# Patient Record
Sex: FEMALE | Race: BLACK OR AFRICAN AMERICAN | NOT HISPANIC OR LATINO | Employment: UNEMPLOYED | URBAN - METROPOLITAN AREA
[De-identification: names, ages, dates, MRNs, and addresses within clinical notes are randomized per-mention and may not be internally consistent; named-entity substitution may affect disease eponyms.]

---

## 2017-08-17 ENCOUNTER — ALLSCRIPTS OFFICE VISIT (OUTPATIENT)
Dept: OTHER | Facility: OTHER | Age: 62
End: 2017-08-17

## 2017-08-17 DIAGNOSIS — E11.65 TYPE 2 DIABETES MELLITUS WITH HYPERGLYCEMIA (HCC): ICD-10-CM

## 2017-08-17 DIAGNOSIS — E78.5 HYPERLIPIDEMIA: ICD-10-CM

## 2017-08-18 ENCOUNTER — TRANSCRIBE ORDERS (OUTPATIENT)
Dept: ADMINISTRATIVE | Age: 62
End: 2017-08-18

## 2017-08-18 ENCOUNTER — APPOINTMENT (OUTPATIENT)
Dept: LAB | Age: 62
End: 2017-08-18

## 2017-08-18 DIAGNOSIS — E78.5 HYPERLIPIDEMIA: ICD-10-CM

## 2017-08-18 DIAGNOSIS — E11.65 TYPE 2 DIABETES MELLITUS WITH HYPERGLYCEMIA (HCC): ICD-10-CM

## 2017-08-18 LAB
ALBUMIN SERPL BCP-MCNC: 3.6 G/DL (ref 3.5–5)
ALP SERPL-CCNC: 71 U/L (ref 46–116)
ALT SERPL W P-5'-P-CCNC: 13 U/L (ref 12–78)
ANION GAP SERPL CALCULATED.3IONS-SCNC: 4 MMOL/L (ref 4–13)
AST SERPL W P-5'-P-CCNC: 7 U/L (ref 5–45)
BILIRUB SERPL-MCNC: 0.52 MG/DL (ref 0.2–1)
BUN SERPL-MCNC: 15 MG/DL (ref 5–25)
CALCIUM SERPL-MCNC: 9.5 MG/DL (ref 8.3–10.1)
CHLORIDE SERPL-SCNC: 104 MMOL/L (ref 100–108)
CHOLEST SERPL-MCNC: 183 MG/DL (ref 50–200)
CO2 SERPL-SCNC: 29 MMOL/L (ref 21–32)
CREAT SERPL-MCNC: 0.7 MG/DL (ref 0.6–1.3)
CREAT UR-MCNC: 169 MG/DL
EST. AVERAGE GLUCOSE BLD GHB EST-MCNC: 146 MG/DL
GFR SERPL CREATININE-BSD FRML MDRD: 109 ML/MIN/1.73SQ M
GLUCOSE P FAST SERPL-MCNC: 96 MG/DL (ref 65–99)
HBA1C MFR BLD: 6.7 % (ref 4.2–6.3)
HDLC SERPL-MCNC: 51 MG/DL (ref 40–60)
LDLC SERPL CALC-MCNC: 120 MG/DL (ref 0–100)
MICROALBUMIN UR-MCNC: 14.2 MG/L (ref 0–20)
MICROALBUMIN/CREAT 24H UR: 8 MG/G CREATININE (ref 0–30)
POTASSIUM SERPL-SCNC: 3.8 MMOL/L (ref 3.5–5.3)
PROT SERPL-MCNC: 7.7 G/DL (ref 6.4–8.2)
SODIUM SERPL-SCNC: 137 MMOL/L (ref 136–145)
TRIGL SERPL-MCNC: 58 MG/DL

## 2017-08-18 PROCEDURE — 82570 ASSAY OF URINE CREATININE: CPT

## 2017-08-18 PROCEDURE — 80061 LIPID PANEL: CPT

## 2017-08-18 PROCEDURE — 80053 COMPREHEN METABOLIC PANEL: CPT

## 2017-08-18 PROCEDURE — 83036 HEMOGLOBIN GLYCOSYLATED A1C: CPT

## 2017-08-18 PROCEDURE — 36415 COLL VENOUS BLD VENIPUNCTURE: CPT

## 2017-08-18 PROCEDURE — 82043 UR ALBUMIN QUANTITATIVE: CPT

## 2017-09-05 ENCOUNTER — GENERIC CONVERSION - ENCOUNTER (OUTPATIENT)
Dept: OTHER | Facility: OTHER | Age: 62
End: 2017-09-05

## 2017-09-26 ENCOUNTER — GENERIC CONVERSION - ENCOUNTER (OUTPATIENT)
Dept: OTHER | Facility: OTHER | Age: 62
End: 2017-09-26

## 2017-09-26 LAB
LEFT EYE DIABETIC RETINOPATHY: NORMAL
RIGHT EYE DIABETIC RETINOPATHY: NORMAL

## 2017-10-09 ENCOUNTER — GENERIC CONVERSION - ENCOUNTER (OUTPATIENT)
Dept: OTHER | Facility: OTHER | Age: 62
End: 2017-10-09

## 2017-10-09 DIAGNOSIS — N64.4 MASTODYNIA: ICD-10-CM

## 2017-11-30 ENCOUNTER — GENERIC CONVERSION - ENCOUNTER (OUTPATIENT)
Dept: OTHER | Facility: OTHER | Age: 62
End: 2017-11-30

## 2017-12-26 ENCOUNTER — GENERIC CONVERSION - ENCOUNTER (OUTPATIENT)
Dept: OTHER | Facility: OTHER | Age: 62
End: 2017-12-26

## 2017-12-26 ENCOUNTER — APPOINTMENT (OUTPATIENT)
Dept: URGENT CARE | Age: 62
End: 2017-12-26
Payer: OTHER MISCELLANEOUS

## 2017-12-26 ENCOUNTER — APPOINTMENT (OUTPATIENT)
Dept: RADIOLOGY | Age: 62
End: 2017-12-26
Attending: PHYSICIAN ASSISTANT
Payer: OTHER MISCELLANEOUS

## 2017-12-26 ENCOUNTER — TRANSCRIBE ORDERS (OUTPATIENT)
Dept: URGENT CARE | Age: 62
End: 2017-12-26

## 2017-12-26 DIAGNOSIS — T14.90XA INJURY: ICD-10-CM

## 2017-12-26 DIAGNOSIS — T14.90XA INJURY: Primary | ICD-10-CM

## 2017-12-26 PROCEDURE — 71101 X-RAY EXAM UNILAT RIBS/CHEST: CPT

## 2017-12-26 PROCEDURE — G0382 LEV 3 HOSP TYPE B ED VISIT: HCPCS | Performed by: FAMILY MEDICINE

## 2017-12-26 PROCEDURE — 99283 EMERGENCY DEPT VISIT LOW MDM: CPT | Performed by: FAMILY MEDICINE

## 2018-01-13 VITALS
BODY MASS INDEX: 32.4 KG/M2 | HEIGHT: 65 IN | WEIGHT: 194.45 LBS | HEART RATE: 88 BPM | TEMPERATURE: 98.2 F | SYSTOLIC BLOOD PRESSURE: 102 MMHG | DIASTOLIC BLOOD PRESSURE: 72 MMHG

## 2018-01-15 NOTE — MISCELLANEOUS
Reason For Visit  Reason For Visit Free Text Note Form: Assistance with obtaining Medical coverage and Medications     Case Management Documentation [facility]:   Information obtained from the patient and medical record  Patient's financial status employed and no medical insurance  Action Plan: supportive counseling/advocacy, information provided and Financial Counselor? Medicine Program  plan reviewed  Progress Note  TERE met wit this 63 y/o female pt who originally is from the [facility]  Pt is being [address] for her Diabetes and is with out insurance Pt resides with her dgt and grandchildren, Pt relates she was rejected for MA in the past  However, pt has very sporadic income and it appears she maybe eligible for MA  Pt referred to [facility] our financial Counselor to assist with MA application as well as [facility]  Sw has jairo reviewed pt's medications with [facility]   Pt will f/u with Tomi Redman re: some of the Mail order options and will switch to a less expensive pharmacy  Pt to f/u with [facility]ERE as indicated  Active Problems    1  Benign essential hypertension (401 1) (I10)   2  Diabetes Mellitus (250 00)   3  Diabetes mellitus type 2, uncontrolled (250 02) (E11 65)   4  Peripheral neuropathy (356 9) (G62 9)    Current Meds   1  Gabapentin 300 MG Oral Capsule; TAKE 1 CAPSULE DAILY FOR 5 DAYS THEN   INCREASE TO 1 CAPSULE TWICE A DAY; Therapy: 21Jan2013 to (Evaluate:50Niq0285)  Requested for: 51DRD5965; Last   Rx:[id number] Ordered   2  Lisinopril-Hydrochlorothiazide 10-12 5 MG Oral Tablet; TAKE 1 TABLET DAILY; Therapy: 52GSG1773 to (Evaluate:66Afr6161)  Requested for: 36AXQ8719; Last   Rx:[id number] Ordered   3  MetFORMIN HCl - 1000 MG Oral Tablet; TAKE ONE TABLET BY MOUTH TWICE DAILY AS   DIRECTED; Therapy: 95BQB3019 to (Evaluate:60Dkh2960)  Requested for: 93KPX7219; Last   Rx:[id number] Ordered    Allergies    1   No Known Drug Allergies    Future Appointments    Date/Time Provider Specialty Site   07/25/2016 01:10 PM Coleman Tirado PCP   08/30/2016 09:50 AM Specialty Clinic, Podiatry  [address]     Signatures   Electronically signed by : Abhijeet Peterson LCSW; Jul 20 2016 10:57AM EST                       (Author)

## 2018-01-22 VITALS
TEMPERATURE: 98 F | HEART RATE: 72 BPM | DIASTOLIC BLOOD PRESSURE: 80 MMHG | BODY MASS INDEX: 31.96 KG/M2 | HEIGHT: 65 IN | WEIGHT: 191.8 LBS | SYSTOLIC BLOOD PRESSURE: 122 MMHG

## 2018-01-22 VITALS
HEART RATE: 80 BPM | WEIGHT: 196.65 LBS | TEMPERATURE: 97.6 F | HEIGHT: 65 IN | SYSTOLIC BLOOD PRESSURE: 156 MMHG | BODY MASS INDEX: 32.76 KG/M2 | DIASTOLIC BLOOD PRESSURE: 88 MMHG

## 2018-01-31 PROBLEM — E11.9 DIABETES MELLITUS TYPE II, CONTROLLED (HCC): Status: ACTIVE | Noted: 2017-10-09

## 2018-01-31 PROBLEM — E11.9 DM TYPE 2 WITHOUT RETINOPATHY (HCC): Status: ACTIVE | Noted: 2017-11-30

## 2018-01-31 RX ORDER — GLYBURIDE 5 MG/1
1 TABLET ORAL
COMMUNITY
Start: 2017-08-17 | End: 2018-02-01 | Stop reason: SDUPTHER

## 2018-01-31 RX ORDER — GABAPENTIN 300 MG/1
2 CAPSULE ORAL EVERY 8 HOURS
COMMUNITY
Start: 2013-01-21 | End: 2018-02-01 | Stop reason: SDUPTHER

## 2018-01-31 RX ORDER — LISINOPRIL AND HYDROCHLOROTHIAZIDE 12.5; 1 MG/1; MG/1
1 TABLET ORAL DAILY
COMMUNITY
Start: 2013-01-24 | End: 2018-02-01 | Stop reason: SDUPTHER

## 2018-01-31 RX ORDER — MELOXICAM 15 MG/1
1 TABLET ORAL DAILY PRN
COMMUNITY
Start: 2017-10-09 | End: 2018-02-26 | Stop reason: SDUPTHER

## 2018-01-31 RX ORDER — LOVASTATIN 20 MG/1
1 TABLET ORAL
COMMUNITY
Start: 2016-07-25 | End: 2018-02-01 | Stop reason: SDUPTHER

## 2018-02-01 ENCOUNTER — OFFICE VISIT (OUTPATIENT)
Dept: INTERNAL MEDICINE CLINIC | Facility: CLINIC | Age: 63
End: 2018-02-01
Payer: COMMERCIAL

## 2018-02-01 VITALS
HEART RATE: 96 BPM | WEIGHT: 198.41 LBS | BODY MASS INDEX: 33.06 KG/M2 | DIASTOLIC BLOOD PRESSURE: 76 MMHG | TEMPERATURE: 98 F | HEIGHT: 65 IN | SYSTOLIC BLOOD PRESSURE: 132 MMHG

## 2018-02-01 DIAGNOSIS — E78.2 HYPERLIPEMIA, MIXED: Primary | ICD-10-CM

## 2018-02-01 DIAGNOSIS — Z23 NEED FOR INFLUENZA VACCINATION: Primary | ICD-10-CM

## 2018-02-01 DIAGNOSIS — E78.2 MIXED HYPERLIPIDEMIA: ICD-10-CM

## 2018-02-01 DIAGNOSIS — E11.42 DIABETIC PERIPHERAL NEUROPATHY (HCC): ICD-10-CM

## 2018-02-01 DIAGNOSIS — E11.42 CONTROLLED TYPE 2 DIABETES MELLITUS WITH DIABETIC POLYNEUROPATHY, WITHOUT LONG-TERM CURRENT USE OF INSULIN (HCC): ICD-10-CM

## 2018-02-01 DIAGNOSIS — Z12.11 SCREENING FOR COLON CANCER: ICD-10-CM

## 2018-02-01 DIAGNOSIS — I10 BENIGN ESSENTIAL HYPERTENSION: ICD-10-CM

## 2018-02-01 DIAGNOSIS — J40 BRONCHITIS: ICD-10-CM

## 2018-02-01 PROCEDURE — 99213 OFFICE O/P EST LOW 20 MIN: CPT | Performed by: PHYSICIAN ASSISTANT

## 2018-02-01 RX ORDER — GABAPENTIN 300 MG/1
600 CAPSULE ORAL EVERY 8 HOURS
Qty: 180 CAPSULE | Refills: 2 | Status: SHIPPED | OUTPATIENT
Start: 2018-02-01 | End: 2018-03-01 | Stop reason: SDUPTHER

## 2018-02-01 RX ORDER — LOVASTATIN 20 MG/1
20 TABLET ORAL
Qty: 90 TABLET | Refills: 0 | Status: SHIPPED | OUTPATIENT
Start: 2018-02-01 | End: 2018-03-01 | Stop reason: SDUPTHER

## 2018-02-01 RX ORDER — ALBUTEROL SULFATE 90 UG/1
2 AEROSOL, METERED RESPIRATORY (INHALATION) EVERY 4 HOURS PRN
Status: SHIPPED | OUTPATIENT
Start: 2018-02-01 | End: 2018-02-08

## 2018-02-01 RX ORDER — LISINOPRIL AND HYDROCHLOROTHIAZIDE 12.5; 1 MG/1; MG/1
1 TABLET ORAL DAILY
Qty: 90 TABLET | Refills: 0 | Status: SHIPPED | OUTPATIENT
Start: 2018-02-01 | End: 2018-03-01 | Stop reason: SDUPTHER

## 2018-02-01 RX ORDER — GLYBURIDE 5 MG/1
5 TABLET ORAL
Qty: 90 TABLET | Refills: 0 | Status: SHIPPED | OUTPATIENT
Start: 2018-02-01 | End: 2018-03-01 | Stop reason: SDUPTHER

## 2018-02-01 NOTE — PATIENT INSTRUCTIONS
10% - bad control"> 10% - bad control,Hemoglobin A1c (HbA1c) greater than 10% indicating poor diabetic control,Haemoglobin A1c greater than 10% indicating poor diabetic control">   Diabetes Mellitus Type 2 in Adults, Ambulatory Care   GENERAL INFORMATION:   Diabetes mellitus type 2  is a disease that affects how your body uses glucose (sugar)  Insulin helps move sugar out of the blood so it can be used for energy  Normally, when the blood sugar level increases, the pancreas makes more insulin  Type 2 diabetes develops because either the body cannot make enough insulin, or it cannot use the insulin correctly  After many years, your pancreas may stop making insulin  Common symptoms include the following:   · More hunger or thirst than usual     · Frequent urination     · Weight loss without trying     · Blurred vision  Seek immediate care for the following symptoms:   · Severe abdominal pain, or pain that spreads to your back  You may also be vomiting  · Trouble staying awake or focusing    · Shaking or sweating    · Blurred or double vision    · Breath has a fruity, sweet smell    · Breathing is deep and labored, or rapid and shallow    · Heartbeat is fast and weak  Treatment for diabetes mellitus type 2  includes keeping your blood sugar at a normal level  You must eat the right foods, and exercise regularly  You may also need medicine if you cannot control your blood sugar level with nutrition and exercise  Manage diabetes mellitus type 2:   · Check your blood sugar level  You will be taught how to check a small drop of blood in a glucose monitor  Ask your healthcare provider when and how often to check during the day  Ask your healthcare provider what your blood sugar levels should be when you check them  · Keep track of carbohydrates (sugar and starchy foods)  Your blood sugar level can get too high if you eat too many carbohydrates   Your dietitian will help you plan meals and snacks that have the right amount of carbohydrates  · Eat low-fat foods  Some examples are skinless chicken and low-fat milk  · Eat less sodium (salt)  Some examples of high-sodium foods to limit are soy sauce, potato chips, and soup  Do not add salt to food you cook  Limit your use of table salt  · Eat high-fiber foods  Foods that are a good source of fiber include vegetables, whole grain bread, and beans  · Limit alcohol  Alcohol affects your blood sugar level and can make it harder to manage your diabetes  Women should limit alcohol to 1 drink a day  Men should limit alcohol to 2 drinks a day  A drink of alcohol is 12 ounces of beer, 5 ounces of wine, or 1½ ounces of liquor  · Get regular exercise  Exercise can help keep your blood sugar level steady, decrease your risk of heart disease, and help you lose weight  Exercise for at least 30 minutes, 5 days a week  Include muscle strengthening activities 2 days each week  Work with your healthcare provider to create an exercise plan  · Check your feet each day  for injuries or open sores  Ask your healthcare provider for activities you can do if you have an open sore  · Quit smoking  If you smoke, it is never too late to quit  Smoking can worsen the problems that may occur with diabetes  Ask your healthcare provider for information about how to stop smoking if you are having trouble quitting  · Ask about your weight:  Ask healthcare providers if you need to lose weight, and how much to lose  Ask them to help you with a weight loss program  Even a 10 to 15 pound weight loss can help you manage your blood sugar level  · Carry medical alert identification  Wear medical alert jewelry or carry a card that says you have diabetes  Ask your healthcare provider where to get these items  · Ask about vaccines  Diabetes puts you at risk of serious illness if you get the flu, pneumonia, or hepatitis   Ask your healthcare provider if you should get a flu, pneumonia, or hepatitis B vaccine, and when to get the vaccine  Follow up with your healthcare provider as directed:  Write down your questions so you remember to ask them during your visits  CARE AGREEMENT:   You have the right to help plan your care  Learn about your health condition and how it may be treated  Discuss treatment options with your caregivers to decide what care you want to receive  You always have the right to refuse treatment  The above information is an  only  It is not intended as medical advice for individual conditions or treatments  Talk to your doctor, nurse or pharmacist before following any medical regimen to see if it is safe and effective for you  © 2014 [address] is for End User's use only and may not be sold, redistributed or otherwise used for commercial purposes  All illustrations and images included in CareNotes® are the copyrighted property of [employer]  or [address]  Acute Bronchitis   AMBULATORY CARE:   Acute bronchitis  is swelling and irritation in the air passages of your lungs  This irritation may cause you to cough or have other breathing problems  Acute bronchitis often starts because of another illness, such as a cold or the flu  The illness spreads from your nose and throat to your windpipe and airways  Bronchitis is often called a chest cold  Acute bronchitis lasts about 3 to 6 weeks and is usually not a serious illness  Your cough can last for several weeks  You may have any of the following symptoms:   · A cough with sputum that may be clear, yellow, or green    · Feeling more tired than usual, and body aches    · A fever and chills    · Wheezing when you breathe    · A tight chest or pain when you breathe or cough  Seek care immediately if:   · You cough up blood  · Your lips or fingernails turn blue  · You feel like you are not getting enough air when you breathe    Contact your healthcare provider if:   · You have a fever  · Your breathing problems do not go away or get worse  · Your cough does not get better within 4 weeks  · You have questions or concerns about your condition or care  Self-care:   · Get more rest   Rest helps your body to heal  Slowly start to do more each day  Rest when you feel it is needed  · Avoid irritants in the air  Avoid chemicals, fumes, and dust  Wear a face mask if you must work around dust or fumes  Stay inside on days when air pollution levels are high  If you have allergies, stay inside when pollen counts are high  Do not use aerosol products, such as spray-on deodorant, bug spray, and hair spray  · Do not smoke or be around others who smoke  Nicotine and other chemicals in cigarettes and cigars damages the cilia that move mucus out of your lungs  Ask your healthcare provider for information if you currently smoke and need help to quit  E-cigarettes or smokeless tobacco still contain nicotine  Talk to your healthcare provider before you use these products  · Drink liquids as directed  Liquids help keep your air passages moist and help you cough up mucus  You may need to drink more liquids when you have acute bronchitis  Ask how much liquid to drink each day and which liquids are best for you  · Use a humidifier or vaporizer  Use a cool mist humidifier or a vaporizer to increase air moisture in your home  This may make it easier for you to breathe and help decrease your cough  Prevent acute bronchitis by doing the following:   · Get the vaccinations you need  Ask your healthcare provider if you should get vaccinated against the flu or pneumonia  · Prevent the spread of germs  You can decrease your risk of acute bronchitis and other illnesses by doing the following:     [facility] your hands often with soap and water  Carry germ-killing hand lotion or gel with you   You can use the lotion or gel to clean your hands when soap and water are not available  ¨ Do not touch your eyes, nose, or mouth unless you have washed your hands first     ¨ Always cover your mouth when you cough to prevent the spread of germs  It is best to cough into a tissue or your shirt sleeve instead of into your hand  Ask those around you cover their mouths when they cough  ¨ Try to avoid people who have a cold or the flu  If you are sick, stay away from others as much as possible  Medicines: Your healthcare provider may  give you any of the following:  · Ibuprofen or acetaminophen  are medicines that help lower your fever  They are available without a doctor's order  Ask your healthcare provider which medicine is right for you  Ask how much to take and how often to take it  Follow directions  These medicines can cause stomach bleeding if not taken correctly  Ibuprofen can cause kidney damage  Do not take ibuprofen if you have kidney disease, an ulcer, or allergies to aspirin  Acetaminophen can cause liver damage  Do not take more than 4,000 milligrams in 24 hours  · Decongestants  help loosen mucus in your lungs and make it easier to cough up  This can help you breathe easier  · Cough suppressants  decrease your urge to cough  If your cough produces mucus, do not take a cough suppressant unless your healthcare provider tells you to  Your healthcare provider may suggest that you take a cough suppressant at night so you can rest     · Inhalers  may be given  Your healthcare provider may give you one or more inhalers to help you breathe easier and cough less  An inhaler gives your medicine to open your airways  Ask your healthcare provider to show you how to use your inhaler correctly  Follow up with your healthcare provider as directed:  Write down questions you have so you will remember to ask them during your follow-up visits    © 2017 [address] [employer] Information is for End User's use only and may not be sold, redistributed or otherwise used for commercial purposes  All illustrations and images included in CareNotes® are the copyrighted property of [employer]  or Ephra[employer] Charles  The above information is an  only  It is not intended as medical advice for individual conditions or treatments  Talk to your doctor, nurse or pharmacist before following any medical regimen to see if it is safe and effective for you

## 2018-02-01 NOTE — PROGRESS NOTES
Collaborating physician  Patient reviewed with NP Mary Dai  Primary and secondary diagnoses and management, as detailed in A&P, reviewed and accepted in their entirety  Pertinent lab study results reviewed and discussed     Follow-up appointment and instructions provided to patient

## 2018-02-01 NOTE — PROGRESS NOTES
Assessment/Plan:   I have refilled your medications  Please get your labs completed  Complete the FIT test  Take the medication for cough and cold  Note for RTW provided  Appointment here in 1 month to review labs    No problem-specific Assessment & Plan notes found for this encounter  Diagnoses and all orders for this visit:    Need for influenza vaccination  -     Cancel: Flu vaccine greater than or equal to 4yo preservative free IM    Screening for colon cancer  -     Cancel: Occult Bloood,Fecal Immunochemical; Future    Controlled type 2 diabetes mellitus with diabetic polyneuropathy, without long-term current use of insulin (HCC)  -     glyBURIDE (DIABETA) 5 mg tablet; Take 1 tablet (5 mg total) by mouth daily with breakfast for 90 days  -     metFORMIN (GLUCOPHAGE) 1000 MG tablet; Take 1 tablet (1,000 mg total) by mouth 2 (two) times a day with meals for 90 days  -     Comprehensive metabolic panel; Future  -     Hemoglobin A1c  -     Microalbumin / creatinine urine ratio  -     Comprehensive metabolic panel    Benign essential hypertension  -     lisinopril-hydrochlorothiazide (PRINZIDE,ZESTORETIC) 10-12 5 MG per tablet; Take 1 tablet by mouth daily for 90 days    Mixed hyperlipidemia  -     lovastatin (MEVACOR) 20 mg tablet; Take 1 tablet (20 mg total) by mouth daily at bedtime for 90 days  -     Lipid Panel with Direct LDL reflex    Diabetic peripheral neuropathy (HCC)  -     gabapentin (NEURONTIN) 300 mg capsule; Take 2 capsules (600 mg total) by mouth every 8 (eight) hours for 90 days    Bronchitis  -     Chlorpheniramine-DM (CORICIDIN COUGH/COLD) 4-30 MG TABS; Take 1 tablet by mouth 3 (three) times a day for 7 days  -     albuterol (PROVENTIL HFA,VENTOLIN HFA) inhaler 2 puff; Inhale 2 puffs every 4 (four) hours as needed for wheezing or shortness of breath     Other orders  -     Discontinue: gabapentin (NEURONTIN) 300 mg capsule;  Take 2 capsules by mouth every 8 (eight) hours  -     Discontinue: glyBURIDE (DIABETA) 5 mg tablet; Take 1 tablet by mouth  -     Discontinue: lisinopril-hydrochlorothiazide (PRINZIDE,ZESTORETIC) 10-12 5 MG per tablet; Take 1 tablet by mouth daily  -     Discontinue: lovastatin (MEVACOR) 20 mg tablet; Take 1 tablet by mouth  -     meloxicam (MOBIC) 15 mg tablet; Take 1 tablet by mouth daily as needed  -     Discontinue: metFORMIN (GLUCOPHAGE) 1000 MG tablet; Take 1 tablet by mouth 2 (two) times a day          Subjective:      Patient ID: Niecy Chamberlain is a 65 y o  female  Pt here for routine check up, did not get her labs completed  Also reports cough started 5 days ago, all day but is affecting sleep at night  Works health care, 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  You decline flu vaccine  Will get FIT test  Positive sick contacts at work          Diabetes   She presents for her follow-up diabetic visit  She has type 2 diabetes mellitus  Her disease course has been fluctuating  There are no hypoglycemic associated symptoms  Pertinent negatives for diabetes include no blurred vision, no polydipsia, no polyphagia and no visual change  There are no hypoglycemic complications  Symptoms are stable  There are no diabetic complications  Pertinent negatives for diabetic complications include no CVA  Risk factors for coronary artery disease include diabetes mellitus and post-menopausal  Current diabetic treatment includes oral agent (dual therapy)  She is compliant with treatment most of the time  Her weight is stable  She is following a generally healthy diet  When asked about meal planning, she reported none  She never participates in exercise  There is no change in her home blood glucose trend  An ACE inhibitor/angiotensin II receptor blocker is being taken  Hypertension   This is a chronic problem  The current episode started more than 1 year ago  The problem is unchanged  The problem is controlled  Pertinent negatives include no blurred vision   There are no associated agents to hypertension  Risk factors for coronary artery disease include diabetes mellitus and dyslipidemia  Past treatments include ACE inhibitors and diuretics  The current treatment provides significant improvement  There are no compliance problems  There is no history of kidney disease, CVA or heart failure  Cough   The current episode started in the past 7 days  The problem has been gradually worsening  The problem occurs constantly  The cough is non-productive  Associated symptoms include a fever, myalgias and postnasal drip  Pertinent negatives include no chills  She has tried nothing for the symptoms  There is no history of asthma, COPD or emphysema  The following portions of the patient's history were reviewed and updated as appropriate: allergies, current medications, past family history, past social history, past surgical history and problem list     Review of Systems   Constitutional: Positive for fever  Negative for chills  HENT: Positive for postnasal drip  Eyes: Negative  Negative for blurred vision  Respiratory: Positive for cough  Cardiovascular: Negative  Gastrointestinal: Negative  Endocrine: Negative for polydipsia and polyphagia  Musculoskeletal: Positive for myalgias  Skin: Negative  Allergic/Immunologic: Negative  Neurological: Negative  Hematological: Negative  Psychiatric/Behavioral: Negative  Objective:  Vitals:    02/01/18 0755   BP: 132/76   Pulse: 96   Temp: 98 °F (36 7 °C)   Weight: 90 kg (198 lb 6 6 oz)   Height: 5' 4 5" (1 638 m)        Physical Exam   Constitutional: She is oriented to person, place, and time  She appears well-developed and well-nourished  HENT:   Head: Normocephalic and atraumatic  Right Ear: Tympanic membrane, external ear and ear canal normal    Left Ear: Tympanic membrane, external ear and ear canal normal    Nose: Mucosal edema and rhinorrhea present  Mouth/Throat: Uvula is midline   Mucous membranes are dry  No oropharyngeal exudate, posterior oropharyngeal edema or posterior oropharyngeal erythema  Moderate post nasal drip   Eyes: Conjunctivae are normal  Pupils are equal, round, and reactive to light  Cardiovascular: Normal rate, regular rhythm and normal heart sounds  Exam reveals no gallop and no friction rub  No murmur heard  Pulmonary/Chest: Effort normal  She has no wheezes  She has rales  Abdominal: Soft  Musculoskeletal: Normal range of motion  Neurological: She is alert and oriented to person, place, and time  Skin: Skin is warm and dry

## 2018-02-01 NOTE — LETTER
February 1, 2018     Patient: Jailene Multani   YOB: 1955   Date of Visit: 2/1/2018       To Whom it May Concern:    Jailene Multani is under my professional care  She was seen in my office on 2/1/2018  She may return to work on 2/5/2018  If you have any questions or concerns, please don't hesitate to call           Sincerely,          Deloris Gonzales PA-C        CC: No Recipients

## 2018-02-02 ENCOUNTER — APPOINTMENT (OUTPATIENT)
Dept: LAB | Facility: CLINIC | Age: 63
End: 2018-02-02
Payer: COMMERCIAL

## 2018-02-02 DIAGNOSIS — E78.2 HYPERLIPEMIA, MIXED: ICD-10-CM

## 2018-02-02 LAB
ALBUMIN SERPL BCP-MCNC: 3.3 G/DL (ref 3.5–5)
ALP SERPL-CCNC: 71 U/L (ref 46–116)
ALT SERPL W P-5'-P-CCNC: 19 U/L (ref 12–78)
ANION GAP SERPL CALCULATED.3IONS-SCNC: 5 MMOL/L (ref 4–13)
AST SERPL W P-5'-P-CCNC: 13 U/L (ref 5–45)
BILIRUB SERPL-MCNC: 0.28 MG/DL (ref 0.2–1)
BUN SERPL-MCNC: 13 MG/DL (ref 5–25)
CALCIUM SERPL-MCNC: 9.2 MG/DL (ref 8.3–10.1)
CHLORIDE SERPL-SCNC: 107 MMOL/L (ref 100–108)
CHOLEST SERPL-MCNC: 172 MG/DL (ref 50–200)
CO2 SERPL-SCNC: 29 MMOL/L (ref 21–32)
CREAT SERPL-MCNC: 0.68 MG/DL (ref 0.6–1.3)
CREAT UR-MCNC: 98.9 MG/DL
EST. AVERAGE GLUCOSE BLD GHB EST-MCNC: 131 MG/DL
GFR SERPL CREATININE-BSD FRML MDRD: 109 ML/MIN/1.73SQ M
GLUCOSE P FAST SERPL-MCNC: 79 MG/DL (ref 65–99)
HBA1C MFR BLD: 6.2 % (ref 4.2–6.3)
HDLC SERPL-MCNC: 49 MG/DL (ref 40–60)
LDLC SERPL CALC-MCNC: 106 MG/DL (ref 0–100)
MICROALBUMIN UR-MCNC: 6 MG/L (ref 0–20)
MICROALBUMIN/CREAT 24H UR: 6 MG/G CREATININE (ref 0–30)
POTASSIUM SERPL-SCNC: 3.9 MMOL/L (ref 3.5–5.3)
PROT SERPL-MCNC: 7.2 G/DL (ref 6.4–8.2)
SODIUM SERPL-SCNC: 141 MMOL/L (ref 136–145)
TRIGL SERPL-MCNC: 83 MG/DL

## 2018-02-02 PROCEDURE — 80061 LIPID PANEL: CPT | Performed by: PHYSICIAN ASSISTANT

## 2018-02-02 PROCEDURE — 36415 COLL VENOUS BLD VENIPUNCTURE: CPT | Performed by: PHYSICIAN ASSISTANT

## 2018-02-02 PROCEDURE — 82570 ASSAY OF URINE CREATININE: CPT | Performed by: PHYSICIAN ASSISTANT

## 2018-02-02 PROCEDURE — 82043 UR ALBUMIN QUANTITATIVE: CPT | Performed by: PHYSICIAN ASSISTANT

## 2018-02-02 PROCEDURE — 83036 HEMOGLOBIN GLYCOSYLATED A1C: CPT | Performed by: PHYSICIAN ASSISTANT

## 2018-02-02 PROCEDURE — 80053 COMPREHEN METABOLIC PANEL: CPT | Performed by: PHYSICIAN ASSISTANT

## 2018-02-02 PROCEDURE — 3061F NEG MICROALBUMINURIA REV: CPT | Performed by: PHYSICIAN ASSISTANT

## 2018-02-03 ENCOUNTER — TRANSCRIBE ORDERS (OUTPATIENT)
Dept: LAB | Facility: HOSPITAL | Age: 63
End: 2018-02-03

## 2018-02-03 DIAGNOSIS — Z12.11 SPECIAL SCREENING FOR MALIGNANT NEOPLASMS, COLON: Primary | ICD-10-CM

## 2018-02-05 ENCOUNTER — TELEPHONE (OUTPATIENT)
Dept: INTERNAL MEDICINE CLINIC | Facility: CLINIC | Age: 63
End: 2018-02-05

## 2018-02-12 DIAGNOSIS — E11.9 TYPE 2 DIABETES MELLITUS WITHOUT COMPLICATIONS (HCC): ICD-10-CM

## 2018-02-19 ENCOUNTER — APPOINTMENT (OUTPATIENT)
Dept: LAB | Facility: CLINIC | Age: 63
End: 2018-02-19
Payer: COMMERCIAL

## 2018-02-19 DIAGNOSIS — Z12.11 SCREENING FOR COLON CANCER: ICD-10-CM

## 2018-02-19 LAB — HEMOCCULT STL QL IA: POSITIVE

## 2018-02-19 PROCEDURE — G0328 FECAL BLOOD SCRN IMMUNOASSAY: HCPCS

## 2018-02-26 DIAGNOSIS — M19.90 ARTHRITIS: Primary | ICD-10-CM

## 2018-02-27 RX ORDER — MELOXICAM 15 MG/1
TABLET ORAL
Qty: 90 TABLET | Refills: 1 | Status: SHIPPED | OUTPATIENT
Start: 2018-02-27 | End: 2018-07-26

## 2018-02-28 PROBLEM — J40 BRONCHITIS: Status: RESOLVED | Noted: 2018-02-01 | Resolved: 2018-02-28

## 2018-02-28 PROBLEM — R19.5 POSITIVE FIT (FECAL IMMUNOCHEMICAL TEST): Status: ACTIVE | Noted: 2018-02-28

## 2018-02-28 NOTE — PROGRESS NOTES
Assessment/Plan:  No change to meds today and all were refilled  sched PT for your back pain as chronic and getting worse  Get the urine test due to some pain with urination  Appt here in 6 months / prn    Diabetes mellitus type II, controlled ([facility] [address] )  Your sugar is well controlled, continue Metformin and glyburide  Hyperlipidemia  Your cholesterol is well controlled continue Lovastatin    Positive FIT (fecal immunochemical test)  We will call you with appointment for colonoscopy    Benign essential hypertension  Continue Lisinopril/ HCTZ as blood pressure well controled  Diagnoses and all orders for this visit:    Controlled type 2 diabetes mellitus with diabetic polyneuropathy, without long-term current use of insulin (HCC)  -     glyBURIDE (DIABETA) 5 mg tablet; Take 1 tablet (5 mg total) by mouth daily with breakfast for 180 days    Mixed hyperlipidemia    Benign essential hypertension    Positive FIT (fecal immunochemical test)  -     Ambulatory referral to Gastroenterology; Future    Need for influenza vaccination    Dysuria  -     UA w Reflex to Microscopic w Reflex to Culture -Lab Collect    Chronic bilateral low back pain without sciatica  -     Ambulatory referral to Physical Therapy; Future    Other orders  -     Cancel: Flu vaccine greater than or equal to 4yo preservative free IM          Subjective:      Patient ID: Claudean Cao is a 65 y o  female  Patient is here today for lab review  Significant improvement in A1c was 6 7 now in February 2018 is 6 2%  Cholesterol is well controlled on current dose of lovastatin will be continued  Blood pressure also well controlled on lisinopril HCTZ same dose will be continued  Patient is fit test came back positive and will be scheduled for a colonoscopy  Needs med refills    Patient states she has been experiencing a lot of back pain that has been chronic(more than 10 years) but worse over the past two months   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 but cannot think of a specific event  Does a lot of lifting of patients  No radiation of pain, no LE weakness  Does have DM neuropathy but limited to feet/ hands and managed with gabapentin  Reports this is unchanged  Also c/o past 2 weeks sometimes feels pressure slight discomfort with urination, thinks more frequent  Pt then had to leave as had to take grandchildren to school      Diabetes   She presents for her follow-up diabetic visit  She has type 2 diabetes mellitus  Her disease course has been improving  Pertinent negatives for hypoglycemia include no headaches or tremors  Associated symptoms include foot paresthesias  Pertinent negatives for diabetes include no chest pain, no polydipsia and no polyphagia  Current diabetic treatment includes oral agent (dual therapy)  An ACE inhibitor/angiotensin II receptor blocker is being taken  The following portions of the patient's history were reviewed and updated as appropriate: allergies, current medications, past family history, past medical history, past social history, past surgical history and problem list     Review of Systems   Constitutional: Negative for chills and fever  HENT: Negative  Respiratory: Negative  Cardiovascular: Negative for chest pain, palpitations and leg swelling  Gastrointestinal: Negative  Endocrine: Negative for polydipsia and polyphagia  Genitourinary: Positive for dysuria, frequency and urgency  Negative for vaginal discharge and vaginal pain  Musculoskeletal: Positive for back pain and myalgias  Negative for arthralgias (back, legs, shoulders), gait problem and neck pain  Patient states she is getting tingling/pins and needles on the left hand and feet   Neurological: Negative for tremors and headaches           Objective:      /82 (BP Location: Right arm, Patient Position: Sitting, Cuff Size: Large)   Pulse (!) 120   Temp 97 8 °F (36 6 °C) (Oral)   Ht 5' 4 5" (1 638 m)   Wt 88 2 kg (194 lb 7 1 oz)   BMI 32 86 kg/m²          Physical Exam   Constitutional: She is oriented to person, place, and time  She appears well-developed and well-nourished  HENT:   Head: Normocephalic and atraumatic  Eyes: Conjunctivae are normal  Pupils are equal, round, and reactive to light  Cardiovascular: Normal rate, regular rhythm, normal heart sounds and intact distal pulses  Pulmonary/Chest: Effort normal and breath sounds normal    Abdominal: Soft  Bowel sounds are normal  There is no guarding  NO suprapubic pain, Neg CVA tenderness   Musculoskeletal: She exhibits tenderness  Lumbar back: She exhibits decreased range of motion and tenderness  She exhibits no bony tenderness and no swelling  Neurological: She is alert and oriented to person, place, and time  She displays normal reflexes  She exhibits normal muscle tone  Skin: Skin is warm and dry  Psychiatric: She has a normal mood and affect   Her behavior is normal

## 2018-03-01 ENCOUNTER — APPOINTMENT (OUTPATIENT)
Dept: LAB | Facility: CLINIC | Age: 63
End: 2018-03-01
Payer: COMMERCIAL

## 2018-03-01 ENCOUNTER — OFFICE VISIT (OUTPATIENT)
Dept: INTERNAL MEDICINE CLINIC | Facility: CLINIC | Age: 63
End: 2018-03-01
Payer: COMMERCIAL

## 2018-03-01 VITALS
HEIGHT: 65 IN | TEMPERATURE: 97.8 F | BODY MASS INDEX: 32.4 KG/M2 | HEART RATE: 120 BPM | SYSTOLIC BLOOD PRESSURE: 134 MMHG | WEIGHT: 194.45 LBS | DIASTOLIC BLOOD PRESSURE: 82 MMHG

## 2018-03-01 DIAGNOSIS — R19.5 POSITIVE FIT (FECAL IMMUNOCHEMICAL TEST): ICD-10-CM

## 2018-03-01 DIAGNOSIS — E11.42 CONTROLLED TYPE 2 DIABETES MELLITUS WITH DIABETIC POLYNEUROPATHY, WITHOUT LONG-TERM CURRENT USE OF INSULIN (HCC): Primary | ICD-10-CM

## 2018-03-01 DIAGNOSIS — E11.42 DIABETIC PERIPHERAL NEUROPATHY (HCC): ICD-10-CM

## 2018-03-01 DIAGNOSIS — R30.0 DYSURIA: ICD-10-CM

## 2018-03-01 DIAGNOSIS — M54.50 CHRONIC BILATERAL LOW BACK PAIN WITHOUT SCIATICA: ICD-10-CM

## 2018-03-01 DIAGNOSIS — E11.9 TYPE 2 DIABETES MELLITUS WITHOUT COMPLICATIONS (HCC): ICD-10-CM

## 2018-03-01 DIAGNOSIS — E78.2 MIXED HYPERLIPIDEMIA: ICD-10-CM

## 2018-03-01 DIAGNOSIS — Z23 NEED FOR INFLUENZA VACCINATION: ICD-10-CM

## 2018-03-01 DIAGNOSIS — G89.29 CHRONIC BILATERAL LOW BACK PAIN WITHOUT SCIATICA: ICD-10-CM

## 2018-03-01 DIAGNOSIS — I10 BENIGN ESSENTIAL HYPERTENSION: ICD-10-CM

## 2018-03-01 LAB
BILIRUB UR QL STRIP: ABNORMAL
CLARITY UR: ABNORMAL
COLOR UR: ABNORMAL
GLUCOSE UR STRIP-MCNC: NEGATIVE MG/DL
HGB UR QL STRIP.AUTO: NEGATIVE
KETONES UR STRIP-MCNC: NEGATIVE MG/DL
LEUKOCYTE ESTERASE UR QL STRIP: NEGATIVE
NITRITE UR QL STRIP: NEGATIVE
PH UR STRIP.AUTO: 6 [PH] (ref 4.5–8)
PROT UR STRIP-MCNC: NEGATIVE MG/DL
SP GR UR STRIP.AUTO: 1.03 (ref 1–1.03)
UROBILINOGEN UR QL STRIP.AUTO: 1 E.U./DL

## 2018-03-01 PROCEDURE — 99214 OFFICE O/P EST MOD 30 MIN: CPT | Performed by: PHYSICIAN ASSISTANT

## 2018-03-01 PROCEDURE — 81003 URINALYSIS AUTO W/O SCOPE: CPT | Performed by: PHYSICIAN ASSISTANT

## 2018-03-01 RX ORDER — GLYBURIDE 5 MG/1
5 TABLET ORAL
Qty: 90 TABLET | Refills: 1 | Status: SHIPPED | OUTPATIENT
Start: 2018-03-01 | End: 2018-09-04 | Stop reason: SDUPTHER

## 2018-03-01 RX ORDER — GABAPENTIN 300 MG/1
600 CAPSULE ORAL EVERY 8 HOURS
Qty: 270 CAPSULE | Refills: 1 | Status: SHIPPED | OUTPATIENT
Start: 2018-03-01 | End: 2018-09-04 | Stop reason: SDUPTHER

## 2018-03-01 RX ORDER — LISINOPRIL AND HYDROCHLOROTHIAZIDE 12.5; 1 MG/1; MG/1
1 TABLET ORAL DAILY
Qty: 90 TABLET | Refills: 1 | Status: SHIPPED | OUTPATIENT
Start: 2018-03-01 | End: 2018-09-04 | Stop reason: SDUPTHER

## 2018-03-01 RX ORDER — GLYBURIDE 5 MG/1
5 TABLET ORAL
Qty: 90 TABLET | Refills: 1 | Status: SHIPPED | OUTPATIENT
Start: 2018-03-01 | End: 2018-03-01 | Stop reason: SDUPTHER

## 2018-03-01 RX ORDER — LOVASTATIN 20 MG/1
20 TABLET ORAL
Qty: 90 TABLET | Refills: 1 | Status: SHIPPED | OUTPATIENT
Start: 2018-03-01 | End: 2018-09-04 | Stop reason: SDUPTHER

## 2018-04-19 ENCOUNTER — EVALUATION (OUTPATIENT)
Dept: PHYSICAL THERAPY | Age: 63
End: 2018-04-19
Payer: COMMERCIAL

## 2018-04-19 DIAGNOSIS — M54.50 CHRONIC BILATERAL LOW BACK PAIN WITHOUT SCIATICA: Primary | ICD-10-CM

## 2018-04-19 DIAGNOSIS — G89.29 CHRONIC BILATERAL LOW BACK PAIN WITHOUT SCIATICA: Primary | ICD-10-CM

## 2018-04-19 PROCEDURE — G8991 OTHER PT/OT GOAL STATUS: HCPCS

## 2018-04-19 PROCEDURE — G8990 OTHER PT/OT CURRENT STATUS: HCPCS

## 2018-04-19 PROCEDURE — 97162 PT EVAL MOD COMPLEX 30 MIN: CPT

## 2018-04-20 NOTE — PROGRESS NOTES
PT Evaluation     Today's date: 2018  Patient name: Harpreet Iyer  : 1955  MRN: [id number]  Referring provider: Poncho Gallardo PA-C  Dx:   Encounter Diagnosis     ICD-10-CM    1  Chronic bilateral low back pain without sciatica M54 5 Ambulatory referral to Physical Therapy    G89 29                   Assessment  Impairments: abnormal gait, abnormal or restricted ROM, activity intolerance, impaired physical strength, lacks appropriate home exercise program and pain with function  Functional limitations: decreased tolerance to bending, sleeping , lifting due to pain  Assessment details: The pt is a 65year old female referred to outpt PT with a diagnosis of chronic low back pain with exacerbation of symptoms noted in  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  The pt presents with c/o low back pain and lumbar muscle guarding, decreased trunk arom, decreased body mechanics ,and + antalgic gait with c/o right knee pain also noted  PT is warranted to address these deficits in efforts to reduce pain and maximize function  Understanding of Dx/Px/POC: good   Prognosis: good    Goals  stg independence with home exer program in two weeks   Achieve 4/5 abdominal mmt in two weeks   ltg  Reduction of low back pain by 50 percent in 4 weeks  Achieve full trunk arom without increased c/o pain in 4 weeks    Plan  Patient would benefit from: PT eval and skilled PT  Planned modality interventions: thermotherapy: hydrocollator packs and TENS  Planned therapy interventions: manual therapy, joint mobilization, neuromuscular re-education, body mechanics training, therapeutic exercise, therapeutic activities, home exercise program, postural training and patient education  Frequency: 2x week  Duration in weeks: 8  Treatment plan discussed with: patient        Subjective Evaluation    History of Present Illness  Onset date: 2017    Mechanism of injury: The pt is a 65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 referred to outpt PT with c/o chronic low back pain for 2 years with exacerbation of symptoms noted in  with increased lifting activities while at work per pt report  She reports pain with bending and sleeping in particular located at the l4-l5 level and radiating across her back   There is intermittent right le pain to the knee reported  In addition the pt reports increasing right knee pain  Recurrent probem    Quality of life: good    Pain  Current pain rating: 3  At best pain ratin  At worst pain ratin  Location: low back pain  l4-l5 radiating across the back  , also right knee pain at a level 7  Quality: sharp and tight  Relieving factors: rest  Aggravating factors: walking, standing and lifting  Progression: worsening    Social Support  Steps to enter house: yes (2 with left railing)  Stairs in house: yes (flight left railing)   Lives with: adult children and young children    Employment status: working (full time CNA)  Hand dominance: right  Exercise history: sedentary outside of work    Treatments  No previous or current treatments  Patient Goals  Patient goals for therapy: decreased pain, increased motion, increased strength, independence with ADLs/IADLs, return to work and improved balance  Patient goal: return to all prior activity without low back pain        Objective     Active Range of Motion     Lumbar   Flexion: Active lumbar flexion: 1/2 range  Extension: Active lumbar extension: 3/4 range  Left lateral flexion: Active left lumbar lateral flexion: 3/4  Right lateral flexion: Active right lumbar lateral flexion: 3/4  with pain  Left rotation: Active left lumbar rotation: 3/4  Right rotation: Active right lumbar rotation: 3/4 range   with pain  Left Hip   Flexion: WFL    Right Hip   Flexion: WFL  Abduction: 35 degrees with pain    Additional Active Range of Motion Details  Muscle guarding lumbar paraspinal musculature, rig,ht PSIS tenderness  slr 0-80 bilaterally, min quad tightness on left       Ambulation     Ambulation: Level Surfaces   Ambulation without assistive device: independent    Additional Level Surfaces Ambulation Details  + antalgic gait due to right knee pain, right genu valgus greater than left noted    Ambulation: Stairs   Ascend stairs: independent  Pattern: non-reciprocal  Railings: one rail  Descend stairs: independent  Pattern: non-reciprocal  Railings: one rail          Precautions:  Right knee pain currently, PMH DM type 2, peripheral neuropathy, dysuria, HTN, arthritis , chronic low back pain     Daily Treatment Diary     Manual  4/19/18             I eval                                                                    Exercise Diary  4/19            Prone on elbows  1min             Prone pressups 10 reps             Standing backward bends 10            Supine dls             squats             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

## 2018-04-23 ENCOUNTER — OFFICE VISIT (OUTPATIENT)
Dept: PHYSICAL THERAPY | Age: 63
End: 2018-04-23
Payer: COMMERCIAL

## 2018-04-23 DIAGNOSIS — G89.29 CHRONIC BILATERAL LOW BACK PAIN WITHOUT SCIATICA: Primary | ICD-10-CM

## 2018-04-23 DIAGNOSIS — M54.50 CHRONIC BILATERAL LOW BACK PAIN WITHOUT SCIATICA: Primary | ICD-10-CM

## 2018-04-23 PROCEDURE — 97110 THERAPEUTIC EXERCISES: CPT

## 2018-04-23 PROCEDURE — 97014 ELECTRIC STIMULATION THERAPY: CPT

## 2018-04-23 NOTE — PROGRESS NOTES
Daily Note     Today's date: 2018  Patient name: Jose Alford  : 1955  MRN: [id number]  Referring provider: Rosanne Gentile PA-C  Dx:   Encounter Diagnosis     ICD-10-CM    1  Chronic bilateral low back pain without sciatica M54 5     G89 29                   Subjective: "my right knee is really bothering me  " pt to consider seeing orthopedic Dr  For her knee pain possible steroid injection candidate      Objective: See treatment diary below       Manual  18                     I eval                                                                                                                           Exercise Diary                     Prone on elbows  1min    1min                   Prone pressups 10 reps   2 x 10                    Standing backward bends 10  10                   Supine dls alt arm  Alt leg, alt arm and leg, bridging, partial situps    10 reps                   squats    1 set of 10                    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                       Ppt  With cueing    20 reps                                                                                                                                                                                                                                                                                                                                                  Modalities                        Moist heat and e stim sitting low back   E stim charged SMP TENS                                                                           Assessment: Tolerated treatment well  Patient would benefit from continued PT   Pt issued DLS packet and instructed in home exer program      Plan: Progress treatment as tolerated

## 2018-05-02 ENCOUNTER — OFFICE VISIT (OUTPATIENT)
Dept: PHYSICAL THERAPY | Age: 63
End: 2018-05-02
Payer: COMMERCIAL

## 2018-05-02 DIAGNOSIS — M54.50 CHRONIC BILATERAL LOW BACK PAIN WITHOUT SCIATICA: Primary | ICD-10-CM

## 2018-05-02 DIAGNOSIS — G89.29 CHRONIC BILATERAL LOW BACK PAIN WITHOUT SCIATICA: Primary | ICD-10-CM

## 2018-05-02 PROCEDURE — 97110 THERAPEUTIC EXERCISES: CPT

## 2018-05-02 PROCEDURE — 97014 ELECTRIC STIMULATION THERAPY: CPT

## 2018-05-02 NOTE — PROGRESS NOTES
Daily Note     Today's date: 2018  Patient name: Ramy Haddad  : 1955  MRN: [id number]  Referring provider: Cori Muñoz PA-C  Dx:   Encounter Diagnosis     ICD-10-CM    1  Chronic bilateral low back pain without sciatica M54 5     G89 29                   Subjective: Pt  Reports she continues to have back pain that is centrally located at this time  Objective: See treatment diary below      Assessment: Tolerated treatment well  Patient would benefit from continued PT      Plan: Continue per plan of care           Manual  18                   I eval                                                                                                                           Exercise Diary                   Prone on elbows  1min    1min  1 min                 Prone pressups 10 reps   2 x 10   2 x 10                 Standing backward bends 10  10  10                 Supine dls alt arm   Alt leg, alt arm and leg, bridging, partial situps    10 reps  20                 squats    1 set of 10   3 x 10                 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

## 2018-05-03 ENCOUNTER — OFFICE VISIT (OUTPATIENT)
Dept: PHYSICAL THERAPY | Age: 63
End: 2018-05-03
Payer: COMMERCIAL

## 2018-05-03 DIAGNOSIS — G89.29 CHRONIC BILATERAL LOW BACK PAIN WITHOUT SCIATICA: Primary | ICD-10-CM

## 2018-05-03 DIAGNOSIS — M54.50 CHRONIC BILATERAL LOW BACK PAIN WITHOUT SCIATICA: Primary | ICD-10-CM

## 2018-05-03 PROCEDURE — G8991 OTHER PT/OT GOAL STATUS: HCPCS | Performed by: PHYSICAL THERAPIST

## 2018-05-03 PROCEDURE — G8990 OTHER PT/OT CURRENT STATUS: HCPCS | Performed by: PHYSICAL THERAPIST

## 2018-05-03 PROCEDURE — 97110 THERAPEUTIC EXERCISES: CPT

## 2018-05-03 NOTE — PROGRESS NOTES
Daily Note     Today's date: 5/3/2018  Patient name: Maisha Rendon  : 1955  MRN: [id number]  Referring provider: Arden Stuart PA-C  Dx:   Encounter Diagnosis     ICD-10-CM    1  Chronic bilateral low back pain without sciatica M54 5     G89 29                   Subjective: Pt  Reports she can get up in the morning with less pain and is not taking any pain medication  Difficulty with pain scale understanding  "I'm feeling good"      Objective: See treatment diary below      Assessment: Tolerated treatment well  Patient would benefit from continued PT      Plan: Continue per plan of care  Manual  18                   I eval                                                                                                                           Exercise Diary  4/19  4/23  5/2  5/3               Prone on elbows  1min    1min  1 min  1 min               Prone pressups 10 reps   2 x 10   2 x 10  2 x 10               Standing backward bends 10  10  10  10               Supine dls alt arm   Alt leg, alt arm and leg, bridging, partial situps    10 reps  20  20               squats    1 set of 10   3 x 10  3 x 10               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

## 2018-05-07 ENCOUNTER — APPOINTMENT (OUTPATIENT)
Dept: PHYSICAL THERAPY | Age: 63
End: 2018-05-07
Payer: COMMERCIAL

## 2018-06-13 ENCOUNTER — OFFICE VISIT (OUTPATIENT)
Dept: INTERNAL MEDICINE CLINIC | Facility: CLINIC | Age: 63
End: 2018-06-13
Payer: COMMERCIAL

## 2018-06-13 VITALS
WEIGHT: 193.12 LBS | HEART RATE: 80 BPM | SYSTOLIC BLOOD PRESSURE: 128 MMHG | TEMPERATURE: 97.2 F | DIASTOLIC BLOOD PRESSURE: 80 MMHG | HEIGHT: 65 IN | BODY MASS INDEX: 32.18 KG/M2

## 2018-06-13 DIAGNOSIS — R20.2 PARESTHESIA OF BOTH HANDS: ICD-10-CM

## 2018-06-13 DIAGNOSIS — R19.5 POSITIVE FIT (FECAL IMMUNOCHEMICAL TEST): Primary | ICD-10-CM

## 2018-06-13 DIAGNOSIS — Z12.31 VISIT FOR SCREENING MAMMOGRAM: ICD-10-CM

## 2018-06-13 DIAGNOSIS — N64.4 BREAST PAIN, RIGHT: ICD-10-CM

## 2018-06-13 DIAGNOSIS — F51.01 PRIMARY INSOMNIA: ICD-10-CM

## 2018-06-13 PROBLEM — Z98.890 HISTORY OF RIGHT BREAST BIOPSY: Status: ACTIVE | Noted: 2018-06-13

## 2018-06-13 PROCEDURE — 99213 OFFICE O/P EST LOW 20 MIN: CPT | Performed by: PHYSICIAN ASSISTANT

## 2018-06-13 NOTE — PATIENT INSTRUCTIONS
sched your diagnostic and screening mammograms  Sched the nerve test for your hands  Get the braces and wear at night and daytime if needed  Start the melatonin to help with sleep  We will call with appt for colonoscopy   as we discussed you may use the heating pad for 15-20 minutes prior to going to bed  Do not use the heating pad in your bed      Appt here in 1 month to review

## 2018-06-13 NOTE — PROGRESS NOTES
Assessment/Plan:    No problem-specific Assessment & Plan notes found for this encounter  Diagnoses and all orders for this visit:    Positive FIT (fecal immunochemical test)  -     Ambulatory Referral to GI Endoscopy; Future    Primary insomnia  -     Melatonin 5 MG CAPS; Take 1 capsule (5 mg total) by mouth daily at bedtime for 90 days    Paresthesia of both hands  -     EMG 2 Limb Upper Extremity; Future  -     Cock Up Wrist Splint    Breast pain, right  -     Mammo diagnostic right w cad; Future    Visit for screening mammogram  -     Mammo screening left w cad; Future          Subjective:      Patient ID: Lola Ross is a 65 y o  female  Here as states wants to have the clips removed from her R breast from previous biopsy, states has pain there unchanged for years  Patient was supposed to schedule a diagnostic right mammogram last year however patient never schedule that appointment  Also has not been contacted about her colonoscopy for positive FIT test    States did physical therapy and while had some improvement 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 and pain returns but not injury  Patient reports that she is doing home exercises and that these do help  Patient asked about a back brace her heating pad  Discussed with patient that her employer should be providing her with a back support that she should 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  Patient may use a heating pad however she is not to sleep with it as she originally asked but could be used for 15-20 minutes before going to bed and she may use it in the daytime before starting her shift at work  Still c/o tingling in her L  Hand  States feels it all the time  Patient reports and rarely happens  Also reports not sleeping well  Works second shift, states goes to sleep 3am and wakes up at 5:30 in the am  Does not take a nap  Sometimes works 3-3 and does not sleep much at all    Not worried, no anxiety        The following portions of the patient's history were reviewed and updated as appropriate: allergies, current medications, past family history, past medical history, past social history, past surgical history and problem list     Review of Systems   Constitutional: Positive for fatigue  Respiratory: Negative  Cardiovascular: Negative for chest pain and palpitations  Gastrointestinal: Negative  Negative for blood in stool, constipation and nausea  Endocrine: Negative for cold intolerance and heat intolerance  Musculoskeletal:        Patient reports she feels like she is having pain in the location she had the right breast biopsy in [place]  Patient states she feels like she can feel the clips in her breast   Patient denies any new lumps or masses  Neurological: Positive for numbness  Psychiatric/Behavioral: Positive for sleep disturbance  Objective:      /80 (BP Location: Right arm, Patient Position: Sitting, Cuff Size: Adult)   Pulse 80   Temp (!) 97 2 °F (36 2 °C) (Oral)   Ht 5' 4 5" (1 638 m)   Wt 87 6 kg (193 lb 2 oz)   BMI 32 64 kg/m²          Physical Exam   Constitutional: She appears well-developed and well-nourished  Cardiovascular: Normal rate, regular rhythm and normal heart sounds  No murmur heard  Pulmonary/Chest: Effort normal and breath sounds normal  She has no wheezes  Abdominal: Soft  Musculoskeletal: Normal range of motion  Neurological: She has normal strength  She displays normal reflexes  No cranial nerve deficit or sensory deficit  She exhibits normal muscle tone  Patient was negative for Phalen and Tinel bilaterally  With Phalen  patient reported some discomfort in her shoulder but no pain or paresthesias to either hand   Skin: Skin is warm and dry  Psychiatric: She has a normal mood and affect   Her behavior is normal

## 2018-06-20 ENCOUNTER — TRANSCRIBE ORDERS (OUTPATIENT)
Dept: GASTROENTEROLOGY | Facility: CLINIC | Age: 63
End: 2018-06-20

## 2018-06-27 ENCOUNTER — HOSPITAL ENCOUNTER (OUTPATIENT)
Dept: ULTRASOUND IMAGING | Facility: CLINIC | Age: 63
Discharge: HOME | End: 2018-06-27
Payer: COMMERCIAL

## 2018-06-27 ENCOUNTER — HOSPITAL ENCOUNTER (OUTPATIENT)
Dept: MAMMOGRAPHY | Facility: CLINIC | Age: 63
Discharge: HOME | End: 2018-06-27
Payer: COMMERCIAL

## 2018-06-27 DIAGNOSIS — N64.4 MASTODYNIA: ICD-10-CM

## 2018-06-27 PROCEDURE — 77066 DX MAMMO INCL CAD BI: CPT

## 2018-06-27 PROCEDURE — 76642 ULTRASOUND BREAST LIMITED: CPT

## 2018-06-27 PROCEDURE — G0279 TOMOSYNTHESIS, MAMMO: HCPCS

## 2018-06-28 ENCOUNTER — TELEPHONE (OUTPATIENT)
Dept: INTERNAL MEDICINE CLINIC | Facility: CLINIC | Age: 63
End: 2018-06-28

## 2018-06-29 ENCOUNTER — TELEPHONE (OUTPATIENT)
Dept: INTERNAL MEDICINE CLINIC | Facility: CLINIC | Age: 63
End: 2018-06-29

## 2018-06-29 DIAGNOSIS — Z12.11 SCREENING FOR COLON CANCER: ICD-10-CM

## 2018-06-29 DIAGNOSIS — Z12.11 ENCOUNTER FOR SCREENING COLONOSCOPY: Primary | ICD-10-CM

## 2018-06-29 RX ORDER — POLYETHYLENE GLYCOL 3350 17 G/17G
POWDER, FOR SOLUTION ORAL
Qty: 238 G | Refills: 0 | Status: SHIPPED | OUTPATIENT
Start: 2018-06-29 | End: 2018-07-12 | Stop reason: ALTCHOICE

## 2018-06-29 NOTE — TELEPHONE ENCOUNTER
Attempted to contact patient  Phone went right to voicemail, but voice mail message was unclear as to whose machine it was (just background noise)  Will try again at a later time

## 2018-06-29 NOTE — TELEPHONE ENCOUNTER
This patient is scheduled on 7/5/18 AT [address] for their Colonoscopy procedure  Please send prep to the pharmacy  I spoke with the patient and made him/her aware of their appointment date, I mailed out the instructions      According to the referral order form, this patient needs to hold the following meds:     METFORMIN & GLYBURIDE ON THE DAY OF THE PROCEDURE

## 2018-07-02 NOTE — TELEPHONE ENCOUNTER
SPOKE WITH PATIENT AND MADE HER AWARE THAT SHE HAS A CYST ON HER R BREAST, I TOLD PATIENT NOT TO FREAK OUT OR GET SCARED , WE ARE JUST GOING TO MONITOR IT AND REPEAT U/S IN 6 MONTHS  I LET HER KNOW IT CAN SIMPLY BE NOTHING WE JUST WANT TO KEEP AN EYE ON IT , I ADVISED HER IF SHE HAS ANY QUESTIONS OR CONCERNS SHE CAN GIVE US A CALL  AS PER PATIENT SHE UNDERSTOOD EVERYTHING WELL AND HAD NO QUESTIONS  ALSO AWARE IF SHE DEVELOPS ANY SYMPTOMS SHE NEEDS TO CALL US ASAP  PATIENT OK WITH THAT

## 2018-07-04 ENCOUNTER — HOSPITAL ENCOUNTER (EMERGENCY)
Facility: HOSPITAL | Age: 63
Discharge: HOME | End: 2018-07-04
Attending: EMERGENCY MEDICINE | Admitting: EMERGENCY MEDICINE
Payer: OTHER MISCELLANEOUS

## 2018-07-04 ENCOUNTER — APPOINTMENT (OUTPATIENT)
Dept: URGENT CARE | Age: 63
End: 2018-07-04
Payer: OTHER MISCELLANEOUS

## 2018-07-04 ENCOUNTER — APPOINTMENT (EMERGENCY)
Dept: RADIOLOGY | Facility: HOSPITAL | Age: 63
End: 2018-07-04
Payer: OTHER MISCELLANEOUS

## 2018-07-04 VITALS
OXYGEN SATURATION: 96 % | TEMPERATURE: 97.2 F | DIASTOLIC BLOOD PRESSURE: 75 MMHG | WEIGHT: 196 LBS | HEART RATE: 96 BPM | BODY MASS INDEX: 33.46 KG/M2 | HEIGHT: 64 IN | RESPIRATION RATE: 18 BRPM | SYSTOLIC BLOOD PRESSURE: 159 MMHG

## 2018-07-04 DIAGNOSIS — S06.0X9A CONCUSSION: Primary | ICD-10-CM

## 2018-07-04 PROCEDURE — 70450 CT HEAD/BRAIN W/O DYE: CPT

## 2018-07-04 PROCEDURE — 99283 EMERGENCY DEPT VISIT LOW MDM: CPT

## 2018-07-04 PROCEDURE — 99283 EMERGENCY DEPT VISIT LOW MDM: CPT | Performed by: FAMILY MEDICINE

## 2018-07-04 PROCEDURE — G0382 LEV 3 HOSP TYPE B ED VISIT: HCPCS | Performed by: FAMILY MEDICINE

## 2018-07-04 NOTE — ED NOTES
Family out to nursing desk asking how much longer for ct scan  Personally walked to CT scan due to delay  They advised patient was next  Family and patient updated at this time        Ang Razo RN  07/04/18 9800

## 2018-07-04 NOTE — ED ATTENDING ATTESTATION
Paola HARKINS DO, saw and evaluated the patient  I have discussed the patient with the resident/non-physician practitioner and agree with the resident's/non-physician practitioner's findings, Plan of Care, and MDM as documented in the resident's/non-physician practitioner's note, except where noted  All available labs and Radiology studies were reviewed  At this point I agree with the current assessment done in the Emergency Department  I have conducted an independent evaluation of this patient a history and physical is as follows:      Critical Care Time  CritCare Time    Procedures     65 yr old fem sent to the ED from [facility] for [facility] of the head  She was at work yesterday and hit a hand rail on the wall with her head when she stood up,  Headache since then, no neck pain, vomiting or focal neuro complaints  Exm: mild discomfort with 2 cm frontal hematoma  Neck nontender to palp and rom  Cranial grossly normal   Normal gait and finger to nose, heel to shin    Sensation normal   PLN: suspect concussion; get CT

## 2018-07-04 NOTE — ED PROVIDER NOTES
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 Taking? Melatonin 5 MG CAPS   No No   Sig: Take 1 capsule (5 mg total) by mouth daily at bedtime for 90 days   bisacodyl (DULCOLAX) 5 mg EC tablet   No No   Sig: Take 2 tablets (10 mg total) by mouth daily as needed for constipation   gabapentin (NEURONTIN) 300 mg capsule   No No   Sig: Take 2 capsules (600 mg total) by mouth every 8 (eight) hours for 180 days   glyBURIDE (DIABETA) 5 mg tablet   No No   Sig: Take 1 tablet (5 mg total) by mouth daily with breakfast for 180 days   lisinopril-hydrochlorothiazide (PRINZIDE,ZESTORETIC) 10-12 5 MG per tablet   No No   Sig: Take 1 tablet by mouth daily for 180 days   lovastatin (MEVACOR) 20 mg tablet   No No   Sig: Take 1 tablet (20 mg total) by mouth daily at bedtime for 180 days   meloxicam (MOBIC) 15 mg tablet   No No   Sig: TAKE ONE TABLET BY MOUTH ONCE DAILY AS NEEDED FOR PAIN   metFORMIN (GLUCOPHAGE) 1000 MG tablet   No No   Sig: Take 1 tablet (1,000 mg total) by mouth 2 (two) times a day with meals for 180 days   polyethylene glycol (GLYCOLAX) powder   No No   Sig: Mix in 64oz gatorade   Drink 1/2 evening before and 1/2 morning of colonoscopy      Facility-Administered Medications: None       Past Medical History:   Diagnosis Date    Diabetes mellitus ([facility] [address] )     Hypertension        Past Surgical History:   Procedure Laterality Date    HYSTERECTOMY      INCISIONAL BREAST BIOPSY      last assessed 17Aug2017       Family History   Problem Relation Age of Onset    Cancer Cousin     Alcohol abuse Mother     Alcohol abuse Father      I have reviewed and agree with the history as documented  Social History   Substance Use Topics    Smoking status: Current Every Day Smoker    Smokeless tobacco: Never Used      Comment: smokes less than 1pk/day    Alcohol use No        Review of Systems   Constitutional: Negative for fatigue  Respiratory: Negative for shortness of breath  Cardiovascular: Negative for chest pain and palpitations  Gastrointestinal: Negative for abdominal pain, nausea and vomiting  Musculoskeletal: Negative for arthralgias, myalgias, neck pain and neck stiffness  Neurological: Positive for light-headedness  Negative for dizziness and syncope  Psychiatric/Behavioral: Negative for confusion  All other systems reviewed and are negative  Physical Exam  ED Triage Vitals [07/04/18 1334]   Temperature Pulse Respirations Blood Pressure SpO2   (!) 97 2 °F (36 2 °C) 96 18 159/75 96 %      Temp Source Heart Rate Source Patient Position - Orthostatic VS BP Location FiO2 (%)   Tympanic Monitor Sitting Left arm --      Pain Score       8           Orthostatic Vital Signs  Vitals:    07/04/18 1334   BP: 159/75   Pulse: 96   Patient Position - Orthostatic VS: Sitting       Physical Exam   Constitutional: She is oriented to person, place, and time  She appears well-developed and well-nourished  HENT:   Head: Normocephalic and atraumatic  Mild forehead swelling and tenderness   Eyes: Conjunctivae and EOM are normal  Pupils are equal, round, and reactive to light     Neck: Normal range of motion  Neck supple  Cardiovascular: Normal rate, regular rhythm, normal heart sounds and intact distal pulses  Pulmonary/Chest: Effort normal and breath sounds normal  No respiratory distress  She has no wheezes  Abdominal: Soft  Bowel sounds are normal  She exhibits no distension  There is no tenderness  Musculoskeletal: Normal range of motion  She exhibits no edema, tenderness or deformity  Neurological: She is alert and oriented to person, place, and time  No cranial nerve deficit or sensory deficit  Coordination normal    Skin: Skin is warm and dry  No erythema  Psychiatric: She has a normal mood and affect  Her behavior is normal        ED Medications  Medications - No data to display    Diagnostic Studies  Results Reviewed     None                 CT head without contrast   Final Result by Mayi Goins MD (07/04 1523)      No acute intracranial abnormality  Workstation performed: [id number]               Procedures  Procedures      Phone Consults  ED Phone Contact    ED Course                               MDM  Number of Diagnoses or Management Options  Concussion:   Diagnosis management comments: 65 y o  Female with headache s/p hitting head last night  Low concern for head injury  Will do CT head without contrast to rule out  CritCare Time    Disposition  Final diagnoses:   Concussion     Time reflects when diagnosis was documented in both MDM as applicable and the Disposition within this note     Time User Action Codes Description Comment    7/4/2018  3:32 PM Astrid Velasquez Add [S06 0X9A] Concussion     7/4/2018  3:32 PM Astrid Velasquez Remove [S06 0X9A] Concussion     7/4/2018  3:32 PM Astrid Velasquez Add [S06 0X9A] Concussion       ED Disposition     ED Disposition Condition Comment    Discharge  Harpreet Iyer discharge to home/self care      Condition at discharge: Stable        Follow-up Information     Follow up With Specialties Details Why Contact Info [address] Schedule an appointment as soon as possible for a visit for persistent symptoms  [address]  [facility] [address] [address]  [phone]            Patient's Medications   Discharge Prescriptions    No medications on file     No discharge procedures on file  ED Provider  Attending physically available and evaluated Celia Amalia  I managed the patient along with the ED Attending      Electronically Signed by         Didier Celestin MD  07/04/18 6499

## 2018-07-04 NOTE — DISCHARGE INSTRUCTIONS
You were evaluated in the emergency department today for your headache  The CT of your head was negative for any signs of injury  Please return to the ER if you have any dizziness, lightheadedness, loss of consciousness, visual changes, vomiting, any other new or concerning symptoms  Concussion   WHAT YOU NEED TO KNOW:   A concussion is a mild brain injury  It is usually caused by a bump or blow to the head from a fall, a motor vehicle crash, or a sports injury  Sometimes being shaken forcefully may cause a concussion  DISCHARGE INSTRUCTIONS:   Have someone else call 911 for the following:   · Someone tries to wake you and cannot do so  · You have a seizure, increasing confusion, or a change in personality  · Your speech becomes slurred, or you have new vision problems  Return to the emergency department if:   · You have a severe headache that does not go away  · You have arm or leg weakness, numbness, or new problems with coordination  · You have blood or clear fluid coming out of the ears or nose  Contact your healthcare provider if:   · You have nausea or are vomiting  · You feel more sleepy than usual     · Your symptoms get worse  · Your symptoms last longer than 6 weeks after the injury  · You have questions or concerns about your condition or care  Medicines:   · Acetaminophen  helps to decrease pain  It is available without a doctor's order  Ask how much to take and how often to take it  Follow directions  Acetaminophen can cause liver damage if not taken correctly  · NSAIDs , such as ibuprofen, help decrease swelling and pain  NSAIDs can cause stomach bleeding or kidney problems in certain people  If you take blood thinner medicine, always ask your healthcare provider if NSAIDs are safe for you  Always read the medicine label and follow directions  · Take your medicine as directed    Contact your healthcare provider if you think your medicine is not helping or if you have side effects  Tell him or her if you are allergic to any medicine  Keep a list of the medicines, vitamins, and herbs you take  Include the amounts, and when and why you take them  Bring the list or the pill bottles to follow-up visits  Carry your medicine list with you in case of an emergency  Follow up with your healthcare provider as directed:  Write down your questions so you remember to ask them during your visits  Self-care:   · Rest  from physical and mental activities as directed  Mental activities are those that require thinking, concentration, and attention  You will need to rest until your symptoms are gone  Rest will allow you to recover from your concussion  Ask your healthcare provider when you can return to work and other daily activities  · Have someone stay with you for the first 24 hours after your injury  Your healthcare provider should be contacted if your symptoms get worse, or you develop new symptoms  · Do not participate in sports and physical activities until your healthcare provider says it is okay  They could make your symptoms worse or lead to another concussion  Your healthcare provider will tell you when it is okay for you to return to sports or physical activities  Prevent another concussion:   · Wear protective sports equipment that fit properly  Helmets help decrease your risk of a serious brain injury  Talk to your healthcare provider about ways you can decrease your risk for a concussion if you play sports  · Wear your seat belt  every time you travel  This helps to decrease your risk of a head injury if you are in a car accident  © 2017 [employer] Information is for End User's use only and may not be sold, redistributed or otherwise used for commercial purposes  All illustrations and images included in CareNotes® are the copyrighted property of [employer] , [employer]  or [employer]ra[employer]  The above information is an  only   It is not intended as medical advice for individual conditions or treatments  Talk to your doctor, nurse or pharmacist before following any medical regimen to see if it is safe and effective for you

## 2018-07-05 ENCOUNTER — ANESTHESIA (OUTPATIENT)
Dept: GASTROENTEROLOGY | Facility: HOSPITAL | Age: 63
End: 2018-07-05
Payer: COMMERCIAL

## 2018-07-05 ENCOUNTER — ANESTHESIA EVENT (OUTPATIENT)
Dept: GASTROENTEROLOGY | Facility: HOSPITAL | Age: 63
End: 2018-07-05
Payer: COMMERCIAL

## 2018-07-05 ENCOUNTER — HOSPITAL ENCOUNTER (OUTPATIENT)
Facility: HOSPITAL | Age: 63
Setting detail: OUTPATIENT SURGERY
Discharge: HOME | End: 2018-07-05
Attending: INTERNAL MEDICINE | Admitting: INTERNAL MEDICINE
Payer: COMMERCIAL

## 2018-07-05 VITALS
RESPIRATION RATE: 16 BRPM | HEART RATE: 90 BPM | TEMPERATURE: 97.9 F | BODY MASS INDEX: 33.46 KG/M2 | OXYGEN SATURATION: 100 % | DIASTOLIC BLOOD PRESSURE: 82 MMHG | HEIGHT: 64 IN | WEIGHT: 196 LBS | SYSTOLIC BLOOD PRESSURE: 141 MMHG

## 2018-07-05 LAB — GLUCOSE SERPL-MCNC: 125 MG/DL (ref 65–140)

## 2018-07-05 PROCEDURE — 45378 DIAGNOSTIC COLONOSCOPY: CPT | Performed by: INTERNAL MEDICINE

## 2018-07-05 PROCEDURE — 82948 REAGENT STRIP/BLOOD GLUCOSE: CPT

## 2018-07-05 RX ORDER — PROPOFOL 10 MG/ML
INJECTION, EMULSION INTRAVENOUS CONTINUOUS PRN
Status: DISCONTINUED | OUTPATIENT
Start: 2018-07-05 | End: 2018-07-05 | Stop reason: SURG

## 2018-07-05 RX ORDER — SODIUM CHLORIDE 9 MG/ML
50 INJECTION, SOLUTION INTRAVENOUS CONTINUOUS
Status: DISCONTINUED | OUTPATIENT
Start: 2018-07-05 | End: 2018-07-05 | Stop reason: HOSPADM

## 2018-07-05 RX ORDER — PROPOFOL 10 MG/ML
INJECTION, EMULSION INTRAVENOUS AS NEEDED
Status: DISCONTINUED | OUTPATIENT
Start: 2018-07-05 | End: 2018-07-05 | Stop reason: SURG

## 2018-07-05 RX ADMIN — PROPOFOL 120 MCG/KG/MIN: 10 INJECTION, EMULSION INTRAVENOUS at 08:56

## 2018-07-05 RX ADMIN — SODIUM CHLORIDE: 0.9 INJECTION, SOLUTION INTRAVENOUS at 08:51

## 2018-07-05 RX ADMIN — PROPOFOL 90 MG: 10 INJECTION, EMULSION INTRAVENOUS at 08:56

## 2018-07-05 RX ADMIN — SODIUM CHLORIDE 50 ML/HR: 0.9 INJECTION, SOLUTION INTRAVENOUS at 07:44

## 2018-07-05 NOTE — OP NOTE
**** GI/ENDOSCOPY REPORT ****     PATIENT NAME: Joy Gonzalez ------ VISIT ID:  Patient ID:   [id number] YOB: 1955     INTRODUCTION: Colonoscopy - A 65 female patient presents for an outpatient   Colonoscopy at [address] [place] [address]  PREVIOUS COLONOSCOPY: No prior colonoscopy  INDICATIONS: Fecal occult blood positive  CONSENT:  The benefits, risks, and alternatives to the procedure were   discussed and informed consent was obtained from the patient  PREPARATION: EKG, pulse, pulse oximetry and blood pressure were monitored   throughout the procedure  The patient was identified by myself both   verbally and by visual inspection of ID band  ASA Classification: Class 3   - Patient has severe systemic disturbance that may or may not be related   to the disorder requiring surgery  MEDICATIONS: Anesthesia-check records     PROCEDURE:  The endoscope was passed without difficulty through the anus   under direct visualization and advanced to the cecum, confirmed by   appendiceal orifice and ileocecal valve  The scope was withdrawn and the   mucosa was carefully examined  The quality of the preparation was poor  Cecal Intubation Time: Minute(s) Scope Withdrawal Time: Minute(s)     RECTAL EXAM: Normal rectal exam      FINDINGS:  A large quantity of solid, semi-solid, and thick-liquid stool   was found in the colon  As a result, an inadequate view of the area was   obtained  There was evidence of mild diverticulosis in the sigmoid colon  Otherwise, the colon appeared to be normal  On retroflexed view,   medium-sized internal and external hemorrhoids were found  COMPLICATIONS: There were no complications  IMPRESSIONS: Stool found  Mild diverticulosis found in the sigmoid colon  Internal and external hemorrhoids  RECOMMENDATIONS: Colonoscopy recommended in 3 months   The recommended   follow-up interval was changed due to preparation for exam was inadequate/incomplete  ESTIMATED BLOOD LOSS:     PATHOLOGY SPECIMENS:     PROCEDURE CODES:     ICD-9 Codes: 792 1 Nonspecific abnormal findings in stool contents 562 10   Diverticulosis of colon (without mention of hemorrhage)     ICD-10 Codes: R19 5 Other fecal abnormalities K57 Diverticular disease of   intestine K64 9 Unspecified hemorrhoids     PERFORMED BY: SHASHI Cedeno  on 07/05/2018  Version 1, electronically signed by SHASHI Cedeno  on 07/05/2018   at 09:39

## 2018-07-05 NOTE — ANESTHESIA POSTPROCEDURE EVALUATION
Post-Op Assessment Note      CV Status:  Stable    Mental Status:  Alert and awake    Hydration Status:  Euvolemic    PONV Controlled:  Controlled    Airway Patency:  Patent    Post Op Vitals Reviewed: Yes          Staff: CRNA, Anesthesiologist           /81 (07/05/18 0939)    Temp      Pulse (!) 110 (07/05/18 0939)   Resp 16 (07/05/18 0939)    SpO2 100 % (07/05/18 0939)

## 2018-07-05 NOTE — ANESTHESIA PREPROCEDURE EVALUATION
Review of Systems/Medical History  Patient summary reviewed  Chart reviewed      Cardiovascular  No pacemaker/AICD, Hyperlipidemia, Hypertension , No CAD , No cardiac stents     Pulmonary  Smoker , Tobacco cessation counseling given Cumulative Pack Years: 36, No COPD , No asthma , No recent URI , No sleep apnea ,        GI/Hepatic            Endo/Other  Diabetes ,      GYN       Hematology   Musculoskeletal       Neurology    No TIA, No CVA ,    Psychology           Physical Exam    Airway    Mallampati score: II  TM Distance: >3 FB       Dental   No notable dental hx     Cardiovascular      Pulmonary      Other Findings        Anesthesia Plan  ASA Score- 2     Anesthesia Type- IV sedation with anesthesia with ASA Monitors  Additional Monitors:   Airway Plan:     Comment: SHASHI Nelson , have personally seen and evaluated the patient prior to anesthetic care  I have reviewed the pre-anesthetic record, and other medical records if appropriate to the anesthetic care  If a CRNA is involved in the case, I have reviewed the CRNA assessment, if present, and agree  Risks/benefits and alternatives discussed with patient including possible PONV, sore throat, and possibility of rare anesthetic and surgical emergencies        Plan Factors-    Induction-     Postoperative Plan-     Informed Consent- Anesthetic plan and risks discussed with patient  I personally reviewed this patient with the CRNA  Discussed and agreed on the Anesthesia Plan with the [facility] Lynn

## 2018-07-06 ENCOUNTER — APPOINTMENT (OUTPATIENT)
Dept: URGENT CARE | Age: 63
End: 2018-07-06
Payer: OTHER MISCELLANEOUS

## 2018-07-06 PROCEDURE — 99213 OFFICE O/P EST LOW 20 MIN: CPT | Performed by: PREVENTIVE MEDICINE

## 2018-07-09 ENCOUNTER — APPOINTMENT (OUTPATIENT)
Dept: URGENT CARE | Age: 63
End: 2018-07-09
Payer: OTHER MISCELLANEOUS

## 2018-07-09 PROCEDURE — 99213 OFFICE O/P EST LOW 20 MIN: CPT | Performed by: PREVENTIVE MEDICINE

## 2018-07-12 ENCOUNTER — APPOINTMENT (OUTPATIENT)
Dept: URGENT CARE | Age: 63
End: 2018-07-12
Payer: OTHER MISCELLANEOUS

## 2018-07-12 PROCEDURE — 99213 OFFICE O/P EST LOW 20 MIN: CPT | Performed by: PREVENTIVE MEDICINE

## 2018-07-12 NOTE — PROGRESS NOTES
Assessment/Plan:    No problem-specific Assessment & Plan notes found for this encounter  Diagnoses and all orders for this visit:    Benign essential hypertension    Paresthesia of both hands    History of right breast biopsy    Controlled type 2 diabetes mellitus with diabetic polyneuropathy, without long-term current use of insulin (formerly [facility])  -     Hemoglobin A1C; Future    Breast pain, right  -     Ambulatory Referral to Breast Surgery; Future    Breast cyst, right  -     Ambulatory Referral to Breast Surgery; Future    Diabetic peripheral neuropathy ([facility] [address] )  -     Ambulatory referral to Podiatry; Future          Subjective:      Patient ID: Erin Child is a 65 y o  female  Patient coming in for follow-up on testing  Patient did complete the diagnostic mammogram as well as an ultrasound  These studies confirm that she does have a clip in the posterior aspect of her right breast   It is also noted that she does have a complex cyst and recommendation is that she have a follow-up ultrasound in 6 months  Patient states she still having pain in the right breast       Patient did go for the colonoscopy after having had a positive fit test   The preparation was noted to be poor and therefore she is being rescheduled for another colonoscopy in 3 months with additional preparation  It was noted that she does have internal hemorrhoids and some diverticulosis  EMG for suspected carpal tunnel syndrome has not been completed yet  She does not have an appointment until September with Luda Fritz  Incidentally is noted patient was in the emergency room earlier this month  Patient reports she was at work picking some garbage off the floor and came up and hit her head on a door knob  Patient reported some dizziness however denied loss of consciousness  A CT scan was completed and this was normal  She filed a work injury     Patient reports she is following with workman's compensation and physical therapy  Patient will be due for her follow-up diabetic labs in August   She will also be due for her diabetic foot exam at the end of August and her diabetic eye exam is due at the end of September  The MA had already started her diabetic foot exam and therefore the rest of the exam was completed at today's visit  You have declined screening for HIV  You feel that you have no risk factors  You are aware that this can be ordered at any time  You will consider hep C screening in the future and this will be reviewed at follow-up visit  The following portions of the patient's history were reviewed and updated as appropriate: allergies, current medications, past family history, past medical history, past social history, past surgical history and problem list     Review of Systems   Constitutional: Negative  Negative for fatigue and fever  HENT: Negative  Respiratory: Negative  Negative for shortness of breath  Cardiovascular: Negative  Negative for chest pain, palpitations and leg swelling  Gastrointestinal: Negative  Negative for abdominal pain, constipation and diarrhea  Endocrine: Positive for polyuria  Negative for cold intolerance, heat intolerance, polydipsia and polyphagia  Genitourinary: Negative for decreased urine volume  Musculoskeletal:        Patient continues to complain of right breast pain  She is aware of the results and would like to follow up with the specialist to discuss her options  Psychiatric/Behavioral: Negative  Objective:      /84   Pulse 72   Temp 97 6 °F (36 4 °C)   Ht 5' 3 5" (1 613 m)   Wt 86 8 kg (191 lb 5 8 oz)   BMI 33 37 kg/m²          Physical Exam   Constitutional: She is oriented to person, place, and time  She appears well-developed and well-nourished  HENT:   Head: Normocephalic  Eyes: Conjunctivae are normal  Pupils are equal, round, and reactive to light  Neck: Neck supple     Cardiovascular: Normal rate, regular rhythm and normal heart sounds  Pulses are no weak pulses  Pulses:       Dorsalis pedis pulses are 2+ on the right side, and 2+ on the left side  Pulmonary/Chest: Effort normal and breath sounds normal  She has no wheezes  Abdominal: Soft  Bowel sounds are normal  There is no tenderness  Musculoskeletal:        Feet:    Vibration and proprioception diminished in right great toe  Feet:   Right Foot:   Skin Integrity: Positive for callus and dry skin  Left Foot:   Skin Integrity: Positive for callus and dry skin  Neurological: She is alert and oriented to person, place, and time  Skin: Skin is warm and dry  Psychiatric: She has a normal mood and affect  Her behavior is normal  Judgment and thought content normal      Patient's shoes and socks removed  Right Foot/Ankle   Right Foot Inspection  Skin Exam: dry skin, callus and callus                          Toe Exam: ROM and strength within normal limits  Sensory   Vibration: diminished  Proprioception: diminished   Monofilament testing: diminished  Vascular  Capillary refills: < 3 seconds  The right DP pulse is 2+  Left Foot/Ankle  Left Foot Inspection  Skin Exam: dry skin and callus                                         Sensory   Vibration: intact  Proprioception: intact  Monofilament: absent  Vascular  Capillary refills: < 3 seconds  The left DP pulse is 2+  Assign Risk Category:  No deformity present; Loss of protective sensation;  No weak pulses       Risk: 1

## 2018-07-13 ENCOUNTER — OFFICE VISIT (OUTPATIENT)
Dept: INTERNAL MEDICINE CLINIC | Facility: CLINIC | Age: 63
End: 2018-07-13
Payer: COMMERCIAL

## 2018-07-13 VITALS
HEART RATE: 72 BPM | BODY MASS INDEX: 32.67 KG/M2 | HEIGHT: 64 IN | DIASTOLIC BLOOD PRESSURE: 84 MMHG | WEIGHT: 191.36 LBS | TEMPERATURE: 97.6 F | SYSTOLIC BLOOD PRESSURE: 130 MMHG

## 2018-07-13 DIAGNOSIS — N60.01 BREAST CYST, RIGHT: ICD-10-CM

## 2018-07-13 DIAGNOSIS — I10 BENIGN ESSENTIAL HYPERTENSION: Primary | ICD-10-CM

## 2018-07-13 DIAGNOSIS — E11.42 DIABETIC PERIPHERAL NEUROPATHY (HCC): ICD-10-CM

## 2018-07-13 DIAGNOSIS — Z98.890 HISTORY OF RIGHT BREAST BIOPSY: ICD-10-CM

## 2018-07-13 DIAGNOSIS — E11.42 CONTROLLED TYPE 2 DIABETES MELLITUS WITH DIABETIC POLYNEUROPATHY, WITHOUT LONG-TERM CURRENT USE OF INSULIN (HCC): ICD-10-CM

## 2018-07-13 DIAGNOSIS — N64.4 BREAST PAIN, RIGHT: ICD-10-CM

## 2018-07-13 DIAGNOSIS — R20.2 PARESTHESIA OF BOTH HANDS: ICD-10-CM

## 2018-07-13 PROCEDURE — 99213 OFFICE O/P EST LOW 20 MIN: CPT | Performed by: PHYSICIAN ASSISTANT

## 2018-07-13 NOTE — PATIENT INSTRUCTIONS
Please get your follow-up labs for diabetes as dated at the end of August     As we discussed please make sure that you wear shoes that cover your feet  This is important as on her exam today you did not have the ability to feel some of the areas of touch as well as decreased vibration and you need to protect your feet better  Please schedule with the podiatrist as well  Please schedule with the breast surgeon to discuss her options regarding the clip from the previous biopsy and the complex cyst     Please schedule an appointment with me the 1st week of September after you complete your labs  You are aware that the gastroenterologist will be contacting you to schedule the repeat colonoscopy in approximately 3 months due to the poor preparation on the first one

## 2018-07-17 ENCOUNTER — APPOINTMENT (OUTPATIENT)
Dept: URGENT CARE | Age: 63
End: 2018-07-17
Payer: OTHER MISCELLANEOUS

## 2018-07-17 PROCEDURE — 99213 OFFICE O/P EST LOW 20 MIN: CPT | Performed by: PREVENTIVE MEDICINE

## 2018-07-23 ENCOUNTER — OFFICE VISIT (OUTPATIENT)
Dept: MULTI SPECIALTY CLINIC | Facility: CLINIC | Age: 63
End: 2018-07-23

## 2018-07-23 VITALS
SYSTOLIC BLOOD PRESSURE: 124 MMHG | DIASTOLIC BLOOD PRESSURE: 80 MMHG | HEART RATE: 80 BPM | HEIGHT: 64 IN | TEMPERATURE: 98.3 F | BODY MASS INDEX: 32.59 KG/M2 | WEIGHT: 190.92 LBS

## 2018-07-23 DIAGNOSIS — N63.10 BREAST MASS, RIGHT: ICD-10-CM

## 2018-07-23 DIAGNOSIS — N60.01 BREAST CYST, RIGHT: Primary | ICD-10-CM

## 2018-07-23 PROCEDURE — 99202 OFFICE O/P NEW SF 15 MIN: CPT | Performed by: SURGERY

## 2018-07-23 NOTE — PATIENT INSTRUCTIONS
Fine Needle Aspiration Biopsy   WHAT YOU NEED TO KNOW:   What do I need to know about a fine needle aspiration biopsy (FNAB)? A FNAB is a procedure to remove a sample of tissue  The tissue may be taken from anywhere in your body  Examples include an organ such as your liver or lung, a muscle, or skin  The tissue can be sent to the lab and tested for cancer or infection  If you have had an organ transplant, tissue from the organ can be tested for organ rejection  How do I prepare for a FNAB? · Your healthcare provider will talk to you about how to prepare for your procedure  You may need to stop taking blood thinner medicine or aspirin 3 days before your procedure  The provider may tell you not to eat or drink anything 8 hours before your procedure  The provider will tell you what medicines to take or not take on the day of your procedure  · You may be given contrast liquid during your procedure to help the tissue show up better in the pictures  Tell the healthcare provider if you have ever had an allergic reaction to contrast liquid  An MRI may also be used during your procedure  Do not enter the procedure room with anything metal  Metal can cause serious injury  Tell the healthcare provider if you have any metal in or on your body  Also tell him if you are pregnant or think you are pregnant  Special shields can be used during the procedure to keep your baby safe  · Do not put on lotions or powders on the day of your procedure  Do not put on deodorant if your biopsy will be taken near your armpit  These products may cause particles to appear on your x-ray  Wear loose-fitting clothing to your procedure  Arrange for someone to drive you home and stay with you after your procedure  What will happen during a FNAB? · You may be given IV sedation to help you relax during your procedure  You may also be given local anesthesia to numb the area   With local anesthesia, you may still feel pressure or pushing during your procedure, but you should not feel any pain  · Your healthcare provider may make a small incision  He or she will insert a needle through your skin and into the tissue  The provider may use x-ray, ultrasound, or MRI pictures to help guide the needle to the correct place  When the needle reaches the tissue, samples will be taken  · The provider will remove the needle and apply pressure to the area  You will not need stitches  A small bandage will be placed over your incision  Your healthcare provider may also wrap a tight-fitting bandage across the area  This may prevent bleeding, swelling, and pain  What will happen after a FNAB? Healthcare providers will monitor you until you are awake  You may be sore or have bruising or swelling for a few days  Do not breastfeed for 24 to 48 hours if you received contrast liquid  The contrast liquid may harm your baby  You may go home after your procedure or you may need to spend a night in the hospital    What are the risks of a FNAB? You may bleed more than expected or get an infection  A pocket of blood or fluid may form under your skin  You may need surgery to drain or remove it  The biopsy needle may damage other organs or tissues  CARE AGREEMENT:   You have the right to help plan your care  Learn about your health condition and how it may be treated  Discuss treatment options with your caregivers to decide what care you want to receive  You always have the right to refuse treatment  The above information is an  only  It is not intended as medical advice for individual conditions or treatments  Talk to your doctor, nurse or pharmacist before following any medical regimen to see if it is safe and effective for you  © 2017 [employer]  Information is for End User's use only and may not be sold, redistributed or otherwise used for commercial purposes   All illustrations and images included in CareNotes® are the copyrighted property of A [employer]  or [employer]

## 2018-07-23 NOTE — ASSESSMENT & PLAN NOTE
Present on ultrasound and mammography at 12 o'clock  To IR for FNA  Follow up in general surgery clinic when FNA resulted

## 2018-07-23 NOTE — PROGRESS NOTES
Office Visit - General Surgery  Noemi Martinez MRN: [id number]  Encounter: [id number]    Assessment and Plan    Problem List Items Addressed This Visit     None          Chief Complaint:  Noemi Martinez is a 65 y o  female who presents for R breast cyst     Subjective  66 yo F hx previous right breast biopsy now presents following mammogram and R breast ultrasound in June 2018, which showed complex R breast cyst at 12 o'clock position  She reports bilateral nipple discharge for 2-3 years  She also reports painful breast mass at 3 o'clock position  Patient reports that she had a previous right breast biopsy and was told results were normal  She had previous hysterectomy in the past  Patient is a current smoker  Past Medical History  Past Medical History:   Diagnosis Date    Bump     bumped head yesterday at work "saw stars" no LOC, cat scan done was normal    Diabetes mellitus ([address] )     blood sugar 125 on admission    Encounter for screening colonoscopy     1 st one    Hyperlipidemia     Hypertension        Past Surgical History  Past Surgical History:   Procedure Laterality Date    HYSTERECTOMY      INCISIONAL BREAST BIOPSY      last assessed [id number]    SC COLONOSCOPY FLX DX W/COLLJ SPEC WHEN PFRMD N/A 7/5/2018    Procedure: COLONOSCOPY;  Surgeon: Hammad George MD;  Location: [facility];   Service: Gastroenterology       Family History  Family History   Problem Relation Age of Onset    Cancer Cousin     Alcohol abuse Mother     Alcohol abuse Father        Medications  Current Outpatient Prescriptions on File Prior to Visit   Medication Sig Dispense Refill    gabapentin (NEURONTIN) 300 mg capsule Take 2 capsules (600 mg total) by mouth every 8 (eight) hours for 180 days 270 capsule 1    glyBURIDE (DIABETA) 5 mg tablet Take 1 tablet (5 mg total) by mouth daily with breakfast for 180 days 90 tablet 1    lisinopril-hydrochlorothiazide (PRINZIDE,ZESTORETIC) 10-12 5 MG per tablet Take 1 tablet by mouth daily for 180 days 90 tablet 1    lovastatin (MEVACOR) 20 mg tablet Take 1 tablet (20 mg total) by mouth daily at bedtime for 180 days 90 tablet 1    Melatonin 5 MG CAPS Take 1 capsule (5 mg total) by mouth daily at bedtime for 90 days 90 capsule 0    meloxicam (MOBIC) 15 mg tablet TAKE ONE TABLET BY MOUTH ONCE DAILY AS NEEDED FOR PAIN 90 tablet 1    metFORMIN (GLUCOPHAGE) 1000 MG tablet Take 1 tablet (1,000 mg total) by mouth 2 (two) times a day with meals for 180 days 180 tablet 1     No current facility-administered medications on file prior to visit  Allergies  No Known Allergies    Review of Systems   Constitutional: Negative  HENT: Negative  Eyes: Negative  Respiratory: Negative  Cardiovascular: Negative  Gastrointestinal: Negative  Endocrine:        Bilateral milky breast discharge   Genitourinary: Negative  Musculoskeletal: Negative  Skin: Negative  Allergic/Immunologic: Negative  Neurological: Negative  Hematological: Negative  Psychiatric/Behavioral: Negative  Objective  Vitals:    07/23/18 0858   BP: 124/80   Pulse: 80   Temp: 98 3 °F (36 8 °C)       Physical Exam   Constitutional: She is oriented to person, place, and time  She appears well-developed and well-nourished  HENT:   Head: Normocephalic and atraumatic  Eyes: Pupils are equal, round, and reactive to light  Neck: Normal range of motion  Cardiovascular: Normal rate and regular rhythm  Pulmonary/Chest: Effort normal        Abdominal: Soft  She exhibits no distension  There is no tenderness  Musculoskeletal: Normal range of motion  Neurological: She is alert and oriented to person, place, and time  Skin: Skin is warm and dry

## 2018-07-24 ENCOUNTER — OFFICE VISIT (OUTPATIENT)
Dept: MULTI SPECIALTY CLINIC | Facility: CLINIC | Age: 63
End: 2018-07-24

## 2018-07-24 ENCOUNTER — APPOINTMENT (OUTPATIENT)
Dept: URGENT CARE | Age: 63
End: 2018-07-24
Payer: OTHER MISCELLANEOUS

## 2018-07-24 VITALS
WEIGHT: 191.36 LBS | BODY MASS INDEX: 32.67 KG/M2 | SYSTOLIC BLOOD PRESSURE: 100 MMHG | TEMPERATURE: 98.7 F | DIASTOLIC BLOOD PRESSURE: 60 MMHG | HEART RATE: 100 BPM | HEIGHT: 64 IN

## 2018-07-24 DIAGNOSIS — B35.1 ONYCHOMYCOSIS OF MULTIPLE TOENAILS WITH TYPE 2 DIABETES MELLITUS (HCC): ICD-10-CM

## 2018-07-24 DIAGNOSIS — E11.42 DIABETIC PERIPHERAL NEUROPATHY (HCC): Primary | ICD-10-CM

## 2018-07-24 DIAGNOSIS — E11.69 ONYCHOMYCOSIS OF MULTIPLE TOENAILS WITH TYPE 2 DIABETES MELLITUS (HCC): ICD-10-CM

## 2018-07-24 PROCEDURE — 99213 OFFICE O/P EST LOW 20 MIN: CPT | Performed by: PREVENTIVE MEDICINE

## 2018-07-24 PROCEDURE — 99202 OFFICE O/P NEW SF 15 MIN: CPT | Performed by: PODIATRIST

## 2018-07-24 NOTE — PROGRESS NOTES
Podiatry Clinic Visit  Ramy Haddad 65 y o  female MRN: [id number]  Encounter: [id number]    Assessment/Plan     Assessment:  1  Type 2 DM with Neuropathy  2  Onychomycosis of toenails   3  B/l lower extremity edema       Plan:  - Patient was seen/examined  All questions and concerns addressed  - Patient was advised to have better diet plan with taking gabapentin will help with numbness and tingling sensation    - Patient was education on proper self diabetic foot exam   - Script for diabetic shoes was dispensed, recommended to wear daily    - Script for Penlac solution to be used topically was dispensed and recommended to use as prescribed   - Recommended to wear compression stocking when needed   - RTC in 6 months       History of Present Illness     HPI:  Ramy Haddad is a 65 y o  female who presents with numbness and tingling sensation in her feet  She states its been going on since a while a is prescribed Gabapentin by her PCP  She states the medication somewhat helps  She states she checked her last blood sugar levels yesterday and it was 160  She says she is working on changing her diet as she loves to eat candy and sugar in her coffee  She also complains of changing toenail color  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 and end of the day her lower extremity swells  She wants to know if there is anything that can be done to help with the swelling  The patient denies any nausea, vomiting, fever, chills, shortness of breath, or chest pains  Review of Systems   Constitutional: Negative  HENT: Negative  Eyes: Negative  Respiratory: Negative  Cardiovascular: Negative  Gastrointestinal: Negative  Musculoskeletal: Negative   Skin: Negative   Neurological: Negative          Historical Information   Past Medical History:   Diagnosis Date    Bump     bumped head yesterday at work "saw stars" no LOC, cat scan done was normal    Diabetes mellitus [address] [address] )     blood sugar 125 on admission    Encounter for screening colonoscopy     1 st one    Hyperlipidemia     Hypertension      Past Surgical History:   Procedure Laterality Date    HYSTERECTOMY      INCISIONAL BREAST BIOPSY      last assessed [id number]    MD COLONOSCOPY FLX DX W/COLLJ SPEC WHEN PFRMD N/A 7/5/2018    Procedure: COLONOSCOPY;  Surgeon: Cindi Mckinney MD;  Location: [facility]; Service: Gastroenterology     Social History   History   Alcohol Use No     History   Drug Use No     History   Smoking Status    Current Every Day Smoker   Smokeless Tobacco    Never Used     Comment: smokes less than 1pk/day     Family History:   Family History   Problem Relation Age of Onset    Cancer Cousin     Alcohol abuse Mother     Alcohol abuse Father        Meds/Allergies     (Not in a hospital admission)  No Known Allergies    Objective     Current Vitals:   Blood Pressure: 100/60 (07/24/18 0756)  Pulse: 100 (07/24/18 0756)  Temperature: 98 7 °F (37 1 °C) (07/24/18 0756)  Temp Source: Oral (07/24/18 0756)  Height: 5' 4" (162 6 cm) (07/24/18 0756)  Weight - Scale: 86 8 kg (191 lb 5 8 oz) (07/24/18 0756)        /60 (BP Location: Right arm, Patient Position: Sitting, Cuff Size: Adult)   Pulse 100   Temp 98 7 °F (37 1 °C) (Oral)   Ht 5' 4" (1 626 m)   Wt 86 8 kg (191 lb 5 8 oz)   BMI 32 85 kg/m²       Lower Extremity Exam:    Musculoskeletal:  MMT is 5/5 to all compartments of the LE, +1/4 edema B/L, Digital ROM is intact, HAV b/l foot  Pulses:   R and Left DP & PT non-palpable, R and L DP & PT are dopplerable,  CFT< 3sec to all digits  Pedal hair is Present     Skin:  No open Lesions  Skin of the LE is normal texture, turgor  Dystrophic toenails x7  Varicose veins present on b/l lower extremity  Mild edema noted on b/l LE  Neurologic:  Gross sensation is intact  Protective sensation is Diminished

## 2018-07-26 ENCOUNTER — OFFICE VISIT (OUTPATIENT)
Dept: GASTROENTEROLOGY | Facility: MEDICAL CENTER | Age: 63
End: 2018-07-26
Payer: COMMERCIAL

## 2018-07-26 VITALS
BODY MASS INDEX: 32.44 KG/M2 | DIASTOLIC BLOOD PRESSURE: 72 MMHG | TEMPERATURE: 96.5 F | HEIGHT: 64 IN | WEIGHT: 190 LBS | SYSTOLIC BLOOD PRESSURE: 122 MMHG | HEART RATE: 113 BPM

## 2018-07-26 DIAGNOSIS — R19.5 POSITIVE FECAL IMMUNOCHEMICAL TEST: Primary | ICD-10-CM

## 2018-07-26 PROCEDURE — 99214 OFFICE O/P EST MOD 30 MIN: CPT | Performed by: INTERNAL MEDICINE

## 2018-07-26 NOTE — LETTER
August 14, 2018     Josiane Andrews MD  [facility] [address]    Patient: Paco Guthrie   YOB: 1955   Date of Visit: 7/26/2018       Dear Dr Huyen Krueger:    Thank you for referring Paco Guthrie to me for evaluation  Below are my notes for this consultation  If you have questions, please do not hesitate to call me  I look forward to following your patient along with you  Sincerely,    SHASHI Bobby  [facility]  Mobile: [phone]  Available on [email or web address]  [email or web address]           CC: KAYODE Bingham MD  8/14/2018  9:20 PM  Sign at close encounter  [facility] - Outpatient Consultation  Paco Guthrie 65 y o  female MRN: [id number]  Encounter: [id number]      PCP: Marilin Gentile PA-C  Referring: Marilin Gentile PA-C  [facility]  [facility], [address] [place] [address]      ASSESSMENT AND PLAN:      1  Positive fecal immunochemical test  Recent +FIT testing, s/p colonoscopy on 7/5 with poor prep despite miralax preparation  Would recommend she start miralax 1 capful daily with uptitration to benefit  Will re-schedule colonoscopy within 1 year  She should follow an extended clear liquid diet with clear liquids x 2 days prior to prep, taking miralax regularly leading up to procedure, and then, golytely the night before + morning of the procedure  - Case request operating room: COLONOSCOPY; Standing  - Case request operating room: COLONOSCOPY  - polyethylene glycol (GOLYTELY) 4000 mL solution; Take 4,000 mL by mouth once for 1 dose  Dispense: 4000 mL; Refill: 0    ______________________________________________________________________    HPI:      Patient is a 65year old female that sees me in follow up after colonoscopy  She was initially referred following +FIT testing   She underwent colonoscopy with miralax prep on 7/5 which demonstrated stool throughout the colon, limiting evaluation of small polyps  She has been having difficulty with her bowel movements, occurring irregularly with passage of small, hard stools once every other day  Symptoms are associated with lower abdominal pain that is crampy in nature and relieved with bowel movements  She notes no rectal bleeding or unintentional weight loss  REVIEW OF SYSTEMS:    CONSTITUTIONAL: Denies any fever, chills, rigors, and weight loss  HEENT: No earache or tinnitus  Denies hearing loss or visual disturbances  CARDIOVASCULAR: No chest pain or palpitations  RESPIRATORY: Denies any cough, hemoptysis, shortness of breath or dyspnea on exertion  GASTROINTESTINAL: As noted in the History of Present Illness  GENITOURINARY: No problems with urination  Denies any hematuria or dysuria  NEUROLOGIC: No dizziness or vertigo, denies headaches  MUSCULOSKELETAL: Denies any muscle or joint pain  SKIN: Denies skin rashes or itching  ENDOCRINE: Denies excessive thirst  Denies intolerance to heat or cold  PSYCHOSOCIAL: Denies depression or anxiety  Denies any recent memory loss  Historical Information   Past Medical History:   Diagnosis Date    Bump     bumped head yesterday at work "saw stars" no LOC, cat scan done was normal    Diabetes mellitus [address] [address] )     blood sugar 125 on admission    Encounter for screening colonoscopy     1 st one    Hyperlipidemia     Hypertension      Past Surgical History:   Procedure Laterality Date    HYSTERECTOMY      INCISIONAL BREAST BIOPSY      last assessed [id number]    TN COLONOSCOPY FLX DX W/COLLJ SPEC WHEN PFRMD N/A 7/5/2018    Procedure: COLONOSCOPY;  Surgeon: Violeta Lacey MD;  Location: [facility];   Service: Gastroenterology     Social History   History   Alcohol Use No     History   Drug Use No     History   Smoking Status    Current Every Day Smoker   Smokeless Tobacco    Never Used     Comment: smokes less than 1pk/day     Family History   Problem Relation Age of Onset    Cancer Cousin     Alcohol abuse Mother     Alcohol abuse Father        Meds/Allergies       Current Outpatient Prescriptions:     gabapentin (NEURONTIN) 300 mg capsule    glyBURIDE (DIABETA) 5 mg tablet    lisinopril-hydrochlorothiazide (PRINZIDE,ZESTORETIC) 10-12 5 MG per tablet    lovastatin (MEVACOR) 20 mg tablet    metFORMIN (GLUCOPHAGE) 1000 MG tablet    acetaminophen (TYLENOL) 325 mg tablet    amitriptyline (ELAVIL) 25 mg tablet    ciclopirox (PENLAC) 8 % solution    magnesium oxide (MAG-OX) 400 mg    Melatonin 5 MG CAPS    predniSONE 10 mg tablet    No Known Allergies        Objective     Blood pressure 122/72, pulse (!) 113, temperature (!) 96 5 °F (35 8 °C), temperature source Tympanic, height 5' 4" (1 626 m), weight 86 2 kg (190 lb)  Body mass index is 32 61 kg/m²  PHYSICAL EXAM:      General Appearance:   Alert, cooperative, no distress   HEENT:   Normocephalic, atraumatic, anicteric      Neck:  Supple, symmetrical, trachea midline   Lungs:   Clear to auscultation bilaterally; no rales, rhonchi or wheezing; respirations unlabored    Heart[de-identified]   Regular rate and rhythm; no murmur, rub, or gallop     Abdomen:   Soft, non-tender, non-distended; normal bowel sounds; no masses, no organomegaly    Genitalia:   Deferred    Rectal:   Deferred    Extremities:  No cyanosis, clubbing or edema    Pulses:  2+ and symmetric    Skin:  No jaundice, rashes, or lesions    Lymph nodes:  No palpable cervical lymphadenopathy        Lab Results:     No results found for: WBC, HGB, HCT, MCV, PLT    Lab Results   Component Value Date     02/02/2018    K 3 9 02/02/2018     02/02/2018    CO2 29 02/02/2018    ANIONGAP 5 02/02/2018    BUN 13 02/02/2018    CREATININE 0 68 02/02/2018    GLUCOSE 262 (H) 07/14/2016    GLUF 79 02/02/2018    CALCIUM 9 2 02/02/2018    AST 13 02/02/2018    ALT 19 02/02/2018    ALKPHOS 71 02/02/2018    PROT 7 2 02/02/2018    BILITOT 0 28 02/02/2018    EGFR 109 02/02/2018       No results found for: INR, PROTIME      Radiology Results:   No results found

## 2018-08-06 ENCOUNTER — APPOINTMENT (OUTPATIENT)
Dept: URGENT CARE | Age: 63
End: 2018-08-06
Payer: OTHER MISCELLANEOUS

## 2018-08-06 PROCEDURE — 99213 OFFICE O/P EST LOW 20 MIN: CPT | Performed by: PREVENTIVE MEDICINE

## 2018-08-09 ENCOUNTER — OFFICE VISIT (OUTPATIENT)
Dept: OBGYN CLINIC | Facility: OTHER | Age: 63
End: 2018-08-09
Payer: OTHER MISCELLANEOUS

## 2018-08-09 ENCOUNTER — TELEPHONE (OUTPATIENT)
Dept: NEUROLOGY | Facility: CLINIC | Age: 63
End: 2018-08-09

## 2018-08-09 VITALS
BODY MASS INDEX: 34.38 KG/M2 | HEART RATE: 97 BPM | WEIGHT: 194 LBS | HEIGHT: 63 IN | SYSTOLIC BLOOD PRESSURE: 117 MMHG | DIASTOLIC BLOOD PRESSURE: 81 MMHG

## 2018-08-09 DIAGNOSIS — H53.9 VISUAL DISTURBANCES: ICD-10-CM

## 2018-08-09 DIAGNOSIS — G44.309 POST-CONCUSSION HEADACHE: ICD-10-CM

## 2018-08-09 DIAGNOSIS — F07.81 POST CONCUSSION SYNDROME: Primary | ICD-10-CM

## 2018-08-09 DIAGNOSIS — F51.01 PRIMARY INSOMNIA: ICD-10-CM

## 2018-08-09 PROCEDURE — 99204 OFFICE O/P NEW MOD 45 MIN: CPT | Performed by: INTERNAL MEDICINE

## 2018-08-09 RX ORDER — AMITRIPTYLINE HYDROCHLORIDE 25 MG/1
25 TABLET, FILM COATED ORAL
Qty: 30 TABLET | Refills: 0 | Status: SHIPPED | OUTPATIENT
Start: 2018-08-09 | End: 2018-09-14

## 2018-08-09 RX ORDER — PREDNISONE 10 MG/1
10 TABLET ORAL DAILY
Qty: 7 TABLET | Refills: 0 | Status: SHIPPED | OUTPATIENT
Start: 2018-08-09 | End: 2018-09-14

## 2018-08-09 RX ORDER — ACETAMINOPHEN 325 MG/1
650 TABLET ORAL EVERY 6 HOURS PRN
COMMUNITY

## 2018-08-09 NOTE — PROGRESS NOTES
Assessment / Plan     Begin RTP:  N/A  Work Restrictions:  No, she is not able to return to work until cleared by physician  Diagnoses and all orders for this visit:    Post concussion syndrome  -     MRI brain wo contrast; Future  -     Ambulatory referral to Ophthalmology; Future  -     Ambulatory referral to Neurology; Future  -     predniSONE 10 mg tablet; Take 1 tablet (10 mg total) by mouth daily  -     magnesium oxide (MAG-OX) 400 mg; Take 1 tablet (400 mg total) by mouth daily  -     Ambulatory referral to Physical Therapy; Future  -     Ambulatory referral to Occupational Therapy; Future  -     amitriptyline (ELAVIL) 25 mg tablet; Take 1 tablet (25 mg total) by mouth daily at bedtime    Visual disturbances  -     MRI brain wo contrast; Future  -     Ambulatory referral to Ophthalmology; Future  -     Ambulatory referral to Neurology; Future  -     predniSONE 10 mg tablet; Take 1 tablet (10 mg total) by mouth daily  -     Ambulatory referral to Physical Therapy; Future  -     Ambulatory referral to Occupational Therapy; Future    Post-concussion headache  -     MRI brain wo contrast; Future  -     Ambulatory referral to Ophthalmology; Future  -     Ambulatory referral to Neurology; Future  -     predniSONE 10 mg tablet; Take 1 tablet (10 mg total) by mouth daily  -     magnesium oxide (MAG-OX) 400 mg; Take 1 tablet (400 mg total) by mouth daily  -     Ambulatory referral to Physical Therapy; Future  -     Ambulatory referral to Occupational Therapy; Future    Primary insomnia  -     Melatonin 5 MG CAPS; Take 2 capsules (10 mg total) by mouth daily at bedtime for 90 days    Other orders  -     acetaminophen (TYLENOL) 325 mg tablet; Take 650 mg by mouth every 6 (six) hours as needed for mild pain      61-year-old female presents with constellation of post work related injury constellation of symptoms consistent with postconcussion syndrome      She has been under the care of occupational medicine since her injury  Review of her medical record from Occupational Medicine indicates that her headache symptoms was slightly improving  During her visit on 7/6/2018, she indicates that her headache pain level was 3/10  However, her 7/9/2018 occupational medicine visit indicates that her headache pain level was 7/10  Her subsequent 7/24/2018 visit headache rating was 8/10 consistent with her report of worsening symptoms today  Unfortunately, her symptoms has been evolving  Given that she is also having worsening postconcussion headache in addition to her postconcussion syndrome, I have referred her to Neurology for further evaluation and management of her postconcussion syndrome and headaches  I have ordered brain MRI to evaluate for any other intracranial pathology  Hopefully she will be able to obtain the MRI prior to her appointment with Dr Raina Rodas of Neurology service  Abilities she will also benefit from neuro psychology evaluation once she is under the care of Neurology  Have referred her to physical therapy and Occupational therapy rehabilitation for the constellation of postconcussion syndrome symptoms and findings    Referral to Ophthalmology for further diagnostic evaluation of her postconcussion visual disturbances  I started patient on prednisone 10 mg p o  daily due to the significant constellation of postconcussion syndrome symptoms that she currently has  Start melatonin for postconcussion sleep disturbances  Magnesium 400 mg p o  daily for postconcussion headache  Start amitriptyline 25 mg p o  q h s  for her postconcussion syndrome symptoms including sadness               Discussed with Trainer:  N/A    Subjective       Patient ID:  Cuba Briones is a 65 y o  female    School:  Not applicable  Sport:   Not applicable  Sport: Position:  Not applicable    Injury Description:  Date / Time:  7/3/2018  :  Patient  Injury Description:  58-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  On today's presentation, she reports that her symptoms has been progressively getting worse  She rates headache at 10/10  She also reports anterior grade memory disturbances  Work related activities is causing symptom exacerbation  On today's presentation, she reports that she has had headache, nausea, visual disturbances (blurry vision, dark spots), dizziness, tiredness, light sensitivity, noise sensitivity, forgetfulness, sadness & depressed, nervousness, and sleeping less than usual in the past 24 hr  She does not have any suicidal ideation or thoughts  She has 14/22 concussion symptoms today  Evidence of forcible blow to the head:  no  Evidence of IC Injury / Fracture:  no  Location:  Frontal, Left temporal, Right temporal and Headache radiates to the occipital and retro orbital areas  Cause:  Work-related injury  Amnesia:   Retrograde:  yes   Anterograde:  no   LOC:  no  Early Signs:  Appears dazed/stunned, Confused about events, Appeared dazed, Dizziness, Headache and Nausea  Seizures:  No  CT Scan:  Yes  History of Concussion:  No    Headache History:  No   No history of pre-injury headaches or migraine headaches  Family History of Headache:  No  Developmental History:  None  History of Sleep Disorder:  No  Psychiatric History:  None  Do symptoms worsen with Physical Activity? Yes  Do symptoms worsen with Cognitive Activity? Yes  Overall Rating:  What percent is this person back to normal?  Parent 0 %  Patient reports that her symptoms has been getting worse since her injury  The following portions of the patient's history were reviewed and updated as appropriate: allergies, current medications, past family history, past medical history, past social history, past surgical history and problem list              IMPACT report reviewed  See printout        Physical Exam     /81   Pulse 97   Ht 5' 3" (1 6 m)   Wt 88 kg (194 lb)   BMI 34 37 kg/m² General:   NAD:  Slightly anxious  Psych:   AAOX3:  Yes   Mood and Affect:  Normal  HEENT:   Lacerations:  No   Bruising:  No   PEERLA:  Yes, however, pupillary I reflex not done today  EOMI:  Yes   C/D/I:  N/A   Fracture/Trauma:  No   Fundi Discs Sharp:  N/A  Neuro:   Examination of Coordination:  Abnormal:   Romberg:   Abnormal   Explain:  Swaying without dorian loss of balance  Very slight right-sided arm drift  , Past Pointing:   Normal, Single Leg Stance:   Abnormal   Explain:  Abnormal single leg stance bilaterally with eyes open and eyes closed  , Forward Tandem Gait:   Abnormal   Explain:  Abnormal forward tandem gait, Backward Tandem Gait:   Abnormal   Explain:  Abnormal backwards tandem gait, Eyes Close Tandem Gait:   Abnormal   Explain:  Abnormal forward and backwards tandem gait with eyes closed respectively  , Dysdiadochokinesia:   Bilateral:   No, Positive dysdiadochokinesis bilaterally  and Finger - Nose Impaired:   Bilateral:   No   CNII - XII Intact:  Yes   FTN:  Normal   Accommodation:  30 cm   Convergence:  28 cm  Vestibular Ocular:  Gaze stability:  Abnormal:   Dizziness with verticle motion, Dizziness with horizontal motion and Headache exacerbation and nausea during horizontal and vertical head motions test respectively

## 2018-08-15 NOTE — PROGRESS NOTES
[facility] - Outpatient Consultation  Richy Tafoya 65 y o  female MRN: [id number]  Encounter: [id number]      PCP: Luz Pradhan PA-C  Referring: Luz Pradhan PA-C  [address]  [facility], [address] [facility]      ASSESSMENT AND PLAN:      1  Positive fecal immunochemical test  Recent +FIT testing, s/p colonoscopy on 7/5 with poor prep despite miralax preparation  Would recommend she start miralax 1 capful daily with uptitration to benefit  Will re-schedule colonoscopy within 1 year  She should follow an extended clear liquid diet with clear liquids x 2 days prior to prep, taking miralax regularly leading up to procedure, and then, golytely the night before + morning of the procedure  - Case request operating room: COLONOSCOPY; Standing  - Case request operating room: COLONOSCOPY  - polyethylene glycol (GOLYTELY) 4000 mL solution; Take 4,000 mL by mouth once for 1 dose  Dispense: 4000 mL; Refill: 0    ______________________________________________________________________    HPI:      Patient is a 65year old female that sees me in follow up after colonoscopy  She was initially referred following +FIT testing  She underwent colonoscopy with miralax prep on 7/5 which demonstrated stool throughout the colon, limiting evaluation of small polyps  She has been having difficulty with her bowel movements, occurring irregularly with passage of small, hard stools once every other day  Symptoms are associated with lower abdominal pain that is crampy in nature and relieved with bowel movements  She notes no rectal bleeding or unintentional weight loss  REVIEW OF SYSTEMS:    CONSTITUTIONAL: Denies any fever, chills, rigors, and weight loss  HEENT: No earache or tinnitus  Denies hearing loss or visual disturbances  CARDIOVASCULAR: No chest pain or palpitations  RESPIRATORY: Denies any cough, hemoptysis, shortness of breath or dyspnea on exertion    GASTROINTESTINAL: As noted in the History of Present Illness  GENITOURINARY: No problems with urination  Denies any hematuria or dysuria  NEUROLOGIC: No dizziness or vertigo, denies headaches  MUSCULOSKELETAL: Denies any muscle or joint pain  SKIN: Denies skin rashes or itching  ENDOCRINE: Denies excessive thirst  Denies intolerance to heat or cold  PSYCHOSOCIAL: Denies depression or anxiety  Denies any recent memory loss  Historical Information   Past Medical History:   Diagnosis Date    Bump     bumped head yesterday at work "saw stars" no LOC, cat scan done was normal    Diabetes mellitus ([facility] [address] )     blood sugar 125 on admission    Encounter for screening colonoscopy     1 st one    Hyperlipidemia     Hypertension      Past Surgical History:   Procedure Laterality Date    HYSTERECTOMY      INCISIONAL BREAST BIOPSY      last assessed [id number]    MI COLONOSCOPY FLX DX W/COLLJ SPEC WHEN PFRMD N/A 7/5/2018    Procedure: COLONOSCOPY;  Surgeon: Cindi Mckinney MD;  Location: [facility];   Service: Gastroenterology     Social History   History   Alcohol Use No     History   Drug Use No     History   Smoking Status    Current Every Day Smoker   Smokeless Tobacco    Never Used     Comment: smokes less than 1pk/day     Family History   Problem Relation Age of Onset    Cancer Cousin     Alcohol abuse Mother     Alcohol abuse Father        Meds/Allergies       Current Outpatient Prescriptions:     gabapentin (NEURONTIN) 300 mg capsule    glyBURIDE (DIABETA) 5 mg tablet    lisinopril-hydrochlorothiazide (PRINZIDE,ZESTORETIC) 10-12 5 MG per tablet    lovastatin (MEVACOR) 20 mg tablet    metFORMIN (GLUCOPHAGE) 1000 MG tablet    acetaminophen (TYLENOL) 325 mg tablet    amitriptyline (ELAVIL) 25 mg tablet    ciclopirox (PENLAC) 8 % solution    magnesium oxide (MAG-OX) 400 mg    Melatonin 5 MG CAPS    predniSONE 10 mg tablet    No Known Allergies        Objective     Blood pressure 122/72, pulse (!) 113, temperature (!) 96 5 °F (35 8 °C), temperature source Tympanic, height 5' 4" (1 626 m), weight 86 2 kg (190 lb)  Body mass index is 32 61 kg/m²  PHYSICAL EXAM:      General Appearance:   Alert, cooperative, no distress   HEENT:   Normocephalic, atraumatic, anicteric      Neck:  Supple, symmetrical, trachea midline   Lungs:   Clear to auscultation bilaterally; no rales, rhonchi or wheezing; respirations unlabored    Heart[de-identified]   Regular rate and rhythm; no murmur, rub, or gallop  Abdomen:   Soft, non-tender, non-distended; normal bowel sounds; no masses, no organomegaly    Genitalia:   Deferred    Rectal:   Deferred    Extremities:  No cyanosis, clubbing or edema    Pulses:  2+ and symmetric    Skin:  No jaundice, rashes, or lesions    Lymph nodes:  No palpable cervical lymphadenopathy        Lab Results:     No results found for: WBC, HGB, HCT, MCV, PLT    Lab Results   Component Value Date     02/02/2018    K 3 9 02/02/2018     02/02/2018    CO2 29 02/02/2018    ANIONGAP 5 02/02/2018    BUN 13 02/02/2018    CREATININE 0 68 02/02/2018    GLUCOSE 262 (H) 07/14/2016    GLUF 79 02/02/2018    CALCIUM 9 2 02/02/2018    AST 13 02/02/2018    ALT 19 02/02/2018    ALKPHOS 71 02/02/2018    PROT 7 2 02/02/2018    BILITOT 0 28 02/02/2018    EGFR 109 02/02/2018       No results found for: INR, PROTIME      Radiology Results:   No results found

## 2018-08-17 ENCOUNTER — APPOINTMENT (OUTPATIENT)
Dept: URGENT CARE | Age: 63
End: 2018-08-17
Payer: OTHER MISCELLANEOUS

## 2018-08-17 ENCOUNTER — HOSPITAL ENCOUNTER (EMERGENCY)
Facility: HOSPITAL | Age: 63
Discharge: HOME | End: 2018-08-17
Attending: EMERGENCY MEDICINE | Admitting: EMERGENCY MEDICINE
Payer: OTHER MISCELLANEOUS

## 2018-08-17 VITALS
BODY MASS INDEX: 34.38 KG/M2 | OXYGEN SATURATION: 99 % | DIASTOLIC BLOOD PRESSURE: 93 MMHG | HEIGHT: 63 IN | SYSTOLIC BLOOD PRESSURE: 146 MMHG | TEMPERATURE: 97.6 F | RESPIRATION RATE: 18 BRPM | WEIGHT: 194 LBS | HEART RATE: 89 BPM

## 2018-08-17 DIAGNOSIS — S06.0X9A CONCUSSION: ICD-10-CM

## 2018-08-17 DIAGNOSIS — F32.A DEPRESSION: Primary | ICD-10-CM

## 2018-08-17 PROCEDURE — 99213 OFFICE O/P EST LOW 20 MIN: CPT | Performed by: PREVENTIVE MEDICINE

## 2018-08-17 PROCEDURE — 99283 EMERGENCY DEPT VISIT LOW MDM: CPT

## 2018-08-17 NOTE — ED NOTES
Pt stated that she was hungry and has not eaten since this morning  Offered pt a lunch box, she stated that she cannot eat cold sandwiches and that luzma crackers will give her heart burn        Trendmiguelito Fishman  08/17/18 1933

## 2018-08-18 NOTE — DISCHARGE INSTRUCTIONS
Depression   WHAT YOU NEED TO KNOW:   Depression is a medical condition that causes feelings of sadness or hopelessness that do not go away  Depression may cause you to lose interest in things you used to enjoy  These feelings may interfere with your daily life  DISCHARGE INSTRUCTIONS:   Call 911 for any of the following:   · You think about harming yourself or someone else  Contact your healthcare provider if:   · Your symptoms do not improve  · You cannot make it to your next appointment  · You have new symptoms  · You have questions or concerns about your condition or care  Medicines:   · Antidepressants  may be given to improve or balance your mood  You may need to take this medicine for several weeks before you begin to feel better  · Take your medicine as directed  Contact your healthcare provider if you think your medicine is not helping or if you have side effects  Tell him of her if you are allergic to any medicine  Keep a list of the medicines, vitamins, and herbs you take  Include the amounts, and when and why you take them  Bring the list or the pill bottles to follow-up visits  Carry your medicine list with you in case of an emergency  Therapy  may be used to treat your depression  A therapist will help you learn to cope with your thoughts and feelings  This can be done alone or in a group  It may also be done with family members or a significant other  Self-care:   · Get regular physical activity  Try to exercise for 30 minutes, 3 to 5 days a week  Work with your healthcare provider to develop an exercise plan that you enjoy  Physical activity may improve your symptoms  · Get enough sleep  Create a routine to help you relax before bed  You can listen to music, read, or do yoga  Try to go to bed and wake up at the same time every day  Sleep is important for emotional health  · Eat a variety of healthy foods from all of the food groups    A healthy meal plan is low in fat, salt, and added sugar  Ask your healthcare provider for more information about a meal plan that is right for you  · Do not drink alcohol or use drugs  Alcohol and drugs can make your symptoms worse  Follow up with your healthcare provider as directed: Your healthcare provider will monitor your progress at follow-up visits  He or she will also monitor your medicine if you take antidepressants  Your healthcare provider will ask if the medicine is helping  Tell him or her about any side effects or problems you may have with your medicine  The type or amount of medicine may need to be changed  Write down your questions so you remember to ask them during your visits  © 2017 [facility] Information is for End User's use only and may not be sold, redistributed or otherwise used for commercial purposes  All illustrations and images included in CareNotes® are the copyrighted property of [employer]  or [employer]  The above information is an  only  It is not intended as medical advice for individual conditions or treatments  Talk to your doctor, nurse or pharmacist before following any medical regimen to see if it is safe and effective for you

## 2018-08-18 NOTE — ED NOTES
ED Dr Saldivar request that CW give pt OP MH resources  The pt is depressed  Denies SI/HI/AH/VH  The pt states she is only at the ED because of her workmen comp claim at work   CW gave pt OP MH and support group list

## 2018-08-18 NOTE — ED PROVIDER NOTES
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 Taking?    Melatonin 5 MG CAPS   No No   Sig: Take 2 capsules (10 mg total) by mouth daily at bedtime for 90 days   acetaminophen (TYLENOL) 325 mg tablet   Yes No   Sig: Take 650 mg by mouth every 6 (six) hours as needed for mild pain   amitriptyline (ELAVIL) 25 mg tablet   No No   Sig: Take 1 tablet (25 mg total) by mouth daily at bedtime   ciclopirox (PENLAC) 8 % solution   No No   Sig: Apply topically daily at bedtime   gabapentin (NEURONTIN) 300 mg capsule   No No   Sig: Take 2 capsules (600 mg total) by mouth every 8 (eight) hours for 180 days   glyBURIDE (DIABETA) 5 mg tablet   No No   Sig: Take 1 tablet (5 mg total) by mouth daily with breakfast for 180 days   lisinopril-hydrochlorothiazide (PRINZIDE,ZESTORETIC) 10-12 5 MG per tablet   No No   Sig: Take 1 tablet by mouth daily for 180 days   lovastatin (MEVACOR) 20 mg tablet   No No   Sig: Take 1 tablet (20 mg total) by mouth daily at bedtime for 180 days   Patient taking differently: Take 20 mg by mouth 2 (two) times a day as needed     magnesium oxide (MAG-OX) 400 mg   No No   Sig: Take 1 tablet (400 mg total) by mouth daily   metFORMIN (GLUCOPHAGE) 1000 MG tablet   No No   Sig: Take 1 tablet (1,000 mg total) by mouth 2 (two) times a day with meals for 180 days   polyethylene glycol (GOLYTELY) 4000 mL solution   No No   Sig: Take 4,000 mL by mouth once for 1 dose   predniSONE 10 mg tablet   No No   Sig: Take 1 tablet (10 mg total) by mouth daily      Facility-Administered Medications: None       Past Medical History:   Diagnosis Date    Bump     bumped head yesterday at work "saw stars" no LOC, cat scan done was normal    Diabetes mellitus ([address] )     blood sugar 125 on admission    Encounter for screening colonoscopy     1 st one    Hyperlipidemia     Hypertension        Past Surgical History:   Procedure Laterality Date    HYSTERECTOMY      INCISIONAL BREAST BIOPSY      last assessed [id number]    MI COLONOSCOPY FLX DX W/COLLJ SPEC WHEN PFRMD N/A 7/5/2018    Procedure: COLONOSCOPY;  Surgeon: Shavonne Beach MD;  Location: [facility]; Service: Gastroenterology       Family History   Problem Relation Age of Onset    Cancer Cousin     Alcohol abuse Mother     Alcohol abuse Father      I have reviewed and agree with the history as documented  Social History   Substance Use Topics    Smoking status: Current Every Day Smoker    Smokeless tobacco: Never Used      Comment: smokes less than 1pk/day    Alcohol use No        Review of Systems   Constitutional: Negative for activity change, chills and fever  HENT: Negative for congestion, rhinorrhea, sore throat and trouble swallowing  Eyes: Negative for pain, discharge and visual disturbance  Respiratory: Negative for cough, chest tightness and shortness of breath  Cardiovascular: Negative for chest pain, palpitations and leg swelling  Gastrointestinal: Negative for abdominal pain, nausea and vomiting  Genitourinary: Negative for dysuria, frequency and urgency  Musculoskeletal: Negative for gait problem, neck pain and neck stiffness  Skin: Negative for color change, rash and wound  Neurological: Positive for dizziness  Negative for syncope, light-headedness and headaches  Psychiatric/Behavioral: Negative for self-injury and suicidal ideas  Physical Exam  ED Triage Vitals [08/17/18 1621]   Temperature Pulse Respirations Blood Pressure SpO2   97 6 °F (36 4 °C) (!) 110 18 135/90 99 %      Temp Source Heart Rate Source Patient Position - Orthostatic VS BP Location FiO2 (%)   Tympanic Monitor Sitting Left arm --      Pain Score       No Pain           Orthostatic Vital Signs  Vitals:    08/17/18 1621 08/17/18 1933   BP: 135/90 146/93   Pulse: (!) 110 89   Patient Position - Orthostatic VS: Sitting Sitting       Physical Exam   Constitutional: She is oriented to person, place, and time  She appears well-developed and well-nourished  No distress  HENT:   Head: Normocephalic and atraumatic  Mouth/Throat: Oropharynx is clear and moist    Eyes: Conjunctivae and EOM are normal  Pupils are equal, round, and reactive to light  Right eye exhibits no discharge  Left eye exhibits no discharge  Neck: Normal range of motion  Neck supple  Cardiovascular: Normal rate, regular rhythm and intact distal pulses  Pulmonary/Chest: Effort normal and breath sounds normal  No stridor  No respiratory distress  Abdominal: Soft  There is no tenderness  There is no rebound and no guarding  Musculoskeletal: Normal range of motion  She exhibits no edema or tenderness  Neurological: She is alert and oriented to person, place, and time  No cranial nerve deficit or sensory deficit  She exhibits normal muscle tone  Coordination normal    Skin: Skin is warm and dry  Psychiatric: She has a normal mood and affect  Her speech is normal and behavior is normal  She is not actively hallucinating  She expresses no homicidal and no suicidal ideation   She expresses no suicidal plans and no homicidal plans  She is attentive  Nursing note and vitals reviewed  ED Medications  Medications - No data to display    Diagnostic Studies  Results Reviewed     None                 No orders to display         Procedures  Procedures      Phone Consults  ED Phone Contact    ED Course                               MDM  Number of Diagnoses or Management Options  Concussion:   Depression:   Diagnosis management comments: No indication for inpatient care  Pt given outpatient resources  Strict return precautions for SI  CritCare Time    Disposition  Final diagnoses:   Depression   Concussion     Time reflects when diagnosis was documented in both MDM as applicable and the Disposition within this note     Time User Action Codes Description Comment    8/17/2018  8:18 PM Janeice Goldmann [F32 9] Depression     8/17/2018  8:18 PM Leonor Sim, Danette Zamora [[id number] [id number]] Concussion       ED Disposition     ED Disposition Condition Comment    Discharge  Ya Mariano discharge to home/self care      Condition at discharge: Stable        Follow-up Information     Follow up With Specialties Details Why [facility] Emergency Department Emergency Medicine  If symptoms worsen, As needed [facility] ED, [address] [place] [address], [id number]    see resources provided by ED crisis worker               Discharge Medication List as of 8/17/2018  8:18 PM      CONTINUE these medications which have NOT CHANGED    Details   acetaminophen (TYLENOL) 325 mg tablet Take 650 mg by mouth every 6 (six) hours as needed for mild pain, Historical Med      amitriptyline (ELAVIL) 25 mg tablet Take 1 tablet (25 mg total) by mouth daily at bedtime, Starting Thu 8/9/2018, Normal      ciclopirox (PENLAC) 8 % solution Apply topically daily at bedtime, Starting Tue 7/24/2018, Print      gabapentin (NEURONTIN) 300 mg capsule Take 2 capsules (600 mg total) by mouth every 8 (eight) hours for 180 days, Starting Thu 3/1/2018, Until Tue 8/28/2018, Normal      glyBURIDE (DIABETA) 5 mg tablet Take 1 tablet (5 mg total) by mouth daily with breakfast for 180 days, Starting Thu 3/1/2018, Until Tue 8/28/2018, Normal      lisinopril-hydrochlorothiazide (PRINZIDE,ZESTORETIC) 10-12 5 MG per tablet Take 1 tablet by mouth daily for 180 days, Starting Thu 3/1/2018, Until Tue 8/28/2018, Normal      lovastatin (MEVACOR) 20 mg tablet Take 1 tablet (20 mg total) by mouth daily at bedtime for 180 days, Starting Thu 3/1/2018, Until Tue 8/28/2018, Normal      magnesium oxide (MAG-OX) 400 mg Take 1 tablet (400 mg total) by mouth daily, Starting Thu 8/9/2018, Normal      Melatonin 5 MG CAPS Take 2 capsules (10 mg total) by mouth daily at bedtime for 90 days, Starting Thu 8/9/2018, Until Wed 11/7/2018, Normal      metFORMIN (GLUCOPHAGE) 1000 MG tablet Take 1 tablet (1,000 mg total) by mouth 2 (two) times a day with meals for 180 days, Starting Thu 3/1/2018, Until Tue 8/28/2018, Normal      polyethylene glycol (GOLYTELY) 4000 mL solution Take 4,000 mL by mouth once for 1 dose, Starting Tue 8/14/2018, Normal      predniSONE 10 mg tablet Take 1 tablet (10 mg total) by mouth daily, Starting Thu 8/9/2018, Normal           No discharge procedures on file  ED Provider  Attending physically available and evaluated Euna Cancer  I managed the patient along with the ED Attending      Electronically Signed by         Marlon Leal MD  08/18/18 9811

## 2018-08-18 NOTE — ED ATTENDING ATTESTATION
Salvatore Conley MD, saw and evaluated the patient  I have discussed the patient with the resident/non-physician practitioner and agree with the resident's/non-physician practitioner's findings, Plan of Care, and MDM as documented in the resident's/non-physician practitioner's note, except where noted  All available labs and Radiology studies were reviewed  At this point I agree with the current assessment done in the Emergency Department  I have conducted an independent evaluation of this patient a history and physical is as follows:  Patient with concussion 1 month ago, patient has since had difficulty reading, concentrating, and some dizziness  She says she has been unable to return to work  Patient states that she is depressed about that, that is why she is here  At the time of the injury, patient had a negative head CT, and was scheduled for MRI tomorrow, but did not follow up because she did not want to go into a closed MRI  She is looking for an open MRI  She has no new or worsening symptoms, she states she is just not getting better as quickly as she would like to  She has not had nausea or vomiting  She denies suicidal ideation or homicidal ideation  She has not been hallucinating  Her review of systems otherwise negative in 12 systems reviewed  On exam Vital signs were reviewed  Patient is awake, alert, interactive  The patient's pupils are equally round reactive to light  Oropharynx is clear with moist mucous membranes  Neck is supple and nontender with no adenopathy or JVD  Heart is regular with no murmurs, rubs, or gallops  Lungs are clear and equal with no wheezes, rales, or rhonchi  Abdomen is soft and nontender with no masses, rebound, or guarding  There is no CVA tenderness  The patient was completely exposed  There is no skin breakdown  There are no rashes or skin changes  Extremities are warm and well perfused with good pulses   The patient has normal strength, sensation, and cranial nerves  Impression:  Adjustment disorder with depressed mood because of concussion    Will plan to consult crisis    Critical Care Time  CritCare Time    Procedures

## 2018-08-27 ENCOUNTER — APPOINTMENT (OUTPATIENT)
Dept: LAB | Facility: CLINIC | Age: 63
End: 2018-08-27

## 2018-08-27 DIAGNOSIS — E11.42 CONTROLLED TYPE 2 DIABETES MELLITUS WITH DIABETIC POLYNEUROPATHY, WITHOUT LONG-TERM CURRENT USE OF INSULIN (HCC): ICD-10-CM

## 2018-08-27 LAB
EST. AVERAGE GLUCOSE BLD GHB EST-MCNC: 163 MG/DL
HBA1C MFR BLD: 7.3 % (ref 4.2–6.3)

## 2018-08-27 PROCEDURE — 83036 HEMOGLOBIN GLYCOSYLATED A1C: CPT

## 2018-08-27 PROCEDURE — 36415 COLL VENOUS BLD VENIPUNCTURE: CPT

## 2018-08-29 ENCOUNTER — TELEPHONE (OUTPATIENT)
Dept: GASTROENTEROLOGY | Facility: AMBULARY SURGERY CENTER | Age: 63
End: 2018-08-29

## 2018-08-29 ENCOUNTER — ANESTHESIA EVENT (OUTPATIENT)
Dept: GASTROENTEROLOGY | Facility: MEDICAL CENTER | Age: 63
End: 2018-08-29

## 2018-08-29 NOTE — TELEPHONE ENCOUNTER
Pt cancelled procedure due to transportation issues  Pt will call back to reschedule  Betina Lopez from [facility] aware of cancellation

## 2018-08-30 ENCOUNTER — ANESTHESIA (OUTPATIENT)
Dept: GASTROENTEROLOGY | Facility: MEDICAL CENTER | Age: 63
End: 2018-08-30

## 2018-08-30 ENCOUNTER — PREP FOR PROCEDURE (OUTPATIENT)
Dept: GASTROENTEROLOGY | Facility: MEDICAL CENTER | Age: 63
End: 2018-08-30

## 2018-08-30 DIAGNOSIS — R19.5 POSITIVE FECAL OCCULT BLOOD TEST: Primary | ICD-10-CM

## 2018-08-30 NOTE — TELEPHONE ENCOUNTER
Pt is scheduled at [facility] with dr Hakeem Ba on 9/26/18 pt needs last appt of the day because of transportation issues  Pt still has golytely prep instructions as well as prep kit   Pt is aware she will get a kiera the day before with exact time of arrival

## 2018-08-30 NOTE — PATIENT INSTRUCTIONS
Pt is scheduled at [facility] with dr Jesus Fox on 9/26/18 pt needs last appt of the day because of transportation issues  Pt still has golytely prep instructions as well as prep kit   Pt is aware she will get a kiera the day before with exact time of arrival

## 2018-09-04 ENCOUNTER — OFFICE VISIT (OUTPATIENT)
Dept: INTERNAL MEDICINE CLINIC | Facility: CLINIC | Age: 63
End: 2018-09-04
Payer: COMMERCIAL

## 2018-09-04 VITALS
BODY MASS INDEX: 33.67 KG/M2 | DIASTOLIC BLOOD PRESSURE: 82 MMHG | WEIGHT: 190.04 LBS | HEART RATE: 100 BPM | SYSTOLIC BLOOD PRESSURE: 130 MMHG | TEMPERATURE: 97.8 F | HEIGHT: 63 IN

## 2018-09-04 DIAGNOSIS — E11.42 CONTROLLED TYPE 2 DIABETES MELLITUS WITH DIABETIC POLYNEUROPATHY, WITHOUT LONG-TERM CURRENT USE OF INSULIN (HCC): Primary | ICD-10-CM

## 2018-09-04 DIAGNOSIS — E78.2 MIXED HYPERLIPIDEMIA: ICD-10-CM

## 2018-09-04 DIAGNOSIS — I10 BENIGN ESSENTIAL HYPERTENSION: ICD-10-CM

## 2018-09-04 DIAGNOSIS — F32.9 REACTIVE DEPRESSION: ICD-10-CM

## 2018-09-04 DIAGNOSIS — N64.4 BREAST PAIN, RIGHT: ICD-10-CM

## 2018-09-04 DIAGNOSIS — E11.42 DIABETIC PERIPHERAL NEUROPATHY (HCC): ICD-10-CM

## 2018-09-04 DIAGNOSIS — E11.9 DM TYPE 2 WITHOUT RETINOPATHY (HCC): ICD-10-CM

## 2018-09-04 PROCEDURE — 99213 OFFICE O/P EST LOW 20 MIN: CPT | Performed by: PHYSICIAN ASSISTANT

## 2018-09-04 PROCEDURE — 3075F SYST BP GE 130 - 139MM HG: CPT | Performed by: PHYSICIAN ASSISTANT

## 2018-09-04 PROCEDURE — 3079F DIAST BP 80-89 MM HG: CPT | Performed by: PHYSICIAN ASSISTANT

## 2018-09-04 RX ORDER — LISINOPRIL AND HYDROCHLOROTHIAZIDE 12.5; 1 MG/1; MG/1
1 TABLET ORAL DAILY
Qty: 90 TABLET | Refills: 1 | Status: SHIPPED | OUTPATIENT
Start: 2018-09-04 | End: 2021-01-12 | Stop reason: SDUPTHER

## 2018-09-04 RX ORDER — GABAPENTIN 300 MG/1
600 CAPSULE ORAL EVERY 8 HOURS
Qty: 270 CAPSULE | Refills: 0 | Status: SHIPPED | OUTPATIENT
Start: 2018-09-04 | End: 2021-01-09

## 2018-09-04 RX ORDER — LOVASTATIN 40 MG/1
40 TABLET ORAL
Qty: 90 TABLET | Refills: 1 | Status: SHIPPED | OUTPATIENT
Start: 2018-09-04 | End: 2018-09-14

## 2018-09-04 RX ORDER — GLYBURIDE 5 MG/1
5 TABLET ORAL
Qty: 90 TABLET | Refills: 1 | Status: SHIPPED | OUTPATIENT
Start: 2018-09-04 | End: 2021-02-01 | Stop reason: SDUPTHER

## 2018-09-04 NOTE — PATIENT INSTRUCTIONS
I have refilled all your meds  Get the new labs as dated in 6 months  Call here with glucose machine brand so we can send your testing supplies  I have refilled the glyburide as well as you stopped taking  Sched with the breast surgeon, your were referred back in July  Colonoscopy is Sept  Neurology for [place] [facility] is also in September  Does feel depressed but has not found a location to go, Patient reports she was given the location however when she called she was told she needed to pay 200 dollars  Advised patient we will get her an appointment with our  to assist her in finding a location accepted through her insurance

## 2018-09-04 NOTE — PROGRESS NOTES
Assessment/Plan:    No problem-specific Assessment & Plan notes found for this encounter  Diagnoses and all orders for this visit:    Controlled type 2 diabetes mellitus with diabetic polyneuropathy, without long-term current use of insulin (HCC)  -     glyBURIDE (DIABETA) 5 mg tablet; Take 1 tablet (5 mg total) by mouth daily with breakfast for 180 days  -     metFORMIN (GLUCOPHAGE) 1000 MG tablet; Take 1 tablet (1,000 mg total) by mouth 2 (two) times a day with meals for 180 days  -     Comprehensive metabolic panel; Future  -     Hemoglobin A1C; Future  -     Microalbumin / creatinine urine ratio; Future    DM type 2 without retinopathy (HCC)    Benign essential hypertension  -     lisinopril-hydrochlorothiazide (PRINZIDE,ZESTORETIC) 10-12 5 MG per tablet; Take 1 tablet by mouth daily for 180 days    Breast pain, right    Mixed hyperlipidemia  -     lovastatin (MEVACOR) 40 MG tablet; Take 1 tablet (40 mg total) by mouth daily at bedtime for 180 days  -     Lipid Panel with Direct LDL reflex; Future    Diabetic peripheral neuropathy (HCC)  -     gabapentin (NEURONTIN) 300 mg capsule; Take 2 capsules (600 mg total) by mouth every 8 (eight) hours for 180 days    Reactive depression  -     Ambulatory referral to Social Work; Future          Subjective:      Patient ID: Ramy Haddad is a 65 y o  female  Pt here for lab review  A1C increased but has not been taking her glyburide, does not know why, denies side effects  States ran out of her testing supplies but does not know what brand her machine is  Colonoscopy is sched for 8/13/92    Known complicated R breast cyst on diagnostic imaging from June and to get repeat in 6 moths but is reporting more pain then before    Pt states never sched the appt for the breast surgeon as referred in past    Here for lab review A1C went up from 6 2 to 7 3 % States not taking her glyburide not sure why just not taking    States still seeing a Dr s/p hitting head on her job  Is under [place]  Has neuro appt 9/14  Reports still getting headaches but no change vision        The following portions of the patient's history were reviewed and updated as appropriate: allergies, current medications, past family history, past medical history, past social history, past surgical history and problem list     Review of Systems   Constitutional: Negative for appetite change, chills and fatigue  HENT: Negative  Eyes: Negative  Negative for visual disturbance  Respiratory: Negative  Negative for cough and shortness of breath  Cardiovascular: Negative  Negative for chest pain, palpitations and leg swelling  Gastrointestinal: Negative for abdominal pain, diarrhea and nausea  Endocrine: Positive for polydipsia  Negative for cold intolerance, heat intolerance, polyphagia and polyuria  Musculoskeletal: Negative  Skin: Negative  Neurological: Positive for headaches (as noted HPI  W/C case)  Psychiatric/Behavioral: Positive for dysphoric mood  Negative for behavioral problems, confusion, self-injury, sleep disturbance and suicidal ideas  Objective:      /82   Pulse 100   Temp 97 8 °F (36 6 °C)   Ht 5' 3" (1 6 m)   Wt 86 2 kg (190 lb 0 6 oz)   BMI 33 66 kg/m²          Physical Exam   Constitutional: She is oriented to person, place, and time  She appears well-developed and well-nourished  Cardiovascular: Normal rate, regular rhythm and normal heart sounds  Pulmonary/Chest: Effort normal and breath sounds normal    Abdominal: Soft  Bowel sounds are normal  There is no tenderness  Musculoskeletal: Normal range of motion  Neurological: She is alert and oriented to person, place, and time  No cranial nerve deficit  Skin: Skin is warm  Psychiatric: She has a normal mood and affect   Her behavior is normal

## 2018-09-05 ENCOUNTER — OFFICE VISIT (OUTPATIENT)
Dept: SURGERY | Facility: CLINIC | Age: 63
End: 2018-09-05

## 2018-09-05 VITALS
SYSTOLIC BLOOD PRESSURE: 128 MMHG | WEIGHT: 192.2 LBS | TEMPERATURE: 96 F | DIASTOLIC BLOOD PRESSURE: 72 MMHG | HEIGHT: 63 IN | BODY MASS INDEX: 34.05 KG/M2

## 2018-09-05 DIAGNOSIS — N64.4 BREAST PAIN, RIGHT: Primary | ICD-10-CM

## 2018-09-05 PROCEDURE — 99213 OFFICE O/P EST LOW 20 MIN: CPT | Performed by: SURGERY

## 2018-09-05 NOTE — LETTER
September 5, 2018     Marisol Law PA-C  [address] [facility]    Patient: Aiyana Hernandez   YOB: 1955   Date of Visit: 9/5/2018       Dear Dr Stuart Del Real: Thank you for referring Aiyana Hernandez to me for evaluation  Below are my notes for this consultation  If you have questions, please do not hesitate to call me  I look forward to following your patient along with you  Sincerely,        Dipika Watkins MD        CC: No Recipients  Dipika Watkins MD  9/5/2018  1:30 PM  Sign at close encounter  Office Visit - General Surgery  Aiyana Hernandez MRN: [id number]  Encounter: [id number]    Assessment and Plan    Problem List Items Addressed This Visit        Other    Breast pain, right - Primary     I told her I do not have a good explanation as why she is having pain  The cyst previously identified was not in the same location where she is having pain now  For 6 month follow-up ultrasound was recommended  That should be done in December and we will see her back after that  I gave her fiber cystic diet list to see if maybe there is some foods on that that she eats a lot of  I told her if she cuts back in some of those thing she may have some decreased pain but I could not guarantee it  Chief Complaint:  Aiyana Hernandez is a 65 y o  female who presents for Abscess (Consult right breast cyst)    Subjective  65year old female presents with right breast pain  She had been seen previously and had an ultrasound and mammogram done which revealed a small, probable cyst at 12 o'clock right breast   No abnormality at 5 o'clock where she had pain when seen before      Past Medical History  Past Medical History:   Diagnosis Date    Bump     bumped head yesterday at work "saw stars" no LOC, cat scan done was normal    Diabetes mellitus [address] [address] )     blood sugar 125 on admission    Encounter for screening colonoscopy     1 st one    Hyperlipidemia     Hypertension        Past Surgical History  Past Surgical History:   Procedure Laterality Date    HYSTERECTOMY      INCISIONAL BREAST BIOPSY      last assessed 17Aug2017    NH COLONOSCOPY FLX DX W/COLLJ SPEC WHEN PFRMD N/A 7/5/2018    Procedure: COLONOSCOPY;  Surgeon: Christopher Syed MD;  Location: [facility];   Service: Gastroenterology       Family History  Family History   Problem Relation Age of Onset    Cancer Cousin     Alcohol abuse Mother     Alcohol abuse Father        Medications  Current Outpatient Prescriptions on File Prior to Visit   Medication Sig Dispense Refill    gabapentin (NEURONTIN) 300 mg capsule Take 2 capsules (600 mg total) by mouth every 8 (eight) hours for 180 days 270 capsule 0    glyBURIDE (DIABETA) 5 mg tablet Take 1 tablet (5 mg total) by mouth daily with breakfast for 180 days 90 tablet 1    lisinopril-hydrochlorothiazide (PRINZIDE,ZESTORETIC) 10-12 5 MG per tablet Take 1 tablet by mouth daily for 180 days 90 tablet 1    lovastatin (MEVACOR) 40 MG tablet Take 1 tablet (40 mg total) by mouth daily at bedtime for 180 days 90 tablet 1    magnesium oxide (MAG-OX) 400 mg Take 1 tablet (400 mg total) by mouth daily 30 tablet 2    metFORMIN (GLUCOPHAGE) 1000 MG tablet Take 1 tablet (1,000 mg total) by mouth 2 (two) times a day with meals for 180 days 180 tablet 1    acetaminophen (TYLENOL) 325 mg tablet Take 650 mg by mouth every 6 (six) hours as needed for mild pain      amitriptyline (ELAVIL) 25 mg tablet Take 1 tablet (25 mg total) by mouth daily at bedtime (Patient not taking: Reported on 9/5/2018 ) 30 tablet 0    ciclopirox (PENLAC) 8 % solution Apply topically daily at bedtime (Patient not taking: Reported on 9/5/2018 ) 6 6 mL 5    Melatonin 5 MG CAPS Take 2 capsules (10 mg total) by mouth daily at bedtime for 90 days (Patient not taking: Reported on 9/5/2018 ) 90 capsule 0    polyethylene glycol (GOLYTELY) 4000 mL solution Take 4,000 mL by mouth once for 1 dose 4000 mL 0  predniSONE 10 mg tablet Take 1 tablet (10 mg total) by mouth daily (Patient not taking: Reported on 9/4/2018 ) 7 tablet 0     No current facility-administered medications on file prior to visit  Allergies  No Known Allergies    Review of Systems   Constitutional: Negative for chills and fever  HENT: Negative for trouble swallowing and voice change  Eyes: Negative for pain and visual disturbance  Respiratory: Negative for cough and shortness of breath  Cardiovascular: Negative for chest pain and leg swelling  Gastrointestinal: Negative for abdominal pain, nausea and vomiting  Endocrine: Negative for cold intolerance, heat intolerance, polydipsia, polyphagia and polyuria  Genitourinary: Negative for difficulty urinating and flank pain  Musculoskeletal: Negative for arthralgias and gait problem  Skin: Negative for rash and wound  Allergic/Immunologic: Negative for food allergies  Neurological: Negative for seizures, syncope, weakness and headaches  Hematological: Negative for adenopathy  Psychiatric/Behavioral: Positive for confusion and decreased concentration  All other systems reviewed and are negative  Objective  Vitals:    09/05/18 1228   BP: 128/72   Temp: (!) 96 °F (35 6 °C)       Physical Exam   Constitutional: She is oriented to person, place, and time  She appears well-developed and well-nourished  HENT:   Head: Normocephalic and atraumatic  Right Ear: External ear normal    Left Ear: External ear normal    Eyes: Conjunctivae are normal  No scleral icterus  Neck: Neck supple  No tracheal deviation present  No thyromegaly present  Cardiovascular: Normal rate, regular rhythm and normal heart sounds  Exam reveals no friction rub  No murmur heard  Pulmonary/Chest: Effort normal and breath sounds normal  No respiratory distress  She has no wheezes  She has no rales  Breast exam:  Right breast:  No discrete masses no axillary adenopathy   Minimal tenderness upper inner quadrant, no discrete or worrisome masses  Left breast: no discrete masses no axillary adenopathy     Abdominal: Soft  She exhibits no mass  There is no tenderness  There is no rebound and no guarding  Musculoskeletal: Normal range of motion  She exhibits no edema  Lymphadenopathy:     She has no cervical adenopathy  Neurological: She is alert and oriented to person, place, and time  Skin: Skin is warm and dry  Psychiatric: She has a normal mood and affect  Her behavior is normal  Judgment and thought content normal    Nursing note and vitals reviewed

## 2018-09-05 NOTE — ASSESSMENT & PLAN NOTE
I told her I do not have a good explanation as why she is having pain  The cyst previously identified was not in the same location where she is having pain now  For 6 month follow-up ultrasound was recommended  That should be done in December and we will see her back after that  I gave her fiber cystic diet list to see if maybe there is some foods on that that she eats a lot of  I told her if she cuts back in some of those thing she may have some decreased pain but I could not guarantee it

## 2018-09-05 NOTE — PROGRESS NOTES
Office Visit - General Surgery  Kendall Arriola MRN: [id number]  Encounter: [id number]    Assessment and Plan    Problem List Items Addressed This Visit        Other    Breast pain, right - Primary     I told her I do not have a good explanation as why she is having pain  The cyst previously identified was not in the same location where she is having pain now  For 6 month follow-up ultrasound was recommended  That should be done in December and we will see her back after that  I gave her fiber cystic diet list to see if maybe there is some foods on that that she eats a lot of  I told her if she cuts back in some of those thing she may have some decreased pain but I could not guarantee it  Chief Complaint:  Kendall Arriola is a 65 y o  female who presents for Abscess (Consult right breast cyst)    Subjective  65year old female presents with right breast pain  She had been seen previously and had an ultrasound and mammogram done which revealed a small, probable cyst at 12 o'clock right breast   No abnormality at 5 o'clock where she had pain when seen before  Past Medical History  Past Medical History:   Diagnosis Date    Bump     bumped head yesterday at work "saw stars" no LOC, cat scan done was normal    Diabetes mellitus ([address] )     blood sugar 125 on admission    Encounter for screening colonoscopy     1 st one    Hyperlipidemia     Hypertension        Past Surgical History  Past Surgical History:   Procedure Laterality Date    HYSTERECTOMY      INCISIONAL BREAST BIOPSY      last assessed 17Aug2017    ID COLONOSCOPY FLX DX W/COLLJ SPEC WHEN PFRMD N/A 7/5/2018    Procedure: COLONOSCOPY;  Surgeon: Koki Pratt MD;  Location: [facility];   Service: Gastroenterology       Family History  Family History   Problem Relation Age of Onset    Cancer Cousin     Alcohol abuse Mother     Alcohol abuse Father        Medications  Current Outpatient Prescriptions on File Prior to Visit   Medication Sig Dispense Refill    gabapentin (NEURONTIN) 300 mg capsule Take 2 capsules (600 mg total) by mouth every 8 (eight) hours for 180 days 270 capsule 0    glyBURIDE (DIABETA) 5 mg tablet Take 1 tablet (5 mg total) by mouth daily with breakfast for 180 days 90 tablet 1    lisinopril-hydrochlorothiazide (PRINZIDE,ZESTORETIC) 10-12 5 MG per tablet Take 1 tablet by mouth daily for 180 days 90 tablet 1    lovastatin (MEVACOR) 40 MG tablet Take 1 tablet (40 mg total) by mouth daily at bedtime for 180 days 90 tablet 1    magnesium oxide (MAG-OX) 400 mg Take 1 tablet (400 mg total) by mouth daily 30 tablet 2    metFORMIN (GLUCOPHAGE) 1000 MG tablet Take 1 tablet (1,000 mg total) by mouth 2 (two) times a day with meals for 180 days 180 tablet 1    acetaminophen (TYLENOL) 325 mg tablet Take 650 mg by mouth every 6 (six) hours as needed for mild pain      amitriptyline (ELAVIL) 25 mg tablet Take 1 tablet (25 mg total) by mouth daily at bedtime (Patient not taking: Reported on 9/5/2018 ) 30 tablet 0    ciclopirox (PENLAC) 8 % solution Apply topically daily at bedtime (Patient not taking: Reported on 9/5/2018 ) 6 6 mL 5    Melatonin 5 MG CAPS Take 2 capsules (10 mg total) by mouth daily at bedtime for 90 days (Patient not taking: Reported on 9/5/2018 ) 90 capsule 0    polyethylene glycol (GOLYTELY) 4000 mL solution Take 4,000 mL by mouth once for 1 dose 4000 mL 0    predniSONE 10 mg tablet Take 1 tablet (10 mg total) by mouth daily (Patient not taking: Reported on 9/4/2018 ) 7 tablet 0     No current facility-administered medications on file prior to visit  Allergies  No Known Allergies    Review of Systems   Constitutional: Negative for chills and fever  HENT: Negative for trouble swallowing and voice change  Eyes: Negative for pain and visual disturbance  Respiratory: Negative for cough and shortness of breath  Cardiovascular: Negative for chest pain and leg swelling     Gastrointestinal: Negative for abdominal pain, nausea and vomiting  Endocrine: Negative for cold intolerance, heat intolerance, polydipsia, polyphagia and polyuria  Genitourinary: Negative for difficulty urinating and flank pain  Musculoskeletal: Negative for arthralgias and gait problem  Skin: Negative for rash and wound  Allergic/Immunologic: Negative for food allergies  Neurological: Negative for seizures, syncope, weakness and headaches  Hematological: Negative for adenopathy  Psychiatric/Behavioral: Positive for confusion and decreased concentration  All other systems reviewed and are negative  Objective  Vitals:    09/05/18 1228   BP: 128/72   Temp: (!) 96 °F (35 6 °C)       Physical Exam   Constitutional: She is oriented to person, place, and time  She appears well-developed and well-nourished  HENT:   Head: Normocephalic and atraumatic  Right Ear: External ear normal    Left Ear: External ear normal    Eyes: Conjunctivae are normal  No scleral icterus  Neck: Neck supple  No tracheal deviation present  No thyromegaly present  Cardiovascular: Normal rate, regular rhythm and normal heart sounds  Exam reveals no friction rub  No murmur heard  Pulmonary/Chest: Effort normal and breath sounds normal  No respiratory distress  She has no wheezes  She has no rales  Breast exam:  Right breast:  No discrete masses no axillary adenopathy  Minimal tenderness upper inner quadrant, no discrete or worrisome masses  Left breast: no discrete masses no axillary adenopathy     Abdominal: Soft  She exhibits no mass  There is no tenderness  There is no rebound and no guarding  Musculoskeletal: Normal range of motion  She exhibits no edema  Lymphadenopathy:     She has no cervical adenopathy  Neurological: She is alert and oriented to person, place, and time  Skin: Skin is warm and dry  Psychiatric: She has a normal mood and affect   Her behavior is normal  Judgment and thought content normal    Nursing note and vitals reviewed

## 2018-09-06 ENCOUNTER — APPOINTMENT (OUTPATIENT)
Dept: URGENT CARE | Age: 63
End: 2018-09-06
Payer: OTHER MISCELLANEOUS

## 2018-09-06 PROCEDURE — 99213 OFFICE O/P EST LOW 20 MIN: CPT | Performed by: PREVENTIVE MEDICINE

## 2018-09-11 ENCOUNTER — HOSPITAL ENCOUNTER (OUTPATIENT)
Dept: NEUROLOGY | Facility: AMBULATORY SURGERY CENTER | Age: 63
Discharge: HOME | End: 2018-09-11
Payer: COMMERCIAL

## 2018-09-11 DIAGNOSIS — R20.2 PARESTHESIA OF BOTH HANDS: ICD-10-CM

## 2018-09-11 PROCEDURE — 95886 MUSC TEST DONE W/N TEST COMP: CPT | Performed by: PSYCHIATRY & NEUROLOGY

## 2018-09-11 PROCEDURE — 95911 NRV CNDJ TEST 9-10 STUDIES: CPT | Performed by: PSYCHIATRY & NEUROLOGY

## 2018-09-12 ENCOUNTER — TELEPHONE (OUTPATIENT)
Dept: INTERNAL MEDICINE CLINIC | Facility: CLINIC | Age: 63
End: 2018-09-12

## 2018-09-12 DIAGNOSIS — G56.03 CARPAL TUNNEL SYNDROME, BILATERAL: Primary | ICD-10-CM

## 2018-09-13 NOTE — PROGRESS NOTES
Patient ID: Joanne Wright is a 65 y o  female  Assessment/Plan:   Problem List Items Addressed This Visit        Cardiovascular and Mediastinum    Migraine without aura and without status migrainosus, not intractable    Relevant Medications    naproxen (NAPROSYN) 500 mg tablet       Nervous and Auditory    Post concussion syndrome - Primary    Relevant Medications    naproxen (NAPROSYN) 500 mg tablet       Other    Primary insomnia    Dizziness and giddiness    Depression      Other Visit Diagnoses     Visual disturbance                Preventive therapy for her symptoms:  - patient is taking gabapentin 600 mg twice a day  I am going to have her do 300 in the morning 300 at noon and 600 at bedtime  Given have 5 patient should take it 3 times a day  This high dose may be causing some dizziness for her as well  If dizziness is not get better with physical therapy consider decreasing gabapentin during the day  -   She was given magnesium 400 mg  ( should help with headaches) which she has not picked up  I encouraged patient to pick that up and take 400 mg 2 tabs daily  If she can I would also encourage her to buy vitamin B2 200 milligrams in the morning and 200 milligrams at bedtime  This should help her with her energy level and her headaches  -   Amitriptyline 25 mg at bedtime  Patient did not pick that up as well  Patient to pick that up to help her with her mood, sleep and headaches  She is to take that an hour prior to going to bed   abortive therapy for headaches:   - Will give patient  Naproxen 500 milligrams to have to use at the onset of a severe headache with food  Patient is to contact her worker's Comp  to discuss these medication  Patient states that she does not have the money thus has not picked these medication up  These medication, should be covered by her worker's comp  Subjective:  HPI  This is a worker comp case   Patient does understand that we will not be writing any letter or working with  in their behalf  However we will try to provide the best medical care possible  We had the pleasure of evaluating Garrick Mcneal in neurological consultation today  As you know she is a right handed 65year old female  She is here today for evaluation of her head injury  Head injury History: she works at [employer]  At work on July 3rd 2018, she hit her head on the handle  She states she immediately had a headache, blurry vision and felt dizzy  Compared with before the injury, patient today presents with:  Feelings of dizziness- yes  - Not like before but when she is standing she will feel dizzy and that makes her fall  She has fallen on her bottom, knee and her abdomin  She has had total of 8 fall since the head injury  - she is going to PT now and going 3 days a week  Noise sensitivity - yes all the time  Sleep disturbances- goes to sleep 2 am and then wakes up 5:30am  In the past she would sleep at 12 and would sleep until 7-8 am      Fatigue, tiring more easily- yes    Mood issues:   - Being irritable, easily angered, Feeling depressed or tearful, Feeling frustrated or impatient  - she was in the ER recently for this as she felt she was not getting better  Memory problem: yes  - Forgetfulness, poor memory, Poor Concentration, Taking longer to think     Blurred vision- yes  - she is not Able to watch TV for long as it will cause dizziness and blurry vision  She can read but after 10 minutes she has blurry vision  Light sensitivity - yes    Double vision - yes  - she is doing therapy for this and is not driving at this time         Headache History:        What is your current pain level -7  Headaches started at what age - after the head injury  How often do the headaches occur - constant headaches but twice a week it bad  What time of the day do the headaches start - it tends to get worse in the middle of the day or at night time  How long do the headaches last - severe headaches can last all day  Are you ever headache free - yes - at least 3-4 times a week she has no headache  Where are they located - frontal  What is the intensity of pain - 4 to 10/10  Any warning prior to headache and how long do they last - none  Describe your usual headache - shooting pain every other day, throbbing   Associated symptoms:   - nausea sometimes  - Photo and photosensitive  - sometime dizzy with headaches  Headache are worse if the patient:  noise  Headache trigger: Fatigue, Stress/Tension, noise  What medications do you take or have you taken for your headaches? PREVENTIVE: Prednisone, melatonin, magnesium lisinopril, gabapentin, amitriptyline (never used),  ABORTIVE:  Tylenol,      The following portions of the patient's history were reviewed and updated as appropriate: allergies, current medications, past family history, past medical history, past social history, past surgical history and problem list        Objective:  Blood pressure 145/74, pulse 84, height 5' 3" (1 6 m), weight 88 kg (194 lb)  Physical Exam   Constitutional: She appears well-developed  Eyes: EOM are normal  Pupils are equal, round, and reactive to light  Neck: Normal range of motion  Neck supple  Cardiovascular: Normal rate  Pulmonary/Chest: Effort normal    Abdominal: Soft  Musculoskeletal: Normal range of motion  Neurological: She has normal strength and normal reflexes  Gait and coordination normal    Skin: Skin is warm  Psychiatric: She has a normal mood and affect  Her speech is normal        Neurological Exam    Mental Status  The patient is alert  Her speech is normal  Her language is fluent with no aphasia  She has normal attention span and concentration       Cranial Nerves    CN II: The patient's visual acuity and visual fields are normal   CN III, IV, VI: The patient's pupils are equally round and reactive to light and ocular movements are normal   CN V: The patient has normal facial sensation  CN VII:  The patient has symmetric facial movement  CN VIII:  The patient's hearing is normal   CN IX, X: The patient has symmetric palate movement and normal gag reflex  CN XI: The patient's shoulder shrug strength is normal   CN XII: The patient's tongue is midline without atrophy or fasciculations  Subjective Double vision with extraocular movement in all direction     Motor   Her strength is 5/5 throughout all four extremities  Sensory  The patient's sensation is normal in all four extremities  Reflexes  Deep tendon reflexes are 2+ and symmetric in all four extremities with downgoing toes bilaterally  Gait and Coordination  The patient has normal gait and station  She has normal coordination bilaterally  ROS:    Review of Systems   Constitutional: Positive for fatigue  HENT: Negative  Eyes: Negative  Respiratory: Negative  Cardiovascular: Negative  Gastrointestinal: Negative  Endocrine: Negative  Genitourinary: Negative  Musculoskeletal: Negative  Skin: Negative  Allergic/Immunologic: Negative  Neurological: Positive for headaches  Hematological: Negative  Psychiatric/Behavioral: Negative

## 2018-09-13 NOTE — TELEPHONE ENCOUNTER
Patient called back and was advised of her results  I will be printing the address to Yahaira Higginbotham for patient  Can you please re order the splints? Unable to reprint  Patient picking up script for Ortho and Splints tomorrow morning   Thanks

## 2018-09-14 ENCOUNTER — TRANSCRIBE ORDERS (OUTPATIENT)
Dept: NEUROLOGY | Facility: CLINIC | Age: 63
End: 2018-09-14

## 2018-09-14 ENCOUNTER — OFFICE VISIT (OUTPATIENT)
Dept: NEUROLOGY | Facility: CLINIC | Age: 63
End: 2018-09-14
Payer: OTHER MISCELLANEOUS

## 2018-09-14 VITALS
HEART RATE: 84 BPM | HEIGHT: 63 IN | WEIGHT: 194 LBS | DIASTOLIC BLOOD PRESSURE: 74 MMHG | BODY MASS INDEX: 34.38 KG/M2 | SYSTOLIC BLOOD PRESSURE: 145 MMHG

## 2018-09-14 DIAGNOSIS — G56.03 CARPAL TUNNEL SYNDROME, BILATERAL: ICD-10-CM

## 2018-09-14 DIAGNOSIS — H53.9 VISUAL DISTURBANCE: ICD-10-CM

## 2018-09-14 DIAGNOSIS — F07.81 POSTCONCUSSIONAL SYNDROME: Primary | ICD-10-CM

## 2018-09-14 DIAGNOSIS — G43.009 MIGRAINE WITHOUT AURA AND WITHOUT STATUS MIGRAINOSUS, NOT INTRACTABLE: ICD-10-CM

## 2018-09-14 DIAGNOSIS — F32.89 OTHER DEPRESSION: ICD-10-CM

## 2018-09-14 DIAGNOSIS — F51.01 PRIMARY INSOMNIA: ICD-10-CM

## 2018-09-14 DIAGNOSIS — R42 DIZZINESS AND GIDDINESS: ICD-10-CM

## 2018-09-14 DIAGNOSIS — F07.81 POST CONCUSSION SYNDROME: Primary | ICD-10-CM

## 2018-09-14 PROBLEM — F32.A DEPRESSION: Status: ACTIVE | Noted: 2018-09-14

## 2018-09-14 PROCEDURE — 99244 OFF/OP CNSLTJ NEW/EST MOD 40: CPT | Performed by: PSYCHIATRY & NEUROLOGY

## 2018-09-14 RX ORDER — NAPROXEN 500 MG/1
TABLET ORAL
Qty: 10 TABLET | Refills: 0 | Status: SHIPPED | OUTPATIENT
Start: 2018-09-14 | End: 2021-02-01 | Stop reason: ALTCHOICE

## 2018-09-14 NOTE — PATIENT INSTRUCTIONS
Preventive therapy for her symptoms:  - patient is taking gabapentin 600 mg twice a day  I am going to have her do 300 in the morning 300 at noon and 600 at bedtime  Given have 5 patient should take it 3 times a day  This high dose may be causing some dizziness for her as well  If dizziness is not get better with physical therapy consider decreasing gabapentin during the day  -   She was given magnesium 400 mg  ( should help with headaches) which she has not picked up  I encouraged patient to pick that up and take 400 mg 2 tabs daily  If she can I would also encourage her to buy vitamin B2 200 milligrams in the morning and 200 milligrams at bedtime  This should help her with her energy level and her headaches  -   Amitriptyline 25 mg at bedtime  Patient did not pick that up as well  Patient to pick that up to help her with her mood, sleep and headaches  She is to take that an hour prior to going to bed   abortive therapy for headaches:   - Will give patient  Naproxen 500 milligrams to have to use at the onset of a severe headache with food  Patient is to contact her worker's Comp  to discuss these medication  Patient states that she does not have the money thus has not picked these medication up  These medication, should be covered by her worker's comp

## 2018-09-25 ENCOUNTER — ANESTHESIA EVENT (OUTPATIENT)
Dept: GASTROENTEROLOGY | Facility: MEDICAL CENTER | Age: 63
End: 2018-09-25

## 2018-09-26 ENCOUNTER — ANESTHESIA (OUTPATIENT)
Dept: GASTROENTEROLOGY | Facility: MEDICAL CENTER | Age: 63
End: 2018-09-26

## 2018-09-26 ENCOUNTER — HOSPITAL ENCOUNTER (OUTPATIENT)
Facility: MEDICAL CENTER | Age: 63
Setting detail: OUTPATIENT SURGERY
Discharge: HOME | End: 2018-09-26
Attending: INTERNAL MEDICINE | Admitting: INTERNAL MEDICINE

## 2018-09-26 VITALS
RESPIRATION RATE: 16 BRPM | OXYGEN SATURATION: 99 % | HEART RATE: 69 BPM | SYSTOLIC BLOOD PRESSURE: 158 MMHG | DIASTOLIC BLOOD PRESSURE: 97 MMHG | HEIGHT: 63 IN | WEIGHT: 196 LBS | BODY MASS INDEX: 34.73 KG/M2 | TEMPERATURE: 97.9 F

## 2018-09-26 DIAGNOSIS — R19.5 POSITIVE FECAL OCCULT BLOOD TEST: ICD-10-CM

## 2018-09-26 LAB — GLUCOSE SERPL-MCNC: 143 MG/DL (ref 65–140)

## 2018-09-26 PROCEDURE — 45380 COLONOSCOPY AND BIOPSY: CPT | Performed by: INTERNAL MEDICINE

## 2018-09-26 PROCEDURE — 88305 TISSUE EXAM BY PATHOLOGIST: CPT | Performed by: PATHOLOGY

## 2018-09-26 PROCEDURE — 82948 REAGENT STRIP/BLOOD GLUCOSE: CPT

## 2018-09-26 RX ORDER — LIDOCAINE HYDROCHLORIDE 20 MG/ML
INJECTION, SOLUTION EPIDURAL; INFILTRATION; INTRACAUDAL; PERINEURAL AS NEEDED
Status: DISCONTINUED | OUTPATIENT
Start: 2018-09-26 | End: 2018-09-26 | Stop reason: SURG

## 2018-09-26 RX ORDER — SODIUM CHLORIDE 9 MG/ML
125 INJECTION, SOLUTION INTRAVENOUS CONTINUOUS
Status: DISCONTINUED | OUTPATIENT
Start: 2018-09-26 | End: 2018-09-26 | Stop reason: HOSPADM

## 2018-09-26 RX ORDER — GLYCOPYRROLATE 0.2 MG/ML
INJECTION INTRAMUSCULAR; INTRAVENOUS AS NEEDED
Status: DISCONTINUED | OUTPATIENT
Start: 2018-09-26 | End: 2018-09-26 | Stop reason: SURG

## 2018-09-26 RX ORDER — PROPOFOL 10 MG/ML
INJECTION, EMULSION INTRAVENOUS AS NEEDED
Status: DISCONTINUED | OUTPATIENT
Start: 2018-09-26 | End: 2018-09-26 | Stop reason: SURG

## 2018-09-26 RX ADMIN — PROPOFOL 50 MG: 10 INJECTION, EMULSION INTRAVENOUS at 09:01

## 2018-09-26 RX ADMIN — PROPOFOL 20 MG: 10 INJECTION, EMULSION INTRAVENOUS at 08:46

## 2018-09-26 RX ADMIN — PROPOFOL 20 MG: 10 INJECTION, EMULSION INTRAVENOUS at 08:53

## 2018-09-26 RX ADMIN — PROPOFOL 30 MG: 10 INJECTION, EMULSION INTRAVENOUS at 09:05

## 2018-09-26 RX ADMIN — PROPOFOL 20 MG: 10 INJECTION, EMULSION INTRAVENOUS at 08:43

## 2018-09-26 RX ADMIN — PROPOFOL 20 MG: 10 INJECTION, EMULSION INTRAVENOUS at 08:41

## 2018-09-26 RX ADMIN — PROPOFOL 20 MG: 10 INJECTION, EMULSION INTRAVENOUS at 08:38

## 2018-09-26 RX ADMIN — PROPOFOL 30 MG: 10 INJECTION, EMULSION INTRAVENOUS at 09:09

## 2018-09-26 RX ADMIN — PROPOFOL 40 MG: 10 INJECTION, EMULSION INTRAVENOUS at 08:51

## 2018-09-26 RX ADMIN — PROPOFOL 20 MG: 10 INJECTION, EMULSION INTRAVENOUS at 08:56

## 2018-09-26 RX ADMIN — PROPOFOL 40 MG: 10 INJECTION, EMULSION INTRAVENOUS at 08:50

## 2018-09-26 RX ADMIN — SODIUM CHLORIDE 125 ML/HR: 0.9 INJECTION, SOLUTION INTRAVENOUS at 08:21

## 2018-09-26 RX ADMIN — GLYCOPYRROLATE 0.2 MG: 0.2 INJECTION, SOLUTION INTRAMUSCULAR; INTRAVENOUS at 08:38

## 2018-09-26 RX ADMIN — PROPOFOL 20 MG: 10 INJECTION, EMULSION INTRAVENOUS at 09:10

## 2018-09-26 RX ADMIN — PROPOFOL 30 MG: 10 INJECTION, EMULSION INTRAVENOUS at 08:37

## 2018-09-26 RX ADMIN — PROPOFOL 150 MG: 10 INJECTION, EMULSION INTRAVENOUS at 08:35

## 2018-09-26 RX ADMIN — LIDOCAINE HYDROCHLORIDE 2 ML: 20 INJECTION, SOLUTION EPIDURAL; INFILTRATION; INTRACAUDAL; PERINEURAL at 08:35

## 2018-09-26 RX ADMIN — PROPOFOL 20 MG: 10 INJECTION, EMULSION INTRAVENOUS at 09:07

## 2018-09-26 NOTE — DISCHARGE INSTR - AVS FIRST PAGE
OPERATIVE REPORT  PATIENT NAME: Marta Michaud    :  1955  MRN: [id number]  Pt Location: [facility] GI ROOM 01    SURGERY DATE: 2018    Surgeon(s) and Role:     Delphine Kanner, MD - Primary    Preop Diagnosis:  Positive fecal occult blood test [R19 5]    Post-Op Diagnosis Codes:     * Positive fecal occult blood test [R19 5]    Procedure(s) (LRB):  COLONOSCOPY (N/A)    Specimen(s):  ID Type Source Tests Collected by Time Destination   1 : ascending colon polyp- forcep Tissue Polyp, Colorectal TISSUE EXAM Emile Frausto MD 2018 [id number]        Operative Indications:  Positive fecal occult blood test [R19 5]    Colonoscopy Procedure Note    Procedure: Colonoscopy    Sedation: Monitored anesthesia care, check anesthesia records      ASA Class: 2    INDICATIONS: +FIT    POST-OP DIAGNOSIS: See the impression below    Procedure Details     Prior colonoscopy: No prior colonoscopy  Informed consent was obtained for the procedure, including sedation  Risks of perforation, hemorrhage, adverse drug reaction and aspiration were discussed  The patient was placed in the left lateral decubitus position  Based on the pre-procedure assessment, including review of the patient's medical history, medications, allergies, and review of systems, she had been deemed to be an appropriate candidate for conscious sedation; she was therefore sedated with the medications listed below  The patient was monitored continuously with telemetry, pulse oximetry, blood pressure monitoring, and direct observations  A rectal examination was performed with large external hemorrhoids  The colonoscope was inserted into the rectum and advanced under direct vision to the cecum, which was identified by the ileocecal valve and appendiceal orifice  The quality of the colonic preparation was fair    A careful inspection was made as the colonoscope was withdrawn, including a retroflexed view of the rectum; findings and interventions are described below  Findings:  Flat, diminutive polyp seen in the ascending colon, status post cold forcep biopsy with complete tissue retrieval and removal   Thick liquid stool, unable to be section obscuring the majority of the sigmoid colonic mucosal evaluation and polyps less than 1 cm in this area were likely missed  Large internal hemorrhoids seen on rectal retroflexed views  Complications: None; patient tolerated the procedure well  Impression:    Flat diminutive polyp seen in the ascending colon  Thick liquid stool obscuring the sigmoid evaluation  Large internal and external hemorrhoids  Recommendations:  Repeat colonoscopy in 3 years or sooner if clinically indicated  Repeat colonoscopy is being recommended at an interval of less than 10 years, this is because of an inadequate bowel preparation  Follow-up biopsies  Resume regular diet  Discharge home        SIGNATURE: Zander Phan MD  DATE: September 26, 2018  TIME: 9:13 AM

## 2018-09-26 NOTE — H&P
History and Physical - [facility]  Alfonso Russ 59 y o  female MRN: [id number]                  HPI: Alfonso Russ is a 59y o  year old female who presents for +FIT      REVIEW OF SYSTEMS: Per the HPI, and otherwise unremarkable  Historical Information   Past Medical History:   Diagnosis Date    Bump     bumped head yesterday at work "saw stars" no LOC, cat scan done was normal    Diabetes mellitus ([address] )     blood sugar 125 on admission    Encounter for screening colonoscopy     1 st one    Hyperlipidemia     Hypertension      Past Surgical History:   Procedure Laterality Date    HYSTERECTOMY      INCISIONAL BREAST BIOPSY      last assessed 17Aug2017    NM COLONOSCOPY FLX DX W/COLLJ SPEC WHEN PFRMD N/A 7/5/2018    Procedure: COLONOSCOPY;  Surgeon: Nate Tobin MD;  Location: [facility]; Service: Gastroenterology     Social History   History   Alcohol Use No     History   Drug Use No     History   Smoking Status    Current Every Day Smoker   Smokeless Tobacco    Never Used     Comment: smokes less than 1pk/day     Family History   Problem Relation Age of Onset    Cancer Cousin     Alcohol abuse Mother     Alcohol abuse Father        Meds/Allergies     Prescriptions Prior to Admission   Medication    acetaminophen (TYLENOL) 325 mg tablet    Elastic Bandages & Supports (WRIST SPLINT/COCK-UP/LEFT M) MISC    Elastic Bandages & Supports (WRIST SPLINT/COCK-UP/RIGHT M) MISC    gabapentin (NEURONTIN) 300 mg capsule    glyBURIDE (DIABETA) 5 mg tablet    lisinopril-hydrochlorothiazide (PRINZIDE,ZESTORETIC) 10-12 5 MG per tablet    magnesium oxide (MAG-OX) 400 mg    metFORMIN (GLUCOPHAGE) 1000 MG tablet    naproxen (NAPROSYN) 500 mg tablet       No Known Allergies    Objective     There were no vitals taken for this visit        PHYSICAL EXAM    Gen: NAD  CV: RRR  CHEST: Clear  ABD: soft, NT/ND  EXT: no edema      ASSESSMENT/PLAN:  This is a 59y o  year old female here for +FIT, and she is stable and optimized for her procedure

## 2018-09-26 NOTE — ANESTHESIA PREPROCEDURE EVALUATION
Review of Systems/Medical History  Patient summary reviewed  Chart reviewed  No history of anesthetic complications     Cardiovascular  Exercise tolerance (METS): >4,  Hyperlipidemia, Hypertension controlled,    Pulmonary  Smoker cigarette smoker  , Tobacco cessation counseling given ,        GI/Hepatic    GERD well controlled,        Negative  ROS        Endo/Other  Diabetes well controlled type 2 Oral agent,      GYN  Negative gynecology ROS          Hematology  Negative hematology ROS      Musculoskeletal  Negative musculoskeletal ROS        Neurology    Headaches,    Psychology   Depression ,              Physical Exam    Airway    Mallampati score: II  TM Distance: >3 FB  Neck ROM: full     Dental   upper dentures,     Cardiovascular  Rhythm: regular, Rate: normal,     Pulmonary  Breath sounds clear to auscultation,     Other Findings  Partial lower plate  Anesthesia Plan  ASA Score- 2     Anesthesia Type- IV sedation with anesthesia with ASA Monitors  Additional Monitors:   Airway Plan:         Plan Factors- Patient instructed to abstain from smoking on day of procedure  Patient did not smoke on day of surgery  Induction- intravenous  Postoperative Plan-     Informed Consent- Anesthetic plan and risks discussed with patient

## 2018-09-26 NOTE — DISCHARGE INSTRUCTIONS
Colonoscopy   WHAT YOU NEED TO KNOW:   A colonoscopy is a procedure to examine the inside of your colon (intestine) with a scope  Polyps or tissue growths may have been removed during your colonoscopy  It is normal to feel bloated and to have some abdominal discomfort  You should be passing gas  If you have hemorrhoids or you had polyps removed, you may have a small amount of bleeding  DISCHARGE INSTRUCTIONS:   Seek care immediately if:   · You have a large amount of bright red blood in your bowel movements  · Your abdomen is hard and firm and you have severe pain  · You have sudden trouble breathing  Contact your healthcare provider if:   · You develop a rash or hives  · You have a fever within 24 hours of your procedure  · You have not had a bowel movement for 3 days after your procedure  · You have questions or concerns about your condition or care  Activity:   · Do not lift, strain, or run  for 3 days after your procedure  · Rest after your procedure  You have been given medicine to relax you  Do not  drive or make important decisions until the day after your procedure  Return to your normal activity as directed  · Relieve gas and discomfort from bloating  by lying on your right side with a heating pad on your abdomen  You may need to take short walks to help the gas move out  Eat small meals until bloating is relieved  If you had polyps removed: For 7 days after your procedure:  · Do not  take aspirin  · Do not  go on long car rides  Help prevent constipation:   · Eat a variety of healthy foods  Healthy foods include fruit, vegetables, whole-grain breads, low-fat dairy products, beans, lean meat, and fish  Ask if you need to be on a special diet  Your healthcare provider may recommend that you eat high-fiber foods such as cooked beans  Fiber helps you have regular bowel movements  · Drink liquids as directed    Adults should drink between 9 and 13 eight-ounce cups of liquid every day  Ask what amount is best for you  For most people, good liquids to drink are water, juice, and milk  · Exercise as directed  Talk to your healthcare provider about the best exercise plan for you  Exercise can help prevent constipation, decrease your blood pressure and improve your health  Follow up with your healthcare provider as directed:  Write down your questions so you remember to ask them during your visits  © 2017 [employer] Information is for End User's use only and may not be sold, redistributed or otherwise used for commercial purposes  All illustrations and images included in CareNotes® are the copyrighted property of [employer]  or [employer]ra[employer] Charles  The above information is an  only  It is not intended as medical advice for individual conditions or treatments  Talk to your doctor, nurse or pharmacist before following any medical regimen to see if it is safe and effective for you  Colorectal Polyps   WHAT YOU NEED TO KNOW:   Colorectal polyps are small growths of tissue in the lining of the colon and rectum  Most polyps are hyperplastic polyps and are usually benign (noncancerous)  Certain types of polyps, called adenomatous polyps, may turn into cancer  DISCHARGE INSTRUCTIONS:   Follow up with your healthcare provider or gastroenterologist as directed: You may need to return for more tests, such as another colonoscopy  Write down your questions so you remember to ask them during your visits  Reduce your risk for colorectal polyps:   · Eat a variety of healthy foods:  Healthy foods include fruit, vegetables, whole-grain breads, low-fat dairy products, beans, lean meat, and fish  Ask if you need to be on a special diet  · Maintain a healthy weight:  Ask your healthcare provider if you need to lose weight and how much you need to lose   Ask for help with a weight loss program     · Exercise:  Begin to exercise slowly and do more as you get stronger  Talk with your healthcare provider before you start an exercise program      · Limit alcohol:  Your risk for polyps increases the more you drink  · Do not smoke: If you smoke, it is never too late to quit  Ask for information about how to stop  For support and more information:   · Brittany Carlisle ([facility])  [address] , [place] [address]  Phone: 0- 749 - [phone]  Web Address: www digestive  niddk nih gov  Contact your healthcare provider or gastroenterologist if:   · You have a fever  · You have chills, a cough, or feel weak and achy  · You have abdominal pain that does not go away or gets worse after you take medicine  · Your abdomen is swollen  · You are losing weight without trying  · You have questions or concerns about your condition or care  Seek care immediately or call 911 if:   · You have sudden shortness of breath  · You have a fast heart rate, fast breathing, or are too dizzy to stand up  · You have severe abdominal pain  · You see blood in your bowel movement  © 2017 [employer] [facility] Information is for End User's use only and may not be sold, redistributed or otherwise used for commercial purposes  All illustrations and images included in CareNotes® are the copyrighted property of [employer]  or [employer]raim Charles  The above information is an  only  It is not intended as medical advice for individual conditions or treatments  Talk to your doctor, nurse or pharmacist before following any medical regimen to see if it is safe and effective for you  How to Stop Smoking   WHAT YOU NEED TO KNOW:   You will improve your health and the health of others around you if you stop smoking  Your risk for heart and lung disease, cancer, stroke, heart attack, and vision problems will also decrease   You can benefit from quitting no matter how long you have smoked  DISCHARGE INSTRUCTIONS:   Prepare to stop smoking:  Nicotine is a highly addictive drug found in cigarettes  Withdrawal symptoms can happen when you stop smoking and make it hard to quit  These include anxiety, depression, irritability, trouble sleeping, and increased appetite  You increase your chances of success if you prepare to quit  · Set a quit date  Jonna Ghosh a date that is within the next 2 weeks  Do not pick a day that you think may be stressful or busy  Write down the day or [place] it on your calender  · Tell friends and family that you plan to quit  Explain that you may have withdrawal symptoms when you try to quit  Ask them to support you  They may be able to encourage you and help reduce your stress to make it easier for you to quit  · Make a list of your reasons for quitting  Put the list somewhere you will see it every day, such as your refrigerator  You can look at the list when you have a craving  · Remove all tobacco and nicotine products from your home, car, and workplace  Also, remove anything else that will tempt you to smoke, such as lighters, matches, or ashtrays  Clean your car, home, and places at work that smell like smoke  The smell of smoke can trigger a craving  · Identify triggers that make you want to smoke  This may include activities, feelings, or people  Also write down 1 way you can deal with each of your triggers  For example, if you want to smoke as soon as you wake up, plan another activity during this time, such as exercise  · Make a plan for how you will quit  Learn about the tools that can help you quit, such as medicine, counseling, or nicotine replacement therapy  Choose at least 2 options to help you quit  Tools to help you stop smoking:   · Counseling  from a trained healthcare provider can provide you with support and skills to quit smoking  The provider will also teach you to manage your withdrawal symptoms and cravings   You may receive counseling from one counselor, in group therapy, or through phone therapy called a quit line  · Nicotine replacement therapy (NRT)  such as nicotine patches, gum, or lozenges may help reduce your nicotine cravings  You may get these without a doctor's order  Do not use e-cigarettes or smokeless tobacco in place of cigarettes or to help you quit  They still contain nicotine  · Prescription medicines  such as nasal sprays or nicotine inhalers may help reduce your withdrawal symptoms  Other medicines may also be used to reduce your urge to smoke  Ask your healthcare provider about these medicines  You may need to start certain medicines 2 weeks before your quit date for them to work well  · Hypnosis  is a practice that helps guide you through thoughts and feelings  Hypnosis may help decrease your cravings and make you more willing to quit  · Acupuncture therapy  uses very thin needles to balance energy channels in the body  This is thought to help decrease cravings and symptoms of nicotine withdrawal      · Support groups  let you talk to others who are trying to quit or have already quit  It may be helpful to speak with others about how they quit  Manage your cravings:   · Avoid situations, people, and places that tempt you to smoke  Go to nonsmoking places, such as libraries or restaurants  Understand what tempts you and try to avoid these things  · Keep your hands busy  Hold things such as a stress ball or pen  · Put candy or toothpicks in your mouth  Keep lollipops, sugarless gum, or toothpicks with you at all times  · Do not have alcohol or caffeine  These drinks may tempt you to smoke  Drink healthy liquids such as water or juice instead  · Reward yourself when you resist your cravings  Rewards will motivate you and help you stay positive  · Do an activity that distracts you from your craving  Examples include going for a walk, exercising, or cleaning    Prevent weight gain after you quit:  You may gain a few pounds after you quit smoking  It is healthier for you to gain a few pounds than to continue to smoke  The following can help you prevent weight gain:  · Eat healthy foods  These include fruits, vegetables, whole-grain breads, low-fat dairy products, beans, lean meats, and fish  Eat healthy snacks, such as low-fat yogurt, if you get hungry between meals  · Drink water before, during, and between meals  This will make your stomach feel full and help prevent you from overeating  Ask your healthcare provider how much liquid to drink each day and which liquids are best for you  · Exercise  Take a walk or do some kind of exercise every day  Ask your healthcare provider what exercise is right for you  This may help reduce your cravings and reduce stress  For support and more information:   · [employer]oke[employer]  gov  Phone: 6- 777 - 537-2038  Web Address: www smokefree  [employer]  © 2017 [address] [employer] Information is for End User's use only and may not be sold, redistributed or otherwise used for commercial purposes  All illustrations and images included in CareNotes® are the copyrighted property of [employer]  or [employer]ra[employer] Charles  The above information is an  only  It is not intended as medical advice for individual conditions or treatments  Talk to your doctor, nurse or pharmacist before following any medical regimen to see if it is safe and effective for you

## 2018-09-26 NOTE — OP NOTE
OPERATIVE REPORT  PATIENT NAME: Paco Guthrie    :  1955  MRN: [id number]  Pt Location: [facility] GI ROOM 01    SURGERY DATE: 2018    Surgeon(s) and Role:     Russ Kumari MD - Primary    Preop Diagnosis:  Positive fecal occult blood test [R19 5]    Post-Op Diagnosis Codes:     * Positive fecal occult blood test [R19 5]    Procedure(s) (LRB):  COLONOSCOPY (N/A)    Specimen(s):  ID Type Source Tests Collected by Time Destination   1 : ascending colon polyp- forcep Tissue Polyp, Colorectal TISSUE EXAM Ann De Guzman MD 2018 [id number]        Operative Indications:  Positive fecal occult blood test [R19 5]    Colonoscopy Procedure Note    Procedure: Colonoscopy    Sedation: Monitored anesthesia care, check anesthesia records      ASA Class: 2    INDICATIONS: +FIT    POST-OP DIAGNOSIS: See the impression below    Procedure Details     Prior colonoscopy: No prior colonoscopy  Informed consent was obtained for the procedure, including sedation  Risks of perforation, hemorrhage, adverse drug reaction and aspiration were discussed  The patient was placed in the left lateral decubitus position  Based on the pre-procedure assessment, including review of the patient's medical history, medications, allergies, and review of systems, she had been deemed to be an appropriate candidate for conscious sedation; she was therefore sedated with the medications listed below  The patient was monitored continuously with telemetry, pulse oximetry, blood pressure monitoring, and direct observations  A rectal examination was performed with large external hemorrhoids  The colonoscope was inserted into the rectum and advanced under direct vision to the cecum, which was identified by the ileocecal valve and appendiceal orifice  The quality of the colonic preparation was fair    A careful inspection was made as the colonoscope was withdrawn, including a retroflexed view of the rectum; findings and interventions are described below  Findings:  Flat, diminutive polyp seen in the ascending colon, status post cold forcep biopsy with complete tissue retrieval and removal   Thick liquid stool, unable to be section obscuring the majority of the sigmoid colonic mucosal evaluation and polyps less than 1 cm in this area were likely missed  Large internal hemorrhoids seen on rectal retroflexed views  Complications: None; patient tolerated the procedure well  Impression:    Flat diminutive polyp seen in the ascending colon  Thick liquid stool obscuring the sigmoid evaluation  Large internal and external hemorrhoids  Recommendations:  Repeat colonoscopy in 3 years or sooner if clinically indicated  Repeat colonoscopy is being recommended at an interval of less than 10 years, this is because of an inadequate bowel preparation  Follow-up biopsies  Resume regular diet  Discharge home        SIGNATURE: Christopher Syed MD  DATE: September 26, 2018  TIME: 9:13 AM

## 2018-10-02 ENCOUNTER — TELEPHONE (OUTPATIENT)
Dept: NEUROLOGY | Facility: CLINIC | Age: 63
End: 2018-10-02

## 2018-10-02 NOTE — PROGRESS NOTES
Please call patient with pathology results - colon polyp was benign, not cancer and no potential to turn into cancer  She needs a repeat in 3 years due to poor prep

## 2018-12-10 ENCOUNTER — TELEPHONE (OUTPATIENT)
Dept: INTERNAL MEDICINE CLINIC | Facility: CLINIC | Age: 63
End: 2018-12-10

## 2019-03-13 NOTE — PROGRESS NOTES
Called patient left vm to remind of overdue orders for:    CMP  Hemoglobin A1C  Lipid Panel  Microalbumin

## 2020-06-25 NOTE — ASSESSMENT & PLAN NOTE
Airway  Urgency: elective      General Information and Staff    Patient location during procedure: OR  CRNA: Marylin Patrick CRNA  Performed: CRNA     Indications and Patient Condition  Indications for airway management: anesthesia  Spontaneous Ventilation: absent  Sedation level: deep  Preoxygenated: yes  Patient position: flat  Mask difficulty assessment: 0 - not attempted    Final Airway Details  Final airway type: supraglottic airway      Successful airway: Supreme  Size 4    Placement verified by: chest auscultation and capnometry   Number of attempts at approach: 1             We will call you with appointment for colonoscopy

## 2020-10-24 NOTE — NURSING NOTE
Blood sugar not needed in recovery per Dr Santo Ott    Pre procedure blood sugar 140 see chief complaint quote

## 2020-12-09 ENCOUNTER — TRANSCRIBE ORDERS (OUTPATIENT)
Dept: ADMINISTRATIVE | Age: 65
End: 2020-12-09

## 2020-12-09 ENCOUNTER — LAB (OUTPATIENT)
Dept: LAB | Age: 65
End: 2020-12-09

## 2020-12-09 DIAGNOSIS — Z02.1 PHYSICAL EXAM, PRE-EMPLOYMENT: ICD-10-CM

## 2020-12-09 DIAGNOSIS — Z02.1 PHYSICAL EXAM, PRE-EMPLOYMENT: Primary | ICD-10-CM

## 2020-12-09 PROCEDURE — U0003 INFECTIOUS AGENT DETECTION BY NUCLEIC ACID (DNA OR RNA); SEVERE ACUTE RESPIRATORY SYNDROME CORONAVIRUS 2 (SARS-COV-2) (CORONAVIRUS DISEASE [COVID-19]), AMPLIFIED PROBE TECHNIQUE, MAKING USE OF HIGH THROUGHPUT TECHNOLOGIES AS DESCRIBED BY CMS-2020-01-R: HCPCS

## 2020-12-10 LAB — SARS-COV-2 RNA SPEC QL NAA+PROBE: NOT DETECTED

## 2021-01-09 ENCOUNTER — HOSPITAL ENCOUNTER (EMERGENCY)
Facility: HOSPITAL | Age: 66
Discharge: HOME | End: 2021-01-09
Attending: EMERGENCY MEDICINE

## 2021-01-09 ENCOUNTER — APPOINTMENT (EMERGENCY)
Dept: RADIOLOGY | Facility: HOSPITAL | Age: 66
End: 2021-01-09

## 2021-01-09 VITALS
SYSTOLIC BLOOD PRESSURE: 191 MMHG | TEMPERATURE: 98 F | WEIGHT: 180 LBS | HEART RATE: 76 BPM | BODY MASS INDEX: 31.89 KG/M2 | OXYGEN SATURATION: 100 % | DIASTOLIC BLOOD PRESSURE: 92 MMHG | RESPIRATION RATE: 18 BRPM

## 2021-01-09 DIAGNOSIS — S22.39XA RIB FRACTURE: ICD-10-CM
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
## 2021-01-09 DIAGNOSIS — N64.4 BREAST PAIN, RIGHT: Primary | ICD-10-CM

## 2021-01-09 DIAGNOSIS — R07.81 RIB PAIN ON RIGHT SIDE: ICD-10-CM

## 2021-01-09 LAB
ATRIAL RATE: 87 BPM
P AXIS: 65 DEGREES
PR INTERVAL: 164 MS
QRS AXIS: 40 DEGREES
QRSD INTERVAL: 80 MS
QT INTERVAL: 344 MS
QTC INTERVAL: 413 MS
T WAVE AXIS: 71 DEGREES
VENTRICULAR RATE: 87 BPM

## 2021-01-09 PROCEDURE — 99284 EMERGENCY DEPT VISIT MOD MDM: CPT

## 2021-01-09 PROCEDURE — 93010 ELECTROCARDIOGRAM REPORT: CPT | Performed by: INTERNAL MEDICINE

## 2021-01-09 PROCEDURE — 93005 ELECTROCARDIOGRAM TRACING: CPT

## 2021-01-09 PROCEDURE — 71101 X-RAY EXAM UNILAT RIBS/CHEST: CPT

## 2021-01-09 PROCEDURE — 99284 EMERGENCY DEPT VISIT MOD MDM: CPT | Performed by: PHYSICIAN ASSISTANT

## 2021-01-09 RX ORDER — METHOCARBAMOL 500 MG/1
500 TABLET, FILM COATED ORAL ONCE
Status: COMPLETED | OUTPATIENT
Start: 2021-01-09 | End: 2021-01-09

## 2021-01-09 RX ORDER — NAPROXEN 500 MG/1
500 TABLET ORAL 2 TIMES DAILY WITH MEALS
Qty: 30 TABLET | Refills: 0 | Status: SHIPPED | OUTPATIENT
Start: 2021-01-09 | End: 2021-02-01 | Stop reason: ALTCHOICE

## 2021-01-09 RX ORDER — LIDOCAINE 50 MG/G
1 PATCH TOPICAL DAILY
Qty: 30 PATCH | Refills: 0 | Status: SHIPPED | OUTPATIENT
Start: 2021-01-09 | End: 2021-02-01 | Stop reason: ALTCHOICE

## 2021-01-09 RX ORDER — IBUPROFEN 600 MG/1
600 TABLET ORAL ONCE
Status: COMPLETED | OUTPATIENT
Start: 2021-01-09 | End: 2021-01-09

## 2021-01-09 RX ORDER — LIDOCAINE 50 MG/G
1 PATCH TOPICAL ONCE
Status: DISCONTINUED | OUTPATIENT
Start: 2021-01-09 | End: 2021-01-09 | Stop reason: HOSPADM

## 2021-01-09 RX ORDER — METHOCARBAMOL 500 MG/1
500 TABLET, FILM COATED ORAL 2 TIMES DAILY
Qty: 20 TABLET | Refills: 0 | Status: SHIPPED | OUTPATIENT
Start: 2021-01-09 | End: 2021-02-01 | Stop reason: ALTCHOICE

## 2021-01-09 RX ORDER — ACETAMINOPHEN 325 MG/1
650 TABLET ORAL ONCE
Status: COMPLETED | OUTPATIENT
Start: 2021-01-09 | End: 2021-01-09

## 2021-01-09 RX ADMIN — ACETAMINOPHEN 650 MG: 325 TABLET, FILM COATED ORAL at 09:24

## 2021-01-09 RX ADMIN — LIDOCAINE 1 PATCH: 50 PATCH TOPICAL at 09:24

## 2021-01-09 RX ADMIN — METHOCARBAMOL 500 MG: 500 TABLET, FILM COATED ORAL at 09:24

## 2021-01-09 RX ADMIN — IBUPROFEN 600 MG: 600 TABLET, FILM COATED ORAL at 10:20

## 2021-01-09 NOTE — ED PROVIDER NOTES
History  Chief Complaint   Patient presents with    Breast Pain     Pt states she is having R sided breast pain for a week after a fall     60 y o  female presents for evaluation of right breast and rib pain after a slip and fall approximately 1 week ago  States she has been having right rib and breast pain since, worse with positional changes, lifting arms, pushing or pulling movements  Pain is reproducible with palpation, positional changes and deep respiration  Pt has been taking aspirin with no relief, pain does not radiate denies CP, SOB, sweating episodes, dizziness, fainting episodes, fevers, chills  Notes she fell flat onto her chest but did not strike her head or face, no LOC, HA, blurry vision, N/V/D, uri symptoms, known sick contacts, known COVID exposures  Prior to Admission Medications   Prescriptions Last Dose Informant Patient Reported? Taking?   acetaminophen (TYLENOL) 325 mg tablet   Yes Yes   Sig: Take 650 mg by mouth every 6 (six) hours as needed for mild pain   glyBURIDE (DIABETA) 5 mg tablet   No Yes   Sig: Take 1 tablet (5 mg total) by mouth daily with breakfast for 180 days   lisinopril-hydrochlorothiazide (PRINZIDE,ZESTORETIC) 10-12 5 MG per tablet   No Yes   Sig: Take 1 tablet by mouth daily for 180 days   magnesium oxide (MAG-OX) 400 mg   No Yes   Sig: Take 1 tablet (400 mg total) by mouth daily   metFORMIN (GLUCOPHAGE) 1000 MG tablet   No Yes   Sig: Take 1 tablet (1,000 mg total) by mouth 2 (two) times a day with meals for 180 days   naproxen (NAPROSYN) 500 mg tablet   No Yes   Si at the onset of a migraine headache with food    Less than 3 times a week      Facility-Administered Medications: None       Past Medical History:   Diagnosis Date    Bump     bumped head yesterday at work "saw stars" no LOC, cat scan done was normal    Diabetes mellitus ([facility] [address] )     blood sugar 125 on admission    Encounter for screening colonoscopy     1 st one    Headache     Hyperlipidemia  Hypertension        Past Surgical History:   Procedure Laterality Date    COLONOSCOPY N/A 9/26/2018    Procedure: COLONOSCOPY;  Surgeon: Adriana Smallwood MD;  Location: [facility]; Service: Gastroenterology    HYSTERECTOMY      INCISIONAL BREAST BIOPSY      last assessed 17Aug2017    CO COLONOSCOPY FLX DX W/COLLJ SPEC WHEN PFRMD N/A 7/5/2018    Procedure: COLONOSCOPY;  Surgeon: Adriana Smallwood MD;  Location:  GI LAB; Service: Gastroenterology       Family History   Problem Relation Age of Onset    Cancer Cousin     Alcohol abuse Mother     Alcohol abuse Father      I have reviewed and agree with the history as documented  E-Cigarette/Vaping    E-Cigarette Use Never User      E-Cigarette/Vaping Substances     Social History     Tobacco Use    Smoking status: Current Every Day Smoker     Packs/day: 0 50     Types: Cigarettes    Smokeless tobacco: Never Used    Tobacco comment: smokes less than 1pk/day   Substance Use Topics    Alcohol use: No    Drug use: No       Review of Systems   Musculoskeletal: Positive for myalgias  All other systems reviewed and are negative  Physical Exam  Physical Exam  Vitals signs and nursing note reviewed  Exam conducted with a chaperone present  Constitutional:       Appearance: Normal appearance  She is normal weight  HENT:      Head: Normocephalic and atraumatic  Right Ear: External ear normal       Left Ear: External ear normal       Nose: Nose normal       Mouth/Throat:      Mouth: Mucous membranes are moist       Pharynx: Oropharynx is clear  Eyes:      Extraocular Movements: Extraocular movements intact  Conjunctiva/sclera: Conjunctivae normal       Pupils: Pupils are equal, round, and reactive to light  Neck:      Musculoskeletal: Normal range of motion and neck supple  Cardiovascular:      Rate and Rhythm: Normal rate and regular rhythm  Pulses: Normal pulses  Heart sounds: Normal heart sounds        Comments: R>L chest wall tenderness with palpation, position change from laying to sitting and lifting arms above 30 degrees  Pulmonary:      Effort: Pulmonary effort is normal       Breath sounds: Examination of the right-lower field reveals decreased breath sounds  Examination of the left-lower field reveals decreased breath sounds  Decreased breath sounds present  Chest:      Chest wall: Tenderness present  No mass, lacerations, deformity, swelling, crepitus or edema  There is no dullness to percussion  Breasts:         Right: Tenderness present  No swelling, bleeding, inverted nipple, mass, nipple discharge or skin change  Abdominal:      General: Abdomen is flat  Musculoskeletal: Normal range of motion  Lymphadenopathy:      Upper Body:      Right upper body: No supraclavicular, axillary or pectoral adenopathy  Skin:     General: Skin is warm  Capillary Refill: Capillary refill takes less than 2 seconds  Neurological:      General: No focal deficit present  Mental Status: She is alert and oriented to person, place, and time  Mental status is at baseline  Psychiatric:         Mood and Affect: Mood normal          Thought Content:  Thought content normal          Judgment: Judgment normal          Vital Signs  ED Triage Vitals [01/09/21 0905]   Temperature Pulse Respirations Blood Pressure SpO2   98 °F (36 7 °C) 88 18 (!) 193/95 100 %      Temp Source Heart Rate Source Patient Position - Orthostatic VS BP Location FiO2 (%)   Oral Monitor Sitting Left arm --      Pain Score       Worst Possible Pain           Vitals:    01/09/21 0905 01/09/21 1015   BP: (!) 193/95 (!) 191/92   Pulse: 88 76   Patient Position - Orthostatic VS: Sitting Sitting         Visual Acuity      ED Medications  Medications   acetaminophen (TYLENOL) tablet 650 mg (650 mg Oral Given 1/9/21 0924)   methocarbamol (ROBAXIN) tablet 500 mg (500 mg Oral Given 1/9/21 0924)   ibuprofen (MOTRIN) tablet 600 mg (600 mg Oral Given 1/9/21 1020)       Diagnostic Studies  Results Reviewed     None                 XR ribs with pa chest min 3 views RIGHT   Final Result by Temo Gould MD (01/09 1116)      No acute cardiopulmonary disease  Right 5th rib fracture      The study was marked in EPIC for immediate notification  Workstation performed: [id number]                    Procedures  Procedures         ED Course                             SBIRT 22yo+      Most Recent Value   SBIRT (24 yo +)   In order to provide better care to our patients, we are screening all of our patients for alcohol and drug use  Would it be okay to ask you these screening questions? Yes Filed at: 01/09/2021 0907   Initial Alcohol Screen: US AUDIT-C    1  How often do you have a drink containing alcohol?  0 Filed at: 01/09/2021 0907   2  How many drinks containing alcohol do you have on a typical day you are drinking? 0 Filed at: 01/09/2021 0907   3b  FEMALE Any Age, or MALE 65+: How often do you have 4 or more drinks on one occassion? 0 Filed at: 01/09/2021 4184   Audit-C Score  0 Filed at: 01/09/2021 7956   OLIMPIA: How many times in the past year have you    Used an illegal drug or used a prescription medication for non-medical reasons?   Never Filed at: 01/09/2021 0907                    MDM  Number of Diagnoses or Management Options  Breast pain, right:   Fall, initial encounter:   Rib fracture:   Rib pain on right side:   Diagnosis management comments: Pt  Is A&)x3, vss, afebrile, NAD, nontoxic appearing, appears uncomfortable with positional changes  Did discuss symptomatic management, need for good deep breathing exercises, follow up with PCP, encouraged to decrease or stop cigarette use, caffeine intake  Strict return precautions discussed, all questions answered       Amount and/or Complexity of Data Reviewed  Tests in the radiology section of CPT®: ordered and reviewed        Disposition  Final diagnoses:   Breast pain, right   Rib pain on right side Fall, initial encounter   Rib fracture     Time reflects when diagnosis was documented in both MDM as applicable and the Disposition within this note     Time User Action Codes Description Comment    1/9/2021 11:03 AM Juan Antonio Sour Add [N64 4] Breast pain     1/9/2021 11:03 AM Juan Antonio Sour Remove [N64 4] Breast pain     1/9/2021 11:03 AM Juan Antonio Sour Add [N64 4] Breast pain, right     1/9/2021 11:03 AM Juan Antonio Sour Add [R07 81] Rib pain on right side     1/9/2021 11:04 AM Juan Antonio Sour Add [W19  AACJ] Fall, initial encounter     1/9/2021 11:26 AM Juan Antonio Sour Add [S22 39XA] Rib fracture       ED Disposition     ED Disposition Condition Date/Time Comment    Discharge Stable Sat Jan 9, 2021 11:03 AM Courtney Ya discharge to home/self care  Follow-up Information     Follow up With Specialties Details Why Contact Info    Marilu Carvalho PA-C Internal Medicine, Physician Assistant In 2 days   E  [phone] [phone]            Discharge Medication List as of 1/9/2021 11:31 AM      START taking these medications    Details   lidocaine (LIDODERM) 5 % Apply 1 patch topically daily Remove & Discard patch within 12 hours or as directed by MD, Starting Sat 1/9/2021, Print      methocarbamol (ROBAXIN) 500 mg tablet Take 1 tablet (500 mg total) by mouth 2 (two) times a day, Starting Sat 1/9/2021, Normal      !! naproxen (NAPROSYN) 500 mg tablet Take 1 tablet (500 mg total) by mouth 2 (two) times a day with meals, Starting Sat 1/9/2021, Normal       !! - Potential duplicate medications found  Please discuss with provider        CONTINUE these medications which have NOT CHANGED    Details   acetaminophen (TYLENOL) 325 mg tablet Take 650 mg by mouth every 6 (six) hours as needed for mild pain, Historical Med      glyBURIDE (DIABETA) 5 mg tablet Take 1 tablet (5 mg total) by mouth daily with breakfast for 180 days, Starting Tue 9/4/2018, Until Sat 1/9/2021, Normal      lisinopril-hydrochlorothiazide (PRINZIDE,ZESTORETIC) 10-12 5 MG per tablet Take 1 tablet by mouth daily for 180 days, Starting Tue 9/4/2018, Until Sat 1/9/2021, Normal      magnesium oxide (MAG-OX) 400 mg Take 1 tablet (400 mg total) by mouth daily, Starting Thu 8/9/2018, Normal      metFORMIN (GLUCOPHAGE) 1000 MG tablet Take 1 tablet (1,000 mg total) by mouth 2 (two) times a day with meals for 180 days, Starting Tue 9/4/2018, Until Sat 1/9/2021, Normal      !! naproxen (NAPROSYN) 500 mg tablet 1 at the onset of a migraine headache with food  Less than 3 times a week, Normal       !! - Potential duplicate medications found  Please discuss with provider  No discharge procedures on file      PDMP Review     None          ED Provider  Electronically Signed by           Elvis Alaniz PA-C  01/09/21 0927

## 2021-01-09 NOTE — Clinical Note
Kristen Pavon was seen and treated in our emergency department on 1/9/2021  Diagnosis:     Ginny Dobbs  may return to work on return date  She may return on this date: 01/13/2021         If you have any questions or concerns, please don't hesitate to call        Afsaneh Richmond PA-C    ______________________________           _______________          _______________  Hospital Representative                              Date                                Time

## 2021-01-09 NOTE — Clinical Note
Andre Saldana was seen and treated in our emergency department on 1/9/2021  Diagnosis:     Namrata Brown  may return to work on return date  She may return on this date: 01/11/2021         If you have any questions or concerns, please don't hesitate to call        Brenden Peres PA-C    ______________________________           _______________          _______________  Hospital Representative                              Date                                Time

## 2021-01-09 NOTE — Clinical Note
Chuyita Porter was seen and treated in our emergency department on 1/9/2021  Diagnosis:     James Desouza  may return to work on return date  She may return on this date: 01/13/2021         If you have any questions or concerns, please don't hesitate to call        Son Stevens PA-C    ______________________________           _______________          _______________  Hospital Representative                              Date                                Time

## 2021-01-12 ENCOUNTER — OFFICE VISIT (OUTPATIENT)
Dept: INTERNAL MEDICINE CLINIC | Facility: CLINIC | Age: 66
End: 2021-01-12

## 2021-01-12 VITALS
BODY MASS INDEX: 29.77 KG/M2 | WEIGHT: 168 LBS | HEIGHT: 63 IN | TEMPERATURE: 98.4 F | HEART RATE: 94 BPM | DIASTOLIC BLOOD PRESSURE: 91 MMHG | SYSTOLIC BLOOD PRESSURE: 166 MMHG | OXYGEN SATURATION: 100 %

## 2021-01-12 DIAGNOSIS — Z11.4 SCREENING FOR HIV (HUMAN IMMUNODEFICIENCY VIRUS): ICD-10-CM

## 2021-01-12 DIAGNOSIS — E11.65 CONTROLLED TYPE 2 DIABETES MELLITUS WITH HYPERGLYCEMIA, WITHOUT LONG-TERM CURRENT USE OF INSULIN (HCC): Primary | ICD-10-CM

## 2021-01-12 DIAGNOSIS — R63.4 WEIGHT LOSS, UNINTENTIONAL: ICD-10-CM

## 2021-01-12 DIAGNOSIS — I10 BENIGN ESSENTIAL HYPERTENSION: ICD-10-CM

## 2021-01-12 DIAGNOSIS — Z11.59 NEED FOR HEPATITIS C SCREENING TEST: ICD-10-CM

## 2021-01-12 DIAGNOSIS — E78.2 MIXED HYPERLIPIDEMIA: ICD-10-CM

## 2021-01-12 DIAGNOSIS — S22.31XD CLOSED FRACTURE OF ONE RIB OF RIGHT SIDE WITH ROUTINE HEALING, SUBSEQUENT ENCOUNTER: ICD-10-CM

## 2021-01-12 DIAGNOSIS — Z12.31 ENCOUNTER FOR SCREENING MAMMOGRAM FOR MALIGNANT NEOPLASM OF BREAST: ICD-10-CM

## 2021-01-12 PROBLEM — F51.01 PRIMARY INSOMNIA: Status: RESOLVED | Noted: 2018-06-13 | Resolved: 2021-01-12

## 2021-01-12 PROBLEM — R20.2 PARESTHESIA OF BOTH HANDS: Status: RESOLVED | Noted: 2018-06-13 | Resolved: 2021-01-12

## 2021-01-12 PROBLEM — F07.81 POST CONCUSSION SYNDROME: Status: RESOLVED | Noted: 2018-09-14 | Resolved: 2021-01-12

## 2021-01-12 PROBLEM — R42 DIZZINESS AND GIDDINESS: Status: RESOLVED | Noted: 2018-09-14 | Resolved: 2021-01-12

## 2021-01-12 PROBLEM — R30.0 DYSURIA: Status: RESOLVED | Noted: 2018-03-01 | Resolved: 2021-01-12

## 2021-01-12 PROBLEM — R19.5 POSITIVE FECAL IMMUNOCHEMICAL TEST: Status: RESOLVED | Noted: 2018-07-26 | Resolved: 2021-01-12

## 2021-01-12 PROBLEM — F32.9 REACTIVE DEPRESSION: Status: RESOLVED | Noted: 2018-09-04 | Resolved: 2021-01-12

## 2021-01-12 PROBLEM — R19.5 POSITIVE FIT (FECAL IMMUNOCHEMICAL TEST): Status: RESOLVED | Noted: 2018-02-28 | Resolved: 2021-01-12

## 2021-01-12 PROCEDURE — 99213 OFFICE O/P EST LOW 20 MIN: CPT | Performed by: PHYSICIAN ASSISTANT

## 2021-01-12 RX ORDER — LISINOPRIL AND HYDROCHLOROTHIAZIDE 12.5; 1 MG/1; MG/1
1 TABLET ORAL DAILY
Qty: 90 TABLET | Refills: 1 | Status: SHIPPED | OUTPATIENT
Start: 2021-01-12 | End: 2021-07-06

## 2021-01-12 NOTE — PATIENT INSTRUCTIONS
We are restarting your care today  We did review your recent ER visit and note confirmed a fracture on your right 5th rib  At this time you do note significant improvement in pain  Able to take deep breaths and move your arm without problems at this time  We are restarting you on your blood pressure medication 1 tablet daily this has been sent to your pharmacy  Script provided for all of her labs and based on those results will determine what medication we will need to restart you on for your diabetes and cholesterol  Also reviewed that we do need to get you scheduled for your follow-up diagnostic imaging due to the abnormality seen on your right breast back in 2018 and recommendation at that time was for 6 month follow-up which was not completed as you had left the area  We did discuss however that we are going to hold off on getting this scheduled for at least 1 or 2 weeks until we bring you back in to confirm that you are no longer having pain from her recent right rib fracture  We will get you scheduled for follow-up once we get your initial lab results

## 2021-01-12 NOTE — PROGRESS NOTES
Assessment/Plan: We are restarting your care today  We did review your recent ER visit and note confirmed a fracture on your right 5th rib  At this time you do note significant improvement in pain  Able to take deep breaths and move your arm without problems at this time  We are restarting you on your blood pressure medication 1 tablet daily this has been sent to your pharmacy  Script provided for all of her labs and based on those results will determine what medication we will need to restart you on for your diabetes and cholesterol  Also reviewed that we do need to get you scheduled for your follow-up diagnostic imaging due to the abnormality seen on your right breast back in 2018 and recommendation at that time was for 6 month follow-up which was not completed as you had left the area  We did discuss however that we are going to hold off on getting this scheduled for at least 1 or 2 weeks until we bring you back in to confirm that you are no longer having pain from her recent right rib fracture  We will get you scheduled for follow-up once we get your initial lab results  No problem-specific Assessment & Plan notes found for this encounter  Diagnoses and all orders for this visit:    Controlled type 2 diabetes mellitus with hyperglycemia, without long-term current use of insulin (HCC)  -     Comprehensive metabolic panel  -     Hemoglobin A1C  -     Microalbumin / creatinine urine ratio    Benign essential hypertension  -     lisinopril-hydrochlorothiazide (PRINZIDE,ZESTORETIC) 10-12 5 MG per tablet; Take 1 tablet by mouth daily    Mixed hyperlipidemia  -     Lipid Panel with Direct LDL reflex    Closed fracture of one rib of right side with routine healing, subsequent encounter    Screening for HIV (human immunodeficiency virus)  -     HIV 1/2 Antigen/Antibody (4th Generation) w Reflex [facility]; Future    Need for hepatitis C screening test  -     Hepatitis C antibody;  Future    Encounter for screening mammogram for malignant neoplasm of breast  -     Mammo screening bilateral w cad; Future    Weight loss, unintentional  -     CBC and differential  -     TSH, 3rd generation with Free T4 reflex          Subjective:      Patient ID: Sharif Pablo is a 60 y o  female  Patient presents today to [facility]  Patient was last seen in this office 2 and half years ago  Patient was under treatment for type 2 diabetes, hypertension and hyperlipidemia  Patient does confirm that she has not been taking any medications since last visit  Patient does not have any insurance  States has also returned to [facility] to take care of other family  Father was unwell  States doing better  Then got stuck there longer due to Ryan  Patient had actually schedule this appointment for ER follow-up  She had presented to the emergency room on January 9th after sustaining a fall the week before and continued to have right-sided chest and breast pain  Imaging completed in the emergency room did confirm right 5th rib fracture  Oksana Spindle inside her home states was on way to work at [employer] and thinks may have tripped on [facility], did not hit face or head  When pain not improving went to ER  Pain is significantly improved can raise R arm now  States did not have any dizziness, no CP no symptoms prior to fall  Record review shows patient did have mammogram in 2018 with abnormalities of right breast   Did complete diagnostic ultrasound and mammogram and was advised needed six-month diagnostic imaging  As noted patient did not return and therefore this was never completed  Will have to get this scheduled once right rib fracture has healed  Positive FH breast cancer, Sister, Daughter    Admits to weight loss, states eating OK  Record review shows patient has lost 24 lb in the past 2 and half years    With strong positive family history of breast cancer and she did have a previous right breast biopsy will need to get her scheduled for the diagnostic imaging but will have to wait until the right rib fracture heals  As noted today significantly improvement in pain but still having some discomfort  May also be related to uncontrolled diabetes as noted she has not had any medication for her diabetes in 2 and half years  Will order labs and also based on those results and breast imaging may need further evaluation  Not unexpectedly blood pressure is elevated as she has not had many medications since 2018  Patient however at this time asymptomatic from cardiovascular standpoint  Will get her restarted on her medication and will follow-up after her labs and testing  The following portions of the patient's history were reviewed and updated as appropriate: allergies, current medications, past family history, past medical history, past social history, past surgical history and problem list     Review of Systems   Constitutional: Positive for unexpected weight change  Negative for appetite change, chills and fever  HENT: Negative  Respiratory: Negative for cough, chest tightness and shortness of breath  Cardiovascular: Negative  Negative for chest pain, palpitations and leg swelling  Gastrointestinal: Negative  Negative for abdominal pain, constipation and diarrhea  Endocrine: Positive for polyuria  Negative for cold intolerance, heat intolerance, polydipsia and polyphagia  Genitourinary: Positive for frequency  Negative for urgency  Musculoskeletal: Positive for arthralgias  Skin: Negative for rash  Neurological: Negative for syncope and headaches  Psychiatric/Behavioral: Negative  Objective:      /91 (BP Location: Left arm, Patient Position: Sitting, Cuff Size: Standard)   Pulse 94   Temp 98 4 °F (36 9 °C) (Temporal)   Ht 5' 3 25" (1 607 m)   Wt 76 2 kg (168 lb)   SpO2 100%   BMI 29 53 kg/m²          Physical Exam  Vitals signs and nursing note reviewed  Constitutional:       General: She is not in acute distress  Appearance: She is not ill-appearing  HENT:      Head: Normocephalic  Eyes:      Conjunctiva/sclera: Conjunctivae normal    Neck:      Vascular: No carotid bruit  Cardiovascular:      Rate and Rhythm: Normal rate and regular rhythm  Heart sounds: Normal heart sounds  Pulmonary:      Effort: Pulmonary effort is normal       Breath sounds: Normal breath sounds  Abdominal:      General: Bowel sounds are normal       Palpations: Abdomen is soft  Tenderness: There is no abdominal tenderness  Musculoskeletal:         General: Tenderness and signs of injury present  Right lower leg: No edema  Left lower leg: No edema  Skin:     Findings: No rash  Neurological:      Mental Status: She is alert  Mental status is at baseline  Psychiatric:         Mood and Affect: Mood normal          Thought Content: Thought content normal          Judgment: Judgment normal          PHQ-9 Depression Screening    PHQ-9:   Frequency of the following problems over the past two weeks:      Little interest or pleasure in doing things: 0 - not at all  Feeling down, depressed, or hopeless: 0 - not at all  Trouble falling or staying asleep, or sleeping too much: 0 - not at all  Feeling tired or having little energy: 0 - not at all  Poor appetite or overeatin - not at all  Feeling bad about yourself - or that you are a failure or have let yourself or your family down: 0 - not at all  Trouble concentrating on things, such as reading the newspaper or watching television: 0 - not at all  Moving or speaking so slowly that other people could have noticed   Or the opposite - being so fidgety or restless that you have been moving around a lot more than usual: 0 - not at all  Thoughts that you would be better off dead, or of hurting yourself in some way: 0 - not at all  PHQ-2 Score: 0  PHQ-9 Score: 0

## 2021-01-13 ENCOUNTER — LAB (OUTPATIENT)
Dept: LAB | Facility: HOSPITAL | Age: 66
End: 2021-01-13

## 2021-01-13 DIAGNOSIS — Z11.59 NEED FOR HEPATITIS C SCREENING TEST: ICD-10-CM

## 2021-01-13 DIAGNOSIS — Z11.4 SCREENING FOR HIV (HUMAN IMMUNODEFICIENCY VIRUS): ICD-10-CM

## 2021-01-13 LAB
ALBUMIN SERPL BCP-MCNC: 3.8 G/DL (ref 3.5–5)
ALP SERPL-CCNC: 109 U/L (ref 46–116)
ALT SERPL W P-5'-P-CCNC: 17 U/L (ref 12–78)
ANION GAP SERPL CALCULATED.3IONS-SCNC: 3 MMOL/L (ref 4–13)
AST SERPL W P-5'-P-CCNC: 6 U/L (ref 5–45)
BASOPHILS # BLD AUTO: 0.03 THOUSANDS/ΜL (ref 0–0.1)
BASOPHILS NFR BLD AUTO: 0 % (ref 0–1)
BILIRUB SERPL-MCNC: 0.39 MG/DL (ref 0.2–1)
BUN SERPL-MCNC: 17 MG/DL (ref 5–25)
CALCIUM SERPL-MCNC: 10 MG/DL (ref 8.3–10.1)
CHLORIDE SERPL-SCNC: 106 MMOL/L (ref 100–108)
CHOLEST SERPL-MCNC: 213 MG/DL (ref 50–200)
CO2 SERPL-SCNC: 29 MMOL/L (ref 21–32)
CREAT SERPL-MCNC: 0.75 MG/DL (ref 0.6–1.3)
CREAT UR-MCNC: 63.1 MG/DL
EOSINOPHIL # BLD AUTO: 0.15 THOUSAND/ΜL (ref 0–0.61)
EOSINOPHIL NFR BLD AUTO: 2 % (ref 0–6)
ERYTHROCYTE [DISTWIDTH] IN BLOOD BY AUTOMATED COUNT: 13.5 % (ref 11.6–15.1)
EST. AVERAGE GLUCOSE BLD GHB EST-MCNC: 301 MG/DL
GFR SERPL CREATININE-BSD FRML MDRD: 97 ML/MIN/1.73SQ M
GLUCOSE P FAST SERPL-MCNC: 257 MG/DL (ref 65–99)
HBA1C MFR BLD: 12.1 %
HCT VFR BLD AUTO: 42.9 % (ref 34.8–46.1)
HCV AB SER QL: NORMAL
HDLC SERPL-MCNC: 53 MG/DL
HGB BLD-MCNC: 13.4 G/DL (ref 11.5–15.4)
IMM GRANULOCYTES # BLD AUTO: 0.01 THOUSAND/UL (ref 0–0.2)
IMM GRANULOCYTES NFR BLD AUTO: 0 % (ref 0–2)
LDLC SERPL CALC-MCNC: 137 MG/DL (ref 0–100)
LYMPHOCYTES # BLD AUTO: 2.03 THOUSANDS/ΜL (ref 0.6–4.47)
LYMPHOCYTES NFR BLD AUTO: 30 % (ref 14–44)
MCH RBC QN AUTO: 27.4 PG (ref 26.8–34.3)
MCHC RBC AUTO-ENTMCNC: 31.2 G/DL (ref 31.4–37.4)
MCV RBC AUTO: 88 FL (ref 82–98)
MICROALBUMIN UR-MCNC: 8.4 MG/L (ref 0–20)
MICROALBUMIN/CREAT 24H UR: 13 MG/G CREATININE (ref 0–30)
MONOCYTES # BLD AUTO: 0.65 THOUSAND/ΜL (ref 0.17–1.22)
MONOCYTES NFR BLD AUTO: 10 % (ref 4–12)
NEUTROPHILS # BLD AUTO: 3.88 THOUSANDS/ΜL (ref 1.85–7.62)
NEUTS SEG NFR BLD AUTO: 58 % (ref 43–75)
NRBC BLD AUTO-RTO: 0 /100 WBCS
PLATELET # BLD AUTO: 283 THOUSANDS/UL (ref 149–390)
PMV BLD AUTO: 10.4 FL (ref 8.9–12.7)
POTASSIUM SERPL-SCNC: 4 MMOL/L (ref 3.5–5.3)
PROT SERPL-MCNC: 7.6 G/DL (ref 6.4–8.2)
RBC # BLD AUTO: 4.89 MILLION/UL (ref 3.81–5.12)
SODIUM SERPL-SCNC: 138 MMOL/L (ref 136–145)
TRIGL SERPL-MCNC: 116 MG/DL
TSH SERPL DL<=0.05 MIU/L-ACNC: 2.3 UIU/ML (ref 0.36–3.74)
WBC # BLD AUTO: 6.75 THOUSAND/UL (ref 4.31–10.16)

## 2021-01-13 PROCEDURE — 36415 COLL VENOUS BLD VENIPUNCTURE: CPT | Performed by: PHYSICIAN ASSISTANT

## 2021-01-13 PROCEDURE — 83036 HEMOGLOBIN GLYCOSYLATED A1C: CPT | Performed by: PHYSICIAN ASSISTANT

## 2021-01-13 PROCEDURE — 82043 UR ALBUMIN QUANTITATIVE: CPT | Performed by: PHYSICIAN ASSISTANT

## 2021-01-13 PROCEDURE — 87389 HIV-1 AG W/HIV-1&-2 AB AG IA: CPT

## 2021-01-13 PROCEDURE — 82570 ASSAY OF URINE CREATININE: CPT | Performed by: PHYSICIAN ASSISTANT

## 2021-01-13 PROCEDURE — 84443 ASSAY THYROID STIM HORMONE: CPT | Performed by: PHYSICIAN ASSISTANT

## 2021-01-13 PROCEDURE — 86803 HEPATITIS C AB TEST: CPT

## 2021-01-13 PROCEDURE — 80053 COMPREHEN METABOLIC PANEL: CPT | Performed by: PHYSICIAN ASSISTANT

## 2021-01-13 PROCEDURE — 85025 COMPLETE CBC W/AUTO DIFF WBC: CPT | Performed by: PHYSICIAN ASSISTANT

## 2021-01-13 PROCEDURE — 80061 LIPID PANEL: CPT | Performed by: PHYSICIAN ASSISTANT

## 2021-01-14 LAB — HIV 1+2 AB+HIV1 P24 AG SERPL QL IA: NORMAL

## 2021-01-15 DIAGNOSIS — E11.42 CONTROLLED TYPE 2 DIABETES MELLITUS WITH DIABETIC POLYNEUROPATHY, WITHOUT LONG-TERM CURRENT USE OF INSULIN (HCC): ICD-10-CM

## 2021-02-01 ENCOUNTER — OFFICE VISIT (OUTPATIENT)
Dept: INTERNAL MEDICINE CLINIC | Facility: CLINIC | Age: 66
End: 2021-02-01

## 2021-02-01 DIAGNOSIS — E11.65 TYPE 2 DIABETES MELLITUS WITH HYPERGLYCEMIA, WITHOUT LONG-TERM CURRENT USE OF INSULIN (HCC): Primary | ICD-10-CM

## 2021-02-01 DIAGNOSIS — E78.2 MIXED HYPERLIPIDEMIA: ICD-10-CM

## 2021-02-01 DIAGNOSIS — I10 BENIGN ESSENTIAL HYPERTENSION: ICD-10-CM

## 2021-02-01 PROBLEM — G43.009 MIGRAINE WITHOUT AURA AND WITHOUT STATUS MIGRAINOSUS, NOT INTRACTABLE: Status: RESOLVED | Noted: 2018-09-14 | Resolved: 2021-02-01

## 2021-02-01 PROCEDURE — 99213 OFFICE O/P EST LOW 20 MIN: CPT | Performed by: PHYSICIAN ASSISTANT

## 2021-02-01 RX ORDER — GLYBURIDE 5 MG/1
5 TABLET ORAL
Qty: 90 TABLET | Refills: 1 | Status: SHIPPED | OUTPATIENT
Start: 2021-02-01 | End: 2021-07-15 | Stop reason: SDUPTHER

## 2021-02-01 RX ORDER — LOVASTATIN 20 MG/1
20 TABLET ORAL
Qty: 90 TABLET | Refills: 0 | Status: SHIPPED | OUTPATIENT
Start: 2021-02-01 | End: 2021-05-19 | Stop reason: SDUPTHER

## 2021-02-01 NOTE — PATIENT INSTRUCTIONS
On your virtual visit today we discussed your diabetes and other labs  You do confirm receiving the phone call after your labs that your A1c was significantly elevated greater than 12%  You also confirm that you restarted your metformin 1000 mg twice daily  As per our discussion I am restarting you on your glyburide 5 mg tablet once daily in the morning  This is the same medication that you were on 2 years ago  You also confirm you are taking your blood pressure medication daily  We discussed her other labs and that we also need to get you started on cholesterol medication not only to help bring her cholesterol down but also because you are diabetic and increased risk for cardiovascular events  I am starting you on lovastatin 20 mg 1 tablet daily  Will also plan to see if we can get you on different medications through medication program as you currently do not have medical insurance  Also per our discussion we discussed the dietary modifications including decreasing sweets and chocolate  Also reducing your simple carbohydrates such as white breads rice pasta and switch to whole wheat and whole grain  Referral provided today for our diabetic nutritionist to will contact you to schedule an appointment  Please remember to call and schedule your mammogram that was ordered last month  Also reviewed we still need to get you an appointment in the office as your blood pressure was elevated last month and you were just restarted on your blood pressure medicine and we need to see if it is working well  I have also ordered follow-up labs to be completed in 3 months but as discussed we will need to see you in the office before that

## 2021-02-08 ENCOUNTER — TELEPHONE (OUTPATIENT)
Dept: FAMILY MEDICINE CLINIC | Facility: CLINIC | Age: 66
End: 2021-02-08

## 2021-02-10 ENCOUNTER — OFFICE VISIT (OUTPATIENT)
Dept: INTERNAL MEDICINE CLINIC | Facility: CLINIC | Age: 66
End: 2021-02-10

## 2021-02-10 VITALS
WEIGHT: 165 LBS | DIASTOLIC BLOOD PRESSURE: 70 MMHG | SYSTOLIC BLOOD PRESSURE: 109 MMHG | BODY MASS INDEX: 27.49 KG/M2 | HEIGHT: 65 IN | TEMPERATURE: 97.9 F | HEART RATE: 109 BPM

## 2021-02-10 DIAGNOSIS — M21.619 BUNION OF GREAT TOE: ICD-10-CM

## 2021-02-10 DIAGNOSIS — E11.42 DIABETIC PERIPHERAL NEUROPATHY (HCC): ICD-10-CM

## 2021-02-10 DIAGNOSIS — E78.2 MIXED HYPERLIPIDEMIA: ICD-10-CM

## 2021-02-10 DIAGNOSIS — Z28.20 IMMUNIZATION NOT CARRIED OUT BECAUSE OF PATIENT DECISION: ICD-10-CM

## 2021-02-10 DIAGNOSIS — I10 BENIGN ESSENTIAL HYPERTENSION: ICD-10-CM

## 2021-02-10 DIAGNOSIS — E66.3 OVERWEIGHT (BMI 25.0-29.9): ICD-10-CM

## 2021-02-10 DIAGNOSIS — E11.65 TYPE 2 DIABETES MELLITUS WITH HYPERGLYCEMIA, WITHOUT LONG-TERM CURRENT USE OF INSULIN (HCC): Primary | ICD-10-CM

## 2021-02-10 DIAGNOSIS — M94.0 COSTOCHONDRITIS: ICD-10-CM

## 2021-02-10 PROBLEM — R19.5 POSITIVE FECAL OCCULT BLOOD TEST: Status: RESOLVED | Noted: 2018-08-30 | Resolved: 2021-02-10

## 2021-02-10 PROBLEM — Z90.710 HISTORY OF HYSTERECTOMY: Status: ACTIVE | Noted: 2021-02-10

## 2021-02-10 PROCEDURE — 99214 OFFICE O/P EST MOD 30 MIN: CPT | Performed by: PHYSICIAN ASSISTANT

## 2021-02-10 RX ORDER — GABAPENTIN 100 MG/1
CAPSULE ORAL
Qty: 95 CAPSULE | Refills: 0 | Status: SHIPPED | OUTPATIENT
Start: 2021-02-10 | End: 2021-04-07 | Stop reason: ALTCHOICE

## 2021-02-10 NOTE — PATIENT INSTRUCTIONS
On your visit today we reviewed that you are taking your metformin and blood pressure medication but you never went to  your other diabetic or cholesterol medication  Please pick these up today  We are restarting you on gabapentin for your diabetic pain which will also help your lower back pain  Please start with 1 tablet daily at bedtime and after 5 days you will increase this to 300 mg daily at bedtime only  We also reviewed that the discomfort you are feeling in the upper right side of her chest is due to costochondritis and you may take Tylenol as needed and will improve over time  If however your pain does not improve or gets worse to call our office back  We also reviewed that our diabetic educator did contact you 2 days ago but you report never receiving this message  Will request that they call you again to get you scheduled for your diabetic education  As per our discussion referral to our diabetic eye specialist provided today and they will contact you with your appointment  Her diabetic foot exam completed today and you do have bunions and calluses and referral to podiatry to schedule appointment was made as well  Labs are due as dated the 1st week in May to follow-up on your diabetes to re-evaluate treatment plan at that time  You continue to decline all immunizations and are aware of significant increased risk for complications  Please also remember to call and schedule your mammogram that was ordered at your previous visit  Weight Management   AMBULATORY CARE:   Why it is important to manage your weight:  Being overweight increases your risk of health conditions such as heart disease, high blood pressure, type 2 diabetes, and certain types of cancer  It can also increase your risk for osteoarthritis, sleep apnea, and other respiratory problems  Aim for a slow, steady weight loss  Even a small amount of weight loss can lower your risk of health problems    How to lose weight safely:  A safe and healthy way to lose weight is to eat fewer calories and get regular exercise  · You can lose up about 1 pound a week by decreasing the number of calories you eat by 500 calories each day  You can decrease calories by eating smaller portion sizes or by cutting out high-calorie foods  Read labels to find out how many calories are in the foods you eat  · You can also burn calories with exercise such as walking, swimming, or biking  You will be more likely to keep weight off if you make these changes part of your lifestyle  Exercise at least 30 minutes per day on most days of the week  You can also fit in more physical activity by taking the stairs instead of the elevator or parking farther away from stores  Ask your healthcare provider about the best exercise plan for you  Healthy meal plan for weight management:  A healthy meal plan includes a variety of foods, contains fewer calories, and helps you stay healthy  A healthy meal plan includes the following:     · Eat whole-grain foods more often  A healthy meal plan should contain fiber  Fiber is the part of grains, fruits, and vegetables that is not broken down by your body  Whole-grain foods are healthy and provide extra fiber in your diet  Some examples of whole-grain foods are whole-wheat breads and pastas, oatmeal, brown rice, and bulgur  · Eat a variety of vegetables every day  Include dark, leafy greens such as spinach, kale, irwin greens, and mustard greens  Eat yellow and orange vegetables such as carrots, sweet potatoes, and winter squash  · Eat a variety of fruits every day  Choose fresh or canned fruit (canned in its own juice or light syrup) instead of juice  Fruit juice has very little or no fiber  · Eat low-fat dairy foods  Drink fat-free (skim) milk or 1% milk  Eat fat-free yogurt and low-fat cottage cheese  Try low-fat cheeses such as mozzarella and other reduced-fat cheeses      · Choose meat and other protein foods that are low in fat  Choose beans or other legumes such as split peas or lentils  Choose fish, skinless poultry (chicken or turkey), or lean cuts of red meat (beef or pork)  Before you cook meat or poultry, cut off any visible fat  · Use less fat and oil  Try baking foods instead of frying them  Add less fat, such as margarine, sour cream, regular salad dressing and mayonnaise to foods  Eat fewer high-fat foods  Some examples of high-fat foods include french fries, doughnuts, ice cream, and cakes  · Eat fewer sweets  Limit foods and drinks that are high in sugar  This includes candy, cookies, regular soda, and sweetened drinks  Ways to decrease calories:   · Eat smaller portions  ? Use a small plate with smaller servings  ? Do not eat second helpings  ? When you eat at a restaurant, ask for a box and place half of your meal in the box before you eat  ? Share an entrée with someone else  · Replace high-calorie snacks with healthy, low-calorie snacks  ? Choose fresh fruit, vegetables, fat-free rice cakes, or air-popped popcorn instead of potato chips, nuts, or chocolate  ? Choose water or calorie-free drinks instead of soda or sweetened drinks  · Do not shop for groceries when you are hungry  You may be more likely to make unhealthy food choices  Take a grocery list of healthy foods and shop after you have eaten  · Eat regular meals  Do not skip meals  Skipping meals can lead to overeating later in the day  This can make it harder for you to lose weight  Eat a healthy snack in place of a meal if you do not have time to eat a regular meal  Talk with a dietitian to help you create a meal plan and schedule that is right for you  Other things to consider as you try to lose weight:   · Be aware of situations that may give you the urge to overeat, such as eating while watching television  Find ways to avoid these situations   For example, read a book, go for a walk, or do crafts  · Meet with a weight loss support group or friends who are also trying to lose weight  This may help you stay motivated to continue working on your weight loss goals  © Copyright [address] Information is for End User's use only and may not be sold, redistributed or otherwise used for commercial purposes  All illustrations and images included in CareNotes® are the copyrighted property of A D A M , Inc  or [facility] Harpal Suarez   The above information is an  only  It is not intended as medical advice for individual conditions or treatments  Talk to your doctor, nurse or pharmacist before following any medical regimen to see if it is safe and effective for you  Low Fat Diet   AMBULATORY CARE:   A low-fat diet  is an eating plan that is low in total fat, unhealthy fat, and cholesterol  You may need to follow a low-fat diet if you have trouble digesting or absorbing fat  You may also need to follow this diet if you have high cholesterol  You can also lower your cholesterol by increasing the amount of fiber in your diet  Soluble fiber is a type of fiber that helps to decrease cholesterol levels  Different types of fat in food:   · Limit unhealthy fats  A diet that is high in cholesterol, saturated fat, and trans fat may cause unhealthy cholesterol levels  Unhealthy cholesterol levels increase your risk of heart disease  ? Cholesterol:  Limit intake of cholesterol to less than 200 mg per day  Cholesterol is found in meat, eggs, and dairy  ? Saturated fat:  Limit saturated fat to less than 7% of your total daily calories  Ask your dietitian how many calories you need each day  Saturated fat is found in butter, cheese, ice cream, whole milk, and palm oil  Saturated fat is also found in meat, such as beef, pork, chicken skin, and processed meats  Processed meats include sausage, hot dogs, and bologna  ? Trans fat:  Avoid trans fat as much as possible   Trans fat is used in fried and baked foods  Foods that say trans fat free on the label may still have up to 0 5 grams of trans fat per serving  · Include healthy fats  Replace foods that are high in saturated and trans fat with foods high in healthy fats  This may help to decrease high cholesterol levels  ? Monounsaturated fats: These are found in avocados, nuts, and vegetable oils, such as olive, canola, and sunflower oil  ? Polyunsaturated fats: These can be found in vegetable oils, such as soybean or corn oil  Omega-3 fats can help to decrease the risk of heart disease  Omega-3 fats are found in fish, such as salmon, herring, trout, and tuna  Omega-3 fats can also be found in plant foods, such as walnuts, flaxseed, soybeans, and canola oil  Foods to limit or avoid:   · Grains:      ? Snacks that are made with partially hydrogenated oils, such as chips, regular crackers, and butter-flavored popcorn    ? High-fat baked goods, such as biscuits, croissants, doughnuts, pies, cookies, and pastries    · Dairy:      ? Whole milk, 2% milk, and yogurt and ice cream made with whole milk    ? Half and half creamer, heavy cream, and whipping cream    ? Cheese, cream cheese, and sour cream    · Meats and proteins:      ? High-fat cuts of meat (T-bone steak, regular hamburger, and ribs)    ? Fried meat, poultry (turkey and chicken), and fish    ? Poultry (chicken and turkey) with skin    ? Cold cuts (salami or bologna), hot dogs, abebe, and sausage    ? Whole eggs and egg yolks    · Vegetables and fruits with added fat:      ? Fried vegetables or vegetables in butter or high-fat sauces, such as cream or cheese sauces    ? Fried fruit or fruit served with butter or cream    · Fats:      ? Butter, stick margarine, and shortening    ? Coconut, palm oil, and palm kernel oil    Foods to include:   · Grains:      ? Whole-grain breads, cereals, pasta, and brown rice    ?  Low-fat crackers and pretzels    · Vegetables and fruits:      ? Fresh, frozen, or canned vegetables (no salt or low-sodium)    ? Fresh, frozen, dried, or canned fruit (canned in light syrup or fruit juice)    ? Avocado    · Low-fat dairy products:      ? Nonfat (skim) or 1% milk    ? Nonfat or low-fat cheese, yogurt, and cottage cheese    · Meats and proteins:      ? Chicken or turkey with no skin    ? Baked or broiled fish    ? Lean beef and pork (loin, round, extra lean hamburger)    ? Beans and peas, unsalted nuts, soy products    ? Egg whites and substitutes    ? Seeds and nuts    · Fats:      ? Unsaturated oil, such as canola, olive, peanut, soybean, or sunflower oil    ? Soft or liquid margarine and vegetable oil spread    ? Low-fat salad dressing    Other ways to decrease fat:   · Read food labels before you buy foods  Choose foods that have less than 30% of calories from fat  Choose low-fat or fat-free dairy products  Remember that fat free does not mean calorie free  These foods still contain calories, and too many calories can lead to weight gain  · Trim fat from meat and avoid fried food  Trim all visible fat from meat before you cook it  Remove the skin from poultry  Do not dang meat, fish, or poultry  Bake, roast, boil, or broil these foods instead  Avoid fried foods  Eat a baked potato instead of [place] fries  Steam vegetables instead of sautéing them in butter  · Add less fat to foods  Use imitation abebe bits on salads and baked potatoes instead of regular abebe bits  Use fat-free or low-fat salad dressings instead of regular dressings  Use low-fat or nonfat butter-flavored topping instead of regular butter or margarine on popcorn and other foods  Ways to decrease fat in recipes:  Replace high-fat ingredients with low-fat or nonfat ones  This may cause baked goods to be drier than usual  You may need to use nonfat cooking spray on pans to prevent food from sticking   You also may need to change the amount of other ingredients, such as water, in the recipe  Try the following:  · Use low-fat or light margarine instead of regular margarine or shortening  · Use lean ground turkey breast or chicken, or lean ground beef (less than 5% fat) instead of hamburger  · Add 1 teaspoon of canola oil to 8 ounces of skim milk instead of using cream or half and half  · Use grated zucchini, carrots, or apples in breads instead of coconut  · Use blenderized, low-fat cottage cheese, plain tofu, or low-fat ricotta cheese instead of cream cheese  · Use 1 egg white and 1 teaspoon of canola oil, or use ¼ cup (2 ounces) of fat-free egg substitute instead of a whole egg  · Replace half of the oil that is called for in a recipe with applesauce when you bake  Use 3 tablespoons of cocoa powder and 1 tablespoon of canola oil instead of a square of baking chocolate  How to increase fiber:  Eat enough high-fiber foods to get 20 to 30 grams of fiber every day  Slowly increase your fiber intake to avoid stomach cramps, gas, and other problems  · Eat 3 ounces of whole-grain foods each day  An ounce is about 1 slice of bread  Eat whole-grain breads, such as whole-wheat bread  Whole wheat, whole-wheat flour, or other whole grains should be listed as the first ingredient on the food label  Replace white flour with whole-grain flour or use half of each in recipes  Whole-grain flour is heavier than white flour, so you may have to add more yeast or baking powder  · Eat a high-fiber cereal for breakfast   Oatmeal is a good source of soluble fiber  Look for cereals that have bran or fiber in the name  Choose whole-grain products, such as brown rice, barley, and whole-wheat pasta  · Eat more beans, peas, and lentils  For example, add beans to soups or salads  Eat at least 5 cups of fruits and vegetables each day  Eat fruits and vegetables with the peel because the peel is high in fiber      © Copyright [address] 2020 Information is for End User's use only and may not be sold, redistributed or otherwise used for commercial purposes  All illustrations and images included in CareNotes® are the copyrighted property of A [employer] M [employer] Inc  or Clau Martinez  The above information is an  only  It is not intended as medical advice for individual conditions or treatments  Talk to your doctor, nurse or pharmacist before following any medical regimen to see if it is safe and effective for you  Heart Healthy Diet   AMBULATORY CARE:   A heart healthy diet  is an eating plan low in unhealthy fats and sodium (salt)  The plan is high in healthy fats and fiber  A heart healthy diet helps improve your cholesterol levels and lowers your risk for heart disease and stroke  A dietitian will teach you how to read and understand food labels  Heart healthy diet guidelines to follow:   · Choose foods that contain healthy fats  ? Unsaturated fats  include monounsaturated and polyunsaturated fats  Unsaturated fat is found in foods such as soybean, canola, olive, corn, and safflower oils  It is also found in soft tub margarine that is made with liquid vegetable oil  ? Omega-3 fat  is found in certain fish, such as salmon, tuna, and trout, and in walnuts and flaxseed  Eat fish high in omega-3 fats at least 2 times a week  · Get 20 to 30 grams of fiber each day  Fruits, vegetables, whole-grain foods, and legumes (cooked beans) are good sources of fiber  · Limit or do not have unhealthy fats  ? Cholesterol  is found in animal foods, such as eggs and lobster, and in dairy products made from whole milk  Limit cholesterol to less than 200 mg each day  ? Saturated fat  is found in meats, such as abebe and hamburger  It is also found in chicken or turkey skin, whole milk, and butter  Limit saturated fat to less than 7% of your total daily calories  ? Trans fat  is found in packaged foods, such as potato chips and cookies   It is also in hard margarine, some fried foods, and shortening  Do not eat foods that contain trans fats  · Limit sodium as directed  You may be told to limit sodium to 2,000 to 2,300 mg each day  Choose low-sodium or no-salt-added foods  Add little or no salt to food you prepare  Use herbs and spices in place of salt  Include the following in your heart healthy plan:  Ask your dietitian or healthcare provider how many servings to have from each of the following food groups:  · Grains:      ? Whole-wheat breads, cereals, and pastas, and brown rice    ? Low-fat, low-sodium crackers and chips    · Vegetables:      ? Broccoli, green beans, green peas, and spinach    ? Collards, kale, and lima beans    ? Carrots, sweet potatoes, tomatoes, and peppers    ? Canned vegetables with no salt added    · Fruits:      ? Bananas, peaches, pears, and pineapple    ? Grapes, raisins, and dates    ? Oranges, tangerines, grapefruit, orange juice, and grapefruit juice    ? Apricots, mangoes, melons, and papaya    ? Raspberries and strawberries    ? Canned fruit with no added sugar    · Low-fat dairy:      ? Nonfat (skim) milk, 1% milk, and low-fat almond, cashew, or soy milks fortified with calcium    ? Low-fat cheese, regular or frozen yogurt, and cottage cheese    · Meats and proteins:      ? Lean cuts of beef and pork (loin, leg, round), skinless chicken and turkey    ? Legumes, soy products, egg whites, or nuts    Limit or do not include the following in your heart healthy plan:   · Unhealthy fats and oils:      ? Whole or 2% milk, cream cheese, sour cream, or cheese    ? High-fat cuts of beef (T-bone steaks, ribs), chicken or turkey with skin, and organ meats such as liver    ?  Butter, stick margarine, shortening, and cooking oils such as coconut or palm oil    · Foods and liquids high in sodium:      ? Packaged foods, such as frozen dinners, cookies, macaroni and cheese, and cereals with more than 300 mg of sodium per serving    ? Vegetables with added sodium, such as instant potatoes, vegetables with added sauces, or regular canned vegetables    ? Cured or smoked meats, such as hot dogs, abebe, and sausage    ? High-sodium ketchup, barbecue sauce, salad dressing, pickles, olives, soy sauce, or miso    · Foods and liquids high in sugar:      ? Candy, cake, cookies, pies, or doughnuts    ? Soft drinks (soda), sports drinks, or sweetened tea    ? Canned or dry mixes for cakes, soups, sauces, or gravies    Other healthy heart guidelines:   · Do not smoke  Nicotine and other chemicals in cigarettes and cigars can cause lung and heart damage  Ask your healthcare provider for information if you currently smoke and need help to quit  E-cigarettes or smokeless tobacco still contain nicotine  Talk to your healthcare provider before you use these products  · Limit or do not drink alcohol as directed  Alcohol can damage your heart and raise your blood pressure  Your healthcare provider may give you specific daily and weekly limits  The general recommended limit is 1 drink a day for women 21 or older and for men 72 or older  Do not have more than 3 drinks in a day or 7 in a week  The recommended limit is 2 drinks a day for men 24to 59years of age  Do not have more than 4 drinks in a day or 14 in a week  A drink of alcohol is 12 ounces of beer, 5 ounces of wine, or 1½ ounces of liquor  · Exercise regularly  Exercise can help you maintain a healthy weight and improve your blood pressure and cholesterol levels  Regular exercise can also decrease your risk for heart problems  Ask your healthcare provider about the best exercise plan for you  Do not start an exercise program without asking your healthcare provider  Follow up with your doctor or cardiologist as directed:  Write down your questions so you remember to ask them during your visits    © Copyright [employer] 2020 Information is for End User's use only and may not be sold, redistributed or otherwise used for commercial purposes  All illustrations and images included in CareNotes® are the copyrighted property of [employer] M [employer]  or Clau Martinez  The above information is an  only  It is not intended as medical advice for individual conditions or treatments  Talk to your doctor, nurse or pharmacist before following any medical regimen to see if it is safe and effective for you

## 2021-02-10 NOTE — PROGRESS NOTES
Assessment/Plan: On your visit today we reviewed that you are taking your metformin and blood pressure medication but you never went to  your other diabetic or cholesterol medication  Please pick these up today  We are restarting you on gabapentin for your diabetic pain which will also help your lower back pain  Please start with 1 tablet daily at bedtime and after 5 days you will increase this to 300 mg daily at bedtime only  We also reviewed that the discomfort you are feeling in the upper right side of her chest is due to costochondritis and you may take Tylenol as needed and will improve over time  If however your pain does not improve or gets worse to call our office back  We also reviewed that our diabetic educator did contact you 2 days ago but you report never receiving this message  Will request that they call you again to get you scheduled for your diabetic education  As per our discussion referral to our diabetic eye specialist provided today and they will contact you with your appointment  Her diabetic foot exam completed today and you do have bunions and calluses and referral to podiatry to schedule appointment was made as well  Labs are due as dated the 1st week in May to follow-up on your diabetes to re-evaluate treatment plan at that time  You continue to decline all immunizations and are aware of significant increased risk for complications  Please also remember to call and schedule your mammogram that was ordered at your previous visit  No problem-specific Assessment & Plan notes found for this encounter  Diagnoses and all orders for this visit:    Type 2 diabetes mellitus with hyperglycemia, without long-term current use of insulin ([facility]  -     Ambulatory referral to Ophthalmology; Future  -     Ambulatory referral to Podiatry; Future    Benign essential hypertension    Diabetic peripheral neuropathy (HCC)  -     gabapentin (NEURONTIN) 100 mg capsule;  Take 1 capsule (100 mg total) by mouth daily at bedtime for 5 days, THEN 3 capsules (300 mg total) daily at bedtime  Mixed hyperlipidemia    Costochondritis    Overweight (BMI 25 0-29  9)    Bunion of great toe  -     Ambulatory referral to Podiatry; Future    Immunization not carried out because of patient decision          Subjective:      Patient ID: Andrey Wang is a 60 y o  female  Patient presents today for in person follow-up for her diabetes  Patient was scheduled for follow-up however secondary to snowstorm was able to complete a virtual visit only at that time  As noted previously patient was known diabetic who was lost to care  Upon labs after resuming care her A1c was greater than 12%  Patient however had been without medication for over 2 years  Patient has since been restarted on metformin 1000 twice a day and glipizide twice daily as well  Patient was not interested in starting insulin and as she is self-pay would be difficult other than the 70/ 30 version  Will see how her levels are in 3 months and further discuss at that time  Patient states never went to  the glyburide or the lovastatin, forgot will go today  Patient tolerating the metformin twice daily without GI issues  Diabetic foot exam completed today  Patient does have some bilateral bunions great toe  Mild callus formation bilateral lateral great toes and will have her see Podiatry as well  Patient's blood pressure was also significantly elevated as she had been without her medication  Was restarted on her lisinopril hydrochlorothiazide and blood pressure is now well controlled  Patient was also started on lovastatin for cardiovascular protection due to diabetes and ASCVD risk calculation of almost 60%  Would prefer to have her on a higher intensity statin but secondary to no insurance will have to see if we can get her covered for anything      Patient having R sided upper chest wall pain,  No pain with 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  Has had that for 1-2 weeks  No pain with breathing  No pain with or without food  States difficulty sleeping due to chronic low back pain and some tingling in her feet  Discussed with patient as she was on gabapentin for the same in the past will get her restarted on this prescription today  Patient however does work and advised her to take the gabapentin at bedtime only at this time as that is when she is most symptomatic  Patient is aware that the gabapentin can help with the tingling in her hands and feet due to her diabetic neuropathy as well as her 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  Patient was asking about the diabetic nutritionist   Review with patient that she called her and left a message on February 8th  Patient does not recall receiving that message  Will reach out to diabetic Education program to contact patient once again  Patient however continues to decline all of her immunizations she is aware she is at significantly increased risk for complications including hospitalization and possible early death due to complications  Will continue to discuss with patient at follow-up visits  The following portions of the patient's history were reviewed and updated as appropriate: allergies, current medications, past family history, past medical history, past social history, past surgical history and problem list     Review of Systems   Constitutional: Negative  Negative for chills and fever  HENT: Negative  Respiratory: Negative  Negative for cough, chest tightness and shortness of breath  Cardiovascular: Positive for chest pain (chest wall R upper)  Gastrointestinal: Negative  Endocrine: Positive for polydipsia and polyuria  Negative for polyphagia  Tingling bilateral hands and feet stocking-glove distribution worse at night   Genitourinary: Positive for frequency   Negative for urgency  Musculoskeletal: Positive for arthralgias and myalgias  Negative for joint swelling  Skin: Negative  Neurological: Positive for numbness  Negative for dizziness, light-headedness and headaches  Psychiatric/Behavioral: Negative  Objective:      /70 (BP Location: Right arm, Patient Position: Sitting, Cuff Size: Standard)   Pulse (!) 109   Temp 97 9 °F (36 6 °C) (Temporal)   Ht 5' 5" (1 651 m)   Wt 74 8 kg (165 lb)   BMI 27 46 kg/m²          Physical Exam  Vitals signs and nursing note reviewed  Constitutional:       General: She is not in acute distress  Appearance: She is not ill-appearing  Eyes:      Conjunctiva/sclera: Conjunctivae normal    Neck:      Musculoskeletal: Neck supple  Vascular: No carotid bruit  Cardiovascular:      Rate and Rhythm: Normal rate and regular rhythm  Pulses: no weak pulses          Dorsalis pedis pulses are 2+ on the right side and 2+ on the left side  Heart sounds: Normal heart sounds  Pulmonary:      Effort: Pulmonary effort is normal       Breath sounds: Normal breath sounds  Abdominal:      General: Bowel sounds are normal  There is no distension  Musculoskeletal:         General: Tenderness present  Right lower leg: No edema  Left lower leg: No edema  Comments: On evaluation patient has tenderness to palpation superior right chest wall consistent with costochondritis  Pain is reproduced with movement of right upper extremity  Feet:      Right foot:      Skin integrity: Callus (lateral great toe) and dry skin present  Left foot:      Skin integrity: Callus (lateral great toe) and dry skin present  Skin:     Findings: No rash  Neurological:      Mental Status: She is alert  Cranial Nerves: No cranial nerve deficit  Sensory: No sensory deficit        Gait: Gait normal       Deep Tendon Reflexes: Reflexes normal    Psychiatric:         Mood and Affect: Mood normal  Thought Content: Thought content normal          Patient's shoes and socks removed  Right Foot/Ankle   Right Foot Inspection  Skin Exam: dry skin, callus (lateral great toe) and callus (lateral great toe)                            Sensory     Proprioception: intact   Monofilament testing: intact  Vascular    The right DP pulse is 2+  Left Foot/Ankle  Left Foot Inspection  Skin Exam: dry skin and callus (lateral great toe)                                         Sensory     Proprioception: intact  Monofilament: intact  Vascular    The left DP pulse is 2+  Assign Risk Category:  Deformity present; No loss of protective sensation; No weak pulses       Risk: 1    BMI Counseling: Body mass index is 27 46 kg/m²  The BMI is above normal  Nutrition recommendations include reducing portion sizes, 3-5 servings of fruits/vegetables daily, reducing fast food intake, consuming healthier snacks, decreasing soda and/or juice intake, moderation in carbohydrate intake, reducing intake of saturated fat and trans fat and reducing intake of cholesterol  Exercise recommendations include exercising 3-5 times per week

## 2021-02-15 ENCOUNTER — OFFICE VISIT (OUTPATIENT)
Dept: MULTI SPECIALTY CLINIC | Facility: CLINIC | Age: 66
End: 2021-02-15

## 2021-02-15 VITALS
TEMPERATURE: 98.8 F | SYSTOLIC BLOOD PRESSURE: 143 MMHG | HEIGHT: 65 IN | WEIGHT: 163 LBS | BODY MASS INDEX: 27.16 KG/M2 | DIASTOLIC BLOOD PRESSURE: 78 MMHG | HEART RATE: 80 BPM

## 2021-02-15 DIAGNOSIS — M21.619 BUNION OF GREAT TOE: ICD-10-CM

## 2021-02-15 DIAGNOSIS — E11.65 TYPE 2 DIABETES MELLITUS WITH HYPERGLYCEMIA, WITHOUT LONG-TERM CURRENT USE OF INSULIN (HCC): ICD-10-CM

## 2021-02-15 PROCEDURE — 99213 OFFICE O/P EST LOW 20 MIN: CPT | Performed by: PODIATRIST

## 2021-02-15 PROCEDURE — 11720 DEBRIDE NAIL 1-5: CPT | Performed by: PODIATRIST

## 2021-02-15 NOTE — PROGRESS NOTES
[facility] 60 [facility]  [facility] MRN: [id number]  Encounter: [id number]      Assessment/Plan        Diagnoses and all orders for this visit:    Type 2 diabetes mellitus with hyperglycemia, without long-term current use of insulin ([facility] )  -     Ambulatory referral to Podiatry    Bunion of great toe  -     Ambulatory referral to Podiatry           Plan:   Patient was seen/examined  All questions and concerns addressed   Recommend wider toe box shoes and Voltaren gel as needed for right bunion pain  Surgical intervention is not an option at this time due to elevated (A1c 12 1%)   Nails x5 were sharply trimmed to normal length and thickness with a large nail nipper without incident   Stressed the importance of glycemic control, shoe gear, and proper diabetic foot care   RTC in 6 month(s)    Dr Randa Boas was present during this entire procedure  History of Present Illness     HPI:  Courtney Ya is a 60 y o  female who presents with elongated toenails and at risk diabetic foot care  States that their nails are painful, elongated  They have difficulty applying their socks and shoes  The pressure within their shoe gear is painful and they have been unable to cut their nails adequately  Patient reports numbness/tingling  Last A1c 12 1% (1/13/21)  The patient denies any nausea, vomiting, fever, chills, shortness of breath, or chest pains  Review of Systems   Constitutional: Negative  HENT: Negative  Eyes: Negative  Respiratory: Negative  Cardiovascular: Negative  Gastrointestinal: Negative  Musculoskeletal: Negative   Skin: elongated thickened toenails   Neurological: Negative          Historical Information   Past Medical History:   Diagnosis Date    Bump     bumped head yesterday at work "saw stars" no LOC, cat scan done was normal    Diabetes [facility] )     blood sugar 125 on admission    Encounter for screening colonoscopy     1 st one    Headache     Hyperlipidemia     Hypertension     Positive fecal occult blood test 8/30/2018    Added automatically from request for surgery [id number]    Post concussion syndrome 9/14/2018     Past Surgical History:   Procedure Laterality Date    COLONOSCOPY N/A 9/26/2018    Procedure: COLONOSCOPY;  Surgeon: Tom Hughes MD;  Location: [facility] GI LAB; Service: Gastroenterology    HYSTERECTOMY      INCISIONAL BREAST BIOPSY      last assessed [id number]    MS COLONOSCOPY FLX DX W/COLLJ SPEC WHEN PFRMD N/A 7/5/2018    Procedure: COLONOSCOPY;  Surgeon: Tom Hughes MD;  Location:  GI LAB; Service: Gastroenterology     Social History   Social History     Substance and Sexual Activity   Alcohol Use Never    Frequency: Never    Binge frequency: Never     Social History     Substance and Sexual Activity   Drug Use Never     Social History     Tobacco Use   Smoking Status Current Every Day Smoker    Packs/day: 0 50    Types: Cigarettes   Smokeless Tobacco Never Used   Tobacco Comment    smokes less than 1pk/day     Family History:   Family History   Problem Relation Age of Onset    Cancer Cousin     Alcohol abuse Mother     Alcohol abuse Father        Meds/Allergies   (Not in a hospital admission)    No Known Allergies    Objective     Current Vitals:   Blood Pressure: 143/78 (02/15/21 1521)  Pulse: 80 (02/15/21 1521)  Temperature: 98 8 °F (37 1 °C) (02/15/21 1521)  Temp Source: Temporal (02/15/21 1521)  Height: 5' 5" (165 1 cm) (02/15/21 1521)  Weight - Scale: 73 9 kg (163 lb) (02/15/21 1521)        /78 (BP Location: Left arm, Patient Position: Sitting, Cuff Size: Adult)   Pulse 80   Temp 98 8 °F (37 1 °C) (Temporal)   Ht 5' 5" (1 651 m)   Wt 73 9 kg (163 lb)   BMI 27 12 kg/m²       Lower Extremity Exam:    Diabetic Foot Exam    Musculoskeletal:  MMT is 5/5 to all compartments of the LE, +0/4 edema B/L, Hallux limitus is present  Equinus is present      HAV deformity noted bilaterally R>>L; Mild pain on palpation of right 1st met head dorsal medially; negative for track bound  Vascular:   R DP is +2/4, R PT is +2/4, L DP is +2/4, L PT is +2/4, CFT< 3sec to all digits  regular rate and rhythm  Skin:  No open Lesions  Skin of the LE is normal texture, temperature, turgor  Interdigital maceration is not present  Nails elongated and thickened  x4  Xerotic skin is not noted  Neurologic:  Gross sensation is intact  Protective sensation is Diminished  Sharp sensation is present

## 2021-04-07 ENCOUNTER — OFFICE VISIT (OUTPATIENT)
Dept: INTERNAL MEDICINE CLINIC | Facility: CLINIC | Age: 66
End: 2021-04-07

## 2021-04-07 VITALS
OXYGEN SATURATION: 100 % | HEART RATE: 98 BPM | DIASTOLIC BLOOD PRESSURE: 76 MMHG | TEMPERATURE: 98.2 F | BODY MASS INDEX: 27.66 KG/M2 | SYSTOLIC BLOOD PRESSURE: 129 MMHG | HEIGHT: 64 IN | WEIGHT: 162 LBS

## 2021-04-07 DIAGNOSIS — E11.42 DIABETIC PERIPHERAL NEUROPATHY (HCC): ICD-10-CM

## 2021-04-07 DIAGNOSIS — I10 BENIGN ESSENTIAL HYPERTENSION: Primary | ICD-10-CM

## 2021-04-07 PROCEDURE — 99213 OFFICE O/P EST LOW 20 MIN: CPT | Performed by: PHYSICIAN ASSISTANT

## 2021-04-07 RX ORDER — GABAPENTIN 300 MG/1
300 CAPSULE ORAL
Qty: 90 CAPSULE | Refills: 0 | Status: SHIPPED | OUTPATIENT
Start: 2021-04-07 | End: 2021-05-19

## 2021-04-07 NOTE — PROGRESS NOTES
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 a few times per week and if you have any readings that are below 100 or greater than 140 on a regular basis to contact our office once again  You also confirm that you were getting improvement in some of your diabetic foot pain and back pain with the gabapentin that returned once you ran out  Refill has been sent to your pharmacy  We also discussed we are getting you placed on our list to get scheduled for your COVID vaccine  Reviewed you have labs that are due in 1 month an appointment here after to discuss  No problem-specific Assessment & Plan notes found for this encounter  Diagnoses and all orders for this visit:    Benign essential hypertension    Diabetic peripheral neuropathy (HCC)  -     gabapentin (NEURONTIN) 300 mg capsule; Take 1 capsule (300 mg total) by mouth daily at bedtime          Subjective:      Patient ID: Angely Wilkinson is a 60 y o  female  Patient is scheduled for same-day medical home with concern regarding blood pressure  Patient states she has been getting readings that are low and feeling very tired  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  Reading of 90/67     Patient states was also feeling lightheaded  States has been eating well and drinking a lot of water      Blood pressure on evaluation in the office today is completely normal   Orthostatics Lying 124/77,                        Sitting 128/83                       Standing 121/81    Patient unfortunately does not have insurance or finances to purchase a blood pressure cuff of her own  Suspect some of her symptoms are because when she 1st came in her blood pressure was significantly elevated/uncontrolled and now in a normal range which she has not gotten use to yet  Patient also confirms that she ran out of the gabapentin and has noticed an increase in her diabetic neuropathy as well as back pain and would like to resume taking this medication  Also reminded patient her labs are due in 1 month and will schedule for follow-up then  The following portions of the patient's history were reviewed and updated as appropriate: allergies, current medications, past family history, past medical history, past social history, past surgical history and problem list     Review of Systems   Constitutional: Negative for diaphoresis  Eyes: Negative  Negative for visual disturbance  Respiratory: Negative  Negative for cough and shortness of breath  Cardiovascular: Negative  Negative for chest pain and palpitations  Musculoskeletal: Positive for back pain (chronic)  Neurological: Positive for light-headedness  Negative for dizziness, syncope, weakness and headaches  Objective:      /76 (BP Location: Left arm, Patient Position: Sitting, Cuff Size: Standard)   Pulse 98   Temp 98 2 °F (36 8 °C) (Temporal)   Ht 5' 4 25" (1 632 m)   Wt 73 5 kg (162 lb)   SpO2 100%   BMI 27 59 kg/m²          Physical Exam  Constitutional:       General: She is not in acute distress  Appearance: She is not diaphoretic  HENT:      Head: Normocephalic  Eyes:      Extraocular Movements: Extraocular movements intact  Cardiovascular:      Rate and Rhythm: Normal rate and regular rhythm  Heart sounds: Normal heart sounds  Pulmonary:      Effort: Pulmonary effort is normal       Breath sounds: Normal breath sounds  No wheezing  Neurological:      Mental Status: She is alert  Cranial Nerves: No cranial nerve deficit  Coordination: Coordination normal       Deep Tendon Reflexes: Reflexes normal    Psychiatric:         Mood and Affect: Mood normal

## 2021-04-07 NOTE — PATIENT INSTRUCTIONS
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

## 2021-04-14 ENCOUNTER — TELEPHONE (OUTPATIENT)
Dept: INTERNAL MEDICINE CLINIC | Facility: CLINIC | Age: 66
End: 2021-04-14

## 2021-04-15 NOTE — TELEPHONE ENCOUNTER
Called patient and scheduled her for COVID vaccine at [address] [place] [address] on 4/18/21 at 7:40am

## 2021-04-18 ENCOUNTER — IMMUNIZATIONS (OUTPATIENT)
Dept: FAMILY MEDICINE CLINIC | Facility: HOSPITAL | Age: 66
End: 2021-04-18

## 2021-04-18 DIAGNOSIS — Z23 ENCOUNTER FOR IMMUNIZATION: Primary | ICD-10-CM

## 2021-04-18 PROCEDURE — 91300 SARS-COV-2 / COVID-19 MRNA VACCINE (PFIZER-BIONTECH) 30 MCG: CPT

## 2021-04-18 PROCEDURE — 0001A SARS-COV-2 / COVID-19 MRNA VACCINE (PFIZER-BIONTECH) 30 MCG: CPT

## 2021-05-15 ENCOUNTER — TRANSCRIBE ORDERS (OUTPATIENT)
Dept: LAB | Facility: HOSPITAL | Age: 66
End: 2021-05-15

## 2021-05-15 ENCOUNTER — APPOINTMENT (OUTPATIENT)
Dept: LAB | Facility: HOSPITAL | Age: 66
End: 2021-05-15

## 2021-05-15 DIAGNOSIS — E78.2 MIXED HYPERLIPIDEMIA: ICD-10-CM

## 2021-05-15 DIAGNOSIS — E11.65 TYPE 2 DIABETES MELLITUS WITH HYPERGLYCEMIA, WITHOUT LONG-TERM CURRENT USE OF INSULIN (HCC): ICD-10-CM

## 2021-05-15 LAB
ALBUMIN SERPL BCP-MCNC: 3.5 G/DL (ref 3.5–5)
ALP SERPL-CCNC: 64 U/L (ref 46–116)
ALT SERPL W P-5'-P-CCNC: 15 U/L (ref 12–78)
ANION GAP SERPL CALCULATED.3IONS-SCNC: 6 MMOL/L (ref 4–13)
AST SERPL W P-5'-P-CCNC: 7 U/L (ref 5–45)
BILIRUB SERPL-MCNC: 0.19 MG/DL (ref 0.2–1)
BUN SERPL-MCNC: 13 MG/DL (ref 5–25)
CALCIUM SERPL-MCNC: 9.7 MG/DL (ref 8.3–10.1)
CHLORIDE SERPL-SCNC: 108 MMOL/L (ref 100–108)
CHOLEST SERPL-MCNC: 155 MG/DL (ref 50–200)
CO2 SERPL-SCNC: 27 MMOL/L (ref 21–32)
CREAT SERPL-MCNC: 0.65 MG/DL (ref 0.6–1.3)
EST. AVERAGE GLUCOSE BLD GHB EST-MCNC: 140 MG/DL
GFR SERPL CREATININE-BSD FRML MDRD: 109 ML/MIN/1.73SQ M
GLUCOSE P FAST SERPL-MCNC: 65 MG/DL (ref 65–99)
HBA1C MFR BLD: 6.5 %
HDLC SERPL-MCNC: 58 MG/DL
LDLC SERPL CALC-MCNC: 91 MG/DL (ref 0–100)
POTASSIUM SERPL-SCNC: 4 MMOL/L (ref 3.5–5.3)
PROT SERPL-MCNC: 7.5 G/DL (ref 6.4–8.2)
SODIUM SERPL-SCNC: 141 MMOL/L (ref 136–145)
TRIGL SERPL-MCNC: 31 MG/DL

## 2021-05-15 PROCEDURE — 83036 HEMOGLOBIN GLYCOSYLATED A1C: CPT

## 2021-05-15 PROCEDURE — 80061 LIPID PANEL: CPT

## 2021-05-15 PROCEDURE — 36415 COLL VENOUS BLD VENIPUNCTURE: CPT

## 2021-05-15 PROCEDURE — 80053 COMPREHEN METABOLIC PANEL: CPT

## 2021-05-16 ENCOUNTER — IMMUNIZATIONS (OUTPATIENT)
Dept: FAMILY MEDICINE CLINIC | Facility: HOSPITAL | Age: 66
End: 2021-05-16

## 2021-05-16 DIAGNOSIS — Z23 ENCOUNTER FOR IMMUNIZATION: Primary | ICD-10-CM

## 2021-05-16 PROCEDURE — 0002A SARS-COV-2 / COVID-19 MRNA VACCINE (PFIZER-BIONTECH) 30 MCG: CPT

## 2021-05-16 PROCEDURE — 91300 SARS-COV-2 / COVID-19 MRNA VACCINE (PFIZER-BIONTECH) 30 MCG: CPT

## 2021-05-19 ENCOUNTER — OFFICE VISIT (OUTPATIENT)
Dept: INTERNAL MEDICINE CLINIC | Facility: CLINIC | Age: 66
End: 2021-05-19

## 2021-05-19 ENCOUNTER — PATIENT OUTREACH (OUTPATIENT)
Dept: INTERNAL MEDICINE CLINIC | Facility: CLINIC | Age: 66
End: 2021-05-19

## 2021-05-19 VITALS
WEIGHT: 161 LBS | TEMPERATURE: 97.9 F | BODY MASS INDEX: 26.82 KG/M2 | HEART RATE: 101 BPM | DIASTOLIC BLOOD PRESSURE: 89 MMHG | HEIGHT: 65 IN | SYSTOLIC BLOOD PRESSURE: 136 MMHG

## 2021-05-19 DIAGNOSIS — R07.9 CHEST PAIN ON EXERTION: ICD-10-CM

## 2021-05-19 DIAGNOSIS — I10 BENIGN ESSENTIAL HYPERTENSION: ICD-10-CM

## 2021-05-19 DIAGNOSIS — E11.42 DIABETIC PERIPHERAL NEUROPATHY (HCC): ICD-10-CM

## 2021-05-19 DIAGNOSIS — R10.13 DYSPEPSIA: ICD-10-CM

## 2021-05-19 DIAGNOSIS — E11.65 TYPE 2 DIABETES MELLITUS WITH HYPERGLYCEMIA, WITHOUT LONG-TERM CURRENT USE OF INSULIN (HCC): Primary | ICD-10-CM

## 2021-05-19 DIAGNOSIS — E78.2 MIXED HYPERLIPIDEMIA: ICD-10-CM

## 2021-05-19 PROCEDURE — 99213 OFFICE O/P EST LOW 20 MIN: CPT | Performed by: PHYSICIAN ASSISTANT

## 2021-05-19 RX ORDER — ASPIRIN 81 MG/1
81 TABLET ORAL DAILY
Qty: 90 TABLET | Refills: 1 | Status: SHIPPED | OUTPATIENT
Start: 2021-05-19

## 2021-05-19 RX ORDER — LOVASTATIN 20 MG/1
20 TABLET ORAL
Qty: 90 TABLET | Refills: 1 | Status: SHIPPED | OUTPATIENT
Start: 2021-05-19 | End: 2021-12-10 | Stop reason: SDUPTHER

## 2021-05-19 RX ORDER — GABAPENTIN 300 MG/1
300 CAPSULE ORAL 2 TIMES DAILY
Qty: 180 CAPSULE | Refills: 0 | Status: SHIPPED | OUTPATIENT
Start: 2021-05-19 | End: 2021-06-23 | Stop reason: SDUPTHER

## 2021-05-19 RX ORDER — OMEPRAZOLE 20 MG/1
20 CAPSULE, DELAYED RELEASE ORAL DAILY
Qty: 90 CAPSULE | Refills: 0 | Status: SHIPPED | OUTPATIENT
Start: 2021-05-19 | End: 2022-06-13

## 2021-05-19 NOTE — PATIENT INSTRUCTIONS
On your visit today we discussed that your diabetes is now very well controlled A1c 6 5%  No change to medications  Cholesterol and blood pressure also improved no additional adjustments to medications  For your ongoing pain and numbness and tingling in her feet you are aware this is due to her diabetes that was very poorly controlled for many years and I am increasing her gabapentin which will now be 1 tablet twice daily  You also report ongoing chest pain sometimes with activity sometimes at rest sometimes you feel it is after eating but you cannot confirm this at all times  I am putting you on omeprazole 1 tablet daily to help alleviate any heartburn but as you have had not well controlled blood pressure cholesterol diabetes for new many years you are at increased risk for heart disease and you need to call and schedule the stress test of her heart and follow-up with the cardiologist     We also discussed once again that if you have another episode of this chest pain you are to dial 911 and go directly to the hospital   Script provided for new labs as dated in 6 months      Please also remember to schedule your mammogram

## 2021-05-19 NOTE — PROGRESS NOTES
Assessment/Plan: On your visit today we discussed that your diabetes is now very well controlled A1c 6 5%  No change to medications  Cholesterol and blood pressure also improved no additional adjustments to medications  For your ongoing pain and numbness and tingling in her feet you are aware this is due to her diabetes that was very poorly controlled for many years and I am increasing her gabapentin which will now be 1 tablet twice daily  You also report ongoing chest pain sometimes with activity sometimes at rest sometimes you feel it is after eating but you cannot confirm this at all times  I am putting you on omeprazole 1 tablet daily to help alleviate any heartburn but as you have had not well controlled blood pressure cholesterol diabetes for new many years you are at increased risk for heart disease and you need to call and schedule the stress test of her heart and follow-up with the cardiologist     We also discussed once again that if you have another episode of this chest pain you are to dial 911 and go directly to the hospital   Script provided for new labs as dated in 6 months  Please also remember to schedule your mammogram     No problem-specific Assessment & Plan notes found for this encounter  Diagnoses and all orders for this visit:    Type 2 diabetes mellitus with hyperglycemia, without long-term current use of insulin (HCC)  -     lovastatin (MEVACOR) 20 mg tablet; Take 1 tablet (20 mg total) by mouth daily at bedtime  -     Comprehensive metabolic panel; Future  -     Hemoglobin A1C; Future  -     aspirin (ECOTRIN LOW STRENGTH) 81 mg EC tablet; Take 1 tablet (81 mg total) by mouth daily    Benign essential hypertension  -     aspirin (ECOTRIN LOW STRENGTH) 81 mg EC tablet; Take 1 tablet (81 mg total) by mouth daily    Mixed hyperlipidemia  -     lovastatin (MEVACOR) 20 mg tablet;  Take 1 tablet (20 mg total) by mouth daily at bedtime  -     Lipid Panel with Direct LDL reflex; Future    Chest pain on exertion  -     NM myocardial perfusion spect (rx stress and/or rest); Future  -     Ambulatory referral to Cardiology; Future    Diabetic peripheral neuropathy (HCC)  -     gabapentin (NEURONTIN) 300 mg capsule; Take 1 capsule (300 mg total) by mouth 2 (two) times a day    Dyspepsia  -     omeprazole (PriLOSEC) 20 mg delayed release capsule; Take 1 capsule (20 mg total) by mouth daily          Subjective:      Patient ID: Matilda Hunt is a 60 y o  female  Patient presents today for follow-up lab review for her diabetes and cholesterol  A1c has significantly improved with her treatment it was 12 1% now 6 5%  Lipid profile has also significantly improved  Patient's blood pressure is improved but she does admit she did not take her medications today prior to her appointment  Patient how ever continues to report chest pain which is not new and has not been related to activities  That being said patient is at significantly increased risk for cardiovascular and will need stress test and cardiology follow-up  States pain is still more on R side but yesterday felt worse, States can happen at rest or sitting yesterday was sitting   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 and this does exacerbate the pain as well      Patient may have multifactorial pain including musculoskeletal as most of it is primarily on the right side in she is right-hand dominant not reproducible on touch today but has been reproducible on touch in the past        patient also has not yet scheduled her mammogram     Still having a lot of foot pain on gabapentin but once daily    States went to a neurologist in [facility] and states he told her he could fix her nerves, Card shows he is chiropractor not a neurologist            The following portions of the patient's history were reviewed and updated as appropriate: allergies, current medications, past family history, past medical history, past social history, past surgical history and problem list     Review of Systems   Constitutional: Negative  Negative for chills and fever  HENT: Negative  Respiratory: Negative  Negative for cough and shortness of breath  Cardiovascular: Positive for chest pain  Negative for palpitations and leg swelling  As noted in HPI patient is experiencing primarily right-sided chest pain not necessarily related to activities but she also cannot relate that it is after food or heartburn  Gastrointestinal: Negative  Endocrine: Negative for polydipsia, polyphagia and polyuria  Musculoskeletal: Positive for arthralgias and myalgias  Skin: Negative  Neurological: Positive for numbness  Patient continues to report pain and numbness in her feet  Objective:      /89 (BP Location: Left arm, Patient Position: Sitting, Cuff Size: Adult)   Pulse 101   Temp 97 9 °F (36 6 °C) (Temporal)   Ht 5' 5" (1 651 m)   Wt 73 kg (161 lb)   BMI 26 79 kg/m²          Physical Exam  Vitals signs and nursing note reviewed  Constitutional:       General: She is not in acute distress  Appearance: She is not ill-appearing  HENT:      Head: Normocephalic  Eyes:      Conjunctiva/sclera: Conjunctivae normal    Cardiovascular:      Rate and Rhythm: Normal rate and regular rhythm  Heart sounds: Normal heart sounds  Pulmonary:      Effort: Pulmonary effort is normal       Breath sounds: Normal breath sounds  No wheezing  Abdominal:      General: Bowel sounds are normal    Musculoskeletal:         General: No swelling  Right lower leg: No edema  Left lower leg: No edema  Skin:     Findings: No rash  Neurological:      Mental Status: She is alert  Sensory: Sensory deficit (dec sensation touch feet) present        Gait: Gait normal    Psychiatric:         Mood and Affect: Mood normal

## 2021-05-20 ENCOUNTER — PATIENT OUTREACH (OUTPATIENT)
Dept: INTERNAL MEDICINE CLINIC | Facility: CLINIC | Age: 66
End: 2021-05-20

## 2021-05-20 DIAGNOSIS — Z59.89 DOES NOT HAVE HEALTH INSURANCE: Primary | ICD-10-CM

## 2021-05-20 SDOH — ECONOMIC STABILITY - INCOME SECURITY: OTHER PROBLEMS RELATED TO HOUSING AND ECONOMIC CIRCUMSTANCES: Z59.89

## 2021-05-20 NOTE — PROGRESS NOTES
MAR has returned to call to pt who expresses concern re the cost of all of her medical care , needed test and medications cost     Pt does work but is not provided insurance  She does have [employer] but still finds it difficult to manage her expenses    She has hospital cost for blood work and now  will need a stress test and other test  Pt will be able to get Medicare in September which will help  Pt is receptive to a referral to our CHW who will f/iu with pt re above  Mar has also indicated we can refer pt to the Medicine Program to see if there are any indigent programs or lower cost options for her mediations  SW/CHW /Medication Program to f/u and assist as indicted

## 2021-05-25 NOTE — PROGRESS NOTES
Left voicemail for patient to contact [facility] Side medication program to discuss prices for [facility] mail order pharmacy

## 2021-05-26 ENCOUNTER — TELEPHONE (OUTPATIENT)
Dept: NON INVASIVE DIAGNOSTICS | Facility: CLINIC | Age: 66
End: 2021-05-26

## 2021-05-27 ENCOUNTER — PATIENT OUTREACH (OUTPATIENT)
Dept: INTERNAL MEDICINE CLINIC | Facility: CLINIC | Age: 66
End: 2021-05-27

## 2021-05-27 NOTE — PROGRESS NOTES
CHW called and spoke to Musa Garcia [phone] [employer] Financial Counselor  Desire Sofia states pt is not on the [address]  Desire Sofia will be sending out a [facility] for the patient and pt must return it with documents such as   3 months of Bank Statements  3 months of Paystubs  ID  Tax Returns if no Paystubs  Pt should also write a Harship Letter  CHW called Shayne Boss [phone] spoke to [facility] and she stated Patient responsibility is 18% her portion on this test would be $1,737 63  Total charges of the test are $9,600  Pt is only responsible for 18%  Per Tracy Peters is listed as the insurance to bill  She could not verify this information  CHW left message for patient to call me back to see if this test will be billed to [facility] comp or if this will be self paid  Pt has [address] and pays $65 00 per visit at the clinic  CHW will continue working w/ pt and communicate w/ team      Addendum:   CHW spoke to patient and she states the test will not be billed to [employer]  Pt will let them know tomorrow at the registration desk that this is a self paid bill  Pt is aware about [facility], and what the cost of the test will be  She states she will stop by next week to give me documents for me to give to Ken Page  Pt has been oriented on what charges she may have       CHW will continue to work w pt and communicate w team

## 2021-05-28 ENCOUNTER — HOSPITAL ENCOUNTER (OUTPATIENT)
Dept: NON INVASIVE DIAGNOSTICS | Facility: CLINIC | Age: 66
Discharge: HOME | End: 2021-05-28

## 2021-05-28 DIAGNOSIS — R07.9 CHEST PAIN ON EXERTION: ICD-10-CM

## 2021-05-28 LAB
CHEST PAIN STATEMENT: NORMAL
MAX DIASTOLIC BP: 82 MMHG
MAX HEART RATE: 141 BPM
MAX PREDICTED HEART RATE: 156 BPM
MAX. SYSTOLIC BP: 156 MMHG
PROTOCOL NAME: NORMAL
REASON FOR TERMINATION: NORMAL
TARGET HR FORMULA: NORMAL
TEST INDICATION: NORMAL
TIME IN EXERCISE PHASE: NORMAL

## 2021-05-28 PROCEDURE — 78452 HT MUSCLE IMAGE SPECT MULT: CPT

## 2021-05-28 PROCEDURE — 93018 CV STRESS TEST I&R ONLY: CPT | Performed by: INTERNAL MEDICINE

## 2021-05-28 PROCEDURE — A9502 TC99M TETROFOSMIN: HCPCS

## 2021-05-28 PROCEDURE — 93016 CV STRESS TEST SUPVJ ONLY: CPT | Performed by: INTERNAL MEDICINE

## 2021-05-28 PROCEDURE — 93017 CV STRESS TEST TRACING ONLY: CPT

## 2021-05-28 PROCEDURE — 78452 HT MUSCLE IMAGE SPECT MULT: CPT | Performed by: INTERNAL MEDICINE

## 2021-05-28 RX ADMIN — REGADENOSON 0.4 MG: 0.08 INJECTION, SOLUTION INTRAVENOUS at 10:00

## 2021-06-07 ENCOUNTER — PATIENT OUTREACH (OUTPATIENT)
Dept: INTERNAL MEDICINE CLINIC | Facility: CLINIC | Age: 66
End: 2021-06-07

## 2021-06-07 ENCOUNTER — TELEMEDICINE (OUTPATIENT)
Dept: INTERNAL MEDICINE CLINIC | Facility: CLINIC | Age: 66
End: 2021-06-07

## 2021-06-07 ENCOUNTER — TELEPHONE (OUTPATIENT)
Dept: INTERNAL MEDICINE CLINIC | Facility: CLINIC | Age: 66
End: 2021-06-07

## 2021-06-07 DIAGNOSIS — G89.29 CHRONIC BILATERAL LOW BACK PAIN WITHOUT SCIATICA: ICD-10-CM

## 2021-06-07 DIAGNOSIS — M54.50 ACUTE LEFT-SIDED LOW BACK PAIN WITHOUT SCIATICA: Primary | ICD-10-CM

## 2021-06-07 DIAGNOSIS — E11.65 TYPE 2 DIABETES MELLITUS WITH HYPERGLYCEMIA, WITHOUT LONG-TERM CURRENT USE OF INSULIN (HCC): ICD-10-CM

## 2021-06-07 DIAGNOSIS — M54.50 CHRONIC BILATERAL LOW BACK PAIN WITHOUT SCIATICA: ICD-10-CM

## 2021-06-07 PROCEDURE — 99213 OFFICE O/P EST LOW 20 MIN: CPT | Performed by: PHYSICIAN ASSISTANT

## 2021-06-07 RX ORDER — METHOCARBAMOL 500 MG/1
500 TABLET, FILM COATED ORAL
Qty: 7 TABLET | Refills: 0 | Status: SHIPPED | OUTPATIENT
Start: 2021-06-07 | End: 2021-06-23 | Stop reason: SDUPTHER

## 2021-06-07 NOTE — PROGRESS NOTES
Virtual Regular Visit      Assessment/Plan: On your visit today we discussed that you have had acute exacerbation of your chronic low back pain  You report this started approximately 4 days ago but deny any acute trauma or injury at work or at home  You report this time the pain is primarily middle to lower left side of her back  You confirm no radiation into either extremity  We also reviewed that you have been taking your gabapentin twice a day and have noticed improvement in your diabetic neuropathy with the increase in your dose  As per our discussion I am sending in a prescription for a muscle relaxant that you may take at bedtime for up to 1 week to help with her back pain but that you will also change your gabapentin and will take 1 tablet in the morning and 1 tablet in the afternoon and then be able to take the muscle relaxant at bedtime  You may also use your heating pad 15 minutes on then remove you may repeat this 3 or 4 times per day  If you have no improvement in her pain will require physical therapy and may need imaging at that time as well  You also confirm that you will receive Medicare starting in September when you turn 73 and encourage you to speak with our community workers and  regarding supplemental insurance for medication coverage as well  As per discussion letter sent to your email that you may forward to your employer to be off of work for the following 2 days      Problem List Items Addressed This Visit        Endocrine    Type 2 diabetes mellitus with hyperglycemia, without long-term current use of insulin (HCC)       Other    Chronic bilateral low back pain without sciatica      Other Visit Diagnoses     Acute left-sided low back pain without sciatica    -  Primary    Relevant Medications    methocarbamol (ROBAXIN) 500 mg tablet               Reason for visit is   Chief Complaint   Patient presents with    Virtual Regular Visit    Lower back pain     Three days ago, patient state at her work she need to lift people a lot of bending  Patient state she have this before like one year ago  Patient try Tylenol, advil, Motrim and nothing help    Virtual Regular Visit        Encounter provider Valeria Champagne PA-C    Provider located at [address] [place] [address] Road  [place] [id number] [phone]      Recent Visits  No visits were found meeting these conditions  Showing recent visits within past 7 days and meeting all other requirements     Today's Visits  Date Type Provider Dept   06/07/21 Telemedicine Valeria Champagne PA-C [address] today's visits and meeting all other requirements     Future Appointments  No visits were found meeting these conditions  Showing future appointments within next 150 days and meeting all other requirements        The patient was identified by name and date of birth  Jenifer [place] was informed that this is a telemedicine visit and that the visit is being conducted through [employer] and patient was informed that this is a secure, HIPAA-compliant platform  She agrees to proceed     My office door was closed  No one else was in the room  She acknowledged consent and understanding of privacy and security of the video platform  The patient has agreed to participate and understands they can discontinue the visit at any time  Patient is aware this is a billable service  Subjective  Jenifer Eliza Hollis is a 60 y o  female    Patient contacted regarding exacerbation of back pain  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  Does not report acute work injury but has had on off back pain for years  States this episode started 4 days ago, tried tylenol and advil no help  Hurts in all positions  No radiation   States pain is left mid down to lower back    No change bowel or bladder    Is taking her gabapentin twice a day and is helping her diabetic neuropathy, not totally resolve but definitely better  Off work today , then works rest of week then off the weekend  Advised That I will send in a muscle relaxant  Patient however has been advised that she will change her gabapentin she will take 1 in the morning 1 in the afternoon and the muscle relaxant at bedtime  I do not want her to combine the medications and be over sedated at this time  Also advised patient not to remain seated or lying down in any 1 position for too long as that can make the back pain worse  Patient advised she may also use heat or ice and she does report she has a heating pad at home  Also reviewed with patient will provide letter that she will be off of work Tuesday and Wednesday can return Thursday and Friday as she will then be off over the weekend  Patient reports that she is planning on retiring this fall as she turns 66 in 3 months  She reports she was advised by social security she will have Medicare in September but still has to work on supplemental     Patient advised to contact our financial in [facility] counselors as they can assist her with a plan  As patient currently does not have any insurance will hold off on physical therapy referral at this time but if her pain is not improving I do not want her to wait until September and will have to apply for the discount program through physical therapy as well           Past Medical History:   Diagnosis Date    Bump     bumped head yesterday at work "saw stars" no LOC, cat scan done was normal    Diabetes mellitus ([facility] [address] )     blood sugar 125 on admission    Encounter for screening colonoscopy     1 st one    Headache     Hyperlipidemia     Hypertension     Positive fecal occult blood test 8/30/2018    Added automatically from request for surgery [id number]    Post concussion syndrome 9/14/2018       Past Surgical History:   Procedure Laterality Date    COLONOSCOPY N/A 9/26/2018    Procedure: COLONOSCOPY;  Surgeon: Margarette Fountain MD;  [facility] GI LAB; Service: Gastroenterology    HYSTERECTOMY      INCISIONAL BREAST BIOPSY      last assessed 17Aug2017    AL COLONOSCOPY FLX DX W/COLLJ SPEC WHEN PFRMD N/A 7/5/2018    Procedure: COLONOSCOPY;  Surgeon: Margarette Fountain MD;  Location:  GI LAB; Service: Gastroenterology       Current Outpatient Medications   Medication Sig Dispense Refill    acetaminophen (TYLENOL) 325 mg tablet Take 650 mg by mouth every 6 (six) hours as needed for mild pain      aspirin (ECOTRIN LOW STRENGTH) 81 mg EC tablet Take 1 tablet (81 mg total) by mouth daily 90 tablet 1    gabapentin (NEURONTIN) 300 mg capsule Take 1 capsule (300 mg total) by mouth 2 (two) times a day 180 capsule 0    glyBURIDE (DIABETA) 5 mg tablet Take 1 tablet (5 mg total) by mouth daily with breakfast 90 tablet 1    lisinopril-hydrochlorothiazide (PRINZIDE,ZESTORETIC) 10-12 5 MG per tablet Take 1 tablet by mouth daily 90 tablet 1    lovastatin (MEVACOR) 20 mg tablet Take 1 tablet (20 mg total) by mouth daily at bedtime 90 tablet 1    metFORMIN (GLUCOPHAGE) 1000 MG tablet Take 1 tablet (1,000 mg total) by mouth 2 (two) times a day with meals 180 tablet 1    omeprazole (PriLOSEC) 20 mg delayed release capsule Take 1 capsule (20 mg total) by mouth daily 90 capsule 0    methocarbamol (ROBAXIN) 500 mg tablet Take 1 tablet (500 mg total) by mouth daily at bedtime as needed for muscle spasms for up to 7 days 7 tablet 0     No current facility-administered medications for this visit  No Known Allergies    Review of Systems   Constitutional: Negative  Respiratory: Negative  Cardiovascular: Negative  Gastrointestinal: Negative  Musculoskeletal: Positive for back pain and myalgias  Negative for gait problem  Neurological: Negative for weakness          As noted in HPI patient reports improvement in her diabetic neuropathic pain of numbness and tingling in her feet with the increase in gabapentin to twice a day  Video Exam    Vitals:       Physical Exam  Nursing note reviewed  Constitutional:       General: She is not in acute distress  HENT:      Head: Normocephalic  Pulmonary:      Effort: Pulmonary effort is normal  No respiratory distress  Musculoskeletal:      Comments: On video visit patient was moving about and bending without significant difficulty  Range of motion is preserved  Neurological:      General: No focal deficit present  Mental Status: She is alert  Psychiatric:         Mood and Affect: Mood normal           I spent 17 minutes directly with the patient during this visit      VIRTUAL VISIT DISCLAIMER    Catarino Rubin acknowledges that she has consented to an online visit or consultation  She understands that the online visit is based solely on information provided by her, and that, in the absence of a face-to-face physical evaluation by the physician, the diagnosis she receives is both limited and provisional in terms of accuracy and completeness  This is not intended to replace a full medical face-to-face evaluation by the physician  Catarino Rubin understands and accepts these terms

## 2021-06-07 NOTE — PATIENT INSTRUCTIONS
On your visit today we discussed that you have had acute exacerbation of your chronic low back pain  You report this started approximately 4 days ago but deny any acute trauma or injury at work or at home  You report this time the pain is primarily middle to lower left side of her back  You confirm no radiation into either extremity  We also reviewed that you have been taking your gabapentin twice a day and have noticed improvement in your diabetic neuropathy with the increase in your dose  As per our discussion I am sending in a prescription for a muscle relaxant that you may take at bedtime for up to 1 week to help with her back pain but that you will also change your gabapentin and will take 1 tablet in the morning and 1 tablet in the afternoon and then be able to take the muscle relaxant at bedtime  You may also use your heating pad 15 minutes on then remove you may repeat this 3 or 4 times per day  If you have no improvement in her pain will require physical therapy and may need imaging at that time as well  You also confirm that you will receive Medicare starting in September when you turn 73 and encourage you to speak with our community workers and  regarding supplemental insurance for medication coverage as well  As per discussion letter sent to your email that you may forward to your employer to be off of work for the following 2 days

## 2021-06-07 NOTE — PROGRESS NOTES
Patient called to let me know that the [address] from the Hospital has been received  CHW left message for patient to call me back

## 2021-06-07 NOTE — LETTER
June 7, 2021     Patient: Gabi Jose   YOB: 1955   Date of Visit: 6/7/2021       To Whom it May Concern:    Gabi Jose is under my professional care  She was seen in my office on 6/7/2021  She may return to work on 6/10/2021  If you have any questions or concerns, please don't hesitate to call           Sincerely,          Phoebe Jama PA-C        CC: No Recipients

## 2021-06-08 ENCOUNTER — PATIENT OUTREACH (OUTPATIENT)
Dept: INTERNAL MEDICINE CLINIC | Facility: CLINIC | Age: 66
End: 2021-06-08

## 2021-06-08 NOTE — PROGRESS NOTES
CHW met with patient today at the office to obtain the documents for the [facility]  Copies of documents were obtained and copy secured e-mail to Pelon Lin to process the [facility]  At patient request [place] Statements from Months of March , April and May 2021 were printed out to help obtain these documents to be able to process the [facility]  CHW told patient to re-sent her password when she gets home  Patient stated she is concerned with a [facility] [employer] S[employer]BT LE she just received for a large amount       CHW will continue to follow up with patient and communicate w/ team

## 2021-06-09 ENCOUNTER — TELEPHONE (OUTPATIENT)
Dept: INTERNAL MEDICINE CLINIC | Facility: CLINIC | Age: 66
End: 2021-06-09

## 2021-06-09 NOTE — TELEPHONE ENCOUNTER
Patient is calling because her low back pain is not getting any better  She is asking if she can get a work note keeping her out of work the rest of the week  She already saw Hermes for an appointment this past Monday and has an excuse until tomorrow  Please let patient know when it is ready and she can come pick it up

## 2021-06-16 NOTE — TELEPHONE ENCOUNTER
Spoke to patient, she said she did not go to work on [employer] 6/10 or Fri 6/11 so she will need a work excuse for those two days

## 2021-06-17 ENCOUNTER — PATIENT OUTREACH (OUTPATIENT)
Dept: INTERNAL MEDICINE CLINIC | Facility: CLINIC | Age: 66
End: 2021-06-17

## 2021-06-17 ENCOUNTER — TELEPHONE (OUTPATIENT)
Dept: INTERNAL MEDICINE CLINIC | Facility: CLINIC | Age: 66
End: 2021-06-17

## 2021-06-17 NOTE — TELEPHONE ENCOUNTER
FYI    Patient returned call, I have explained [facility] mail order pharmacy order process  Patient was given current Rx [facility] pharmacy prices as follows    Gabapentin 300mg--- upto 270 tablets $25  Glyburide 5mg---------- $13 for 90 days  Lisinopril-hydrochlorothiazide 10-12 5mg---$9 for 90 days  Lovastatin 20mg-------$9 for 30 (thirty) days  Metformin HCL 1000mg------ $13 for 90 days  Methocarbamol 500mg-------- $9 upto 30 tabs or $15 upto 90 tabs    Patient states she will compare prices with [facility] JIMENEZ BERMUDEZ first     Patient will call [facility] prescription line if she chooses to purchase from [facility]  An order form will be needed which I have placed in an envelope with her name at the front (brown [place] folder)

## 2021-06-17 NOTE — PROGRESS NOTES
CHW left message for [address] to find out the Status of the [facility]       CHW will continue to follow up with patient and team

## 2021-06-23 ENCOUNTER — PATIENT OUTREACH (OUTPATIENT)
Dept: INTERNAL MEDICINE CLINIC | Facility: CLINIC | Age: 66
End: 2021-06-23

## 2021-06-23 ENCOUNTER — OFFICE VISIT (OUTPATIENT)
Dept: INTERNAL MEDICINE CLINIC | Facility: CLINIC | Age: 66
End: 2021-06-23

## 2021-06-23 VITALS
DIASTOLIC BLOOD PRESSURE: 84 MMHG | TEMPERATURE: 98.2 F | HEART RATE: 84 BPM | SYSTOLIC BLOOD PRESSURE: 134 MMHG | HEIGHT: 63 IN | BODY MASS INDEX: 28.7 KG/M2 | WEIGHT: 162 LBS | OXYGEN SATURATION: 100 %

## 2021-06-23 DIAGNOSIS — E11.42 DIABETIC PERIPHERAL NEUROPATHY (HCC): ICD-10-CM

## 2021-06-23 DIAGNOSIS — G89.29 CHRONIC BILATERAL LOW BACK PAIN WITH LEFT-SIDED SCIATICA: Primary | ICD-10-CM

## 2021-06-23 DIAGNOSIS — M54.42 CHRONIC BILATERAL LOW BACK PAIN WITH LEFT-SIDED SCIATICA: Primary | ICD-10-CM

## 2021-06-23 PROCEDURE — 99213 OFFICE O/P EST LOW 20 MIN: CPT | Performed by: PHYSICIAN ASSISTANT

## 2021-06-23 RX ORDER — GABAPENTIN 300 MG/1
600 CAPSULE ORAL 2 TIMES DAILY
Qty: 360 CAPSULE | Refills: 0 | Status: SHIPPED | OUTPATIENT
Start: 2021-06-23 | End: 2021-07-15 | Stop reason: SDUPTHER

## 2021-06-23 RX ORDER — METHOCARBAMOL 500 MG/1
500 TABLET, FILM COATED ORAL
Qty: 30 TABLET | Refills: 0 | Status: SHIPPED | OUTPATIENT
Start: 2021-06-23 | End: 2022-06-13

## 2021-06-23 NOTE — PATIENT INSTRUCTIONS
On your visit today we discussed you have continued to experience your left-sided low back pain which is now radiating into your left leg for the past 3-4 weeks  We did review that you get partial relief with the gabapentin although that is helping with the numbness in her feet and you do note better improvement with the muscle relaxant when taken at bedtime  I am adjusting her medications and you will now take gabapentin 600 mg which is 2 tablets together in the morning and in the afternoon and you may take the muscle relaxant at bedtime  You are aware if this makes you feel too sleepy or lightheaded you will decrease the gabapentin back to 1 tablet twice a day  Because your pain is not improving and in fact getting worse please get the x-ray completed  We did review you have been hopeful that you 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

## 2021-06-23 NOTE — PROGRESS NOTES
Assessment/Plan: On your visit today we discussed you have continued to experience your left-sided low back pain which is now radiating into your left leg for the past 3-4 weeks  We did review that you get partial relief with the gabapentin although that is helping with the numbness in her feet and you do note better improvement with the muscle relaxant when taken at bedtime  I am adjusting her medications and you will now take gabapentin 600 mg which is 2 tablets together in the morning and in the afternoon and you may take the muscle relaxant at bedtime  You are aware if this makes you feel too sleepy or lightheaded you will decrease the gabapentin back to 1 tablet twice a day  Because your pain is not improving and in fact getting worse please get the x-ray completed  We did review you have been hopeful that you wanted to remain working until detention age in September but you are aware that your back pain is limiting this  At this time I have provided you with a letter for your employer to discuss going part-time only 3 days a week and not working consecutive days in a row  We also reviewed that you would benefit from physical therapy but will have you get the x-ray completed 1st as you currently are without medical insurance coverage  No problem-specific Assessment & Plan notes found for this encounter  Diagnoses and all orders for this visit:    Chronic bilateral low back pain with left-sided sciatica  -     gabapentin (NEURONTIN) 300 mg capsule; Take 2 capsules (600 mg total) by mouth 2 (two) times a day  -     methocarbamol (ROBAXIN) 500 mg tablet; Take 1 tablet (500 mg total) by mouth daily at bedtime as needed for muscle spasms  -     XR spine lumbar minimum 4 views non injury; Future    Diabetic peripheral neuropathy (HCC)  -     gabapentin (NEURONTIN) 300 mg capsule;  Take 2 capsules (600 mg total) by mouth 2 (two) times a day          Subjective:      Patient ID: Gabi Jose is a 60 y o  female  Patient here as continuing to have the L sided low back pain that radiates into her L leg  Has been taking the medications and will slightly decrease her pain but does not take it away  Limits taking them due to working    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  Admits the pain numbness in her feet is getting better with the gabapentin  Patient had this prior to her back pain secondary to her poorly controlled diabetes previously  Patient does admit that she seemed to get the most relief when she was taking a gabapentin in the morning and in the afternoon and the muscle relaxant at night  Patient states she was able to sleep and had very limited if any pain 1st thing in the morning but once she was working the pain would return later that day  Patient is struggling because she is due to retire in September and was hoping to be able to continue working until that time but she is starting to realize that this level of physical work that she has been doing for almost 20 years is taking its toll  Patient however states she is going to speak with her employer to see if she can go part-time maybe only 3 days a week  reviewed patient that we will slightly adjust her medications  As patient reports she was not significantly sedated or sleepy with the gabapentin will increase this to 600 mg twice a day and she may take the muscle relaxant at bedtime as this seem to be the best combination  Patient is aware not to take the gabapentin and the muscle relaxant together at this time  Because patient has had this pain ongoing and repetitive will order lumbar x-ray  Patient will likely benefit from physical therapy and may require referral to Spine and Pain Management but will need to await her insurance for that                  The following portions of the patient's history were reviewed and updated as appropriate: allergies, current medications, past family history, past medical history, past social history, past surgical history and problem list     Review of Systems   Constitutional: Negative  Respiratory: Negative  Negative for cough and chest tightness  Cardiovascular: Negative  Negative for chest pain and palpitations  Gastrointestinal: Negative for abdominal pain, nausea and vomiting  No change bowel or bladder   Musculoskeletal: Positive for arthralgias, back pain and myalgias  Skin: Negative  Neurological: Positive for numbness (feet)  Negative for weakness  Patient denies any numbness radiating into her left leg but does admit to some tingling and sharp pain that extends to just below her left knee  Psychiatric/Behavioral: Negative  Objective:      /84 (BP Location: Left arm, Patient Position: Sitting, Cuff Size: Standard)   Pulse 84   Temp 98 2 °F (36 8 °C) (Temporal)   Ht 5' 3" (1 6 m)   Wt 73 5 kg (162 lb)   SpO2 100%   BMI 28 70 kg/m²          Physical Exam  Vitals and nursing note reviewed  Constitutional:       General: She is not in acute distress  Cardiovascular:      Rate and Rhythm: Normal rate and regular rhythm  Pulmonary:      Effort: Pulmonary effort is normal       Breath sounds: Normal breath sounds  Abdominal:      General: Bowel sounds are normal    Musculoskeletal:         General: Tenderness present  Lumbar back: Spasms and tenderness present  Decreased range of motion  Positive left straight leg raise test       Comments:   Patient does have tenderness to palpation left paraspinal muscle lumbar region with reproduction of sciatic symptoms with palpation in to sciatic arch  Positive straight leg raise on left negative on right   Skin:     Findings: No rash  Neurological:      Mental Status: She is alert  Motor: No weakness, atrophy or abnormal muscle tone        Deep Tendon Reflexes:      Reflex Scores:       Patellar reflexes are 2+ on the right side and 2+ on the left side  Comments: Bilateral lower extremities strength 5/5 equal     Patellar reflexes 2+ bilaterally equal     Patient does have some decreased sensation to monofilament testing bilaterally due to her diabetes but not more so on left compared to right

## 2021-06-23 NOTE — PROGRESS NOTES
SW has met with pt this date for F/U  Pt is still very much worried about her medical bills  She does relates she will be retiring in September at age 73 but she doesi not know what the SS benefit will be  Pt currently has her employment and a Survivors SS Benefit  Due to her current health situation she will have reduced work schedule  SW has assisted pt with a call to the [employer] and left a message for [employer] [phone] and we left a message asking for info re what her USP benefit will be as pt does not know what it will be  SW and CHW are trying t see if she may qualfy for any MA assistance  She is scheduled to get Medicare A & B starting in September  She will also need to get a Medicare D and a co- insurance and /or advantage plan as able  Support provided        [address] Camacho is following up with pt re Financial Assistance and pt also to f/u with Arley Hernandez of the Medicine Program

## 2021-06-23 NOTE — LETTER
June 23, 2021     Patient: Clement Hancock   YOB: 1955   Date of Visit: 6/23/2021       To Whom it May Concern:    Clement Hancock is under my professional care  She was seen in my office on 6/23/2021  She may return to work with limitations 6/28/2021  Jenifer   May return to work on Monday June 28 however with a limited schedule  She may work 3 shifts per week no more than 8 hours per shift and not on consecutive days  For example she may work Monday, Wednesday and Friday or any 3 day combination as long as they are not consecutive  This limitation is to remain in place until July 31, 2021    If you have any questions or concerns, please don't hesitate to call           Sincerely,          Osito Dudley PA-C        CC: No Recipients

## 2021-06-24 ENCOUNTER — PATIENT OUTREACH (OUTPATIENT)
Dept: INTERNAL MEDICINE CLINIC | Facility: CLINIC | Age: 66
End: 2021-06-24

## 2021-06-24 ENCOUNTER — HOSPITAL ENCOUNTER (OUTPATIENT)
Dept: RADIOLOGY | Facility: HOSPITAL | Age: 66
Discharge: HOME | End: 2021-06-24

## 2021-06-24 DIAGNOSIS — G89.29 CHRONIC BILATERAL LOW BACK PAIN WITH LEFT-SIDED SCIATICA: ICD-10-CM

## 2021-06-24 DIAGNOSIS — M54.42 CHRONIC BILATERAL LOW BACK PAIN WITH LEFT-SIDED SCIATICA: ICD-10-CM

## 2021-06-24 PROCEDURE — 72110 X-RAY EXAM L-2 SPINE 4/>VWS: CPT

## 2021-06-24 NOTE — PROGRESS NOTES
[facility]: Patient approved for 100% [facility] from 06/24/2021 to 12/24/2021  CHW called  to get a price on the XR that patient had done today and per Bandar Bonilla the XR cost with the [employer] is $250 20 it does not include the reading of the XR  CHW spoke to GOSIA COLINDRES [facility] Counselor and she states patient should not get a bill for this x-ray it should be covered at 100%  If pt gets a bill pt should call the [facility] and they will adjust the bill to $0      CHW left message for patient to contact me so I can explain to her  Per [facility] she has adjusted all the accounts on the patient except accounts from 2018 and 2017  CHW will be in communication with team and patient  Addendum: CHW received call from patient and she was very happy with the outcome  Pt stated if she gets a bill from the hospital she will contact me  CHW will work with patient to try to get her Medicare Part D and Medicaid  Based on her new income I think patient may qualify for MA  CHW spoke to Tickade and he states he can meet with patient and myself to go over some options for RX part D plan  CHW has related this information to patient

## 2021-06-24 NOTE — PROGRESS NOTES
CHW spoke to [facility] on this date to find out the status of the [facility] on the Hospital Side  Per David Miguel she just reviewed the information and will have an answer for me by tomorrow  CHW will also contact  to find out the price of XR spine lumbar       CHW will continue to work with pt and communicate w/ team

## 2021-06-25 ENCOUNTER — PATIENT OUTREACH (OUTPATIENT)
Dept: INTERNAL MEDICINE CLINIC | Facility: CLINIC | Age: 66
End: 2021-06-25

## 2021-06-25 NOTE — PROGRESS NOTES
CHW called patient and inform her that Mr Heriberto Cabrera Medicare Specialist [phone] will be calling patient to educate her on Medicare part D program and possible Medicare Replacement plan  Jesus Hernandez is looking to set up a meeting for all of us to meet with patient  CHW will continue to follow up with patient and pt will be in contact with me as well

## 2021-07-07 ENCOUNTER — PATIENT OUTREACH (OUTPATIENT)
Dept: INTERNAL MEDICINE CLINIC | Facility: CLINIC | Age: 66
End: 2021-07-07

## 2021-07-07 ENCOUNTER — TELEPHONE (OUTPATIENT)
Dept: INTERNAL MEDICINE CLINIC | Facility: CLINIC | Age: 66
End: 2021-07-07

## 2021-07-07 NOTE — TELEPHONE ENCOUNTER
Folder Color-Blue    Name of Form-Health-Sustaining form     Form to be filled out by- Abdirizak VILLALOBOS-C    Form to be given to: Wendi Morillo     Patient made aware of 10 business day policy

## 2021-07-07 NOTE — PROGRESS NOTES
CHW left message for patient to see when is a good day to meet in the office or at her home to get education on Medicare Part D plan

## 2021-07-07 NOTE — PROGRESS NOTES
Return call received 6/24/21 from [address] related pt is collecting his SS senior care due age 59  She does not have much earnings and should be able to apply for the Medicare Part B Buy in   TERE spoke with Moe Camacho this date re same and she reports she has assisted pt with an MA application today and will be f/u with her on Friday for further insurance assistance  TERE/KAIW to remain available to assist as indcited

## 2021-07-07 NOTE — PROGRESS NOTES
CHW met with patient at the office  Pt states she her job is requesting a letter indicating her restrictions  She hopes she gets the letter soon so she can bring to the job  Pt also states she wants me to related to doctor that she needs a script for Mammogram      Pt states she received a bill in the amount of $250 for an x-ray  CHW called the Billing Dept at [address] [employer] and the bill was written off because patient has scot care at 100%  Pt does not have any other outstanding bills  Pt wants CHW to complete Housing Applications  CHW will meet with patient on Friday July 9 at 1:pm in the office  MA Application done on this date  Health Sustaining Medication form PA 1970 given to  for doctors to complete

## 2021-07-07 NOTE — TELEPHONE ENCOUNTER
Patient is present today stating that her job needs a detailed letter stating what limitations she has for work  I seen there was a letter given to patient on 6/23/2  But her job is requiring more detail as far as what limitations she has  She works as a CNA  Please review

## 2021-07-09 ENCOUNTER — PATIENT OUTREACH (OUTPATIENT)
Dept: INTERNAL MEDICINE CLINIC | Facility: CLINIC | Age: 66
End: 2021-07-09

## 2021-07-09 NOTE — PROGRESS NOTES
CHW met with patient at the office to help set up and choose a Medicare Plan  Mr Job Singer was present at the meeting to help explain the different plans and have patient decided on a plan  Pt decided to enrolled in [facility] Advantage plan  This plan will have drug prescription coverage, no referrals, $0 copayment , copayment for lab $10, and the plan offers transportation  Pt can also enroll in [employer] program 30 days before she turns 73years old  CHW will work on this with pt in a few months  CHW also called the Social Security office and got a copy of the Award Letter for pt  Housing application were signed and will be completed and mail out for patient  [employer] application has been completed  Ref # [id number]    CHW also help patient complete a package for Cardiology apt for July 14, 2021  CHW also help patient complete an RX Outreach Medication package and I will give it back to [employer] at clinic  CHW will also bring current paystubs to see if [facility] can be re-adjusted to a lower       CHW will continue to work with pt and communicate w/ team

## 2021-07-14 ENCOUNTER — OFFICE VISIT (OUTPATIENT)
Dept: CARDIOLOGY CLINIC | Facility: CLINIC | Age: 66
End: 2021-07-14

## 2021-07-14 VITALS
BODY MASS INDEX: 29.45 KG/M2 | HEIGHT: 63 IN | SYSTOLIC BLOOD PRESSURE: 118 MMHG | DIASTOLIC BLOOD PRESSURE: 70 MMHG | WEIGHT: 166.2 LBS | HEART RATE: 98 BPM

## 2021-07-14 DIAGNOSIS — E78.2 MIXED HYPERLIPIDEMIA: ICD-10-CM

## 2021-07-14 DIAGNOSIS — I10 BENIGN ESSENTIAL HYPERTENSION: Primary | ICD-10-CM

## 2021-07-14 DIAGNOSIS — R07.9 CHEST PAIN ON EXERTION: ICD-10-CM

## 2021-07-14 PROCEDURE — 93000 ELECTROCARDIOGRAM COMPLETE: CPT | Performed by: INTERNAL MEDICINE

## 2021-07-14 PROCEDURE — 99244 OFF/OP CNSLTJ NEW/EST MOD 40: CPT | Performed by: INTERNAL MEDICINE

## 2021-07-14 NOTE — PROGRESS NOTES
Cardiology Consultation     Celia Holland  [id number]  1955  HEART & VASCULAR [facility] [address]    1  Benign essential hypertension     2  Chest pain on exertion  Ambulatory referral to Cardiology    POCT ECG   3  Mixed hyperlipidemia       Chief Complaint   Patient presents with    New Patient Visit    Chest Pain     sharp pain    Dizziness     when sitting up     HPI: Patient is here for cardiac evaluation of chest pain  Pain is noted to be sharp in quality, for the past month, located in the center of chest to the right side, no associated symptoms, and no aggravating or alleviating factors  No reported shortness of breath, palpitations, lightheadedness, syncope, LE edema, orthopnea, PND, or significant weight changes  Patient remains active without any increased fatigue out of the ordinary  Patient Active Problem List   Diagnosis    Benign essential hypertension    Diabetic peripheral neuropathy ([facility]    DM type 2 without retinopathy ([facility] )    Hyperlipidemia    Chronic bilateral low back pain with left-sided sciatica    History of right breast biopsy    Breast pain, right    Visit for screening mammogram    Breast cyst, right    Breast mass, right    Carpal tunnel syndrome, bilateral    Depression    Type 2 diabetes mellitus with hyperglycemia, without long-term current use of insulin ([facility])    Overweight (BMI 25 0-29  9)    History of hysterectomy    Chest pain on exertion     Past Medical History:   Diagnosis Date    Bump     bumped head yesterday at work "saw stars" no LOC, cat scan done was normal    Diabetes mellitus ([address] )     blood sugar 125 on admission    Encounter for screening colonoscopy     1 st one    Headache     Hyperlipidemia     Hypertension     Positive fecal occult blood test 8/30/2018    Added automatically from request for surgery [id number]   Wilfrido Riddle Post concussion syndrome 9/14/2018     Social History     Socioeconomic History    Marital status:      Spouse name: Not on file    Number of children: 10    Years of education: Not on file    Highest education level: Not on file   Occupational History    Occupation: [employer]   Tobacco Use    Smoking status: Current Every Day Smoker     Packs/day: 0 50     Types: Cigarettes    Smokeless tobacco: Never Used    Tobacco comment: smokes less than 1pk/day   Vaping Use    Vaping Use: Never used   Substance and Sexual Activity    Alcohol use: Never    Drug use: Never    Sexual activity: Not Currently   Other Topics Concern    Not on file   Social History Narrative    Daily caffeine consumption, 6-8 servings a day     Social Determinants of Health     Financial Resource Strain: Low Risk     Difficulty of Paying Living Expenses: Not hard at all   Food Insecurity: No Food Insecurity    Worried About Running Out of Food in the Last Year: Never true    Saji of Food in the Last Year: Never true   Transportation Needs: No Transportation Needs    Lack of Transportation (Medical): No    Lack of Transportation (Non-Medical):  No   Physical Activity: Inactive    Days of Exercise per Week: 0 days    Minutes of Exercise per Session: 0 min   Stress: No Stress Concern Present    Feeling of Stress : Not at all   Social Connections: Socially Isolated    Frequency of Communication with Friends and Family: Once a week    Frequency of Social Gatherings with Friends and Family: Once a week    Attends Buddhism Services: Never    Active Member of Clubs or Organizations: No    Attends Club or Organization Meetings: Never    Marital Status:    Intimate Partner Violence: Not At Risk    Fear of Current or Ex-Partner: No    Emotionally Abused: No    Physically Abused: No    Sexually Abused: No      Family History   Problem Relation Age of Onset    Cancer Cousin     Alcohol abuse Mother    Avis Landis Alcohol abuse Father      Past Surgical History:   Procedure Laterality Date    COLONOSCOPY N/A 9/26/2018    Procedure: COLONOSCOPY;  Surgeon: Roro Doe MD;  Location: [facility]; Service: Gastroenterology    HYSTERECTOMY      INCISIONAL BREAST BIOPSY      last assessed 17Aug2017    AK COLONOSCOPY FLX DX W/COLLJ SPEC WHEN PFRMD N/A 7/5/2018    Procedure: COLONOSCOPY;  Surgeon: Roro Doe MD;  Location:  GI LAB;   Service: Gastroenterology       Current Outpatient Medications:     aspirin (ECOTRIN LOW STRENGTH) 81 mg EC tablet, Take 1 tablet (81 mg total) by mouth daily, Disp: 90 tablet, Rfl: 1    gabapentin (NEURONTIN) 300 mg capsule, Take 2 capsules (600 mg total) by mouth 2 (two) times a day, Disp: 360 capsule, Rfl: 0    glyBURIDE (DIABETA) 5 mg tablet, Take 1 tablet (5 mg total) by mouth daily with breakfast, Disp: 90 tablet, Rfl: 1    lisinopril-hydrochlorothiazide (PRINZIDE,ZESTORETIC) 10-12 5 MG per tablet, Take 1 tablet by mouth once daily, Disp: 90 tablet, Rfl: 1    lovastatin (MEVACOR) 20 mg tablet, Take 1 tablet (20 mg total) by mouth daily at bedtime, Disp: 90 tablet, Rfl: 1    metFORMIN (GLUCOPHAGE) 1000 MG tablet, Take 1 tablet (1,000 mg total) by mouth 2 (two) times a day with meals, Disp: 180 tablet, Rfl: 1    methocarbamol (ROBAXIN) 500 mg tablet, Take 1 tablet (500 mg total) by mouth daily at bedtime as needed for muscle spasms, Disp: 30 tablet, Rfl: 0    omeprazole (PriLOSEC) 20 mg delayed release capsule, Take 1 capsule (20 mg total) by mouth daily, Disp: 90 capsule, Rfl: 0    acetaminophen (TYLENOL) 325 mg tablet, Take 650 mg by mouth every 6 (six) hours as needed for mild pain (Patient not taking: Reported on 7/14/2021), Disp: , Rfl:   No Known Allergies  Vitals:    07/14/21 1400   BP: 118/70   BP Location: Left arm   Patient Position: Sitting   Cuff Size: Standard   Pulse: 98   Weight: 75 4 kg (166 lb 3 2 oz)   Height: 5' 3" (1 6 m)       Labs:  Hospital Outpatient Visit on 05/28/2021   Component Date Value    Protocol Name 05/28/2021 Terri Vasquez     Time In Exercise Phase 05/28/2021 00:03:00     MAX  SYSTOLIC BP 50/42/0649 612     Max Diastolic Bp 91/45/0667 82     Max Heart Rate 05/28/2021 141     Max Predicted Heart Rate 05/28/2021 156     Reason for Termination 05/28/2021 TEST COMPLETE        Test Indication 05/28/2021 Chest Discomfort     Target Hr Formular 05/28/2021 (220 - Age)*100%     Chest Pain Statement 05/28/2021 none    Appointment on 05/15/2021   Component Date Value    Sodium 05/15/2021 141     Potassium 05/15/2021 4 0     Chloride 05/15/2021 108     CO2 05/15/2021 27     ANION GAP 05/15/2021 6     BUN 05/15/2021 13     Creatinine 05/15/2021 0 65     Glucose, Fasting 05/15/2021 65     Calcium 05/15/2021 9 7     AST 05/15/2021 7     ALT 05/15/2021 15     Alkaline Phosphatase 05/15/2021 64     Total Protein 05/15/2021 7 5     Albumin 05/15/2021 3 5     Total Bilirubin 05/15/2021 0 19*    eGFR 05/15/2021 109     Hemoglobin A1C 05/15/2021 6 5*    EAG 05/15/2021 140     Cholesterol 05/15/2021 155     Triglycerides 05/15/2021 31     HDL, Direct 05/15/2021 58     LDL Calculated 05/15/2021 91      Lab Results   Component Value Date    TRIG 31 05/15/2021    HDL 58 05/15/2021     Imaging: XR spine lumbar minimum 4 views non injury    Result Date: 7/2/2021  Narrative: LUMBAR SPINE INDICATION:   M54 42: Lumbago with sciatica, left side G89 29: Other chronic pain  COMPARISON: Compared with 1/25/2013 VIEWS:  XR SPINE LUMBAR MINIMUM 4 VIEWS NON INJURY FINDINGS: There are 5 non rib bearing lumbar vertebral bodies  There is no evidence of acute fracture or destructive osseous lesion  Mild dextroscoliosis  Straightening of the lordosis  Loss of disc height at L4-L5 and L5-S1  The pedicles appear intact  Soft tissues are unremarkable  Impression: Mild degenerative changes from L4 to S1  Interval worsening   Workstation performed: [id number] Review of Systems:  Review of Systems   Constitutional: Negative for activity change, appetite change, chills, diaphoresis, fatigue and unexpected weight change  HENT: Negative for hearing loss, nosebleeds and sore throat  Eyes: Negative for photophobia and visual disturbance  Respiratory: Negative for cough, chest tightness, shortness of breath and wheezing  Cardiovascular: Positive for chest pain  Negative for palpitations and leg swelling  Gastrointestinal: Negative for abdominal pain, diarrhea, nausea and vomiting  Endocrine: Negative for polyuria  Genitourinary: Negative for dysuria, frequency and hematuria  Musculoskeletal: Negative for arthralgias, back pain, gait problem and neck pain  Skin: Negative for pallor and rash  Neurological: Negative for dizziness, syncope and headaches  Hematological: Does not bruise/bleed easily  Psychiatric/Behavioral: Negative for behavioral problems and confusion  Physical Exam:  Physical Exam  Vitals reviewed  Constitutional:       Appearance: She is well-developed  She is not diaphoretic  HENT:      Head: Normocephalic and atraumatic  Nose: Nose normal    Eyes:      General: No scleral icterus  Pupils: Pupils are equal, round, and reactive to light  Neck:      Vascular: No JVD  Cardiovascular:      Rate and Rhythm: Normal rate and regular rhythm  Heart sounds: Normal heart sounds  No murmur heard  No friction rub  No gallop  Pulmonary:      Effort: Pulmonary effort is normal  No respiratory distress  Breath sounds: Normal breath sounds  No wheezing or rales  Abdominal:      General: Bowel sounds are normal  There is no distension  Palpations: Abdomen is soft  Tenderness: There is no abdominal tenderness  Musculoskeletal:         General: No deformity  Normal range of motion  Cervical back: Normal range of motion and neck supple  Skin:     General: Skin is warm and dry        Findings: No rash  Neurological:      Mental Status: She is alert and oriented to person, place, and time  Cranial Nerves: No cranial nerve deficit  Psychiatric:         Behavior: Behavior normal        Blood pressure 118/70, pulse 98, height 5' 3" (1 6 m), weight 75 4 kg (166 lb 3 2 oz)  EKG:  Normal sinus rhythm  Low voltage QRS  Borderline ECG    Discussion/Summary:  Chest Pain: unclear etiology  Has risk factors of age, female, post-menopausal, HTN, DM, HLD, and current smoking  She had a screen for cardiac conditions with a stress test that ruled out ischemic etiology  Will also check an echocardiogram to rule out structural heart disease  HTN: well controlled on current regimen  HLD: LDL 91 in May 2021

## 2021-07-14 NOTE — LETTER
July 14, 2021     Patient: Lovella Dakins   YOB: 1955   Date of Visit: 7/14/2021       To Whom it May Concern:    Lovella Dakins is under my professional care  She was seen in my office on 7/14/2021  If you have any questions or concerns, please don't hesitate to call           Sincerely,          Brady Ramírez MD        CC: Levi Morekunal

## 2021-07-14 NOTE — PATIENT INSTRUCTIONS
Chest Pain   AMBULATORY CARE:   Chest pain  can be caused by a range of conditions, from not serious to life-threatening  It may be caused by a heart attack or a blood clot in your lungs  Sometimes chest pain or pressure is caused by poor blood flow to your heart (angina)  Infection, inflammation, or a fracture in the bones or cartilage in your chest can cause pain or discomfort  Chest pain can also be a symptom of a digestive problem, such as acid reflux or a stomach ulcer  An anxiety attack or a strong emotion such as anger can also cause chest pain  It is important to follow up with your healthcare provider to find the cause of your chest pain  Common symptoms you may have with chest pain:   · Fever or sweating    · Nausea or vomiting    · Shortness of breath    · Discomfort or pressure that spreads from your chest to your back, jaw, or arm    · A racing or slow heartbeat    · Feeling weak, tired, or faint    Call your local emergency number (911 in the [address] [place] [address],[address]) or have someone call if:   · You have any of the following signs of a heart attack:      ? Squeezing, pressure, or pain in your chest    ? You may  also have any of the following:     § Discomfort or pain in your back, neck, jaw, stomach, or arm    § Shortness of breath    § Nausea or vomiting    § Lightheadedness or a sudden cold sweat      Seek care immediately if:   · You have chest discomfort that gets worse, even with medicine  · You cough or vomit blood  · Your bowel movements are black or bloody  · You cannot stop vomiting, or it hurts to swallow  Call your doctor if:   · You have questions or concerns about your condition or care  Treatment for chest pain  may include medicine to treat your symptoms while your healthcare provider finds the cause of your chest pain  · Medicines  may be given to treat the cause of your chest pain   Examples include pain medicine, anxiety medicine, or medicines to increase blood flow to your heart     · Do not take certain medicines without asking your healthcare provider first   These include NSAIDs, herbal or vitamin supplements, or hormones (estrogen or progestin)  · One or more stents  may need to be placed in your heart if pain was caused by blockage  A stent is a wire mesh tube that helps hold your artery open  Healthy living tips: The following are general healthy guidelines  If the cause of your chest pain is known, your healthcare provider will give you specific guidelines to follow  · Do not smoke  Nicotine and other chemicals in cigarettes and cigars can cause lung and heart damage  Ask your healthcare provider for information if you currently smoke and need help to quit  E-cigarettes or smokeless tobacco still contain nicotine  Talk to your healthcare provider before you use these products  · Choose a variety of healthy foods as often as possible  Include fresh, frozen, or canned fruits and vegetables  Also include low-fat dairy products, fish, chicken (without skin), and lean meats  Your healthcare provider or a dietitian can help you create meal plans  You may need to avoid certain foods or drinks if your pain is caused by a digestion problem  · Lower your sodium (salt) intake  Limit foods that are high in sodium, such as canned foods, salty snacks, and cold cuts  If you add salt when you cook food, do not add more at the table  Choose low-sodium canned foods as much as possible  · Drink plenty of water every day  Water helps your body to control your temperature and blood pressure  Ask your healthcare provider how much water you should drink every day  · Ask about activity  Your healthcare provider will tell you which activities to limit or avoid  Ask when you can drive, return to work, and have sex  Ask about the best exercise plan for you  · Maintain a healthy weight  Ask your healthcare provider what a healthy weight is for you   Ask him or her to help you create a safe weight loss plan if you are overweight  · Ask about vaccines you may need  Get the influenza (flu) vaccine every year as soon as recommended, usually in September or October  You may also need a pneumococcal vaccine to prevent pneumonia  The vaccine is usually given every 5 years, starting at age 72  Your healthcare provider can tell you if should get other vaccines, and when to get them  Follow up with your healthcare provider within 72 hours, or as directed: You may need to return for more tests to find the cause of your chest pain  You may be referred to a specialist, such as a cardiologist or gastroenterologist  Write down your questions so you remember to ask them during your visits  © Copyright [employer] 2021 Information is for End User's use only and may not be sold, redistributed or otherwise used for commercial purposes  All illustrations and images included in CareNotes® are the copyrighted property of A D A M , Inc  or [employer]   The above information is an  only  It is not intended as medical advice for individual conditions or treatments  Talk to your doctor, nurse or pharmacist before following any medical regimen to see if it is safe and effective for you

## 2021-07-15 ENCOUNTER — PATIENT OUTREACH (OUTPATIENT)
Dept: INTERNAL MEDICINE CLINIC | Facility: CLINIC | Age: 66
End: 2021-07-15

## 2021-07-15 NOTE — PROGRESS NOTES
CHW spoke to patient on this date to let her know that the [place] office is asking for Documents in order to finish processing her request      Employment and income details copy of chilango kuo from [facility] January 2021 and July 2021  Copy of [facility] Statements  Pt states she will bring documents to me on Monday July 19, 2021       CHW will continue to follow up with pt and communicate w/ team

## 2021-07-19 ENCOUNTER — PATIENT OUTREACH (OUTPATIENT)
Dept: INTERNAL MEDICINE CLINIC | Facility: CLINIC | Age: 66
End: 2021-07-19

## 2021-07-19 NOTE — PROGRESS NOTES
CHW met with patient at [facility] side clinic to obtain documents to send to [facility]  Pt also met with Destiny Sousa Counselor Irma Diamond to review the [facility] application so the amount of her payments can be reduced  Pt came back to clinic on this date and  brought FC last pay stub to [place] on this date  As soon as [place] updates the account he will let me know her new amount she is approved for  Housing applications were completed on this date:   [employer] to [phone] confirmation received  [facility] [employer] out on this date  [employer] pt is on wait list for 1 bed apt for [place]  # on the wait list is # [phone]  CHW also called Hospital to find out if pt had any outstanding bills  Pt owes a balance of $30 00 on Hospital Side  Pt states she will make the payment when she gets the statement         CHW will continue to work with pt and communicate w/ team

## 2021-07-20 ENCOUNTER — PATIENT OUTREACH (OUTPATIENT)
Dept: INTERNAL MEDICINE CLINIC | Facility: CLINIC | Age: 66
End: 2021-07-20

## 2021-07-21 ENCOUNTER — PATIENT OUTREACH (OUTPATIENT)
Dept: INTERNAL MEDICINE CLINIC | Facility: CLINIC | Age: 66
End: 2021-07-21

## 2021-07-21 ENCOUNTER — OFFICE VISIT (OUTPATIENT)
Dept: INTERNAL MEDICINE CLINIC | Facility: CLINIC | Age: 66
End: 2021-07-21

## 2021-07-21 VITALS
DIASTOLIC BLOOD PRESSURE: 80 MMHG | WEIGHT: 167 LBS | BODY MASS INDEX: 29.59 KG/M2 | TEMPERATURE: 98 F | OXYGEN SATURATION: 100 % | SYSTOLIC BLOOD PRESSURE: 140 MMHG | HEIGHT: 63 IN | HEART RATE: 98 BPM

## 2021-07-21 DIAGNOSIS — G89.29 CHRONIC BILATERAL LOW BACK PAIN WITH LEFT-SIDED SCIATICA: Primary | ICD-10-CM

## 2021-07-21 DIAGNOSIS — I10 BENIGN ESSENTIAL HYPERTENSION: ICD-10-CM

## 2021-07-21 DIAGNOSIS — M54.42 CHRONIC BILATERAL LOW BACK PAIN WITH LEFT-SIDED SCIATICA: Primary | ICD-10-CM

## 2021-07-21 PROCEDURE — 99213 OFFICE O/P EST LOW 20 MIN: CPT | Performed by: PHYSICIAN ASSISTANT

## 2021-07-21 NOTE — PATIENT INSTRUCTIONS
On your visit we reviewed your x-ray results in person  It does show degenerative disc disease which is also arthritis but can  Cause the pain in her back along with the muscle spasms as well as occasionally having the pain that radiates into your left leg  We discussed that currently you are on medication for treatment which you get some improvement but not complete  You are aware that goals of treatment would be to complete physical therapy and if no improvement referral to Spine and Pain Management  As per discussion you will be getting your Medicare in September but currently without insurance and you are not able to afford the co-pay for physical therapy at this time  You will continue your same current plan of care and medications for back pain  Same restrictions that were placed for employment will remain 3 days per week non consecutive days working  Also reviewed that you state you will need to work but encourage you to find a different field or even possibly home health care where you are not doing repetitive movements  This is something you will have to decide on her own as far as leaving her current job  We did discuss you are scheduled for your stress test July 30th and to follow-up with the cardiologist on August 11th  No appointment needed but do contact our office and of July beginning of August to let me know if we need to provide a new letter extending the limited schedule or if you have decided to leave that position

## 2021-07-21 NOTE — PROGRESS NOTES
Assessment/Plan: On your visit we reviewed your x-ray results in person  It does show degenerative disc disease which is also arthritis but can  Cause the pain in her back along with the muscle spasms as well as occasionally having the pain that radiates into your left leg  We discussed that currently you are on medication for treatment which you get some improvement but not complete  You are aware that goals of treatment would be to complete physical therapy and if no improvement referral to Spine and Pain Management  As per discussion you will be getting your Medicare in September but currently without insurance and you are not able to afford the co-pay for physical therapy at this time  You will continue your same current plan of care and medications for back pain  Same restrictions that were placed for employment will remain 3 days per week non consecutive days working  Also reviewed that you state you will need to work but encourage you to find a different field or even possibly home health care where you are not doing repetitive movements  This is something you will have to decide on her own as far as leaving her current job  We did discuss you are scheduled for your stress test July 30th and to follow-up with the cardiologist on August 11th  No appointment needed but do contact our office and of July beginning of August to let me know if we need to provide a new letter extending the limited schedule or if you have decided to leave that position  No problem-specific Assessment & Plan notes found for this encounter  Diagnoses and all orders for this visit:    Chronic bilateral low back pain with left-sided sciatica    Benign essential hypertension          Subjective:      Patient ID: Twyla Fernando is a 60 y o  female  Pt here as wanted to discuss her x-ray results  States still having the back pain     Did not sched PT for her back pain as no insurance and cannot afford additional bills  Patient still hoping to stay working until retires at age 73 in Sept      currently patient does have limitations that I did provide her a letter for that she may work 3 days per week but not consecutive days allowing her to have some rest in between  Patient is aware that 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  Patient however is concerned about bills could she cannot completely retire  Reviewed with patient this is something she will have to work on for herself I cannot advise her on employment but that if she is having that much pain then she may want to consider doing something different  Patient states 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 and reviewed may be 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 and not doing the same repetitive motions  Discussed the results of her x-ray with the disc space narrowing with arthritis and may likely have some additional disc bulging  Patient does have chronic pain is getting occasional left radiculopathy but no falling weakness no saddle anesthesia  Patient is also aware that will need to get her into physical therapy sooner rather than later as this is also part of the treatment plan to be considered for pain management in the future as well  The following portions of the patient's history were reviewed and updated as appropriate: allergies, current medications, past family history, past medical history, past social history, past surgical history and problem list     Review of Systems   Constitutional: Negative  Respiratory: Negative  Cardiovascular: Negative  Musculoskeletal: Positive for arthralgias, back pain and myalgias  Neurological: Negative for weakness and numbness           Objective:      /80 (BP Location: Left arm, Patient Position: Sitting, Cuff Size: Standard)   Pulse 98   Temp 98 °F (36 7 °C) (Temporal)   Ht 5' 3" (1 6 m)   Wt 75 8 kg (167 lb)   SpO2 100%   BMI 29 58 kg/m²          Physical Exam  Vitals and nursing note reviewed  Constitutional:       General: She is not in acute distress  Cardiovascular:      Rate and Rhythm: Normal rate and regular rhythm  Heart sounds: Normal heart sounds  Pulmonary:      Effort: Pulmonary effort is normal       Breath sounds: Normal breath sounds  Musculoskeletal:      Lumbar back: Tenderness present  No swelling, spasms or bony tenderness  Decreased range of motion  Positive left straight leg raise test  Negative right straight leg raise test       Right lower leg: No edema  Left lower leg: No edema  Neurological:      Mental Status: She is alert

## 2021-07-22 NOTE — PROGRESS NOTES
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

## 2021-07-26 ENCOUNTER — PATIENT OUTREACH (OUTPATIENT)
Dept: INTERNAL MEDICINE CLINIC | Facility: CLINIC | Age: 66
End: 2021-07-26

## 2021-07-28 ENCOUNTER — TELEPHONE (OUTPATIENT)
Dept: FAMILY MEDICINE CLINIC | Facility: CLINIC | Age: 66
End: 2021-07-28

## 2021-07-28 NOTE — TELEPHONE ENCOUNTER
Spoke with patient, she should have Medicare by September first  She agreed to appointment for DM Education w/ me on 9/7/21    Maribell Santiago, MARTÍNN, LDN, [facility]

## 2021-07-30 ENCOUNTER — HOSPITAL ENCOUNTER (OUTPATIENT)
Dept: NON INVASIVE DIAGNOSTICS | Facility: HOSPITAL | Age: 66
Discharge: HOME | End: 2021-07-30

## 2021-07-30 DIAGNOSIS — I10 BENIGN ESSENTIAL HYPERTENSION: ICD-10-CM

## 2021-07-30 DIAGNOSIS — R07.9 CHEST PAIN ON EXERTION: ICD-10-CM

## 2021-07-30 PROCEDURE — 93306 TTE W/DOPPLER COMPLETE: CPT

## 2021-07-30 PROCEDURE — 93306 TTE W/DOPPLER COMPLETE: CPT | Performed by: INTERNAL MEDICINE

## 2021-08-09 ENCOUNTER — PATIENT OUTREACH (OUTPATIENT)
Dept: INTERNAL MEDICINE CLINIC | Facility: CLINIC | Age: 66
End: 2021-08-09

## 2021-08-09 NOTE — PROGRESS NOTES
CHW received call from patient on this date  Pt stated she left her previous job and now has a new job with [employer] in [address] [place] [address]  Pt states she will be working 30 hours weekly  Her hours will be from 9am to 3pm      Housing : Pt has received notification from Housing pt states she will bring me the letter to inform me  CHW left message for [employer] and [address] to see if patient is no wait list      For [employer] we are still waiting to get patient enrolled  We can do it 30 days before her birthday  Addendum: [employer]  Patient was rejected due to Poor Credit History

## 2021-08-11 ENCOUNTER — OFFICE VISIT (OUTPATIENT)
Dept: CARDIOLOGY CLINIC | Facility: CLINIC | Age: 66
End: 2021-08-11

## 2021-08-11 VITALS
SYSTOLIC BLOOD PRESSURE: 118 MMHG | BODY MASS INDEX: 29.68 KG/M2 | HEIGHT: 63 IN | DIASTOLIC BLOOD PRESSURE: 60 MMHG | WEIGHT: 167.5 LBS | OXYGEN SATURATION: 97 % | HEART RATE: 88 BPM

## 2021-08-11 DIAGNOSIS — I10 BENIGN ESSENTIAL HYPERTENSION: Primary | ICD-10-CM

## 2021-08-11 DIAGNOSIS — R07.9 CHEST PAIN ON EXERTION: ICD-10-CM

## 2021-08-11 DIAGNOSIS — E78.2 MIXED HYPERLIPIDEMIA: ICD-10-CM

## 2021-08-11 PROCEDURE — 99214 OFFICE O/P EST MOD 30 MIN: CPT | Performed by: INTERNAL MEDICINE

## 2021-08-11 NOTE — PATIENT INSTRUCTIONS
Chest Pain   AMBULATORY CARE:   Chest pain  can be caused by a range of conditions, from not serious to life-threatening  It may be caused by a heart attack or a blood clot in your lungs  Sometimes chest pain or pressure is caused by poor blood flow to your heart (angina)  Infection, inflammation, or a fracture in the bones or cartilage in your chest can cause pain or discomfort  Chest pain can also be a symptom of a digestive problem, such as acid reflux or a stomach ulcer  An anxiety attack or a strong emotion such as anger can also cause chest pain  It is important to follow up with your healthcare provider to find the cause of your chest pain  Common symptoms you may have with chest pain:   · Fever or sweating    · Nausea or vomiting    · Shortness of breath    · Discomfort or pressure that spreads from your chest to your back, jaw, or arm    · A racing or slow heartbeat    · Feeling weak, tired, or faint    Call your local emergency number (911 in the [address] [place] [address],[address]) or have someone call if:   · You have any of the following signs of a heart attack:      ? Squeezing, pressure, or pain in your chest    ? You may  also have any of the following:     § Discomfort or pain in your back, neck, jaw, stomach, or arm    § Shortness of breath    § Nausea or vomiting    § Lightheadedness or a sudden cold sweat      Seek care immediately if:   · You have chest discomfort that gets worse, even with medicine  · You cough or vomit blood  · Your bowel movements are black or bloody  · You cannot stop vomiting, or it hurts to swallow  Call your doctor if:   · You have questions or concerns about your condition or care  Treatment for chest pain  may include medicine to treat your symptoms while your healthcare provider finds the cause of your chest pain  · Medicines  may be given to treat the cause of your chest pain   Examples include pain medicine, anxiety medicine, or medicines to increase blood flow to your heart     · Do not take certain medicines without asking your healthcare provider first   These include NSAIDs, herbal or vitamin supplements, or hormones (estrogen or progestin)  · One or more stents  may need to be placed in your heart if pain was caused by blockage  A stent is a wire mesh tube that helps hold your artery open  Healthy living tips: The following are general healthy guidelines  If the cause of your chest pain is known, your healthcare provider will give you specific guidelines to follow  · Do not smoke  Nicotine and other chemicals in cigarettes and cigars can cause lung and heart damage  Ask your healthcare provider for information if you currently smoke and need help to quit  E-cigarettes or smokeless tobacco still contain nicotine  Talk to your healthcare provider before you use these products  · Choose a variety of healthy foods as often as possible  Include fresh, frozen, or canned fruits and vegetables  Also include low-fat dairy products, fish, chicken (without skin), and lean meats  Your healthcare provider or a dietitian can help you create meal plans  You may need to avoid certain foods or drinks if your pain is caused by a digestion problem  · Lower your sodium (salt) intake  Limit foods that are high in sodium, such as canned foods, salty snacks, and cold cuts  If you add salt when you cook food, do not add more at the table  Choose low-sodium canned foods as much as possible  · Drink plenty of water every day  Water helps your body to control your temperature and blood pressure  Ask your healthcare provider how much water you should drink every day  · Ask about activity  Your healthcare provider will tell you which activities to limit or avoid  Ask when you can drive, return to work, and have sex  Ask about the best exercise plan for you  · Maintain a healthy weight  Ask your healthcare provider what a healthy weight is for you   Ask him or her to help you create a safe weight loss plan if you are overweight  · Ask about vaccines you may need  Get the influenza (flu) vaccine every year as soon as recommended, usually in September or October  You may also need a pneumococcal vaccine to prevent pneumonia  The vaccine is usually given every 5 years, starting at age 72  Your healthcare provider can tell you if should get other vaccines, and when to get them  Follow up with your healthcare provider within 72 hours, or as directed: You may need to return for more tests to find the cause of your chest pain  You may be referred to a specialist, such as a cardiologist or gastroenterologist  Write down your questions so you remember to ask them during your visits  © Copyright [employer] 2021 Information is for End User's use only and may not be sold, redistributed or otherwise used for commercial purposes  All illustrations and images included in CareNotes® are the copyrighted property of A D A M [employer] Inc  or [employer]   The above information is an  only  It is not intended as medical advice for individual conditions or treatments  Talk to your doctor, nurse or pharmacist before following any medical regimen to see if it is safe and effective for you

## 2021-08-11 NOTE — PROGRESS NOTES
Cardiology Consultation     Euna Cancer  [id number]  1955  HEART & VASCULAR Gerard Muhammad  [facility] [address] [facility] [id number]    1  Benign essential hypertension     2  Mixed hyperlipidemia     3  Chest pain on exertion       Chief Complaint   Patient presents with    Follow-up    Chest Pain     pt states been having pains for a long time    Fatigue     pt states all the time and doesn't know why    Dizziness     pt states when she moves too fast     HPI: Patient is here for cardiac evaluation of chest pain  Pain is noted to be sharp in quality, for quite a number of months, located in the center of chest to the right side, no associated symptoms, and no aggravating or alleviating factors  No reported shortness of breath, palpitations, lightheadedness, syncope, LE edema, orthopnea, PND, or significant weight changes  Patient Active Problem List   Diagnosis    Benign essential hypertension    Diabetic peripheral neuropathy ([address] )    DM type 2 without retinopathy ([address] )    Hyperlipidemia    Chronic bilateral low back pain with left-sided sciatica    History of right breast biopsy    Breast pain, right    Visit for screening mammogram    Breast cyst, right    Breast mass, right    Carpal tunnel syndrome, bilateral    Depression    Type 2 diabetes mellitus with hyperglycemia, without long-term current use of insulin (HCC)    Overweight (BMI 25 0-29  9)    History of hysterectomy    Chest pain on exertion     Past Medical History:   Diagnosis Date    Arthritis     Bump     bumped head yesterday at work "saw stars" no LOC, cat scan done was normal    Circulation problem     Diabetes mellitus ([address] )     blood sugar 125 on admission    Encounter for screening colonoscopy     1 st one    Headache     Hyperlipidemia     Hypertension     Positive fecal occult blood test 08/30/2018    Added automatically from request for surgery [id number]    Post concussion syndrome 09/14/2018    Syncope      Social History     Socioeconomic History    Marital status:      Spouse name: Not on file    Number of children: 10    Years of education: Not on file    Highest education level: Not on file   Occupational History    Occupation: [employer]   Tobacco Use    Smoking status: Current Every Day Smoker     Packs/day: 0 50     Types: Cigarettes    Smokeless tobacco: Never Used    Tobacco comment: smokes less than 1pk/day   Vaping Use    Vaping Use: Never used   Substance and Sexual Activity    Alcohol use: Never    Drug use: Never    Sexual activity: Not Currently   Other Topics Concern    Not on file   Social History Narrative    Daily caffeine consumption, 6-8 servings a day     Social Determinants of Health     Financial Resource Strain: Low Risk     Difficulty of Paying Living Expenses: Not hard at all   Food Insecurity: No Food Insecurity    Worried About Running Out of Food in the Last Year: Never true    Saji of Food in the Last Year: Never true   Transportation Needs: No Transportation Needs    Lack of Transportation (Medical): No    Lack of Transportation (Non-Medical):  No   Physical Activity: Inactive    Days of Exercise per Week: 0 days    Minutes of Exercise per Session: 0 min   Stress: No Stress Concern Present    Feeling of Stress : Not at all   Social Connections: Socially Isolated    Frequency of Communication with Friends and Family: Once a week    Frequency of Social Gatherings with Friends and Family: Once a week    Attends Cheondoism Services: Never    Active Member of Clubs or Organizations: No    Attends Club or Organization Meetings: Never    Marital Status:    Intimate Partner Violence: Not At Risk    Fear of Current or Ex-Partner: No    Emotionally Abused: No    Physically Abused: No    Sexually Abused: No      Family History   Problem Relation Age of Onset  Cancer Cousin     Alcohol abuse Mother     Alcohol abuse Father      Past Surgical History:   Procedure Laterality Date    COLONOSCOPY N/A 9/26/2018    Procedure: COLONOSCOPY;  Surgeon: Kalia Moreno MD;  Location: [facility] GI LAB; Service: Gastroenterology    HYSTERECTOMY      INCISIONAL BREAST BIOPSY      last assessed 17Aug2017    HI COLONOSCOPY FLX DX W/COLLJ SPEC WHEN PFRMD N/A 7/5/2018    Procedure: COLONOSCOPY;  Surgeon: Kalia Moreno MD;  Location:  GI LAB;   Service: Gastroenterology       Current Outpatient Medications:     acetaminophen (TYLENOL) 325 mg tablet, Take 650 mg by mouth every 6 (six) hours as needed for mild pain , Disp: , Rfl:     aspirin (ECOTRIN LOW STRENGTH) 81 mg EC tablet, Take 1 tablet (81 mg total) by mouth daily, Disp: 90 tablet, Rfl: 1    gabapentin (NEURONTIN) 300 mg capsule, Take 2 capsules (600 mg total) by mouth 2 (two) times a day, Disp: 240 capsule, Rfl: 1    glyBURIDE (DIABETA) 5 mg tablet, Take 1 tablet (5 mg total) by mouth daily with breakfast, Disp: 90 tablet, Rfl: 1    lisinopril-hydrochlorothiazide (PRINZIDE,ZESTORETIC) 10-12 5 MG per tablet, Take 1 tablet by mouth daily, Disp: 90 tablet, Rfl: 1    lovastatin (MEVACOR) 20 mg tablet, Take 1 tablet (20 mg total) by mouth daily at bedtime, Disp: 90 tablet, Rfl: 1    metFORMIN (GLUCOPHAGE) 1000 MG tablet, Take 1 tablet (1,000 mg total) by mouth 2 (two) times a day with meals, Disp: 180 tablet, Rfl: 1    methocarbamol (ROBAXIN) 500 mg tablet, Take 1 tablet (500 mg total) by mouth daily at bedtime as needed for muscle spasms, Disp: 30 tablet, Rfl: 0    omeprazole (PriLOSEC) 20 mg delayed release capsule, Take 1 capsule (20 mg total) by mouth daily, Disp: 90 capsule, Rfl: 0  No Known Allergies  Vitals:    08/11/21 1324   BP: 118/60   BP Location: Left arm   Patient Position: Sitting   Cuff Size: Adult   Pulse: 88   SpO2: 97%   Weight: 76 kg (167 lb 8 oz)   Height: 5' 3" (1 6 m)       Labs:  [facility] [place]28[place]21   Component Date Value    Protocol Name 05/28/2021 Kaylin Arriaga     Time In Exercise Phase 05/28/2021 00:03:00     MAX  SYSTOLIC BP 62/16/7976 840     Max Diastolic Bp 51/35/3184 82     Max Heart Rate 05/28/2021 141     Max Predicted Heart Rate 05/28/2021 156     Reason for Termination 05/28/2021 TEST COMPLETE        Test Indication 05/28/2021 Chest Discomfort     Target Hr Formular 05/28/2021 (220 - Age)*100%     Chest Pain Statement 05/28/2021 none    Appointment on 05/15/2021   Component Date Value    Sodium 05/15/2021 141     Potassium 05/15/2021 4 0     Chloride 05/15/2021 108     CO2 05/15/2021 27     ANION GAP 05/15/2021 6     BUN 05/15/2021 13     Creatinine 05/15/2021 0 65     Glucose, Fasting 05/15/2021 65     Calcium 05/15/2021 9 7     AST 05/15/2021 7     ALT 05/15/2021 15     Alkaline Phosphatase 05/15/2021 64     Total Protein 05/15/2021 7 5     Albumin 05/15/2021 3 5     Total Bilirubin 05/15/2021 0 19*    eGFR 05/15/2021 109     Hemoglobin A1C 05/15/2021 6 5*    EAG 05/15/2021 140     Cholesterol 05/15/2021 155     Triglycerides 05/15/2021 31     HDL, Direct 05/15/2021 58     LDL Calculated 05/15/2021 91      Lab Results   Component Value Date    TRIG 31 05/15/2021    HDL 58 05/15/2021     Imaging: XR spine lumbar minimum 4 views non injury    Result Date: 7/2/2021  Narrative: LUMBAR SPINE INDICATION:   M54 42: Lumbago with sciatica, left side G89 29: Other chronic pain  COMPARISON: Compared with 1/25/2013 VIEWS:  XR SPINE LUMBAR MINIMUM 4 VIEWS NON INJURY FINDINGS: There are 5 non rib bearing lumbar vertebral bodies  There is no evidence of acute fracture or destructive osseous lesion  Mild dextroscoliosis  Straightening of the lordosis  Loss of disc height at L4-L5 and L5-S1  The pedicles appear intact  Soft tissues are unremarkable  Impression: Mild degenerative changes from L4 to S1  Interval worsening   Workstation performed: [id number]       Review of Systems:  Review of Systems   Constitutional: Positive for fatigue  Negative for activity change, appetite change, chills, diaphoresis and unexpected weight change  HENT: Negative for hearing loss, nosebleeds and sore throat  Eyes: Negative for photophobia and visual disturbance  Respiratory: Negative for cough, chest tightness, shortness of breath and wheezing  Cardiovascular: Positive for chest pain  Negative for palpitations and leg swelling  Gastrointestinal: Negative for abdominal pain, diarrhea, nausea and vomiting  Endocrine: Negative for polyuria  Genitourinary: Negative for dysuria, frequency and hematuria  Musculoskeletal: Negative for arthralgias, back pain, gait problem and neck pain  Skin: Negative for pallor and rash  Neurological: Negative for dizziness, syncope and headaches  Hematological: Does not bruise/bleed easily  Psychiatric/Behavioral: Negative for behavioral problems and confusion  Physical Exam:  Physical Exam  Vitals reviewed  Constitutional:       Appearance: She is well-developed  She is not diaphoretic  HENT:      Head: Normocephalic and atraumatic  Nose: Nose normal    Eyes:      General: No scleral icterus  Pupils: Pupils are equal, round, and reactive to light  Neck:      Vascular: No JVD  Cardiovascular:      Rate and Rhythm: Normal rate and regular rhythm  Heart sounds: Normal heart sounds  No murmur heard  No friction rub  No gallop  Pulmonary:      Effort: Pulmonary effort is normal  No respiratory distress  Breath sounds: Normal breath sounds  No wheezing or rales  Abdominal:      General: Bowel sounds are normal  There is no distension  Palpations: Abdomen is soft  Tenderness: There is no abdominal tenderness  Musculoskeletal:         General: No deformity  Normal range of motion  Cervical back: Normal range of motion and neck supple     Skin:     General: Skin is warm and dry  Findings: No rash  Neurological:      Mental Status: She is alert and oriented to person, place, and time  Cranial Nerves: No cranial nerve deficit  Psychiatric:         Behavior: Behavior normal        Blood pressure 118/60, pulse 88, height 5' 3" (1 6 m), weight 76 kg (167 lb 8 oz), SpO2 97 %  EKG:  Normal sinus rhythm  Low voltage QRS  Borderline ECG    Discussion/Summary:  Chest Pain: unclear etiology  Has risk factors of age, female, post-menopausal, HTN, DM, HLD, and current smoking  She had a screen for cardiac conditions with a stress test that ruled out ischemic etiology  We also checked an echocardiogram to rule out structural heart disease, which did not reveal any significant changes to explain chest pain  HTN: well controlled on current regimen  HLD: LDL 91 in May 2021    Well controlled and at goal

## 2021-08-27 ENCOUNTER — PATIENT OUTREACH (OUTPATIENT)
Dept: INTERNAL MEDICINE CLINIC | Facility: CLINIC | Age: 66
End: 2021-08-27

## 2021-08-27 NOTE — PROGRESS NOTES
CHW called [facility] to see if patient is on Wait list      [facility] could not find application  An error was made with the name for the application  Document were faxed to [facility] so they can correct the last name

## 2021-09-02 ENCOUNTER — TELEPHONE (OUTPATIENT)
Dept: FAMILY MEDICINE CLINIC | Facility: CLINIC | Age: 66
End: 2021-09-02

## 2021-09-02 NOTE — TELEPHONE ENCOUNTER
LM w/ patient, reminding her of appointment w/ me on 9/7/21 for Diabetes Education    Maribell Santiago, RDN, LDN, [facility]

## 2021-09-07 ENCOUNTER — PATIENT OUTREACH (OUTPATIENT)
Dept: INTERNAL MEDICINE CLINIC | Facility: CLINIC | Age: 66
End: 2021-09-07

## 2021-09-07 ENCOUNTER — OFFICE VISIT (OUTPATIENT)
Dept: FAMILY MEDICINE CLINIC | Facility: CLINIC | Age: 66
End: 2021-09-07
Payer: COMMERCIAL

## 2021-09-07 DIAGNOSIS — E11.65 TYPE 2 DIABETES MELLITUS WITH HYPERGLYCEMIA, WITHOUT LONG-TERM CURRENT USE OF INSULIN (HCC): ICD-10-CM

## 2021-09-07 DIAGNOSIS — E11.9 TYPE 2 DIABETES MELLITUS WITHOUT COMPLICATION, WITHOUT LONG-TERM CURRENT USE OF INSULIN (HCC): Primary | ICD-10-CM

## 2021-09-07 PROCEDURE — G0108 DIAB MANAGE TRN  PER INDIV: HCPCS | Performed by: DIETITIAN, REGISTERED

## 2021-09-07 NOTE — PROGRESS NOTES
CHW spoke to [facility] and the Name on the Application has been fixed  Pt is currently on the Housing Wait List for [facility]

## 2021-09-07 NOTE — PROGRESS NOTES
Type 2 Diabetes Class Assessment    HPI: Met with Rene Miller for DSME Initial visit  Jenifer has Type 2 Diabetes  Diabetes Assessment  Visit Type: Initial visit  Present at Session: patient   Special Learning Needs: No  Barriers to Learning: no barriers    How do you feel about making lifestyle changes at this time? "so so"  How would you rate your current knowledge of diabetes? fair  How confident are you that will be able to take better control of your diabetes?: fair    How long have you had diabetes? At least 5 years  Have you had diabetes education in the past?: No  Do you have any family members with diabetes?: Yes - aunt, cousins  Do you monitor your blood sugar? no  Type of blood sugar monitor: n/a  How old is your meter?: n/a  How often do you test your blood sugars?:n/a  Do you keep a written record of your blood sugars? No   Blood sugar log with patient today and reviewed by educator?: No   Blood Sugar ranges:    Fasting: n/a   Before meals: n/a   2 hours after meals: n/a  Any financial concerns pertaining to your diabetes supplies, medication or care?: No  Have you ever experienced hypoglycemia?:  Yes  Have you ever been hospitalized or gone to the ER due to your blood sugars?: No  How do you treat low blood sugars?: drinks soda,or eats something salty  How do you treat high blood sugars? Doesn't know  Do you wear a Diabetes I D ?: no  Where do you dispose of your sharps (needles,lancetes)?: n/a    Ht Readings from Last 1 Encounters:   08/11/21 5' 3" (1 6 m)     Wt Readings from Last 3 Encounters:   08/11/21 76 kg (167 lb 8 oz)   07/21/21 75 8 kg (167 lb)   07/14/21 75 4 kg (166 lb 3 2 oz)        There is no height or weight on file to calculate BMI       Lab Results   Component Value Date    HGBA1C 6 5 (H) 05/15/2021    HGBA1C 12 1 (H) 01/13/2021    HGBA1C 7 3 (H) 08/27/2018       No results found for: CHOL  Lab Results   Component Value Date    HDL 58 05/15/2021    HDL 53 01/13/2021    HDL 49 02/02/2018     Lab Results   Component Value Date    LDLCALC 91 05/15/2021    LDLCALC 137 (H) 01/13/2021    LDLCALC 106 (H) 02/02/2018     Lab Results   Component Value Date    TRIG 31 05/15/2021    TRIG 116 01/13/2021    TRIG 83 02/02/2018     No results found for: CHOLHDL  No results found for: Aydin Allen    Weight Change: No, patient states she thinks she should gain weight, I explained to her that her current weight is acceptable, that if she were to gain weight, it could raise her blood sugars  Diet Assessment    Do you follow any special diet presently?: No, patient very vague with her diet history  Who cooks at home?:  patient  Who does the grocery shopping?: patient   How frequently do you eat out?: not often    Activity Assessment    Exercise: " I work "     Lifestyle/Social Assessment    Racial/ethnic group:                                       Primary Language: English  Marital Status: Single  Education Level: High School Graduate   Work status: Part Time  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  Who lives in your household?: child  Who is you primary support person(s): child   Describe your quality of life currently?: good  Any concerns for your safety?: No  Any Restoration or cultural practices that may affect your diabetes care: No  Do you have a decrease or loss of hearing?: No  Do you have a decrease or loss of vision?: Yes - blurry sometimes  When was the last time you had an ophthalmology exam?: 9/16/17  When was the last time you had dental exam?: about 3 years ago  Do you check your feet for cracks, sores, debris?: Yes  When was the last time you had podiatry or foot exam?: 2/10/21  Last flu shot?: n/a  Pneumonia shot?: No      The patient's history was reviewed and updated as appropriate: allergies, current medications      Intervention    Diabetes Overview :   Ashleigh Nuno was instructed on basic concepts of diabetes, including identifying role of diabetes self management, basic pathophysiology and types of diabetes, A1c and blood sugar targets  Jenifer has fair understanding of material covered  Taking Medications: Instructed patient on action, side effects, efficacy, prescribed dosage and appropriate timing and frequency of administration of her diabetes medication  Jenifer has good understanding of material covered  Monitoring Blood Sugars  Instructions for Meter Teaching- Patient instructed in the following:    Testing frequency: Encouraged patient to obtain meter so she can start checking her blood sugars  Goal Blood Sugars:   Premeal , even better <110  2hr after a meal <180, even better <140  A1C <7%, even better <6 5%  Hypoglycemia: Instructed patient on definition/risk of hypoglycemia, treatment, causes/symptoms, when to notify provider of lows, prevention of hypoglycemia and exercise precautions  Comments: Jenifer verbalizes understanding of hypoglycemia concepts      Physical Activity: Discussed benefits of physical activity to optimize blood glucose control, encouraged activity at patient is physically able  Always consult a physician prior to starting an exercise program   Comments: Jenifer verbalizes understanding of benefits of physical activity  Diabetes Education Record  Jenifer received the following handouts: basic explanation of "What is diabetes?"   Patient will receive more written education materials when she comes for classes that she is signed up for  Patient response to instruction    Comprehensiongood  Motivationfair  Expected Compliancefair    Thank you for referring your patient to [facility], it was a pleasure working with them today  Please feel free to call with any questions or concerns at       [facility]  [address] [facility]

## 2021-09-07 NOTE — Clinical Note
Met with patient for pre-class assessment  She agreed to schedule time to come in for [facility] classes  Patient states she does not have a meter, so she is not checking her blood sugars  Please order meter for patient that would be covered by her insurance

## 2021-09-09 ENCOUNTER — OFFICE VISIT (OUTPATIENT)
Dept: FAMILY MEDICINE CLINIC | Facility: CLINIC | Age: 66
End: 2021-09-09
Payer: COMMERCIAL

## 2021-09-09 ENCOUNTER — PATIENT OUTREACH (OUTPATIENT)
Dept: INTERNAL MEDICINE CLINIC | Facility: CLINIC | Age: 66
End: 2021-09-09

## 2021-09-09 ENCOUNTER — TELEPHONE (OUTPATIENT)
Dept: INTERNAL MEDICINE CLINIC | Facility: CLINIC | Age: 66
End: 2021-09-09

## 2021-09-09 DIAGNOSIS — E11.9 TYPE 2 DIABETES MELLITUS WITHOUT COMPLICATION, WITHOUT LONG-TERM CURRENT USE OF INSULIN (HCC): Primary | ICD-10-CM

## 2021-09-09 PROCEDURE — G0109 DIAB MANAGE TRN IND/GROUP: HCPCS | Performed by: DIETITIAN, REGISTERED

## 2021-09-09 NOTE — PROGRESS NOTES
Can someone check into insurance and see if patient is signed up with Medicare and if so which plan so we can check into supplies

## 2021-09-09 NOTE — PROGRESS NOTES
KAIW with patient permission called to [employer] to complete an application  I need to call Monday to see if patient is eligible for PaceNet/Pace for RX program      CHW offered supportive listening and provided her with information on her new Medicare Advantage plan

## 2021-09-09 NOTE — PROGRESS NOTES
Living Well with Diabetes Group Class #1    Joanne Wright attended the Living Well with Diabetes Group Class #1  Topics Covered in class today include: What is diabetes; Types of Diabetes; How Diabetes is diagnosed; Management skills; the role of exercise in blood sugar managements, Home glucose monitoring, and target ranges  Jenifer participated in  activities    The patient's history was reviewed and updated as appropriate: allergies, current medications  Lab Results   Component Value Date    HGBA1C 6 5 (H) 05/15/2021       Diabetes Education Record  Jenifer was provided Living Well with Diabetes Class #1 book      Patient response to instruction    Comprehensionvery good  Motivationvery good  Expected Compliancegood    Begin Time: 9am  End Time: 10 am  Referring Provider: Brooke Blake PA-C    Thank you for referring your patient to [facility]r, it was a pleasure working with them today  Please feel free to call with any questions or concerns      Maribell Santiago  [facility]  [address] [place] [address]

## 2021-09-09 NOTE — Clinical Note
Patient participated in [facility] Class 1, very involved, asked appropriate questions  Seems motivated

## 2021-09-09 NOTE — TELEPHONE ENCOUNTER
Patient is wondering if she is able to get a script for Glucose test kit and supplies  She states she would like to monitor this  If appropriate she would like to be called to let know if we can do this

## 2021-09-10 NOTE — TELEPHONE ENCOUNTER
Patient states she has [facility] Arben [id number]      And medicare hospital/medical- ID- [id number] Per patient she states she has medicare Part A, B, and D  Please send testing supplies

## 2021-09-13 ENCOUNTER — OFFICE VISIT (OUTPATIENT)
Dept: FAMILY MEDICINE CLINIC | Facility: CLINIC | Age: 66
End: 2021-09-13
Payer: COMMERCIAL

## 2021-09-13 DIAGNOSIS — E11.9 TYPE 2 DIABETES MELLITUS WITHOUT COMPLICATION, WITHOUT LONG-TERM CURRENT USE OF INSULIN (HCC): Primary | ICD-10-CM

## 2021-09-13 PROCEDURE — G0109 DIAB MANAGE TRN IND/GROUP: HCPCS | Performed by: DIETITIAN, REGISTERED

## 2021-09-13 NOTE — Clinical Note
Patient attended DSMES class 2 today  She was very interested/ asked appropriate questions  Seems willing to follow through with my suggestions  Plans for class 3 tomorrow

## 2021-09-13 NOTE — PROGRESS NOTES
Living Well with Diabetes Group Class #2    Sheeba Woodall attended the Living Well with Diabetes Group Class #2  During class, Jenifer was instructed on the following topics: Macronutrients, Carbohydrate sources, What one serving of carbohydrate equals, effects of diet on blood glucose levels, effect of carbohydrates on blood glucose levels, basics of meal planning: balance, portions, meal times, measurements, reading food labels to determine carbohydrates  Espinoza Jordan participated in  activities of reading labels together and completing the meal planning activity  Espinoza Jordan will follow up with class #3  Will call with any questions or concerns prior to next session  The patient's history was reviewed and updated as appropriate: allergies, current medications  Lab Results   Component Value Date    HGBA1C 6 5 (H) 05/15/2021       Diabetes Education Record  Jenifer was provided Living Well with Diabetes Class #2 book      Patient response to instruction    Comprehensionvery good  Motivationvery good  Expected Compliancegood    Begin Time: 8:45 am   End Time: 9:45 am  Referring Payam Whiting PA-C    Thank you for referring your patient to [facility], it was a pleasure working with them today  Please feel free to call with any questions or concerns      Maribell Santiago  [facility]  [address] [facility]

## 2021-09-14 ENCOUNTER — OFFICE VISIT (OUTPATIENT)
Dept: FAMILY MEDICINE CLINIC | Facility: CLINIC | Age: 66
End: 2021-09-14

## 2021-09-14 ENCOUNTER — PATIENT OUTREACH (OUTPATIENT)
Dept: INTERNAL MEDICINE CLINIC | Facility: CLINIC | Age: 66
End: 2021-09-14

## 2021-09-14 DIAGNOSIS — E11.9 TYPE 2 DIABETES MELLITUS WITHOUT COMPLICATION, WITHOUT LONG-TERM CURRENT USE OF INSULIN (HCC): Primary | ICD-10-CM

## 2021-09-14 DIAGNOSIS — I10 BENIGN ESSENTIAL HYPERTENSION: ICD-10-CM

## 2021-09-14 DIAGNOSIS — E11.65 TYPE 2 DIABETES MELLITUS WITH HYPERGLYCEMIA, WITHOUT LONG-TERM CURRENT USE OF INSULIN (HCC): ICD-10-CM

## 2021-09-14 RX ORDER — LISINOPRIL AND HYDROCHLOROTHIAZIDE 12.5; 1 MG/1; MG/1
1 TABLET ORAL DAILY
Qty: 90 TABLET | Refills: 1 | Status: SHIPPED | OUTPATIENT
Start: 2021-09-14 | End: 2021-09-14 | Stop reason: SDUPTHER

## 2021-09-14 RX ORDER — GLYBURIDE 5 MG/1
5 TABLET ORAL
Qty: 90 TABLET | Refills: 1 | Status: SHIPPED | OUTPATIENT
Start: 2021-09-14 | End: 2021-09-14 | Stop reason: SDUPTHER

## 2021-09-14 RX ORDER — GLYBURIDE 5 MG/1
5 TABLET ORAL
Qty: 90 TABLET | Refills: 1 | Status: SHIPPED | OUTPATIENT
Start: 2021-09-14 | End: 2021-10-19 | Stop reason: CLARIF

## 2021-09-14 RX ORDER — LISINOPRIL AND HYDROCHLOROTHIAZIDE 12.5; 1 MG/1; MG/1
1 TABLET ORAL DAILY
Qty: 90 TABLET | Refills: 1 | Status: SHIPPED | OUTPATIENT
Start: 2021-09-14 | End: 2022-02-03 | Stop reason: SDUPTHER

## 2021-09-14 NOTE — TELEPHONE ENCOUNTER
Called patient and has been informed  Per patient she would contact her insurance tomorrow and would call us back to notify us

## 2021-09-14 NOTE — TELEPHONE ENCOUNTER
Patient appears to have a refill on file at the pharmacy   I called [facility] and they said they never received the July scripts so I will send new RX

## 2021-09-14 NOTE — PROGRESS NOTES
/Sheila:  Pt approved for Pacenet effective 09/13/2021  Pt can begin using after 09/22/2021  Pt must be 73years old  Pacenet ID # [id number]     Pt was mailed out a card on 09/13/2021 and should present this card to pharmacy when picking up Medications  CHW have related this information to patient and she is aware  CHW will be closing case as all goals have been completed  Pt is aware CHW will be closing case  Pt understands any new problems or issues she may have she needs to contact clinic or Care  Worker Wes       CHW will communicate this information to team

## 2021-09-14 NOTE — PROGRESS NOTES
Living Well with Diabetes Group Class #3    Alma Meraz attended the Living Well with Diabetes Group Class #3  During class, Jenifer was instructed on the following topics: Oral and injectable medications, short term complications of diabetes, long term complications of diabetes, prevention of complications, foot care, sick day management, stress management, and traveling with diabetes  Randee Malik participated in  activities  Randee Malik will follow up with class #4  Will call with any questions or concerns prior to next session  The patient's history was reviewed and updated as appropriate: allergies, current medications  Lab Results   Component Value Date    HGBA1C 6 5 (H) 05/15/2021       Diabetes Education Record  Jenifer was provided Living Well with Diabetes Class #3 book      Patient response to instruction    Comprehensionvery good  Motivationvery good  Expected Compliancegood    Begin Time: 9am  End Time: 10am  Referring Provider: Jake Gardner PA-C    Thank you for referring your patient to [facility], it was a pleasure working with them today  Please feel free to call with any questions or concerns      Maribell Santiago  [address] [facility] [phone]

## 2021-09-15 ENCOUNTER — EVALUATION (OUTPATIENT)
Dept: PHYSICAL THERAPY | Facility: REHABILITATION | Age: 66
End: 2021-09-15
Payer: COMMERCIAL

## 2021-09-15 DIAGNOSIS — G89.29 CHRONIC BILATERAL LOW BACK PAIN WITH LEFT-SIDED SCIATICA: Primary | ICD-10-CM

## 2021-09-15 DIAGNOSIS — M54.42 CHRONIC BILATERAL LOW BACK PAIN WITH LEFT-SIDED SCIATICA: Primary | ICD-10-CM

## 2021-09-15 PROCEDURE — 97110 THERAPEUTIC EXERCISES: CPT | Performed by: PHYSICAL THERAPIST

## 2021-09-15 PROCEDURE — 97162 PT EVAL MOD COMPLEX 30 MIN: CPT | Performed by: PHYSICAL THERAPIST

## 2021-09-15 NOTE — PROGRESS NOTES
PT Evaluation     Today's date: 9/15/2021  Patient name: Celia Holland  : 1955  MRN: [id number]  Referring provider: Kamaljit Koo PA-C  Dx:   Encounter Diagnosis     ICD-10-CM    1  Chronic bilateral low back pain with left-sided sciatica  M54 42 Ambulatory referral to Physical Therapy    G89 29                   Assessment  Assessment details: Patient presents with pain, decreased lumbar ROM, decreased hip/core strength, and decreased function secondary to chronic lumbar radaiculopathy  Patient would benefit from skilled PT intervention to address these issues and to maximize function  Thank you for the referral   Impairments: abnormal or restricted ROM, activity intolerance, impaired physical strength, lacks appropriate home exercise program, pain with function and poor body mechanics  Understanding of Dx/Px/POC: good   Prognosis: good    Goals  Short Term:  Pt will report decreased levels of pain by at least 2 subjective ratings in 4 weeks  Pt will demonstrate improved ROM by at least 25% in 4 weeks  Pt will demonstrate improved strength by 1/2 grade MMT in 4 weeks  Long Term:   Pt will be independent in their HEP in 8 weeks  Pt will demonstrate improved FOTO, > predicted level  Pt will be independent with all ADL's  Pt will be able to sleep though the night without waking due to LBP  Pt will perform household activities with max 5/10 pain    Plan  Plan details: Patient was educated in [employer]  All questions were answered to pt's satisfaction      Patient would benefit from: skilled physical therapy  Planned therapy interventions: abdominal trunk stabilization, joint mobilization, manual therapy, neuromuscular re-education, patient education, body mechanics training, postural training, therapeutic activities, therapeutic exercise, strengthening, stretching, flexibility, graded exercise and home exercise program  Frequency: 2x week  Duration in weeks: 12  Plan of Care beginning date: 9/15/2021  Plan of Care expiration date: 2021  Treatment plan discussed with: patient        Subjective Evaluation    History of Present Illness  Mechanism of injury: Pt is a 60 y o female with a c/o ongoing LBP for years 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  Pt denies any prior treatment  Pt had radiographs, which showed L4-S1 degenerative changes  Pt is now referred for PT  Pt reports pain/difficulty with sleep, household activities (vacuuming, sweeping, mopping, prolonged cooking), bending, prolonged standing, work duties as CNA, car transfers due to having a lower car  Pain  At best pain ratin  At worst pain ratin  Location: lumbar spine and radiating down b/l LE's, lateral aspect to her knees  Diagnostic Tests  X-ray: abnormal  Treatments  No previous or current treatments  Patient Goals  Patient goals for therapy: decreased pain, increased motion, increased strength and independence with ADLs/IADLs          Objective     Concurrent Complaints  Negative for bladder dysfunction, bowel dysfunction and saddle (S4) numbness    Additional Special Questions  Pt denies unexplained weight loss  Tenderness     Additional Tenderness Details  Mod TTP b/l lumbar paraspinals, lumbar SP's  Neurological Testing     Reflexes   Left   Patellar (L4): normal (2+)  Achilles (S1): normal (2+)    Right   Patellar (L4): brisk (3+)  Achilles (S1): normal (2+)    Additional Neurological Details  Pt reports ongoing N/T in her feet from diabetes        Active Range of Motion     Additional Active Range of Motion Details  L/S AROM (% of normal):  Flex=wfl with LBP (+)gowers sign ; repeated=increased LBP  Ext=50% with central pain ; repeated=decreased pain  RSB=wfl with pain  LSB=wfl with pain  R rot=75% with pain  L rot=75% with pain    Joint Play     Pain: L1, L2, L3, L4 and L5     Strength/Myotome Testing     Left Hip   Planes of Motion   Flexion: 4  Extension: 3+  Abduction: 4-  External rotation: 4-    Right Hip   Planes of Motion   Flexion: 4  Extension: 3+  Abduction: 3+  External rotation: 3+    Left Knee   Flexion: 5  Extension: 5    Right Knee   Flexion: 5  Extension: 5    Left Ankle/Foot   Dorsiflexion: 5  Plantar flexion: 5 (seated)  Great toe extension: 5    Right Ankle/Foot   Dorsiflexion: 5 (seated)  Plantar flexion: 5  Great toe extension: 5    Tests     Lumbar   Positive prone instability , SIJ compression and sacroiliac distraction   Left   Positive passive SLR  Negative crossed SLR and slump test      Right   Positive passive SLR  Negative crossed SLR and slump test      Left Pelvic Girdle/Sacrum   Positive: active SLR test      Right Pelvic Girdle/Sacrum   Positive: active SLR test      Left Hip   Positive JHONATAN  Right Hip   Positive JHONATAN  Additional Tests Details  HS flex wfl  Precautions: chronic pain, OA, diabetes, HTN         Manuals                                                                 Neuro Re-Ed             TA contraction             TA w/hip add squeeze             TA w/march             TA w/kicks             TA w/bridges             TA w/clamshells             Standing pball crush             TA w/LPD             TA w/multifidus press             Ther Ex             Bike or Nustep             MICHELET             Standing trunk extension             SLR x 3 in standing b/l                                       Pt education + HEP instruction TP 8 mins                         Ther Activity             TA w/mini squats             TA w/sidestepping             Gait Training                                       Modalities

## 2021-09-15 NOTE — TELEPHONE ENCOUNTER
Patient called insurance company and is now letting us know that she was told she has no limitations as to which brand she is allowed to have  Patient said she would like to be called when the script is ready to be sent  Thank you

## 2021-09-16 ENCOUNTER — PATIENT OUTREACH (OUTPATIENT)
Dept: INTERNAL MEDICINE CLINIC | Facility: CLINIC | Age: 66
End: 2021-09-16

## 2021-09-16 NOTE — PROGRESS NOTES
TERE has received update from [facility] that all current Goals are met  Pt is on [employer], [address], [employer] and she has a [employer] as well as [employer] to help with Medication cost      Please re-consult TERE if needed

## 2021-09-17 ENCOUNTER — OFFICE VISIT (OUTPATIENT)
Dept: PHYSICAL THERAPY | Facility: REHABILITATION | Age: 66
End: 2021-09-17
Payer: COMMERCIAL

## 2021-09-17 DIAGNOSIS — G89.29 CHRONIC BILATERAL LOW BACK PAIN WITH LEFT-SIDED SCIATICA: Primary | ICD-10-CM

## 2021-09-17 DIAGNOSIS — M54.42 CHRONIC BILATERAL LOW BACK PAIN WITH LEFT-SIDED SCIATICA: Primary | ICD-10-CM

## 2021-09-17 PROCEDURE — 97110 THERAPEUTIC EXERCISES: CPT

## 2021-09-17 PROCEDURE — 97112 NEUROMUSCULAR REEDUCATION: CPT

## 2021-09-17 NOTE — PROGRESS NOTES
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  She noted taking pain pills a few times a day  Objective: See treatment diary below      Assessment: Tolerated treatment fair  Patient demonstrated fatigue post treatment and exhibited good technique with therapeutic exercises  VC"s needed for correct technique throughout initiation of session  Rest breaks needed throughout session to perform exercises properly  Monitor symptoms NV  Plan: Continue per plan of care  Precautions: chronic pain, OA, diabetes, HTN  Manuals                                                                 Neuro Re-Ed             TA contraction  5"x2'           TA w/hip add squeeze  5"x10           TA w/march  10 ea  TA w/kicks  10 ea  TA w/bridges  3"x10           TA w/clamshells  3"x10 ea           Standing pball crush  5"x10           TA w/LPD  otb 3"x10           TA w/multifidus press  otb 3"x10 ea             Ther Ex             Bike or Nustep  nustep L3x10'           MICHELET  10"x10           Standing trunk extension  5"x10           SLR x 3 in standing b/l                                       Pt education + HEP instruction TP 8 mins                         Ther Activity             TA w/mini squats             TA w/sidestepping  2 laps           Gait Training                                       Modalities

## 2021-09-24 ENCOUNTER — PATIENT OUTREACH (OUTPATIENT)
Dept: INTERNAL MEDICINE CLINIC | Facility: CLINIC | Age: 66
End: 2021-09-24

## 2021-09-24 DIAGNOSIS — Z59.89 INSURANCE COVERAGE PROBLEMS: Primary | ICD-10-CM

## 2021-09-24 SDOH — ECONOMIC STABILITY - INCOME SECURITY: OTHER PROBLEMS RELATED TO HOUSING AND ECONOMIC CIRCUMSTANCES: Z59.89

## 2021-09-24 NOTE — PROGRESS NOTES
SW has returned call to pt to assist with Insurance Questions  Pt is very confused re what insurance she has  SW assisted pt with a call to [facility] [phone] to help clarify  The representative confirms pt has cancelled this plans and the Medicare System has informed them pt has [facility]  Pt appears confused about how she got  this insurance  SW will f/u with pt to confirm she has [employer] as sh does not have her card as yet  Sw will suggest seeing if her family can help review this einfo with pt as well

## 2021-09-27 ENCOUNTER — PATIENT OUTREACH (OUTPATIENT)
Dept: INTERNAL MEDICINE CLINIC | Facility: CLINIC | Age: 66
End: 2021-09-27

## 2021-09-27 DIAGNOSIS — Z59.89 INSURANCE COVERAGE PROBLEMS: Primary | ICD-10-CM

## 2021-09-27 SDOH — ECONOMIC STABILITY - INCOME SECURITY: OTHER PROBLEMS RELATED TO HOUSING AND ECONOMIC CIRCUMSTANCES: Z59.89

## 2021-09-27 NOTE — PROGRESS NOTES
TERE along with Moe Mendese Jesus Blake have spoken with pt to review her insurance questions  CHW has agreed to f/u with pt who would bring in letters she has received from her new insurance plan  SW/KAIW will f/u with pt and assist as indicted 
yes

## 2021-09-27 NOTE — PROGRESS NOTES
Case Discussed with Tyree Cutler to see how we can help the patient with her new enrollment with [facility]  CHW with patient permission called [employer] at [phone] member services  CHW along with patient spoke to Representative from [employer]  And she stated the plan is active effective 09/01/2021 ID # is [id number]  Please see the plan details I have an attachment of the plan benefits  Patient seems less confused on the plan  She states she understands a bit better on the insurance  I told patient this is her decision on the plan that she chooses       Patient has agreed that we speak to her son and her on Friday October 1, 2021 at 9:am      CHW will communicate this information to team

## 2021-09-28 ENCOUNTER — TELEPHONE (OUTPATIENT)
Dept: FAMILY MEDICINE CLINIC | Facility: CLINIC | Age: 66
End: 2021-09-28

## 2021-09-28 NOTE — TELEPHONE ENCOUNTER
Spoke with patient, she agreed to come in to see me tomorrow @ 9am for her appointment    Maribell Santiago, RDN, LDN, CDCES

## 2021-09-29 ENCOUNTER — APPOINTMENT (OUTPATIENT)
Dept: PHYSICAL THERAPY | Facility: REHABILITATION | Age: 66
End: 2021-09-29
Payer: COMMERCIAL

## 2021-09-29 ENCOUNTER — OFFICE VISIT (OUTPATIENT)
Dept: FAMILY MEDICINE CLINIC | Facility: CLINIC | Age: 66
End: 2021-09-29
Payer: COMMERCIAL

## 2021-09-29 DIAGNOSIS — E11.9 TYPE 2 DIABETES MELLITUS WITHOUT COMPLICATION, WITHOUT LONG-TERM CURRENT USE OF INSULIN (HCC): Primary | ICD-10-CM

## 2021-09-29 PROCEDURE — G0109 DIAB MANAGE TRN IND/GROUP: HCPCS | Performed by: DIETITIAN, REGISTERED

## 2021-09-29 NOTE — PROGRESS NOTES
Living Well with Diabetes Group Class #4    Joanne Wright attended the Living Well with Diabetes Group Class #4  During class, Jenifer was instructed on the following topics: Types of cholesterol, dietary sources of cholesterol, types of fat, types of fiber, reading food labels- in depth, healthy choices when dining out  Ronny Hunt participated in group activities of reading labels together  Tiffanybritni Ruedayer will follow up with additional DSMT/MNT as desired  Will call with any questions or concerns prior to next session  The patient's history was reviewed and updated as appropriate: allergies, current medications  Lab Results   Component Value Date    HGBA1C 6 5 (H) 05/15/2021       Diabetes Education Record  Jenifer was provided Living Well with Diabetes Class #4 book & my contact information  Patient response to instruction    Comprehensionvery good  Motivationvery good  Expected Compliancegood    Begin Time: 9am  End Time: 10 am  Referring Provider: Brooke Blake PA-C    Thank you for referring your patient to [facility], it was a pleasure working with them today  Please feel free to call with any questions or concerns      Ivette Gillis [facility]

## 2021-09-29 NOTE — Clinical Note
Patient came for Class 4 of DSMES series  She was very interested, asked very good questions, and seems motivated to follow through   She has my name & contact information to call if she has any questions or desires any follow-up appointments

## 2021-10-01 ENCOUNTER — PATIENT OUTREACH (OUTPATIENT)
Dept: INTERNAL MEDICINE CLINIC | Facility: CLINIC | Age: 66
End: 2021-10-01

## 2021-10-05 ENCOUNTER — OFFICE VISIT (OUTPATIENT)
Dept: PHYSICAL THERAPY | Facility: REHABILITATION | Age: 66
End: 2021-10-05
Payer: COMMERCIAL

## 2021-10-05 DIAGNOSIS — G89.29 CHRONIC BILATERAL LOW BACK PAIN WITH LEFT-SIDED SCIATICA: Primary | ICD-10-CM

## 2021-10-05 DIAGNOSIS — M54.42 CHRONIC BILATERAL LOW BACK PAIN WITH LEFT-SIDED SCIATICA: Primary | ICD-10-CM

## 2021-10-05 PROCEDURE — 97112 NEUROMUSCULAR REEDUCATION: CPT

## 2021-10-05 PROCEDURE — 97110 THERAPEUTIC EXERCISES: CPT

## 2021-10-06 ENCOUNTER — PATIENT OUTREACH (OUTPATIENT)
Dept: INTERNAL MEDICINE CLINIC | Facility: CLINIC | Age: 66
End: 2021-10-06

## 2021-10-13 ENCOUNTER — OFFICE VISIT (OUTPATIENT)
Dept: PHYSICAL THERAPY | Facility: REHABILITATION | Age: 66
End: 2021-10-13
Payer: COMMERCIAL

## 2021-10-13 DIAGNOSIS — G89.29 CHRONIC BILATERAL LOW BACK PAIN WITH LEFT-SIDED SCIATICA: Primary | ICD-10-CM

## 2021-10-13 DIAGNOSIS — M54.42 CHRONIC BILATERAL LOW BACK PAIN WITH LEFT-SIDED SCIATICA: Primary | ICD-10-CM

## 2021-10-13 PROCEDURE — 97112 NEUROMUSCULAR REEDUCATION: CPT

## 2021-10-13 PROCEDURE — 97110 THERAPEUTIC EXERCISES: CPT

## 2021-10-15 ENCOUNTER — TELEPHONE (OUTPATIENT)
Dept: INTERNAL MEDICINE CLINIC | Facility: CLINIC | Age: 66
End: 2021-10-15

## 2021-10-20 ENCOUNTER — APPOINTMENT (OUTPATIENT)
Dept: PHYSICAL THERAPY | Facility: REHABILITATION | Age: 66
End: 2021-10-20
Payer: COMMERCIAL

## 2021-10-27 ENCOUNTER — EVALUATION (OUTPATIENT)
Dept: PHYSICAL THERAPY | Facility: REHABILITATION | Age: 66
End: 2021-10-27
Payer: COMMERCIAL

## 2021-10-27 DIAGNOSIS — M54.42 CHRONIC BILATERAL LOW BACK PAIN WITH LEFT-SIDED SCIATICA: Primary | ICD-10-CM

## 2021-10-27 DIAGNOSIS — G89.29 CHRONIC BILATERAL LOW BACK PAIN WITH LEFT-SIDED SCIATICA: Primary | ICD-10-CM

## 2021-10-27 PROCEDURE — 97110 THERAPEUTIC EXERCISES: CPT | Performed by: PHYSICAL THERAPIST

## 2021-11-04 ENCOUNTER — OFFICE VISIT (OUTPATIENT)
Dept: PHYSICAL THERAPY | Facility: REHABILITATION | Age: 66
End: 2021-11-04
Payer: COMMERCIAL

## 2021-11-04 DIAGNOSIS — M54.42 CHRONIC BILATERAL LOW BACK PAIN WITH LEFT-SIDED SCIATICA: Primary | ICD-10-CM

## 2021-11-04 DIAGNOSIS — G89.29 CHRONIC BILATERAL LOW BACK PAIN WITH LEFT-SIDED SCIATICA: Primary | ICD-10-CM

## 2021-11-04 PROCEDURE — 97110 THERAPEUTIC EXERCISES: CPT | Performed by: PHYSICAL THERAPIST

## 2021-11-04 PROCEDURE — 97112 NEUROMUSCULAR REEDUCATION: CPT | Performed by: PHYSICAL THERAPIST

## 2021-11-10 ENCOUNTER — APPOINTMENT (OUTPATIENT)
Dept: PHYSICAL THERAPY | Facility: REHABILITATION | Age: 66
End: 2021-11-10
Payer: COMMERCIAL

## 2021-11-17 ENCOUNTER — APPOINTMENT (OUTPATIENT)
Dept: PHYSICAL THERAPY | Facility: REHABILITATION | Age: 66
End: 2021-11-17
Payer: COMMERCIAL

## 2021-11-24 ENCOUNTER — APPOINTMENT (OUTPATIENT)
Dept: PHYSICAL THERAPY | Facility: REHABILITATION | Age: 66
End: 2021-11-24
Payer: COMMERCIAL

## 2021-12-08 ENCOUNTER — APPOINTMENT (OUTPATIENT)
Dept: LAB | Facility: CLINIC | Age: 66
End: 2021-12-08
Payer: COMMERCIAL

## 2021-12-08 DIAGNOSIS — E78.2 MIXED HYPERLIPIDEMIA: ICD-10-CM

## 2021-12-08 DIAGNOSIS — E11.65 TYPE 2 DIABETES MELLITUS WITH HYPERGLYCEMIA, WITHOUT LONG-TERM CURRENT USE OF INSULIN (HCC): ICD-10-CM

## 2021-12-08 LAB
ALBUMIN SERPL BCP-MCNC: 3.3 G/DL (ref 3.5–5)
ALP SERPL-CCNC: 56 U/L (ref 46–116)
ALT SERPL W P-5'-P-CCNC: 17 U/L (ref 12–78)
ANION GAP SERPL CALCULATED.3IONS-SCNC: 5 MMOL/L (ref 4–13)
AST SERPL W P-5'-P-CCNC: 8 U/L (ref 5–45)
BILIRUB SERPL-MCNC: 0.22 MG/DL (ref 0.2–1)
BUN SERPL-MCNC: 15 MG/DL (ref 5–25)
CALCIUM ALBUM COR SERPL-MCNC: 10.5 MG/DL (ref 8.3–10.1)
CALCIUM SERPL-MCNC: 9.9 MG/DL (ref 8.3–10.1)
CHLORIDE SERPL-SCNC: 109 MMOL/L (ref 100–108)
CHOLEST SERPL-MCNC: 155 MG/DL
CO2 SERPL-SCNC: 27 MMOL/L (ref 21–32)
CREAT SERPL-MCNC: 0.77 MG/DL (ref 0.6–1.3)
EST. AVERAGE GLUCOSE BLD GHB EST-MCNC: 134 MG/DL
GFR SERPL CREATININE-BSD FRML MDRD: 94 ML/MIN/1.73SQ M
GLUCOSE P FAST SERPL-MCNC: 109 MG/DL (ref 65–99)
HBA1C MFR BLD: 6.3 %
HDLC SERPL-MCNC: 60 MG/DL
LDLC SERPL CALC-MCNC: 85 MG/DL (ref 0–100)
POTASSIUM SERPL-SCNC: 3.9 MMOL/L (ref 3.5–5.3)
PROT SERPL-MCNC: 7 G/DL (ref 6.4–8.2)
SODIUM SERPL-SCNC: 141 MMOL/L (ref 136–145)
TRIGL SERPL-MCNC: 49 MG/DL

## 2021-12-08 PROCEDURE — 80061 LIPID PANEL: CPT

## 2021-12-08 PROCEDURE — 80053 COMPREHEN METABOLIC PANEL: CPT

## 2021-12-08 PROCEDURE — 83036 HEMOGLOBIN GLYCOSYLATED A1C: CPT

## 2021-12-08 PROCEDURE — 36415 COLL VENOUS BLD VENIPUNCTURE: CPT

## 2021-12-09 ENCOUNTER — OFFICE VISIT (OUTPATIENT)
Dept: PHYSICAL THERAPY | Facility: REHABILITATION | Age: 66
End: 2021-12-09
Payer: COMMERCIAL

## 2021-12-09 DIAGNOSIS — M54.42 CHRONIC BILATERAL LOW BACK PAIN WITH LEFT-SIDED SCIATICA: Primary | ICD-10-CM

## 2021-12-09 DIAGNOSIS — G89.29 CHRONIC BILATERAL LOW BACK PAIN WITH LEFT-SIDED SCIATICA: Primary | ICD-10-CM

## 2021-12-09 PROCEDURE — 97110 THERAPEUTIC EXERCISES: CPT | Performed by: PHYSICAL THERAPIST

## 2021-12-09 PROCEDURE — 97112 NEUROMUSCULAR REEDUCATION: CPT | Performed by: PHYSICAL THERAPIST

## 2021-12-10 ENCOUNTER — OFFICE VISIT (OUTPATIENT)
Dept: INTERNAL MEDICINE CLINIC | Facility: CLINIC | Age: 66
End: 2021-12-10

## 2021-12-10 VITALS
SYSTOLIC BLOOD PRESSURE: 119 MMHG | HEART RATE: 116 BPM | OXYGEN SATURATION: 99 % | HEIGHT: 63 IN | TEMPERATURE: 97.8 F | DIASTOLIC BLOOD PRESSURE: 78 MMHG | WEIGHT: 179 LBS | BODY MASS INDEX: 31.71 KG/M2

## 2021-12-10 DIAGNOSIS — M54.42 CHRONIC BILATERAL LOW BACK PAIN WITH LEFT-SIDED SCIATICA: ICD-10-CM

## 2021-12-10 DIAGNOSIS — Z00.00 HEALTHCARE MAINTENANCE: ICD-10-CM

## 2021-12-10 DIAGNOSIS — E11.42 TYPE 2 DIABETES MELLITUS WITH DIABETIC POLYNEUROPATHY, WITHOUT LONG-TERM CURRENT USE OF INSULIN (HCC): Primary | ICD-10-CM

## 2021-12-10 DIAGNOSIS — G89.29 CHRONIC BILATERAL LOW BACK PAIN WITH LEFT-SIDED SCIATICA: ICD-10-CM

## 2021-12-10 DIAGNOSIS — I10 BENIGN ESSENTIAL HYPERTENSION: ICD-10-CM

## 2021-12-10 DIAGNOSIS — E11.42 DIABETIC PERIPHERAL NEUROPATHY (HCC): ICD-10-CM

## 2021-12-10 DIAGNOSIS — E78.2 MIXED HYPERLIPIDEMIA: ICD-10-CM

## 2021-12-10 PROBLEM — E11.9 TYPE 2 DIABETES MELLITUS, WITHOUT LONG-TERM CURRENT USE OF INSULIN (HCC): Status: ACTIVE | Noted: 2021-02-01

## 2021-12-10 PROCEDURE — 99214 OFFICE O/P EST MOD 30 MIN: CPT | Performed by: PHYSICIAN ASSISTANT

## 2021-12-10 RX ORDER — GABAPENTIN 300 MG/1
600 CAPSULE ORAL 3 TIMES DAILY
Qty: 540 CAPSULE | Refills: 0 | Status: SHIPPED | OUTPATIENT
Start: 2021-12-10 | End: 2022-05-06 | Stop reason: SDUPTHER

## 2021-12-10 RX ORDER — LOVASTATIN 20 MG/1
20 TABLET ORAL
Qty: 90 TABLET | Refills: 1 | Status: SHIPPED | OUTPATIENT
Start: 2021-12-10 | End: 2022-02-03 | Stop reason: SDUPTHER

## 2021-12-10 RX ORDER — LOVASTATIN 20 MG/1
20 TABLET ORAL
Qty: 90 TABLET | Refills: 1 | Status: SHIPPED | OUTPATIENT
Start: 2021-12-10 | End: 2021-12-10 | Stop reason: SDUPTHER

## 2021-12-10 RX ORDER — GABAPENTIN 300 MG/1
600 CAPSULE ORAL 3 TIMES DAILY
Qty: 540 CAPSULE | Refills: 0 | Status: SHIPPED | OUTPATIENT
Start: 2021-12-10 | End: 2021-12-10 | Stop reason: SDUPTHER

## 2021-12-13 LAB
LEFT EYE DIABETIC RETINOPATHY: NORMAL
LEFT EYE IMAGE QUALITY: NORMAL
LEFT EYE MACULAR EDEMA: NORMAL
LEFT EYE OTHER RETINOPATHY: NORMAL
RIGHT EYE DIABETIC RETINOPATHY: NORMAL
RIGHT EYE IMAGE QUALITY: NORMAL
RIGHT EYE MACULAR EDEMA: NORMAL
RIGHT EYE OTHER RETINOPATHY: NORMAL
SEVERITY (EYE EXAM): NORMAL

## 2021-12-16 ENCOUNTER — OFFICE VISIT (OUTPATIENT)
Dept: PHYSICAL THERAPY | Facility: REHABILITATION | Age: 66
End: 2021-12-16
Payer: COMMERCIAL

## 2021-12-16 DIAGNOSIS — M54.42 CHRONIC BILATERAL LOW BACK PAIN WITH LEFT-SIDED SCIATICA: Primary | ICD-10-CM

## 2021-12-16 DIAGNOSIS — G89.29 CHRONIC BILATERAL LOW BACK PAIN WITH LEFT-SIDED SCIATICA: Primary | ICD-10-CM

## 2021-12-16 PROCEDURE — 97110 THERAPEUTIC EXERCISES: CPT | Performed by: PHYSICAL THERAPIST

## 2021-12-16 PROCEDURE — 97112 NEUROMUSCULAR REEDUCATION: CPT | Performed by: PHYSICAL THERAPIST

## 2022-01-03 ENCOUNTER — OFFICE VISIT (OUTPATIENT)
Dept: PHYSICAL THERAPY | Facility: REHABILITATION | Age: 67
End: 2022-01-03
Payer: COMMERCIAL

## 2022-01-03 DIAGNOSIS — G89.29 CHRONIC BILATERAL LOW BACK PAIN WITH LEFT-SIDED SCIATICA: Primary | ICD-10-CM

## 2022-01-03 DIAGNOSIS — M54.42 CHRONIC BILATERAL LOW BACK PAIN WITH LEFT-SIDED SCIATICA: Primary | ICD-10-CM

## 2022-01-03 PROCEDURE — 97110 THERAPEUTIC EXERCISES: CPT | Performed by: PHYSICAL THERAPIST

## 2022-01-03 PROCEDURE — 97112 NEUROMUSCULAR REEDUCATION: CPT | Performed by: PHYSICAL THERAPIST

## 2022-01-03 NOTE — PROGRESS NOTES
Daily Note     Today's date: 1/3/2022  Patient name: Ruthy Blanc  : 1955  MRN: [id number]  Referring provider: Ponce Grant PA-C  Dx:   Encounter Diagnosis     ICD-10-CM    1  Chronic bilateral low back pain with left-sided sciatica  M54 42     G89 29                   Subjective: Pt reports complying with HEP daily, which helps to manage her pain  Pt notes intermittent posterior LE pain to her knees, R>L  Objective: See treatment diary below      Assessment: Tolerated treatment well  Patient demonstrated fatigue post treatment and states not having much pain  Plan: Continue per plan of care  Progress treatment as tolerated  Precautions: chronic pain, OA, diabetes, HTN          Manuals   9/17 10/5 10/13  10/27  11/4  12/9  12/16  1/3                                                                                                     Neuro Re-Ed                       TA contraction   5"x2' 5"x2' 5"x2'    5"x2'  np         TA w/hip add squeeze   5"x10 5"x10 5"x10    5"x15  5"x15  np       TA w/march   10 ea  [address]     10ea  10ea  [id number]  [id number]     TA w/kicks   10 ea  10 ea  10 ea     10ea  10ea  x15ea  x15ea     TA w/bridges   3"x10 3"x10 3"x10    3"x10  3"x10  3"x15  3"x15     TA w/clamshells   3"x10 ea 3"x10 ea  3"x10 ea     nv      otb hooklying 3"x15     Standing pball crush   5"x10 5"x10 5"x10    5'x10  5"x10  np       TA w/LPD   otb 3"x10 np np    otb 3"x15  otb 3"x15  otb 3"x15  otb 3" 2x10     TA w/multifidus press   otb 3"x10 ea   np np        otb 3"x10ea  otb 3" x15ea     Ther Ex                       Bike or Nustep   nustep L3x10' nustep L3x10' Bike 10'    bike x10'  bike x10'  NS L4x10'  NS L5x10'     MICHELET   10"x10 held                 Standing trunk extension   5"x10 5"x10 5"x10   5"x10      5"x10     SLR x 3 in standing b/l              x10ea b/l x10ea b/l  2x10ea b/l                              REassessment          TP             Pt education + HEP instruction TP 8 mins                                             Ther Activity                       TA w/mini squats              x10  x10  x15     TA w/sidestepping   2 laps np np      2 laps at [employer] laps at window  3 laps at window     Gait Training                                                                       Modalities

## 2022-01-07 ENCOUNTER — OFFICE VISIT (OUTPATIENT)
Dept: OBGYN CLINIC | Facility: CLINIC | Age: 67
End: 2022-01-07

## 2022-01-07 VITALS
BODY MASS INDEX: 31.71 KG/M2 | HEIGHT: 63 IN | WEIGHT: 179 LBS | HEART RATE: 100 BPM | DIASTOLIC BLOOD PRESSURE: 74 MMHG | SYSTOLIC BLOOD PRESSURE: 129 MMHG

## 2022-01-07 DIAGNOSIS — F17.200 NEEDS SMOKING CESSATION EDUCATION: ICD-10-CM

## 2022-01-07 DIAGNOSIS — Z00.00 HEALTHCARE MAINTENANCE: ICD-10-CM

## 2022-01-07 DIAGNOSIS — Z01.419 WOMEN'S ANNUAL ROUTINE GYNECOLOGICAL EXAMINATION: Primary | ICD-10-CM

## 2022-01-07 DIAGNOSIS — Z12.39 ENCOUNTER FOR BREAST CANCER SCREENING USING NON-MAMMOGRAM MODALITY: ICD-10-CM

## 2022-01-07 DIAGNOSIS — Z12.11 COLON CANCER SCREENING: ICD-10-CM

## 2022-01-07 DIAGNOSIS — Z12.4 CERVICAL CANCER SCREENING: ICD-10-CM

## 2022-01-07 DIAGNOSIS — F32.A DEPRESSION, UNSPECIFIED DEPRESSION TYPE: ICD-10-CM

## 2022-01-07 PROCEDURE — G0101 CA SCREEN;PELVIC/BREAST EXAM: HCPCS | Performed by: NURSE PRACTITIONER

## 2022-01-07 NOTE — PATIENT INSTRUCTIONS
Cigarette Smoking and Your Health     What are the risks to my health if I smoke tobacco?  Nicotine and other chemicals found in tobacco damage every cell in your body  Even if you are a light smoker, you have an increased risk for cancer, heart disease, and lung disease  If you are pregnant or have diabetes, smoking increases your risk for complications  What are the benefits to my health if I stop smoking? · You decrease respiratory symptoms such as coughing, wheezing, and shortness of breath  · You reduce your risk for cancers of the lung, mouth, throat, kidney, bladder, pancreas, stomach, and cervix  If you already have cancer, you increase the benefits of chemotherapy  You also reduce your risk for cancer returning or a second cancer from developing  · You reduce your risk for heart disease, blood clots, heart attack, and stroke  · You reduce your risk for lung infections, and diseases such as pneumonia, asthma, chronic bronchitis, and emphysema  · Your circulation improves  More oxygen can be delivered to your body  If you have diabetes, you lower your risk for complications, such as kidney, artery, and eye diseases  You also lower your risk for nerve damage  Nerve damage can lead to amputations, poor vision, and blindness  · You improve your body's ability to heal and to fight infections  What are the health benefits to others if I stop smoking? Tobacco is harmful to nonsmokers who breathe in your secondhand smoke  The following are ways the health of others around you may improve when you stop smoking:  · You lower the risks for lung cancer and heart disease in nonsmoking adults  · If you are pregnant, you lower the risk for miscarriage, early delivery, low birth weight, and stillbirth  You also lower your baby's risk for SIDS, obesity, developmental delay, and neurobehavioral problems, such as ADHD       · If you have children, you lower their risk for ear infections, colds, pneumonia, bronchitis, and asthma  How to Stop Smoking     You will improve your health and the health of others around you  if you stop smoking  Your risk for heart and lung disease, cancer, stroke, heart attack, and vision problems will also decrease  You can benefit from quitting no matter how long you have smoked  PREPARE to stop smoking  Nicotine is a highly addictive drug found in cigarettes  Withdrawal symptoms can happen when you stop smoking and make it hard to quit  These include anxiety, depression, irritability, trouble sleeping, and increased appetite  You increase your chances of success if you PREPARE to quit  · Set a quit date  Romina Andrade a date that is within the next 2 weeks  Do not pick a day that you think may be stressful or busy  Write down the day or [place] it on your calender  · Tell friends and family that you plan to quit  Explain that you may have withdrawal symptoms when you try to quit  Ask them to support you  They may be able to encourage you and help reduce your stress to make it easier for you to quit  · Make a list of your reasons for quitting  Put the list somewhere you will see it every day, such as your refrigerator  You can look at the list when you have a craving  · Remove all tobacco and nicotine products from your home, car, and workplace  Also, remove anything else that will tempt you to smoke, such as lighters, matches, or ashtrays  Clean your car, home, and places at work that smell like smoke  The smell of smoke can trigger a craving  · Identify triggers that make you want to smoke  This may include activities, feelings, or people  Also write down 1 way you can deal with each of your triggers  For example, if you want to smoke as soon as you wake up, plan another activity during this time, such as exercise  · Make a plan for how you will quit    Learn about the tools that can help you quit, such as medicine, counseling, or nicotine replacement therapy  Choose at least 2 options to help you quit  Tools to help you stop smoking:     · Counseling  from a trained healthcare provider can provide you with support and skills to quit smoking  The provider will also teach you to manage your withdrawal symptoms and cravings  You may receive counseling from one counselor, in group therapy, or through phone therapy called a quit line  · Nicotine replacement therapy (NRT)  such as nicotine patches, gum, or lozenges may help reduce your nicotine cravings  You may get these without a doctor's order  Do not use e-cigarettes or smokeless tobacco in place of cigarettes or to help you quit  They still contain nicotine  · Prescription medicines  such as nasal sprays or nicotine inhalers may help reduce your withdrawal symptoms  Other medicines may also be used to reduce your urge to smoke  Ask your healthcare provider about these medicines  You may need to start certain medicines 2 weeks before your quit date for them to work well  · Hypnosis  is a practice that helps guide you through thoughts and feelings  Hypnosis may help decrease your cravings and make you more willing to quit  · Acupuncture therapy  uses very thin needles to balance energy channels in the body  This is thought to help decrease cravings and symptoms of nicotine withdrawal      · Support groups  let you talk to others who are trying to quit or have already quit  It may be helpful to speak with others about how they quit  Manage your cravings:     · Avoid situations, people, and places that tempt you to smoke  Go to nonsmoking places, such as libraries or restaurants  Understand what tempts you and try to avoid these things  · Keep your hands busy  Hold things such as a stress ball or pen  · Put candy or toothpicks in your mouth  Keep lollipops, sugarless gum, or toothpicks with you at all times  · Do not have alcohol or caffeine    These drinks may tempt you to smoke  Drink healthy liquids such as water or juice instead  · Reward yourself when you resist your cravings  Rewards will motivate you and help you stay positive  · Do an activity that distracts you from your craving  Examples include going for a walk, exercising, or cleaning  Prevent weight gain after you quit:  You may gain a few pounds after you quit smoking  It is healthier for you to gain a few pounds than to continue to smoke  The following can help you prevent weight gain:    · Eat healthy foods  These include fruits, vegetables, whole-grain breads, low-fat dairy products, beans, lean meats, and fish  Eat healthy snacks, such as low-fat yogurt, if you get hungry between meals  · Drink water before, during, and between meals  This will make your stomach feel full and help prevent you from overeating  Ask your healthcare provider how much liquid to drink each day and which liquids are best for you  · Exercise  Take a walk or do some kind of exercise every day  Ask your healthcare provider what exercise is right for you  This may help reduce your cravings and reduce stress  OBESITY     Obesity is defined as a body mass index (BMI) which is greater than 30  Your There is no height or weight on file to calculate BMI       The risks of obesity include  many health problems, such as injuries or physical disability  You may need tests to check for the following:  Diabetes     High blood pressure or high cholesterol     Heart disease     Gallbladder or liver disease     Cancer of the colon, breast, prostate, liver, or kidney     Sleep apnea     Arthritis or gout    Seek care immediately if:   You have a severe headache, confusion, or difficulty speaking  You have weakness on one side of your body  You have chest pain, sweating, or shortness of breath  Contact your healthcare provider if:   You have symptoms of gallbladder or liver disease, such as pain in your upper abdomen      You have knee or hip pain and discomfort while walking  You have symptoms of diabetes, such as intense hunger and thirst, and frequent urination  You have symptoms of sleep apnea, such as snoring or daytime sleepiness  You have questions or concerns about your condition or care  Treatment for obesity  focuses on helping you lose weight to improve your health  Even a small decrease in BMI can reduce the risk for many health problems  Your healthcare provider will help you set a weight-loss goal   Lifestyle changes  are the first step in treating obesity  These include making healthy food choices and getting regular physical activity  Your healthcare provider may suggest a weight-loss program that involves coaching, education, and therapy  Medicine  may help you lose weight when it is used with a healthy diet and physical activity  Surgery  can help you lose weight if you are very obese and have other health problems  There are several types of weight-loss surgery  Ask your healthcare provider for more information  Be successful losing weight:   Set small, realistic goals  An example of a small goal is to walk for 20 minutes 5 days a week  Anther goal is to lose 5% of your body weight  Tell friends, family members, and coworkers about your goals  and ask for their support  Ask a friend to lose weight with you, or join a weight-loss support group  Identify foods or triggers that may cause you to overeat , and find ways to avoid them  Remove tempting high-calorie foods from your home and workplace  Place a bowl of fresh fruit on your kitchen counter  If stress causes you to eat, then find other ways to cope with stress  Keep a diary to track what you eat and drink  Also write down how many minutes of physical activity you do each day  Weigh yourself once a week and record it in your diary  Eating changes:   You will need to eat 500 to 1,000 fewer calories each day than you currently eat to lose 1 to 2 pounds a week  The following changes will help you cut calories:  Eat smaller portions  Use small plates, no larger than 9 inches in diameter  Fill your plate half full of fruits and vegetables  Measure your food using measuring cups until you know what a serving size looks like  Eat 3 meals and 1 or 2 snacks each day  Plan your meals in advance  Ubaldo Barrera and eat at home most of the time  Eat slowly  Eat fruits and vegetables at every meal   They are low in calories and high in fiber, which makes you feel full  Do not add butter, margarine, or cream sauce to vegetables  Use herbs to season steamed vegetables  Eat less fat and fewer fried foods  Eat more baked or grilled chicken and fish  These protein sources are lower in calories and fat than red meat  Limit fast food  Dress your salads with olive oil and vinegar instead of bottled dressing  Limit the amount of sugar you eat  Do not drink sugary beverages  Limit alcohol  Activity changes:  Physical activity is good for your body in many ways  It helps you burn calories and build strong muscles  It decreases stress and depression, and improves your mood  It can also help you sleep better  Talk to your healthcare provider before you begin an exercise program   Exercise for at least 30 minutes 5 days a week  Start slowly  Set aside time each day for physical activity that you enjoy and that is convenient for you  It is best to do both weight training and an activity that increases your heart rate, such as walking, bicycling, or swimming  Find ways to be more active  Do yard work and housecleaning  Walk up the stairs instead of using elevators  Spend your leisure time going to events that require walking, such as outdoor festivals or fairs  This extra physical activity can help you lose weight and keep it off  Follow up with your primary healthcare provider as directed  You may need to meet with a dietitian   Write down your questions so you remember to ask them during your visits

## 2022-01-07 NOTE — PROGRESS NOTES
ANNUAL GYNECOLOGICAL EXAMINATION    Carmelita Forte is a 73 y o  female who presents today for annual GYN exam   She had a hysterectomy about 19 years ago due to fibroids  No history of abnormal pap smears  Her last mammogram was performed 2018 and result was right breast BI-RADS 1 and left breast BI-RADS 3, follow up right breast US BI-RADS 3 with recommendation for repeat imaging in 6 months  She is scheduled for her next mammogram  She had colon cancer screening performed 2018 with recommendation to repeat in 3 years  No LMP recorded  Patient has had a hysterectomy  Her general medical history has been reviewed and she reports it as follows:    Past Medical History:   Diagnosis Date    Arthritis     Bump     bumped head yesterday at work "saw stars" no LOC, cat scan done was normal    Circulation problem     Diabetes mellitus ([facility] [address] )     blood sugar 125 on admission    Encounter for screening colonoscopy     1 st one    Headache     Hyperlipidemia     Hypertension     Positive fecal occult blood test 2018    Added automatically from request for surgery [id number]    Post concussion syndrome 2018    Syncope      Past Surgical History:   Procedure Laterality Date    COLONOSCOPY N/A 2018    Procedure: COLONOSCOPY;  Surgeon: Abelino Cardona MD;  [facility] GI [facility]; Service: Gastroenterology    HYSTERECTOMY      INCISIONAL BREAST BIOPSY      last assessed 2017    [place] COLONOSCOPY FLX DX W/COLLJ SPEC WHEN PFRMD N/A 2018    Procedure: COLONOSCOPY;  Surgeon: Abelino Cardona MD;  Location:  GI LAB;   Service: Gastroenterology     OB History        6    Para   6    Term   6            AB        Living   6       SAB        IAB        Ectopic        Multiple        Live Births                   Social History     Tobacco Use    Smoking status: Current Every Day Smoker     Packs/day: 0 50     Types: Cigarettes    Smokeless tobacco: Never Used    Tobacco comment: smokes less than 1pk/day   Vaping Use    Vaping Use: Never used   Substance Use Topics    Alcohol use: Never    Drug use: Never     Cancer-related family history includes Breast cancer in her daughter; Cancer in her cousin  Current Outpatient Medications   Medication Instructions    Accu-Chek Softclix Lancets lancets Other, 2 times daily, Use as instructed    acetaminophen (TYLENOL) 650 mg, Oral, Every 6 hours PRN    aspirin (ECOTRIN LOW STRENGTH) 81 mg, Oral, Daily    Blood Glucose Monitoring Suppl (Accu-Chek Guide) w/Device KIT Does not apply, 2 times daily    gabapentin (NEURONTIN) 600 mg, Oral, 3 times daily    glipiZIDE (GLUCOTROL) 5 mg, Oral, Daily with breakfast    glucose blood (Accu-Chek Guide) test strip 1 each, Other, 2 times daily, Use as instructed    lisinopril-hydrochlorothiazide (PRINZIDE,ZESTORETIC) 10-12 5 MG per tablet 1 tablet, Oral, Daily    lovastatin (MEVACOR) 20 mg, Oral, Daily at bedtime    metFORMIN (GLUCOPHAGE) 1000 MG tablet Take 1 tablet by mouth twice daily with food    methocarbamol (ROBAXIN) 500 mg, Oral, Daily at bedtime PRN    omeprazole (PRILOSEC) 20 mg, Oral, Daily       Review of Systems:  Review of Systems   Constitutional: Negative  Gastrointestinal: Negative  Genitourinary: Negative  Skin: Negative  Physical Exam:  /74   Pulse 100   Ht 5' 3" (1 6 m)   Wt 81 2 kg (179 lb)   BMI 31 71 kg/m²   Physical Exam  Constitutional:       General: She is not in acute distress  Appearance: Normal appearance  Genitourinary:      Vulva and bladder normal       No lesions in the vagina  Vaginal cuff intact  No vaginal erythema or ulceration  Right Adnexa: not tender and no mass present  Left Adnexa: not tender and no mass present  Cervix is absent  Uterus is absent  Breasts:      Right: No mass, nipple discharge or skin change  Left: No mass, nipple discharge or skin change         Cardiovascular:      Rate and Rhythm: Normal rate and regular rhythm  Pulmonary:      Effort: Pulmonary effort is normal       Breath sounds: Normal breath sounds  Abdominal:      General: Abdomen is flat  Palpations: Abdomen is soft  Musculoskeletal:      Cervical back: Neck supple  Neurological:      Mental Status: She is alert  Skin:     General: Skin is warm and dry  Psychiatric:         Mood and Affect: Mood normal          Behavior: Behavior normal    Vitals reviewed  Assessment/Plan:   1  Normal well-woman GYN exam   2  Cervical cancer screening:  Cervix is surgically absent  Vaginal cuff intact  No need for further pap smears due to hysterectomy and history of normal    3  Breast cancer screening:  Normal breast exam   She is scheduled for her next mammogram  Reviewed breast self-awareness  4  Colon cancer screening:  Last in 2018, due for 3 year repeat  Order placed for consultation with Gastroenterology for consultation to discuss screening  5  Depression Screening: Patient's depression screening was assessed with a PHQ-2 score of 0  Their PHQ-9 score was 0  Patient reports struggling with loss of a son and grandson, and her daughters breast cancer diagnosis  She is interested in talking with someone  Referral placed for   6  BMI Counseling: Body mass index is 31 71 kg/m²  Discussed the patient's BMI with her  The BMI is above normal  Nutrition recommendations include reducing portion sizes, decreasing overall calorie intake, 3-5 servings of fruits/vegetables daily, moderation in carbohydrate intake and increasing intake of lean protein  Exercise recommendations include moderate aerobic physical activity for 150 minutes/week and exercising 3-5 times per week  7  Tobacco Cessation Counseling: Tobacco cessation counseling and education was provided   The patient is sincerely urged to quit consumption of tobacco  She is ready to quit tobacco  The numerous health risks of tobacco consumption were discussed  Referral placed for Smoking cessation program    8  Return to office in one year and PRN

## 2022-01-10 ENCOUNTER — PATIENT OUTREACH (OUTPATIENT)
Dept: OBGYN CLINIC | Facility: CLINIC | Age: 67
End: 2022-01-10

## 2022-01-10 ENCOUNTER — APPOINTMENT (OUTPATIENT)
Dept: PHYSICAL THERAPY | Facility: REHABILITATION | Age: 67
End: 2022-01-10
Payer: COMMERCIAL

## 2022-01-10 NOTE — PROGRESS NOTES
TERE PHILIPPE spoke with 72 y/o-P6- English speaking woman to address [address] needs  Pt resides with her son  Pt denies any MH history  Pt reported she is grieving the loss of her son and grandson  Pt denies any SI  / HI but interested on therapy  Pt has medicare and  Requested TERE PHILIPPE assistance to find local provider to schedule an appointment  TERE PHILIPPE called [facility] and they do not accept her insurance  [facility] has waiting list   TERE PHILIPPE will continue to locate resources  Pt aware

## 2022-01-11 ENCOUNTER — PATIENT OUTREACH (OUTPATIENT)
Dept: OBGYN CLINIC | Facility: CLINIC | Age: 67
End: 2022-01-11

## 2022-01-11 NOTE — PROGRESS NOTES
TERE PHILIPPE called pts insurance today to see who is in network for [facility] providers locally  TERE PHILIPPE reached out to [address] - they would like the patient to call to set up appt herself  DENAE called the patient, offered to call agencies together to set up appointment in a voice message  Awaiting return call

## 2022-01-12 ENCOUNTER — PATIENT OUTREACH (OUTPATIENT)
Dept: OBGYN CLINIC | Facility: CLINIC | Age: 67
End: 2022-01-12

## 2022-01-12 NOTE — PROGRESS NOTES
TERE PHILIPPE was able to get in touch with Jenifer today, [facility], we called [facility] in [place] together and they were able to schedule Jenifer for February 8th at 4:00pm with a therapist to start care  [facility] also having some difficulties re-scheduling a mamogram that was cancelled on 1/7/2022, TERE PHILIPPE called central scheduling, spoke with Tereso Herrera who advised she would be in touch with the patient about re-scheduling - likely at the [facility] breast [place]  TERE PHILIPPE provided Jenifer with central schedulings number incase she does not hear back by the end of today  TERE PHILIPPE will f/u with Jenifer after her initial therapy appointment to see how she is doing and see if she was able to make it to care

## 2022-01-19 ENCOUNTER — TELEPHONE (OUTPATIENT)
Dept: INTERNAL MEDICINE CLINIC | Facility: CLINIC | Age: 67
End: 2022-01-19

## 2022-01-19 DIAGNOSIS — N64.4 BREAST PAIN, RIGHT: ICD-10-CM

## 2022-01-19 DIAGNOSIS — N60.01 BREAST CYST, RIGHT: Primary | ICD-10-CM

## 2022-01-19 NOTE — TELEPHONE ENCOUNTER
Ya from [address] [place] [address] wanted to have 's ordered signed before patient appt tomorrow  She stated she sent the orders directly to dr Emiliano Bolaños  She has two orders waiting for a signature   Rui Small can reached at [phone]

## 2022-01-21 ENCOUNTER — HOSPITAL ENCOUNTER (OUTPATIENT)
Dept: MAMMOGRAPHY | Facility: CLINIC | Age: 67
Discharge: HOME | End: 2022-01-21
Payer: COMMERCIAL

## 2022-01-21 ENCOUNTER — HOSPITAL ENCOUNTER (OUTPATIENT)
Dept: ULTRASOUND IMAGING | Facility: CLINIC | Age: 67
Discharge: HOME | End: 2022-01-21
Payer: COMMERCIAL

## 2022-01-21 VITALS — HEIGHT: 63 IN | WEIGHT: 179 LBS | BODY MASS INDEX: 31.71 KG/M2

## 2022-01-21 DIAGNOSIS — N64.4 BREAST PAIN, RIGHT: ICD-10-CM

## 2022-01-21 DIAGNOSIS — N60.01 BREAST CYST, RIGHT: ICD-10-CM

## 2022-01-21 PROCEDURE — G0279 TOMOSYNTHESIS, MAMMO: HCPCS

## 2022-01-21 PROCEDURE — 77066 DX MAMMO INCL CAD BI: CPT

## 2022-01-21 PROCEDURE — 76642 ULTRASOUND BREAST LIMITED: CPT

## 2022-01-21 NOTE — PROGRESS NOTES
Met with patient and Lou Belcher   regarding recommendation for;    ____x_ RIGHT ______LEFT      ___x__Ultrasound guided  ______Stereotactic breast biopsy  __X___Verbalized understanding        Blood thinners:  No: _____ Yes: _x_____ What:       ASA 81 mg          Biopsy teaching sheet given:  Yes: ___X___ No: ________    Pt given contact information and adv to call with any questions/needs

## 2022-01-31 ENCOUNTER — TELEPHONE (OUTPATIENT)
Dept: INTERNAL MEDICINE CLINIC | Facility: CLINIC | Age: 67
End: 2022-01-31

## 2022-01-31 NOTE — TELEPHONE ENCOUNTER
Folder Color- Blue    Name of Form- [facility] Detailed Written Order    Form to be filled out byAnthony Chawla    Form to be Faxed to [phone]    Patient made aware of 10 business day policy

## 2022-02-02 ENCOUNTER — CONSULT (OUTPATIENT)
Dept: GASTROENTEROLOGY | Facility: CLINIC | Age: 67
End: 2022-02-02
Payer: COMMERCIAL

## 2022-02-02 VITALS
DIASTOLIC BLOOD PRESSURE: 85 MMHG | RESPIRATION RATE: 18 BRPM | SYSTOLIC BLOOD PRESSURE: 141 MMHG | TEMPERATURE: 97.6 F | WEIGHT: 183 LBS | OXYGEN SATURATION: 96 % | BODY MASS INDEX: 32.43 KG/M2 | HEART RATE: 98 BPM | HEIGHT: 63 IN

## 2022-02-02 DIAGNOSIS — Z12.11 COLON CANCER SCREENING: ICD-10-CM

## 2022-02-02 PROCEDURE — 99203 OFFICE O/P NEW LOW 30 MIN: CPT | Performed by: INTERNAL MEDICINE

## 2022-02-02 NOTE — PATIENT INSTRUCTIONS
Scheduled date of colonoscopy (as of today): 4/21/22  Physician performing colonoscopy: Dr Staci Reynolds  Location of colonoscopy: [facility]   Bowel prep reviewed with patient: miralax/mag citrate  Instructions reviewed with patient by: Claire ALONSO  Clearances:  n/a

## 2022-02-02 NOTE — PROGRESS NOTES
[facility]  Pita Calvert 73 y o  female MRN: [id number]  Encounter: [id number]      ASSESSMENT AND PLAN:    Pita Calvert is a 73 y o  old female with no relevant past medical history presents for colonoscopy for colon cancer screening    Colon cancer screening - last colonoscopy 2018 without precancerous polyps however inadequate bowel prep in the sigmoid colon  She was recommended follow-up in 3 years  Otherwise, patient is asymptomatic  No family history of colon cancer  · Plan for colonoscopy        1  Colon cancer screening  - Ambulatory referral to Gastroenterology  - Colonoscopy; Future    ______________________________________________________________________    SUBJECTIVE:  Pita Calvert is a 73 y o  old female with no relevant past medical history presents for colonoscopy for colon cancer screening  Patient denies abdominal pain nausea vomiting constipation diarrhea  She feels well overall  She is here because of previous colonoscopy recommended 3 year follow-up  No family history of colon cancer  REVIEW OF SYSTEMS IS OTHERWISE NEGATIVE  Historical Information   Past Medical History:   Diagnosis Date    Arthritis     Bump     bumped head yesterday at work "saw stars" no LOC, cat scan done was normal    Circulation problem     Diabetes mellitus ([address] )     blood sugar 125 on admission    Encounter for screening colonoscopy     1 st one    Headache     Hyperlipidemia     Hypertension     Positive fecal occult blood test 08/30/2018    Added automatically from request for surgery [id number]    Post concussion syndrome 09/14/2018    Syncope      Past Surgical History:   Procedure Laterality Date    COLONOSCOPY N/A 9/26/2018    Procedure: COLONOSCOPY;  Surgeon: Rhiannon Edmonds MD;  Location: [facility] GI [facility];   Service: Gastroenterology    HYSTERECTOMY  2017    INCISIONAL BREAST BIOPSY      last assessed [id number]    [place] COLONOSCOPY FLX DX W/COLLJ SPEC WHEN PFRMD N/A 7/5/2018    Procedure: COLONOSCOPY;  Surgeon: Nasreen Valiente MD;  Location: [facility];   Service: Gastroenterology     Social History   Social History     Substance and Sexual Activity   Alcohol Use Never     Social History     Substance and Sexual Activity   Drug Use Never     Social History     Tobacco Use   Smoking Status Current Every Day Smoker    Packs/day: 0 50    Types: Cigarettes   Smokeless Tobacco Never Used   Tobacco Comment    smokes less than 1pk/day     Family History   Problem Relation Age of Onset    Breast cancer Cousin 48    Alcohol abuse Mother     Alcohol abuse Father     Breast cancer Daughter 45    No Known Problems Sister     No Known Problems Maternal Grandmother     No Known Problems Maternal Grandfather     No Known Problems Paternal Grandmother     No Known Problems Paternal Grandfather     No Known Problems Son     No Known Problems Son     No Known Problems Daughter     No Known Problems Daughter     No Known Problems Daughter     Breast cancer Sister 77    No Known Problems Maternal Aunt     No Known Problems Maternal Aunt     No Known Problems Maternal Aunt     No Known Problems Maternal Aunt     No Known Problems Maternal Aunt     No Known Problems Maternal Aunt     No Known Problems Maternal Aunt     No Known Problems Maternal Aunt     No Known Problems Maternal Aunt     No Known Problems Maternal Aunt     No Known Problems Maternal Aunt     No Known Problems Maternal Aunt     No Known Problems Paternal Aunt     No Known Problems Paternal Aunt     Dementia Paternal Aunt     No Known Problems Paternal Aunt     No Known Problems Paternal Aunt     No Known Problems Paternal Aunt     No Known Problems Paternal Aunt     No Known Problems Paternal Aunt     Colon cancer Neg Hx     Endometrial cancer Neg Hx     Ovarian cancer Neg Hx        Meds/Allergies       Current Outpatient Medications:     Accu-Chek Softclix Lancets lancets    acetaminophen (TYLENOL) 325 mg tablet    aspirin (ECOTRIN LOW STRENGTH) 81 mg EC tablet    Blood Glucose Monitoring Suppl (Accu-Chek Guide) w/Device KIT    gabapentin (NEURONTIN) 300 mg capsule    glipiZIDE (GLUCOTROL) 5 mg tablet    glucose blood (Accu-Chek Guide) test strip    lisinopril-hydrochlorothiazide (PRINZIDE,ZESTORETIC) 10-12 5 MG per tablet    lovastatin (MEVACOR) 20 mg tablet    metFORMIN (GLUCOPHAGE) 1000 MG tablet    omeprazole (PriLOSEC) 20 mg delayed release capsule    methocarbamol (ROBAXIN) 500 mg tablet    No Known Allergies        Objective     Blood pressure 141/85, pulse 98, temperature 97 6 °F (36 4 °C), temperature source Tympanic, resp  rate 18, height 5' 3" (1 6 m), weight 83 kg (183 lb), SpO2 96 %  Body mass index is 32 42 kg/m²  PHYSICAL EXAM:      General Appearance:   Alert, cooperative, no distress   HEENT:   Normocephalic, atraumatic, anicteric  Neck:  Supple, symmetrical, trachea midline   Lungs:   Clear to auscultation bilaterally; no rales, rhonchi or wheezing; respirations unlabored    Heart[de-identified]   Regular rate and rhythm; no murmur, rub, or gallop  Abdomen:   Soft, non-tender, non-distended; normal bowel sounds; no masses, no organomegaly    Genitalia:   Deferred    Rectal:   Deferred    Extremities:  No cyanosis, clubbing or edema    Pulses:  2+ and symmetric    Skin:  No jaundice, rashes, or lesions    Lymph nodes:  No palpable cervical lymphadenopathy        Lab Results:   No visits with results within 1 Day(s) from this visit     Latest known visit with results is:   Office Visit on 12/10/2021   Component Date Value    Severity 12/10/2021 NORMAL     Right Eye Diabetic Retin* 12/10/2021 None     Right Eye Macular Edema 12/10/2021 None     Right Eye Other Retinopa* 12/10/2021 None     Right Eye Image Quality 12/10/2021 Gradeable Image     Left Eye Diabetic Retino* 12/10/2021 None     Left Eye Macular Edema 12/10/2021 None     Left Eye Other Retinopat* 12/10/2021 None     Left Eye Image Quality 12/10/2021 Gradeable Image     Result 12/10/2021 Retinal Study Result for [facility]     Result 12/10/2021 [facility], a 74 y/o, F (: 1955, MRN: [id number])     Result 12/10/2021 presented to [address] [place] [address] on 12- for a retinal imaging study of the left and right eyes   Result 12/10/2021 Based on the findings of the study, the following is recommended for [address] [place] [address] Result 12/10/2021 No Pathology Detected: Return for follow up [facility] retina evaluation in 12 months or next calendar year   Result 12/10/2021 Interpreting Provider's Comments:  No comments provided     Result 12/10/2021 Diagnoses Present:  - Type 2 diabetes mellitus with diabetic polyneuropathy     Result 12/10/2021 Right eye findings: Negative for Diabetic Retinopathy     Result 12/10/2021 Negative for Macular Edema     Result 12/10/2021 Left eye findings: Negative for Diabetic Retinopathy     Result 12/10/2021 Negative for Macular Edema     Result 12/10/2021 This result was electronically signed by Ben Worthington MD, NPI: [id number], Taxonomy: [id number] on 2021 03:18 [facility]   Result 12/10/2021 NOTE:  Any pathology noted on this diabetic retinal evaluation should be confirmed by an appropriate ophthalmic examination  Radiology Results:   Mammo diagnostic bilateral w 3d & cad, US breast right limited (diagnostic)    Result Date: 2022  Narrative: DIAGNOSIS: Breast cyst, right; Breast pain, right TECHNIQUE: Digital diagnostic mammography was performed  Computer Aided Detection (CAD) analyzed all applicable images  Ultrasound of the right breast(s) was performed  COMPARISONS: Prior breast imaging dated: 2018 and 2018 RELEVANT HISTORY: Family Breast Cancer History: History of breast cancer in [address] [facility], Daughter, Sister   Family Medical History: Family medical history includes breast cancer in 3 relatives (cousin, daughter, sister)  Personal History: No known relevant hormone history  Surgical history includes hysterectomy  No known relevant medical history  RISK ASSESSMENT: 5 Year Tyrer-Cuzick: 2 07 % 10 Year Tyrer-Cuzick: 3 83 % Lifetime Tyrer-Cuzick: 7 95 % TISSUE DENSITY: The breasts are almost entirely fatty  INDICATION: Marina Paredes is a 73 y o  female presenting for R breast cyst  FINDINGS: RIGHT A) MASS Mammo diagnostic bilateral w 3d & cad: There is a 7 mm high density, irregularly shaped mass seen in the right breast at 9 o'clock, 3 cm from the nipple  There are associated calcifications and margins appears slightly irregular on the MLO view  There is no suspicious mass, microcalcification, skin thickening architectural distortion elsewhere in the right breast   There are a few scattered calcifications and previous biopsy marker clips noted  US breast right limited (diagnostic): There is a 6 mm x 3 mm x 7 mm irregularly shaped, hypoechoic mass with angular margins seen in the right breast at 9 o'clock, 3 cm from the nipple  Targeted sonographic evaluation of the right axilla demonstrates typical appearing lymph nodes  Targeted sonographic evaluation of the 12 o'clock position demonstrates interval resolution of the previously described cyst  Targeted sonographic evaluation of the area of pain 10 o'clock position right breast demonstrates no suspicious mass or shadowing  Left There are no suspicious masses, grouped microcalcifications or areas of unexplained architectural distortion  The skin and nipple areolar complex are unremarkable  There are scattered calcifications present  These findings were discussed with the patient at time of examination  Impression: Indeterminate mass at the 9 o'clock position right breast for which ultrasound-guided biopsy is recommended   ASSESSMENT/BI-RADS CATEGORY: Left: 2 - Benign Right: 4 - Suspicious Overall: 4 - Suspicious RECOMMENDATION:      - Ultrasound-guided breast biopsy for the right breast       - Clinical management for the right breast       - Routine screening mammogram in 1 year for the left breast  Workstation ID: [id number]

## 2022-02-03 DIAGNOSIS — E11.42 TYPE 2 DIABETES MELLITUS WITH DIABETIC POLYNEUROPATHY, WITHOUT LONG-TERM CURRENT USE OF INSULIN (HCC): ICD-10-CM

## 2022-02-03 DIAGNOSIS — E78.2 MIXED HYPERLIPIDEMIA: ICD-10-CM

## 2022-02-03 DIAGNOSIS — E11.42 CONTROLLED TYPE 2 DIABETES MELLITUS WITH DIABETIC POLYNEUROPATHY, WITHOUT LONG-TERM CURRENT USE OF INSULIN (HCC): ICD-10-CM

## 2022-02-03 DIAGNOSIS — I10 BENIGN ESSENTIAL HYPERTENSION: ICD-10-CM

## 2022-02-03 RX ORDER — LISINOPRIL AND HYDROCHLOROTHIAZIDE 12.5; 1 MG/1; MG/1
1 TABLET ORAL DAILY
Qty: 90 TABLET | Refills: 3 | Status: SHIPPED | OUTPATIENT
Start: 2022-02-03 | End: 2022-04-07

## 2022-02-03 RX ORDER — LOVASTATIN 20 MG/1
20 TABLET ORAL
Qty: 90 TABLET | Refills: 3 | Status: SHIPPED | OUTPATIENT
Start: 2022-02-03 | End: 2022-08-02

## 2022-02-09 ENCOUNTER — TELEPHONE (OUTPATIENT)
Dept: INTERNAL MEDICINE CLINIC | Facility: CLINIC | Age: 67
End: 2022-02-09

## 2022-02-09 NOTE — TELEPHONE ENCOUNTER
Folder Color- Green     Name of Form- [employer]- detailed written order     Form to be filled out by- Dr Landrum    Form to be Faxed: [phone]    Patient made aware of 10 business day policy

## 2022-02-10 NOTE — TELEPHONE ENCOUNTER
Received fax from [employer]  They are requesting supporting documentation, imagining or x-ray reports in reference to form that was signed on 2/9/22  Please fax information to [phone]  Form will be Green clerical folder for reference

## 2022-02-18 ENCOUNTER — HOSPITAL ENCOUNTER (OUTPATIENT)
Dept: MAMMOGRAPHY | Facility: CLINIC | Age: 67
Discharge: HOME | End: 2022-02-18
Payer: COMMERCIAL

## 2022-02-18 ENCOUNTER — TELEPHONE (OUTPATIENT)
Dept: INTERNAL MEDICINE CLINIC | Facility: CLINIC | Age: 67
End: 2022-02-18

## 2022-02-18 ENCOUNTER — HOSPITAL ENCOUNTER (OUTPATIENT)
Dept: ULTRASOUND IMAGING | Facility: CLINIC | Age: 67
Discharge: HOME | End: 2022-02-18
Payer: COMMERCIAL

## 2022-02-18 VITALS
DIASTOLIC BLOOD PRESSURE: 78 MMHG | BODY MASS INDEX: 32.42 KG/M2 | HEART RATE: 88 BPM | WEIGHT: 182.98 LBS | HEIGHT: 63 IN | SYSTOLIC BLOOD PRESSURE: 140 MMHG

## 2022-02-18 DIAGNOSIS — R92.8 ABNORMAL MAMMOGRAM: ICD-10-CM

## 2022-02-18 PROCEDURE — 88305 TISSUE EXAM BY PATHOLOGIST: CPT | Performed by: PATHOLOGY

## 2022-02-18 PROCEDURE — A4648 IMPLANTABLE TISSUE MARKER: HCPCS

## 2022-02-18 PROCEDURE — 19083 BX BREAST 1ST LESION US IMAG: CPT

## 2022-02-18 RX ORDER — LIDOCAINE HYDROCHLORIDE AND EPINEPHRINE BITARTRATE 20; .01 MG/ML; MG/ML
10 INJECTION, SOLUTION SUBCUTANEOUS ONCE
Status: DISCONTINUED | OUTPATIENT
Start: 2022-02-18 | End: 2022-02-18

## 2022-02-18 RX ORDER — LIDOCAINE HYDROCHLORIDE 10 MG/ML
5 INJECTION, SOLUTION EPIDURAL; INFILTRATION; INTRACAUDAL; PERINEURAL ONCE
Status: COMPLETED | OUTPATIENT
Start: 2022-02-18 | End: 2022-02-18

## 2022-02-18 RX ADMIN — LIDOCAINE HYDROCHLORIDE 5 ML: 10 INJECTION, SOLUTION EPIDURAL; INFILTRATION; INTRACAUDAL; PERINEURAL at 14:19

## 2022-02-18 NOTE — DISCHARGE INSTR - OTHER ORDERS
POST LARGE CORE BREAST BIOPSY PATIENT INFORMATION      Place an ice pack inside your bra over the top of the dressing every hour for 20 minutes (20 minutes on, 60 minutes off)  Do this until bedtime  Do not shower or bathe until the following morning  You may bathe your breast carefully with the steri-strips in place  Be careful    Not to loosen them  The steri-strips will fall off in 3-5 days  You may have mild discomfort, and you may have some bruising where the   Needle entered the skin  This should clear within 5-7 days  If you need medicine for discomfort, take acetaminophen products such as   Tylenol  You may also take Advil or Motrin products  Do not participate in strenuous activities such as-tennis, aerobics, skiing,  Weight lifting, etc  for 24 hours  Refrain from swimming/soaking for 72 hours  Wearing a bra for sleeping may be more comfortable for the first 24-48 hours  Watch for continued bleeding, pain or fever over 101; please call with any questions or concerns  For procedures done at the [facility]  [place] [address] call:  Tawanda Karimi RN at [phone]  Derek Garrett RN at [phone]                    *After 4 PM call the Interventional Radiology Department                    [phone] and ask to speak with the nurse on call  For procedures done at the [address] [place] [address] call:         Colin Smith RN at   *After 4 PM call the Interventional Radiology Department   [phone] and ask to speak with the nurse on call  For procedures done at [address] [place] [address] call: The Radiology Nurse at [phone]  *After 4 PM call your physician, or go to the Emergency Department  9          The final results of your biopsy are usually available within one week

## 2022-02-18 NOTE — LETTER
[facility]  [address] [address] [place] [address]  Dept: [phone]    February 18, 2022     Patient: Mary Coon   YOB: 1955   Date of Visit: 2/18/2022       To Whom it May Concern:    Mary Coon is under my professional care  She was seen in the hospital from 2/18/2022   to 02/18/22  She {Return to school/sport/work:42728}  02/21/22    If you have any questions or concerns, please don't hesitate to call           SincerelyGayatri

## 2022-02-18 NOTE — PROGRESS NOTES
Procedure type:    _X____ultrasound guided _____stereotactic    Breast:    _____Left __X___Right    Location: Right breast 9 o'clock 3 cm Fn    Needle: 12ga Katya    # of passes: 3    Clip: Heart(Ultraclip)    Performed by: Dr Kip Andrade held for 5 minutes by:  Dain ArangoPCA)    Steri Strips:    ___X__yes _____no    Band aid:    ___X__yes_____no    Tape and guaze:    _____yes __X___no    Tolerated procedure:    ___X__yes _____no

## 2022-02-18 NOTE — TELEPHONE ENCOUNTER
Left detailed message on patients machine to advise that specimen was sent to the lab so that will take some time to get back and we will call her back as soon as we get the results
Patient would like a call with her mammogram results  She would like clarification  Please call patient with these results 
Please review and advise
no

## 2022-02-18 NOTE — PROGRESS NOTES
Ice pack given:    __X___yes _____no    Discharge instructions signed by patient:    __X___yes _____no    Discharge instructions given to patient:    ___X__yes _____no    Discharged via:    __X___ambulatory    _____wheelchair    _____stretcher    Stable on discharge:    __X___yes ____no

## 2022-02-21 ENCOUNTER — TELEPHONE (OUTPATIENT)
Dept: MAMMOGRAPHY | Facility: CLINIC | Age: 67
End: 2022-02-21

## 2022-02-21 NOTE — PROGRESS NOTES
Post procedure call completed 2/21/22    Bleeding: _____yes __X___no    Pain: _____yes ___X___no    Redness/Swelling: ______yes ___X___no    Band aid removed: __X___yes _____no    Steri-Strips intact: ___X___yes _____no (discussed with patient to remove steri strips on 2/23/22  if they have not come off on their own)    Pt with no questions at this time, adv will call when results available, adv to call with any questions or concerns, has name/# for contact

## 2022-02-21 NOTE — TELEPHONE ENCOUNTER
I just wanted to let you know that the above patient was given her negative breast biopsy results  The patient had no questions and was advised to have a diagnostic ultrasound in 6 months  An appointment was made for  Friday august 26,2022 at 15:30  If you have any questions or need further assistance please let me know      Thank you,  Walker Steele, [facility]  Breast Nurse Navigator

## 2022-02-23 ENCOUNTER — PATIENT OUTREACH (OUTPATIENT)
Dept: OBGYN CLINIC | Facility: CLINIC | Age: 67
End: 2022-02-23

## 2022-03-04 ENCOUNTER — PATIENT OUTREACH (OUTPATIENT)
Dept: OBGYN CLINIC | Facility: CLINIC | Age: 67
End: 2022-03-04

## 2022-03-04 NOTE — PROGRESS NOTES
TERE PHILIPPE spoke with Pt for follow up  Pt reported she is doing well  Pt went to [address] [place] [address] once and was note pleased with their treatment  Pt not interested on other referral  Pt states she is doing well

## 2022-03-11 NOTE — PROGRESS NOTES
Virtual Regular Visit      Assessment/Plan: On your virtual visit today we discussed your diabetes and other labs  You do confirm receiving the phone call after your labs that your A1c was significantly elevated greater than 12%  You also confirm that you restarted your metformin 1000 mg twice daily  As per our discussion I am restarting you on your glyburide 5 mg tablet once daily in the morning  This is the same medication that you were on 2 years ago  You also confirm you are taking your blood pressure medication daily  We discussed her other labs and that we also need to get you started on cholesterol medication not only to help bring her cholesterol down but also because you are diabetic and increased risk for cardiovascular events  I am starting you on lovastatin 20 mg 1 tablet daily  Will also plan to see if we can get you on different medications through medication program as you currently do not have medical insurance  Also per our discussion we discussed the dietary modifications including decreasing sweets and chocolate  Also reducing your simple carbohydrates such as white breads rice pasta and switch to whole wheat and whole grain  Referral provided today for our diabetic nutritionist to will contact you to schedule an appointment  Please remember to call and schedule your mammogram that was ordered last month  Also reviewed we still need to get you an appointment in the office as your blood pressure was elevated last month and you were just restarted on your blood pressure medicine and we need to see if it is working well  I have also ordered follow-up labs to be completed in 3 months but as discussed we will need to see you in the office before that    Problem List Items Addressed This Visit        Endocrine    Type 2 diabetes mellitus with hyperglycemia, without long-term current use of insulin ([facility] [address] ) - Primary    Relevant Medications    glyBURIDE (DIABETA) 5 mg tablet    lovastatin (MEVACOR) 20 mg tablet    Other Relevant Orders    Comprehensive metabolic panel    Hemoglobin A1C    Ambulatory referral to Diabetic Education       Cardiovascular and Mediastinum    Benign essential hypertension       Other    Hyperlipidemia    Relevant Medications    lovastatin (MEVACOR) 20 mg tablet    Other Relevant Orders    Lipid Panel with Direct LDL reflex               Reason for visit is No chief complaint on file  Encounter provider Joanna Grullon PA-C    Provider [phone]  [phone]      Recent Visits  No visits were found meeting these conditions  Showing recent visits within past 7 days and meeting all other requirements     Today's Visits  Date Type Provider Dept   02/01/21 Office Visit Joanna Grullon PA-C  [facility] today's visits and meeting all other requirements     Future Appointments  No visits were found meeting these conditions  Showing future appointments within next 150 days and meeting all other requirements        The patient was identified by name and date of birth  Andre Saldana was informed that this is a telemedicine visit and that the visit is being conducted through [facility] and patient was informed that this is a secure, HIPAA-compliant platform  She agrees to proceed     My office door was closed  No one else was in the room  She acknowledged consent and understanding of privacy and security of the video platform  The patient has agreed to participate and understands they can discontinue the visit at any time  Patient is aware this is a billable service  Subjective  Jenifer Montague Ryland is a 60 y o  female    Pt contacted for virtual to discuss her labs  Known diabetic lost to care  Just restarted last month      Discussed again sugar significantly increased A1C now 12 1%  Has restarted the metformin twice a day   Patient also confirms taking her blood pressure medicine every morning  Was not following her diet was eating chocolate and white bread  Patient also reports as noted on original visit that she was also eating more trying to regain the weight she lost     Patient's other labs were good including thyroid and CBC  Discussed with patient having uncontrolled diabetes will cause her to lose weight and have decreased energy as well  Also discussed with patient that her cholesterol level was also elevated but also that if it was normal we would still get her started on a cholesterol medicine to help reduce cardiovascular risk due to diabetes even when well controlled  Will also get patient restarted on her glyburide that she had previously  Also once our office reopens can hopefully get her in to medication program and possibly get her switch to something like Januvia or Jardiance and be able to stop the glyburide  Also per our discussion referral provided to our diabetic nutritionist who will meet with her and further discuss the importance of dietary modifications as well  Advised patient will order follow-up labs for 3 months but that we still need to get her scheduled in the office as have to recheck her blood pressure now that she is back on her medication  Patient states otherwise she has been feeling well and offers no new medical concerns today        Also review with patient that she still needs to call and schedule her mammogram                Past Medical History:   Diagnosis Date    Bump     bumped head yesterday at work "saw stars" no LOC, cat scan done was normal    Diabetes mellitus ([address] )     blood sugar 125 on admission    Encounter for screening colonoscopy     1 st one    Headache     Hyperlipidemia     Hypertension     Post concussion syndrome 9/14/2018       Past Surgical History:   Procedure Laterality Date    COLONOSCOPY N/A 9/26/2018    Procedure: COLONOSCOPY;  Surgeon: Wade Herr MD;  Location: [facility]; Service: Gastroenterology    HYSTERECTOMY      INCISIONAL BREAST BIOPSY      last assessed 17Aug2017    [place] COLONOSCOPY FLX DX W/COLLJ SPEC WHEN PFRMD N/A 7/5/2018    Procedure: COLONOSCOPY;  Surgeon: Wade Herr MD;  Location:  GI LAB; Service: Gastroenterology       Current Outpatient Medications   Medication Sig Dispense Refill    acetaminophen (TYLENOL) 325 mg tablet Take 650 mg by mouth every 6 (six) hours as needed for mild pain      glyBURIDE (DIABETA) 5 mg tablet Take 1 tablet (5 mg total) by mouth daily with breakfast 90 tablet 1    lisinopril-hydrochlorothiazide (PRINZIDE,ZESTORETIC) 10-12 5 MG per tablet Take 1 tablet by mouth daily 90 tablet 1    lovastatin (MEVACOR) 20 mg tablet Take 1 tablet (20 mg total) by mouth daily at bedtime 90 tablet 0    metFORMIN (GLUCOPHAGE) 1000 MG tablet Take 1 tablet (1,000 mg total) by mouth 2 (two) times a day with meals 180 tablet 1     No current facility-administered medications for this visit  No Known Allergies    Review of Systems   Constitutional: Negative  Negative for chills and fever  Respiratory: Negative  Negative for cough, chest tightness and shortness of breath  Cardiovascular: Negative  Negative for chest pain  Gastrointestinal: Negative for abdominal pain  Endocrine: Positive for polydipsia and polyphagia  Neurological: Negative for dizziness, light-headedness and headaches  Video Exam    There were no vitals filed for this visit  Physical Exam  Constitutional:       General: She is not in acute distress  Appearance: Normal appearance  HENT:      Head: Normocephalic  Eyes:      Conjunctiva/sclera: Conjunctivae normal    Pulmonary:      Effort: Pulmonary effort is normal  No respiratory distress  Neurological:      Mental Status: She is alert  Mental status is at baseline     Psychiatric:         Mood and Affect: Mood normal  Thought Content: Thought content normal           I spent 24 minutes with patient today in which greater than 50% of the time was spent in counseling/coordination of care regarding Diabetic medications, cholesterol medication potential side effects, diabetic lifestyle and diet modifications  VIRTUAL VISIT DISCLAIMER    Jenifer Noablu Tuckerner acknowledges that she has consented to an online visit or consultation  She understands that the online visit is based solely on information provided by her, and that, in the absence of a face-to-face physical evaluation by the physician, the diagnosis she receives is both limited and provisional in terms of accuracy and completeness  This is not intended to replace a full medical face-to-face evaluation by the physician  Eveline Castleman understands and accepts these terms  See printed instructions

## 2022-04-07 ENCOUNTER — OFFICE VISIT (OUTPATIENT)
Dept: INTERNAL MEDICINE CLINIC | Facility: CLINIC | Age: 67
End: 2022-04-07

## 2022-04-07 VITALS
WEIGHT: 187 LBS | HEIGHT: 63 IN | SYSTOLIC BLOOD PRESSURE: 144 MMHG | DIASTOLIC BLOOD PRESSURE: 83 MMHG | HEART RATE: 120 BPM | TEMPERATURE: 97.8 F | BODY MASS INDEX: 33.13 KG/M2

## 2022-04-07 DIAGNOSIS — E11.42 TYPE 2 DIABETES MELLITUS WITH DIABETIC POLYNEUROPATHY, WITHOUT LONG-TERM CURRENT USE OF INSULIN (HCC): Primary | ICD-10-CM

## 2022-04-07 DIAGNOSIS — Z71.6 ENCOUNTER FOR SMOKING CESSATION COUNSELING: ICD-10-CM

## 2022-04-07 DIAGNOSIS — E11.65 TYPE 2 DIABETES MELLITUS WITH HYPERGLYCEMIA, WITHOUT LONG-TERM CURRENT USE OF INSULIN (HCC): ICD-10-CM

## 2022-04-07 DIAGNOSIS — Z13.820 SCREENING FOR OSTEOPOROSIS: ICD-10-CM

## 2022-04-07 DIAGNOSIS — I10 BENIGN ESSENTIAL HYPERTENSION: ICD-10-CM

## 2022-04-07 DIAGNOSIS — M19.90 ARTHRITIS: ICD-10-CM

## 2022-04-07 LAB — SL AMB POCT HEMOGLOBIN AIC: 7.1 (ref ?–6.5)

## 2022-04-07 PROCEDURE — 99213 OFFICE O/P EST LOW 20 MIN: CPT | Performed by: INTERNAL MEDICINE

## 2022-04-07 PROCEDURE — 83036 HEMOGLOBIN GLYCOSYLATED A1C: CPT | Performed by: INTERNAL MEDICINE

## 2022-04-07 RX ORDER — MULTIVIT,CALC,MINS/IRON/FOLIC 9MG-400MCG
1 TABLET ORAL AS NEEDED
Qty: 2 EACH | Refills: 0 | Status: SHIPPED | OUTPATIENT
Start: 2022-04-07

## 2022-04-07 RX ORDER — BLOOD SUGAR DIAGNOSTIC
1 STRIP MISCELLANEOUS 2 TIMES DAILY
Qty: 200 EACH | Refills: 2 | Status: SHIPPED | OUTPATIENT
Start: 2022-04-07

## 2022-04-07 RX ORDER — LANCETS
EACH MISCELLANEOUS 2 TIMES DAILY
Qty: 200 EACH | Refills: 2 | Status: SHIPPED | OUTPATIENT
Start: 2022-04-07

## 2022-04-07 RX ORDER — LISINOPRIL AND HYDROCHLOROTHIAZIDE 20; 12.5 MG/1; MG/1
1 TABLET ORAL DAILY
Qty: 30 TABLET | Refills: 5 | Status: SHIPPED | OUTPATIENT
Start: 2022-04-07

## 2022-04-07 NOTE — PATIENT INSTRUCTIONS
Thank you for visiting our clinic! It was nice seeing you!     Today, we discussed-  - Diet and lifestyle modifications and strategies for weight loss  - Maintaining a BP log and measuring BP 3 times per week, once in early morning and then in mid afternoon  - Maintaining a blood glucose log and measuring your blood sugars at least once daily  - Please follow up with us in 4 months to review your medicare annual wellness visit and discuss results of mammogram

## 2022-04-07 NOTE — PROGRESS NOTES
[id number] Visit Note  Sharif Pablo 73 y o  female   MRN: [id number]    Assessment and Plan      Problem List Items Addressed This Visit        Endocrine    Type 2 diabetes mellitus with diabetic polyneuropathy, without long-term current use of insulin ([address] ) - Primary    Relevant Medications    Elastic Bandages & Supports (GNP Diabetic Socks Unisex XL) MISC    glucose blood (Accu-Chek Guide) test strip    Accu-Chek Softclix Lancets lancets    Other Relevant Orders    POCT hemoglobin A1c (Completed)       Cardiovascular and Mediastinum    Benign essential hypertension    Relevant Medications    lisinopril-hydrochlorothiazide (PRINZIDE,ZESTORETIC) 20-12 5 MG per tablet      Other Visit Diagnoses     Type 2 diabetes mellitus with hyperglycemia, without long-term current use of insulin (HCC)        Relevant Medications    glucose blood (Accu-Chek Guide) test strip    Accu-Chek Softclix Lancets lancets    Encounter for smoking cessation counseling        Relevant Medications    nicotine (NICODERM CQ) 7 mg/24hr TD 24 hr patch    Arthritis        Relevant Medications    Diclofenac Sodium (VOLTAREN) 1 %          Diabetes Mellitus type 2: Patient with history of diabetes mellitus type 2  Previous HbA1c was 6 3, HbA1c today at 7 1  Patient takes metformin 1000 mg b i d , glipizide 5 mg daily  Patient has a carbohydrate heavy diet, we discussed diet modifications to improve her blood sugars and diabetes  - Continue on metformin 1000 mg b i d   - on glipizide 5 mg daily  - On gabapentin 600 mg t i d  for diabetic neuropathy  - Discussed diet and lifestyle modifications with patient  - Blood glucose locks provided at this visit  - Last diabetic eye exam in 12/2021  - Last diabetic foot exam in 12/2021      Hypertension: Patient with history of hypertension  Currently taking Prinzide 10-12 5    Patient is not monitor blood pressures at home, but does have a blood pressure monitor  Her BP readings at this visit as well as the last couple of clinic visits have been in 140s SBP  Patient will benefit from a slightly increased dose of her antihypertensive medication     - Switched to Prinzide 20-12 5 mg daily  - Discussed at home BP monitoring with patient  - BP logs provided at this visit  - Patient advised to monitor her blood pressure 3 times per week, once early morning and once in mid afternoon      Plan-  - Patient had a diabetic foot an eye exam at the last visit in 12/2021   - DEXA scanning ordered at this visit   - Noted to have 7 mm high-density calcification in right breast in January 2022, underwent core needle biopsy which showed sclerosed intraductal papilloma with fibrocystic changes and calcifications  Has an ultrasound scheduled for August 2022 for follow-up for this  - Last screening colonoscopy in September 2018, positive for polyp with poor prep requiring follow-up call colonoscopy in 3 years  Polyp was negative for dysplasia  Colonoscopy scheduled for 04/21/2022 as per patient  - Patient with history of total hysterectomy including removal of cervical cuff, no indication for Pap smears going forward  - Nicotine patch provided for smoking cessation  - F/u in 4 months for Medicare annual wellness visit    Schedule a follow up visit in 4 months for Medicare annual wellness visit    Subjective     History of Present Illness: Patient is a 71-year-old female with history of hypertension, hyperlipidemia, diabetes mellitus type 2, chronic bilateral low back pain  Patient reports that she is compliant with medications, somewhat compliant with her diet  She does report weight gain over the last couple of months as result of this  Her diet her diet consists of a vegetables, beans, chicken, some fish, a little heavy on starch based products   We discussed diet and lifestyle modifications at this visit including exercising at least 3 times a week, with moderate intensity for about 30 minutes each time  I spoke with the patient about slightly elevated BP readings at this as well as last couple of clinic visits, patient is agreeable to starting on slightly increased dosage of her blood pressure medication  Patient is interested in a DEXA scan, will order  She reports that she is scheduled for colonoscopy on 21st April  She is also scheduled for an ultrasound for right breast in August 2022  She reports that she has mild arthritis and is interested in using a topical cream for this  Patient is currently refusing Tdap vaccine  She was noted to be slightly tachycardic at this visit as well as some last couple of visits, she reports that she feels anxious every time she comes to the clinic and this may be related to that  Patient has no other concerns at this time  Review of Systems   Constitutional: Negative for activity change and appetite change  Respiratory: Negative for chest tightness and shortness of breath  Cardiovascular: Negative for chest pain  Gastrointestinal: Negative for abdominal pain, constipation and diarrhea  Musculoskeletal: Positive for back pain           Current Outpatient Medications:     Accu-Chek Softclix Lancets lancets, Use 2 (two) times a day Use as instructed, Disp: 200 each, Rfl: 2    acetaminophen (TYLENOL) 325 mg tablet, Take 650 mg by mouth every 6 (six) hours as needed for mild pain , Disp: , Rfl:     aspirin (ECOTRIN LOW STRENGTH) 81 mg EC tablet, Take 1 tablet (81 mg total) by mouth daily, Disp: 90 tablet, Rfl: 1    Blood Glucose Monitoring Suppl (Accu-Chek Guide) w/Device KIT, Use 2 (two) times a day, Disp: 1 kit, Rfl: 0    gabapentin (NEURONTIN) 300 mg capsule, Take 2 capsules (600 mg total) by mouth 3 (three) times a day, Disp: 540 capsule, Rfl: 0    glipiZIDE (GLUCOTROL) 5 mg tablet, Take 1 tablet (5 mg total) by mouth daily with breakfast, Disp: 90 tablet, Rfl: 1    glucose blood (Accu-Chek Guide) test strip, Use 1 each 2 (two) times a day Use as instructed, Disp: 200 each, Rfl: 2    lovastatin (MEVACOR) 20 mg tablet, Take 1 tablet (20 mg total) by mouth daily at bedtime, Disp: 90 tablet, Rfl: 3    metFORMIN (GLUCOPHAGE) 1000 MG tablet, Take 1 tablet (1,000 mg total) by mouth 2 (two) times a day with meals, Disp: 180 tablet, Rfl: 3    Diclofenac Sodium (VOLTAREN) 1 %, Apply 2 g topically 3 (three) times a day, Disp: 100 g, Rfl: 1    Elastic Bandages & Supports (GNP Diabetic Socks Unisex XL) MISC, Use 1 each if needed (1 pair of socks for diabetic neuropathy), Disp: 2 each, Rfl: 0    lisinopril-hydrochlorothiazide (PRINZIDE,ZESTORETIC) 20-12 5 MG per tablet, Take 1 tablet by mouth daily, Disp: 30 tablet, Rfl: 5    methocarbamol (ROBAXIN) 500 mg tablet, Take 1 tablet (500 mg total) by mouth daily at bedtime as needed for muscle spasms (Patient not taking: Reported on 12/10/2021 ), Disp: 30 tablet, Rfl: 0    nicotine (NICODERM CQ) 7 mg/24hr TD 24 hr patch, Place 1 patch on the skin every 24 hours, Disp: 28 patch, Rfl: 0    omeprazole (PriLOSEC) 20 mg delayed release capsule, Take 1 capsule (20 mg total) by mouth daily, Disp: 90 capsule, Rfl: 0  No Known Allergies  Past Medical History:   Diagnosis Date    Arthritis     Bump     bumped head yesterday at work "saw stars" no LOC, cat scan done was normal    Circulation problem     Diabetes mellitus (HCC)     blood sugar 125 on admission    Encounter for screening colonoscopy     1 st one    Headache     Hyperlipidemia     Hypertension     Positive fecal occult blood test 08/30/2018    Added automatically from request for surgery [id number]    Post concussion syndrome 09/14/2018    Syncope      Past Surgical History:   Procedure Laterality Date    BREAST BIOPSY Right 02/18/2022    COLONOSCOPY N/A 9/26/2018    Procedure: COLONOSCOPY;  Surgeon: Luis Urban MD;  Location: [facility];   Service: Gastroenterology    HYSTERECTOMY  2017    INCISIONAL BREAST BIOPSY      last assessed 17Aug2017    MI COLONOSCOPY FLX DX W/COLLJ SPEC WHEN PFRMD N/A 7/5/2018    Procedure: COLONOSCOPY;  Surgeon: Slava Villarreal MD;  Location: [facility];   Service: Gastroenterology    US GUIDED BREAST BIOPSY RIGHT COMPLETE Right 2/18/2022     Family History   Problem Relation Age of Onset    Breast cancer Cousin 48    Alcohol abuse Mother     Alcohol abuse Father     Breast cancer Daughter 45    No Known Problems Sister     No Known Problems Maternal Grandmother     No Known Problems Maternal Grandfather     No Known Problems Paternal Grandmother     No Known Problems Paternal Grandfather     No Known Problems Son     No Known Problems Son     No Known Problems Daughter     No Known Problems Daughter     No Known Problems Daughter     Breast cancer Sister 77    No Known Problems Maternal Aunt     No Known Problems Maternal Aunt     No Known Problems Maternal Aunt     No Known Problems Maternal Aunt     No Known Problems Maternal Aunt     No Known Problems Maternal Aunt     No Known Problems Maternal Aunt     No Known Problems Maternal Aunt     No Known Problems Maternal Aunt     No Known Problems Maternal Aunt     No Known Problems Maternal Aunt     No Known Problems Maternal Aunt     No Known Problems Paternal Aunt     No Known Problems Paternal Aunt     Dementia Paternal Aunt     No Known Problems Paternal Aunt     No Known Problems Paternal Aunt     No Known Problems Paternal Aunt     No Known Problems Paternal Aunt     No Known Problems Paternal Aunt     Colon cancer Neg Hx     Endometrial cancer Neg Hx     Ovarian cancer Neg Hx      Social History     Substance and Sexual Activity   Alcohol Use Never     Social History     Substance and Sexual Activity   Drug Use Never     Social History     Tobacco Use   Smoking Status Current Every Day Smoker    Packs/day: 0 25    Types: Cigarettes   Smokeless Tobacco Never Used   Tobacco Comment    smokes less than 1pk/day       Objective     Vitals:    04/07/22 1311   BP: 144/83   BP Location: Right arm   Patient Position: Sitting   Cuff Size: Large   Pulse: (!) 120   Temp: 97 8 °F (36 6 °C)   TempSrc: Temporal   Weight: 84 8 kg (187 lb)   Height: 5' 3" (1 6 m)       Physical Exam  Vitals reviewed  Constitutional:       Appearance: Normal appearance  She is obese  HENT:      Head: Normocephalic and atraumatic  Mouth/Throat:      Mouth: Mucous membranes are moist       Pharynx: Oropharynx is clear  Cardiovascular:      Rate and Rhythm: Regular rhythm  Tachycardia present  Pulses: Normal pulses  Heart sounds: Normal heart sounds  Pulmonary:      Effort: Pulmonary effort is normal       Breath sounds: Normal breath sounds  Abdominal:      General: Bowel sounds are normal       Palpations: Abdomen is soft  Skin:     General: Skin is warm and dry  Capillary Refill: Capillary refill takes less than 2 seconds  Neurological:      Mental Status: She is alert and oriented to person, place, and time  Mental status is at baseline  Chuy Angel MD  Internal Medicine Residency PGY-1  [facility]  Available on [employer]  [email or web address]    ==  PLEASE NOTE:  This encounter was completed utilizing the [employer] Direct Speech Voice Recognition Software  Grammatical errors, random word insertions, pronoun errors and incomplete sentences are occasional consequences of the system due to software limitations, ambient noise and hardware issues  These may be missed by proof reading prior to affixing electronic signature  Any questions or concerns about the content, text or information contained within the body of this dictation should be directly addressed to the physician for clarification  Please do not hesitate to call me directly if you have any any questions or concerns

## 2022-04-08 ENCOUNTER — TELEPHONE (OUTPATIENT)
Dept: ADMINISTRATIVE | Facility: OTHER | Age: 67
End: 2022-04-08

## 2022-04-08 NOTE — TELEPHONE ENCOUNTER
Upon review of the In Basket request we have noted that the procedure was completed via [facility] , because of this we are requesting that you forward this request/concern to the [email or web address] email  The Quality team members assigned to this email will be more than happy to assist you  Any additional questions or concerns should be emailed to the Practice Liaisons via [email or web address] email, please do not reply via In Basket      Thank you  Lizzy Tian

## 2022-04-08 NOTE — TELEPHONE ENCOUNTER
----- Message from Alisia Puga sent at 4/7/2022  1:57 PM EDT -----  Regarding: Health maintinance update  04/07/22 1:59 PM    Hello, our patient Ananya Colon has had a foot exam completed/performed   Please assist in updating the patient chart by updating her health maintenance The date of service is 12/10/2021    Thank you,  [address]

## 2022-04-14 ENCOUNTER — TELEPHONE (OUTPATIENT)
Dept: INTERNAL MEDICINE CLINIC | Facility: CLINIC | Age: 67
End: 2022-04-14

## 2022-04-14 NOTE — TELEPHONE ENCOUNTER
Folder Color- GREEN     Name of Form- [facility]     Form to be filled out by- DR Cori Burgos (Doc of the Day) on behalf of Dr Derrick Edwards    Form to be Faxed  [phone]    Patient made aware of 10 business day policy

## 2022-04-20 ENCOUNTER — NURSE TRIAGE (OUTPATIENT)
Dept: OTHER | Facility: OTHER | Age: 67
End: 2022-04-20

## 2022-04-20 NOTE — TELEPHONE ENCOUNTER
TC on call provider who stated patient would need to reschedule colon for tomorrow; spoke with patient and informed her of the plan; patient verbalized understanding  Would like to get scheduled for next available time slot  Reason for Disposition   [1] Caller has URGENT question or concern AND [2] triager unable to answer question    Answer Assessment - Initial Assessment Questions  1  DATE/TIME: "When did you have your colonoscopy?"       Tomorrow, 4/21  2  MAIN CONCERN: "What is your main concern right now?" "What questions do you have?"      Unsure how to do prep; patient stated she also ate bread and mashed potatoes today      Protocols used: COLONOSCOPY SYMPTOMS AND QUESTIONS-ADULT-

## 2022-04-20 NOTE — TELEPHONE ENCOUNTER
Regarding: Colonoscopy Prep Questions  ----- Message from Sharan Qureshi sent at 4/20/2022  4:31 PM EDT -----  "I am scheduled for a colonoscopy for tomorrow, I was called with the time I am to arrive but no one called me to review instructions, I have all the prep but nothing to tell me what I am supposed to do "

## 2022-04-21 NOTE — TELEPHONE ENCOUNTER
Spoke to patient and she is now scheduled for 9/15/22 with Dr Eliza Odonnell at the [facility]  Prep instructions and appointment reminder have been sent to patients home address  I have also included the direct dial number for the [place] office if the patient has any further questions regarding the prep instructions   Patient is to use Miralax/mag citrate prep

## 2022-05-06 ENCOUNTER — TELEPHONE (OUTPATIENT)
Dept: INTERNAL MEDICINE CLINIC | Facility: CLINIC | Age: 67
End: 2022-05-06

## 2022-05-06 ENCOUNTER — TELEPHONE (OUTPATIENT)
Dept: OTHER | Facility: HOSPITAL | Age: 67
End: 2022-05-06

## 2022-05-06 DIAGNOSIS — E11.42 TYPE 2 DIABETES MELLITUS WITH DIABETIC POLYNEUROPATHY, WITHOUT LONG-TERM CURRENT USE OF INSULIN (HCC): ICD-10-CM

## 2022-05-06 DIAGNOSIS — E11.65 TYPE 2 DIABETES MELLITUS WITH HYPERGLYCEMIA, WITHOUT LONG-TERM CURRENT USE OF INSULIN (HCC): ICD-10-CM

## 2022-05-06 DIAGNOSIS — G89.29 CHRONIC BILATERAL LOW BACK PAIN WITH LEFT-SIDED SCIATICA: ICD-10-CM

## 2022-05-06 DIAGNOSIS — M54.42 CHRONIC BILATERAL LOW BACK PAIN WITH LEFT-SIDED SCIATICA: ICD-10-CM

## 2022-05-06 RX ORDER — BLOOD-GLUCOSE METER
EACH MISCELLANEOUS 2 TIMES DAILY
Qty: 1 KIT | Refills: 0 | Status: SHIPPED | OUTPATIENT
Start: 2022-05-06 | End: 2023-05-06

## 2022-05-06 RX ORDER — GLIPIZIDE 5 MG/1
5 TABLET ORAL
Qty: 90 TABLET | Refills: 1 | Status: SHIPPED | OUTPATIENT
Start: 2022-05-06 | End: 2022-05-09

## 2022-05-06 RX ORDER — GABAPENTIN 300 MG/1
600 CAPSULE ORAL 3 TIMES DAILY
Qty: 540 CAPSULE | Refills: 0 | Status: SHIPPED | OUTPATIENT
Start: 2022-05-06

## 2022-05-06 NOTE — TELEPHONE ENCOUNTER
Patient called and stated she lost her accuchek guide glucometer  Patient needs a new script sent for a new one, please send new glucometer over  Also, received voicemail from [facility] requesting refills for medication, per patient she does not want to switch to [facility] and wants to star at [address]

## 2022-06-02 DIAGNOSIS — M19.90 ARTHRITIS: ICD-10-CM

## 2022-06-02 NOTE — TELEPHONE ENCOUNTER
Requested Prescriptions     Pending Prescriptions Disp Refills    Diclofenac Sodium (VOLTAREN) 1 % 100 g 1     Sig: Apply 2 g topically 3 (three) times a day

## 2022-06-13 ENCOUNTER — OFFICE VISIT (OUTPATIENT)
Dept: INTERNAL MEDICINE CLINIC | Facility: CLINIC | Age: 67
End: 2022-06-13

## 2022-06-13 VITALS
SYSTOLIC BLOOD PRESSURE: 110 MMHG | HEART RATE: 128 BPM | DIASTOLIC BLOOD PRESSURE: 73 MMHG | OXYGEN SATURATION: 100 % | BODY MASS INDEX: 32.84 KG/M2 | TEMPERATURE: 97.2 F | WEIGHT: 185.4 LBS

## 2022-06-13 DIAGNOSIS — Z78.9 NEED FOR FOLLOW-UP BY SOCIAL WORKER: ICD-10-CM

## 2022-06-13 DIAGNOSIS — G47.9 DIFFICULTY SLEEPING: Primary | ICD-10-CM

## 2022-06-13 DIAGNOSIS — Z78.9 NEED FOR COMMUNITY RESOURCE: ICD-10-CM

## 2022-06-13 DIAGNOSIS — R00.0 TACHYCARDIA: ICD-10-CM

## 2022-06-13 PROCEDURE — 99213 OFFICE O/P EST LOW 20 MIN: CPT | Performed by: INTERNAL MEDICINE

## 2022-06-13 PROCEDURE — 93000 ELECTROCARDIOGRAM COMPLETE: CPT | Performed by: INTERNAL MEDICINE

## 2022-06-13 RX ORDER — DOXEPIN HYDROCHLORIDE 10 MG/1
10 CAPSULE ORAL
Qty: 30 CAPSULE | Refills: 2 | Status: SHIPPED | OUTPATIENT
Start: 2022-06-13 | End: 2022-09-11

## 2022-06-13 NOTE — PROGRESS NOTES
[facility]  INTERNAL MEDICINE OFFICE VISIT     PATIENT INFORMATION     Quinton Peter   73 y o  female   MRN: [id number]    ASSESSMENT/PLAN     Diagnoses and all orders for this visit:    Difficulty sleeping  Past 2-3 months patient reports she has been averaging 2-3 hours of sleep per night  She has trouble both falling asleep and staying asleep  She does express increased stressors including no longer working, trouble paying bills, and also recent diagnosis of cancer in her daughter  States she stays up wearing about the stressors  She does have a his history of depression but is not actively being treated for this  Does not meet criteria for active depressive episode at this time  · Start doxepin 10 mg daily at bedtime  · Recommend melatonin 3 mg over-the-counter at bedtime  · Counseled on sleep hygiene - provided instructions an after visit summary    Tachycardia   Inappropriate tachycardia  Patient is completely asymptomatic  Denies sensation of palpitations, irregular heartbeat, chest pain, shortness a breath  Point of care EKG shows sinus tachycardia  Previous echo done July 2021 shows grade 1 diastolic dysfunction with evidence of concentric remodeling  · Will get a TSH to rule out thyroid disorder in the setting of insomnia, inappropriate tachycardia as well as increasing anxiety (although cause of anxiety likely secondary to life stressors)         Schedule a follow-up appointment - patient has follow-up scheduled PCP August 15, 2022    Patient instructed to give us a call in 1 week if symptoms fail to improve or worsen    Immunization History   Administered Date(s) Administered    COVID-19 PFIZER VACCINE 0 3 ML IM 04/18/2021, 05/16/2021       CHIEF COMPLAINT     Chief Complaint   Patient presents with    Insomnia     Sleeps About 2 hours       HISTORY OF PRESENT ILLNESS     Roxi Lion Kawasaki is a 73 y o  female with  type 2 diabetes, hypertension, chronic bilateral low back pain, and past history of depression who presents for sleeping difficulty  States 2-3 months she has been having trouble following asleep  Averages 2-3 hours a day  Increasing stressors recently including daughter who recently found out she has cancer and patient also stopped working recently  States that she frequently stays up at night worrying about these problems  The following portions of the patient's history were reviewed and updated as appropriate: allergies, current medications, past family history, past medical history, past social history, past surgical history and problem list        REVIEW OF SYSTEMS     Review of Systems   Constitutional: Negative for chills and fever  HENT: Negative for ear pain and sore throat  Eyes: Negative for pain and visual disturbance  Respiratory: Negative for cough and shortness of breath  Cardiovascular: Negative for chest pain and palpitations  Gastrointestinal: Negative for abdominal pain and vomiting  Genitourinary: Negative for dysuria and hematuria  Musculoskeletal: Negative for arthralgias and back pain  Skin: Negative for color change and rash  Neurological: Negative for seizures and syncope  Psychiatric/Behavioral: Positive for sleep disturbance  All other systems reviewed and are negative  OBJECTIVE     Vitals:    06/13/22 1621   BP: 110/73   BP Location: Right arm   Patient Position: Sitting   Cuff Size: Large   Pulse: (!) 128   Temp: (!) 97 2 °F (36 2 °C)   TempSrc: Temporal   SpO2: 100%   Weight: 84 1 kg (185 lb 6 4 oz)     Physical Exam  Vitals and nursing note reviewed  Constitutional:       General: She is not in acute distress  Appearance: Normal appearance  She is well-developed  HENT:      Head: Normocephalic and atraumatic  Eyes:      Conjunctiva/sclera: Conjunctivae normal    Cardiovascular:      Rate and Rhythm: Regular rhythm  Tachycardia present  Heart sounds: No murmur heard    Pulmonary: Effort: Pulmonary effort is normal  No respiratory distress  Breath sounds: Normal breath sounds  Abdominal:      Palpations: Abdomen is soft  Tenderness: There is no abdominal tenderness  Musculoskeletal:      Cervical back: Neck supple  Skin:     General: Skin is warm and dry  Neurological:      Mental Status: She is alert and oriented to person, place, and time         CURRENT MEDICATIONS     Current Outpatient Medications:     Accu-Chek Softclix Lancets lancets, Use 2 (two) times a day Use as instructed, Disp: 200 each, Rfl: 2    acetaminophen (TYLENOL) 325 mg tablet, Take 650 mg by mouth every 6 (six) hours as needed for mild pain , Disp: , Rfl:     aspirin (ECOTRIN LOW STRENGTH) 81 mg EC tablet, Take 1 tablet (81 mg total) by mouth daily, Disp: 90 tablet, Rfl: 1    Blood Glucose Monitoring Suppl (Accu-Chek Guide) w/Device KIT, Use 2 (two) times a day Use 2 times a day, Disp: 1 kit, Rfl: 0    Diclofenac Sodium (VOLTAREN) 1 %, Apply 2 g topically 3 (three) times a day, Disp: 100 g, Rfl: 1    Elastic Bandages & Supports (GNP Diabetic Socks Unisex XL) MISC, Use 1 each if needed (1 pair of socks for diabetic neuropathy), Disp: 2 each, Rfl: 0    gabapentin (NEURONTIN) 300 mg capsule, Take 2 capsules (600 mg total) by mouth 3 (three) times a day, Disp: 540 capsule, Rfl: 0    glipiZIDE (GLUCOTROL) 5 mg tablet, Take 1 tablet by mouth once daily with breakfast, Disp: 90 tablet, Rfl: 3    glucose blood (Accu-Chek Guide) test strip, Use 1 each 2 (two) times a day Use as instructed, Disp: 200 each, Rfl: 2    lisinopril-hydrochlorothiazide (PRINZIDE,ZESTORETIC) 20-12 5 MG per tablet, Take 1 tablet by mouth daily, Disp: 30 tablet, Rfl: 5    lovastatin (MEVACOR) 20 mg tablet, Take 1 tablet (20 mg total) by mouth daily at bedtime, Disp: 90 tablet, Rfl: 3    metFORMIN (GLUCOPHAGE) 1000 MG tablet, Take 1 tablet (1,000 mg total) by mouth 2 (two) times a day with meals, Disp: 180 tablet, Rfl: 3   methocarbamol (ROBAXIN) 500 mg tablet, Take 1 tablet (500 mg total) by mouth daily at bedtime as needed for muscle spasms (Patient not taking: No sig reported), Disp: 30 tablet, Rfl: 0    nicotine (NICODERM CQ) 7 mg/24hr TD 24 hr patch, Place 1 patch on the skin every 24 hours (Patient not taking: Reported on 6/13/2022), Disp: 28 patch, Rfl: 0    omeprazole (PriLOSEC) 20 mg delayed release capsule, Take 1 capsule (20 mg total) by mouth daily (Patient not taking: Reported on 6/13/2022), Disp: 90 capsule, Rfl: 0    PAST MEDICAL & SURGICAL HISTORY     Past Medical History:   Diagnosis Date    Arthritis     Bump     bumped head yesterday at work "saw stars" no LOC, cat scan done was normal    Circulation problem     Diabetes mellitus ([facility] [address] )     blood sugar 125 on admission    Encounter for screening colonoscopy     1 st one    Headache     Hyperlipidemia     Hypertension     Positive fecal occult blood test 08/30/2018    Added automatically from request for surgery [id number]    Post concussion syndrome 09/14/2018    Syncope      Past Surgical History:   Procedure Laterality Date    BREAST BIOPSY Right 02/18/2022    COLONOSCOPY N/A 9/26/2018    Procedure: COLONOSCOPY;  Surgeon: La Nena Herrera MD;  Location: [facility] GI LAB; Service: Gastroenterology    HYSTERECTOMY  2017    INCISIONAL BREAST BIOPSY      last assessed 17Aug2017    WI COLONOSCOPY FLX DX W/COLLJ SPEC WHEN PFRMD N/A 7/5/2018    Procedure: COLONOSCOPY;  Surgeon: La Nena Herrera MD;  Location:  GI LAB;   Service: Gastroenterology    US GUIDED BREAST BIOPSY RIGHT COMPLETE Right 2/18/2022     SOCIAL & FAMILY HISTORY     Social History     Socioeconomic History    Marital status:      Spouse name: Not on file    Number of children: 10    Years of education: Not on file    Highest education level: Not on file   Occupational History    Occupation: [employer]   Tobacco Use    Smoking status: Current Every Day Smoker     Packs/day: 0 25     Types: Cigarettes    Smokeless tobacco: Never Used    Tobacco comment: smokes less than 1pk/day   Vaping Use    Vaping Use: Never used   Substance and Sexual Activity    Alcohol use: Never    Drug use: Never    Sexual activity: Not Currently   Other Topics Concern    Not on file   Social History Narrative    Daily caffeine consumption, 6-8 servings a day     Social Determinants of Health     Financial Resource Strain: Low Risk     Difficulty of Paying Living Expenses: Not hard at all   Food Insecurity: No Food Insecurity    Worried About Running Out of Food in the Last Year: Never true    Saji of Food in the Last Year: Never true   Transportation Needs: No Transportation Needs    Lack of Transportation (Medical): No    Lack of Transportation (Non-Medical):  No   Physical Activity: Not on file   Stress: Not on file   Social Connections: Not on file   Intimate Partner Violence: Not on file   Housing Stability: Not on file     Social History     Substance and Sexual Activity   Alcohol Use Never     Social History     Substance and Sexual Activity   Drug Use Never     Social History     Tobacco Use   Smoking Status Current Every Day Smoker    Packs/day: 0 25    Types: Cigarettes   Smokeless Tobacco Never Used   Tobacco Comment    smokes less than 1pk/day     Family History   Problem Relation Age of Onset    Breast cancer Cousin 48    Alcohol abuse Mother     Alcohol abuse Father     Breast cancer Daughter 45    No Known Problems Sister     No Known Problems Maternal Grandmother     No Known Problems Maternal Grandfather     No Known Problems Paternal Grandmother     No Known Problems Paternal Grandfather     No Known Problems Son     No Known Problems Son     No Known Problems Daughter     No Known Problems Daughter     No Known Problems Daughter     Breast cancer Sister 77    No Known Problems Maternal Aunt     No Known Problems Maternal Aunt     No Known Problems Maternal Aunt     No Known Problems Maternal Aunt     No Known Problems Maternal Aunt     No Known Problems Maternal Aunt     No Known Problems Maternal Aunt     No Known Problems Maternal Aunt     No Known Problems Maternal Aunt     No Known Problems Maternal Aunt     No Known Problems Maternal Aunt     No Known Problems Maternal Aunt     No Known Problems Paternal Aunt     No Known Problems Paternal Aunt     Dementia Paternal Aunt     No Known Problems Paternal Aunt     No Known Problems Paternal Aunt     No Known Problems Paternal Aunt     No Known Problems Paternal Aunt     No Known Problems Paternal Aunt     Colon cancer Neg Hx     Endometrial cancer Neg Hx     Ovarian cancer Neg Hx        Global Time for Encounter: 25 minutes  ==  Taylor Mauricio MS, DO  PGY-1, [facility] Internal Medicine [address]   [facility] , [address] [facility] [address] [facility]  Office: [phone]  Fax: [phone]

## 2022-06-13 NOTE — PATIENT INSTRUCTIONS
Please try over the counter Melatonin 3 mg daily for sleep   Start doxepin 10 mg daily for sleep  Sleep hygiene as provided at the end of this after visit summary   Blood work ordered for thyroid studies - we will call you with results   Call us in 1 week if symptoms worsen or fail to improve Tazorac Counseling:  Patient advised that medication is irritating and drying.  Patient may need to apply sparingly and wash off after an hour before eventually leaving it on overnight.  The patient verbalized understanding of the proper use and possible adverse effects of tazorac.  All of the patient's questions and concerns were addressed.

## 2022-06-15 ENCOUNTER — PATIENT OUTREACH (OUTPATIENT)
Dept: INTERNAL MEDICINE CLINIC | Facility: CLINIC | Age: 67
End: 2022-06-15

## 2022-06-15 ENCOUNTER — APPOINTMENT (OUTPATIENT)
Dept: LAB | Facility: CLINIC | Age: 67
End: 2022-06-15
Payer: COMMERCIAL

## 2022-06-15 DIAGNOSIS — E11.42 TYPE 2 DIABETES MELLITUS WITH DIABETIC POLYNEUROPATHY, WITHOUT LONG-TERM CURRENT USE OF INSULIN (HCC): ICD-10-CM

## 2022-06-15 DIAGNOSIS — E78.2 MIXED HYPERLIPIDEMIA: ICD-10-CM

## 2022-06-15 DIAGNOSIS — R00.0 TACHYCARDIA: ICD-10-CM

## 2022-06-15 DIAGNOSIS — G47.9 DIFFICULTY SLEEPING: ICD-10-CM

## 2022-06-15 LAB
ALBUMIN SERPL BCP-MCNC: 3.6 G/DL (ref 3.5–5)
ALP SERPL-CCNC: 65 U/L (ref 46–116)
ALT SERPL W P-5'-P-CCNC: 13 U/L (ref 12–78)
ANION GAP SERPL CALCULATED.3IONS-SCNC: 2 MMOL/L (ref 4–13)
AST SERPL W P-5'-P-CCNC: 9 U/L (ref 5–45)
BILIRUB SERPL-MCNC: 0.26 MG/DL (ref 0.2–1)
BUN SERPL-MCNC: 21 MG/DL (ref 5–25)
CALCIUM SERPL-MCNC: 9.8 MG/DL (ref 8.3–10.1)
CHLORIDE SERPL-SCNC: 107 MMOL/L (ref 100–108)
CHOLEST SERPL-MCNC: 187 MG/DL
CO2 SERPL-SCNC: 30 MMOL/L (ref 21–32)
CREAT SERPL-MCNC: 0.83 MG/DL (ref 0.6–1.3)
CREAT UR-MCNC: 162 MG/DL
EST. AVERAGE GLUCOSE BLD GHB EST-MCNC: 148 MG/DL
GFR SERPL CREATININE-BSD FRML MDRD: 74 ML/MIN/1.73SQ M
GLUCOSE P FAST SERPL-MCNC: 92 MG/DL (ref 65–99)
HBA1C MFR BLD: 6.8 %
HDLC SERPL-MCNC: 49 MG/DL
LDLC SERPL CALC-MCNC: 117 MG/DL (ref 0–100)
MICROALBUMIN UR-MCNC: 23.3 MG/L (ref 0–20)
MICROALBUMIN/CREAT 24H UR: 14 MG/G CREATININE (ref 0–30)
POTASSIUM SERPL-SCNC: 4 MMOL/L (ref 3.5–5.3)
PROT SERPL-MCNC: 7.3 G/DL (ref 6.4–8.2)
SODIUM SERPL-SCNC: 139 MMOL/L (ref 136–145)
TRIGL SERPL-MCNC: 107 MG/DL
TSH SERPL DL<=0.05 MIU/L-ACNC: 1.61 UIU/ML (ref 0.45–4.5)

## 2022-06-15 PROCEDURE — 80053 COMPREHEN METABOLIC PANEL: CPT

## 2022-06-15 PROCEDURE — 84443 ASSAY THYROID STIM HORMONE: CPT

## 2022-06-15 PROCEDURE — 82043 UR ALBUMIN QUANTITATIVE: CPT

## 2022-06-15 PROCEDURE — 82570 ASSAY OF URINE CREATININE: CPT

## 2022-06-15 PROCEDURE — 80061 LIPID PANEL: CPT

## 2022-06-15 PROCEDURE — 36415 COLL VENOUS BLD VENIPUNCTURE: CPT

## 2022-06-15 PROCEDURE — 83036 HEMOGLOBIN GLYCOSYLATED A1C: CPT

## 2022-06-15 NOTE — PROGRESS NOTES
SW has attempted to reach pt via the phone but had to eave a message  Staff relates she has stopped by the Office x 2 to speak me  Pt not able to stay this date for when SW was free  SW left SW # for pt to return SW call

## 2022-06-16 ENCOUNTER — TELEPHONE (OUTPATIENT)
Dept: INTERNAL MEDICINE CLINIC | Facility: CLINIC | Age: 67
End: 2022-06-16

## 2022-06-16 ENCOUNTER — PATIENT OUTREACH (OUTPATIENT)
Dept: INTERNAL MEDICINE CLINIC | Facility: CLINIC | Age: 67
End: 2022-06-16

## 2022-06-16 DIAGNOSIS — Z78.9 NEED FOR COMMUNITY RESOURCE: Primary | ICD-10-CM

## 2022-06-16 NOTE — TELEPHONE ENCOUNTER
Lord Gibbs at [facility] [phone] called to find out whether or not we received the form     Lord Gibbs was told that it was received 6/16/22 and we have 10 business day policy     Will fax as soon as it is completed

## 2022-06-16 NOTE — PROGRESS NOTES
SW has returned call to pt who is requesting assistance with increasing her income and or help with SNAP  Pt reports she stopped working on 5/6/22 and is only receiving about $549 00   They are taking $170 10 for Medicare Part B   SW has offered to help pt call [employer] to see if pt can get SSI and  Medicare Buy In  We called [employer] [phone] and spoke to Elmer Sommers  She assisted pt with a phone interview for SSI  This application was filed today  Pt may get first check may 1st   Pt advised to apply for Medicare Pat B Buy in and if approved she will not need to have the $170 10 premium deducted from her SS benefit  The total SSI benefit may go up to$841 00    SW has offered to have our [facility]  f/u with pt to help her apply for SNAP / Medicare Buy IN and any Emergency Assistance she maybe eligible for  Pt is also very concerned as her insurance does not cover her at our [address]   She has called her Insurance and they are going to send her a list of providers  SW will ask [facility] to help pt get connected if possible

## 2022-06-17 NOTE — TELEPHONE ENCOUNTER
Form not signed by Dr Mely Pavon  Placed back in [address] folder to be signed by doc of the day in Monday 6/20

## 2022-06-20 NOTE — TELEPHONE ENCOUNTER
Form placed in [address] [place], [place] [address]e clerical folder  With multiple A1C results and most recent office note where diabetes was discussed to support the need

## 2022-06-21 ENCOUNTER — PATIENT OUTREACH (OUTPATIENT)
Dept: CASE MANAGEMENT | Facility: OTHER | Age: 67
End: 2022-06-21

## 2022-06-21 NOTE — PROGRESS NOTES
[facility] will meet with patient today at the [address] [place] [address] to complete SNAP and MA PA application

## 2022-06-21 NOTE — PROGRESS NOTES
Kati Uyen Cavazos will met with patient today at the [address] [place] [address] to complete SNAP and MA PA application  CMOC called along with patient to [employer] to request the award letter to submit in the application for SNAP and MA  Patient  do not pay for water only for the electricity  The PPL bill is in her daugther and law name  Patient state the her daughter and law needs a lot of help  CMOC provided listening support about her  Patient state that she don't have no food  [facility] [address] print out information about [facility] and gave to her  Patient state  the she will go after the appointment and get the food  She will call her daughter to pick her up and help with the bags  Application was submit an sent online  in [facility]  Wadlemar Cavazos informed her about the next step and will continue follow up wit patient

## 2022-06-30 ENCOUNTER — PATIENT OUTREACH (OUTPATIENT)
Dept: CASE MANAGEMENT | Facility: OTHER | Age: 67
End: 2022-06-30

## 2022-06-30 NOTE — PROGRESS NOTES
[address] [place] [address] returned the call to the patient as of Wednesday, June 29  The patient left copies of letters he received regarding her half-way, survivor and disability insurance,SSI and human services at the clinic  [address] [place] [address] along with the patient call for an explanation about the letters  Representative Trisha Samson states that the patient owes half-way, survivor and disability insurance $292 since in 2021 he generated $19,544 more than the limit to qualify ($18,960 00 is the limit)  The patient agreed with Trisha Samson to make a payment plan of $30 a month until the debt is eliminated  [address] [place] [address] asks Neal about his second letter (SSI)  Trisha Samson states that the patient will no longer have to pay for medicate part B since it will be covered by the [facility]  The patient will receive a refund of $340 as a retrospective of their payments this month  [facility] Uyen Cavazos together with the patient calls Human Services to obtain information on the status of the patient's application  The representative says that everything is fine and they sent him the [facility] card  [address] [place] [address] asks if the statement bank was received, the representative said that they have not received it yet  [address] [place] [address] forwards information to the [facility]  The patient was happy with all results  CMOC will continue to work with patients for [employer]

## 2022-07-01 ENCOUNTER — PATIENT OUTREACH (OUTPATIENT)
Dept: CASE MANAGEMENT | Facility: OTHER | Age: 67
End: 2022-07-01

## 2022-07-01 NOTE — PROGRESS NOTES
CMOC complete application for PPL [employer]      At this time all Goals have been completed and [facility] will now be closing case and communicate with team

## 2022-07-05 DIAGNOSIS — E11.42 TYPE 2 DIABETES MELLITUS WITH DIABETIC POLYNEUROPATHY, WITHOUT LONG-TERM CURRENT USE OF INSULIN (HCC): Primary | ICD-10-CM

## 2022-07-05 RX ORDER — OMEPRAZOLE 20 MG/1
CAPSULE, DELAYED RELEASE ORAL
Qty: 90 CAPSULE | Refills: 3 | Status: SHIPPED | OUTPATIENT
Start: 2022-07-05

## 2022-07-08 ENCOUNTER — PATIENT OUTREACH (OUTPATIENT)
Dept: INTERNAL MEDICINE CLINIC | Facility: CLINIC | Age: 67
End: 2022-07-08

## 2022-07-08 NOTE — PROGRESS NOTES
CMOC received a call form patient to get help to call a social security to know why pt not get the deposit for her social security  Representative state that pt will received $549  at the 3rd Wednesday in this month  Because the adjustment was at the end of June

## 2022-07-14 DIAGNOSIS — I10 BENIGN ESSENTIAL HYPERTENSION: ICD-10-CM

## 2022-07-14 RX ORDER — LISINOPRIL AND HYDROCHLOROTHIAZIDE 12.5; 1 MG/1; MG/1
TABLET ORAL
Qty: 90 TABLET | Refills: 0 | OUTPATIENT
Start: 2022-07-14

## 2022-07-14 NOTE — TELEPHONE ENCOUNTER
Patient called requesting refills  [facility] since patient should have multiple refills  Per [facility] requested this medication to be transferred to them  Called [facility] and confirmed patient does have 4 more refill on her mail service  Per Melany Black, pharmacist, he will send an " overnight refill"  And patient should have it delivered tomorrow 7/14/2022 or no later than Saturday 7/15/2022  Called patient to inform her she has multiple refills in [facility] mail service  Patient was upset because she does not know how [facility] got involved with her medications  Informed patient she will have to call [address] [place] [address]( her previous pharmacy) and have them call [facility] to transfer medications back  Patient agreeable to receive refills from [facility] for now until [facility] calls [facility]

## 2022-09-08 ENCOUNTER — HOSPITAL ENCOUNTER (OUTPATIENT)
Dept: ULTRASOUND IMAGING | Facility: CLINIC | Age: 67
Discharge: HOME | End: 2022-09-08
Payer: COMMERCIAL

## 2022-09-08 VITALS — HEIGHT: 63 IN | BODY MASS INDEX: 32.85 KG/M2 | WEIGHT: 185.41 LBS

## 2022-09-08 DIAGNOSIS — R92.8 ABNORMAL MAMMOGRAM: ICD-10-CM

## 2022-09-08 PROCEDURE — 76642 ULTRASOUND BREAST LIMITED: CPT

## 2022-09-14 ENCOUNTER — NURSE TRIAGE (OUTPATIENT)
Dept: OTHER | Facility: OTHER | Age: 67
End: 2022-09-14

## 2022-09-14 NOTE — TELEPHONE ENCOUNTER
----- Message from Mauricio Wayne sent at 9/14/2022  1:26 PM EDT -----  " I have a colonoscopy tomorrow and I am extremely hungry and no one has called me to tell me what to do before procedure   I need to talk to someone asap "

## 2022-09-14 NOTE — TELEPHONE ENCOUNTER
Reason for Disposition   Bowel prep for colonoscopy, questions about    Answer Assessment - Initial Assessment Questions  1  DATE/TIME: "When did you have your colonoscopy?"       9 15 22  2  MAIN CONCERN: "What is your main concern right now?" "What questions do you have?"     "No one called me about my colonoscopy prep  And what I can and can not eat "    Pt's colonoscopy prep reviewed with pt  Pt verbalized understanding      Protocols used: COLONOSCOPY SYMPTOMS AND QUESTIONS-ADULT-

## 2022-09-15 ENCOUNTER — ANESTHESIA (OUTPATIENT)
Dept: GASTROENTEROLOGY | Facility: HOSPITAL | Age: 67
End: 2022-09-15

## 2022-09-15 ENCOUNTER — ANESTHESIA EVENT (OUTPATIENT)
Dept: GASTROENTEROLOGY | Facility: HOSPITAL | Age: 67
End: 2022-09-15

## 2022-09-15 ENCOUNTER — HOSPITAL ENCOUNTER (OUTPATIENT)
Dept: GASTROENTEROLOGY | Facility: HOSPITAL | Age: 67
Setting detail: OUTPATIENT SURGERY
End: 2022-09-15
Attending: INTERNAL MEDICINE
Payer: COMMERCIAL

## 2022-09-15 VITALS
RESPIRATION RATE: 16 BRPM | WEIGHT: 185 LBS | HEIGHT: 63 IN | TEMPERATURE: 96.1 F | BODY MASS INDEX: 32.78 KG/M2 | DIASTOLIC BLOOD PRESSURE: 72 MMHG | SYSTOLIC BLOOD PRESSURE: 133 MMHG | OXYGEN SATURATION: 98 % | HEART RATE: 88 BPM

## 2022-09-15 DIAGNOSIS — Z12.11 COLON CANCER SCREENING: ICD-10-CM

## 2022-09-15 DIAGNOSIS — Z86.010 HISTORY OF COLON POLYPS: ICD-10-CM

## 2022-09-15 PROBLEM — IMO0001 SMOKING: Status: ACTIVE | Noted: 2022-09-15

## 2022-09-15 PROBLEM — F17.200 SMOKING: Status: ACTIVE | Noted: 2022-09-15

## 2022-09-15 LAB — GLUCOSE SERPL-MCNC: 78 MG/DL (ref 65–140)

## 2022-09-15 PROCEDURE — G0121 COLON CA SCRN NOT HI RSK IND: HCPCS | Performed by: INTERNAL MEDICINE

## 2022-09-15 PROCEDURE — 82948 REAGENT STRIP/BLOOD GLUCOSE: CPT

## 2022-09-15 RX ORDER — PROPOFOL 10 MG/ML
INJECTION, EMULSION INTRAVENOUS AS NEEDED
Status: DISCONTINUED | OUTPATIENT
Start: 2022-09-15 | End: 2022-09-15

## 2022-09-15 RX ORDER — LIDOCAINE HYDROCHLORIDE 10 MG/ML
INJECTION, SOLUTION EPIDURAL; INFILTRATION; INTRACAUDAL; PERINEURAL AS NEEDED
Status: DISCONTINUED | OUTPATIENT
Start: 2022-09-15 | End: 2022-09-15

## 2022-09-15 RX ORDER — SODIUM CHLORIDE 9 MG/ML
INJECTION, SOLUTION INTRAVENOUS CONTINUOUS PRN
Status: DISCONTINUED | OUTPATIENT
Start: 2022-09-15 | End: 2022-09-15

## 2022-09-15 RX ADMIN — PROPOFOL 100 MG: 10 INJECTION, EMULSION INTRAVENOUS at 09:04

## 2022-09-15 RX ADMIN — PROPOFOL 40 MG: 10 INJECTION, EMULSION INTRAVENOUS at 09:10

## 2022-09-15 RX ADMIN — PROPOFOL 40 MG: 10 INJECTION, EMULSION INTRAVENOUS at 09:19

## 2022-09-15 RX ADMIN — PROPOFOL 50 MG: 10 INJECTION, EMULSION INTRAVENOUS at 09:16

## 2022-09-15 RX ADMIN — LIDOCAINE HYDROCHLORIDE 50 MG: 10 INJECTION, SOLUTION EPIDURAL; INFILTRATION; INTRACAUDAL at 09:04

## 2022-09-15 RX ADMIN — SODIUM CHLORIDE: 9 INJECTION, SOLUTION INTRAVENOUS at 09:00

## 2022-09-15 RX ADMIN — PROPOFOL 50 MG: 10 INJECTION, EMULSION INTRAVENOUS at 09:07

## 2022-09-15 RX ADMIN — PROPOFOL 40 MG: 10 INJECTION, EMULSION INTRAVENOUS at 09:13

## 2022-09-15 RX ADMIN — PROPOFOL 40 MG: 10 INJECTION, EMULSION INTRAVENOUS at 09:30

## 2022-09-15 RX ADMIN — PROPOFOL 40 MG: 10 INJECTION, EMULSION INTRAVENOUS at 09:26

## 2022-09-15 RX ADMIN — PROPOFOL 40 MG: 10 INJECTION, EMULSION INTRAVENOUS at 09:22

## 2022-09-15 NOTE — ANESTHESIA PREPROCEDURE EVALUATION
Procedure:  COLONOSCOPY    Relevant Problems   CARDIO   (+) Benign essential hypertension   (+) Chest pain on exertion   (+) Hyperlipidemia      ENDO   (+) DM type 2 without retinopathy (HCC)   (+) Type 2 diabetes mellitus with diabetic polyneuropathy, without long-term current use of insulin (HCC) (Hb a1c 6 8)      GYN   (+) History of hysterectomy      MUSCULOSKELETAL   (+) Chronic bilateral low back pain with left-sided sciatica      NEURO/PSYCH   (+) Chronic bilateral low back pain with left-sided sciatica   (+) Depression   (+) Diabetic peripheral neuropathy (HCC)      PULMONARY   (+) Smoking        Physical Exam    Airway    Mallampati score: II  TM Distance: >3 FB  Neck ROM: full     Dental   No notable dental hx     Cardiovascular      Pulmonary      Other Findings    TTE 7/2021  LEFT VENTRICLE:  Systolic function was normal  EF was estimated to be 60 %  There were no regional wall motion abnormalities  The changes were consistent with concentric remodeling  Grade 1 DD     RIGHT ATRIUM:  The atrium was dilated      MITRAL VALVE:  There was mild regurgitation      TRICUSPID VALVE:  There was mild to moderate regurgitation  NM Myocardial Perfusion 5/2021  IMPRESSIONS: Normal study after vasodilation and low level exercise without reproduction of symptoms  Myocardial perfusion imaging was normal at rest and with stress  Left ventricular systolic function was normal     Anesthesia Plan  ASA Score- 3     Anesthesia Type- IV sedation with anesthesia with ASA Monitors  Additional Monitors:   Airway Plan:     Comment: Back up GA  Plan Factors-Exercise tolerance (METS): >4 METS  Chart reviewed  Existing labs reviewed  Patient summary reviewed  Induction-     Postoperative Plan-     Informed Consent- Anesthetic plan and risks discussed with patient  I personally reviewed this patient with the CRNA  Discussed and agreed on the Anesthesia Plan with the CRNA  Alton Bhandari

## 2022-09-15 NOTE — H&P
History and Physical - [facility]  Josiane Reyes 73 y o  female MRN: [id number]    HPI: Josiane Reyes is a 73y o  year old female with prior colonoscopy 2018 without polyps but with inadequate prep, presenting for repeat colonoscopy  Last Hg   Lab Results   Component Value Date    HGB 13 4 01/13/2021     Review of Systems    Historical Information   Past Medical History:   Diagnosis Date    Arthritis     Bump     bumped head yesterday at work "saw stars" no LOC, cat scan done was normal    Circulation problem     Diabetes mellitus ([address] )     blood sugar 125 on admission    Encounter for screening colonoscopy     1 st one    Headache     Hyperlipidemia     Hypertension     Positive fecal occult blood test 08/30/2018    Added automatically from request for surgery [id number]    Post concussion syndrome 09/14/2018    Syncope      Past Surgical History:   Procedure Laterality Date    BREAST BIOPSY Right 02/18/2022    COLONOSCOPY N/A 9/26/2018    Procedure: COLONOSCOPY;  Surgeon: Singh Jean MD;  Location: [facility]; Service: Gastroenterology    HYSTERECTOMY  2017    INCISIONAL BREAST BIOPSY      last assessed [id number]    IN COLONOSCOPY FLX DX W/COLLJ SPEC WHEN PFRMD N/A 7/5/2018    Procedure: COLONOSCOPY;  Surgeon: Singh Jean MD;  Location:  GI LAB;   Service: Gastroenterology    US GUIDED BREAST BIOPSY RIGHT COMPLETE Right 2/18/2022     Social History   Social History     Substance and Sexual Activity   Alcohol Use Never     Social History     Substance and Sexual Activity   Drug Use Never     Social History     Tobacco Use   Smoking Status Current Every Day Smoker    Packs/day: 0 25    Types: Cigarettes   Smokeless Tobacco Never Used   Tobacco Comment    smokes less than 1pk/day     Family History   Problem Relation Age of Onset    Breast cancer Cousin 48    Alcohol abuse Mother     Alcohol abuse Father     Breast cancer Daughter 45    No Known Problems Sister     No Known Problems Maternal Grandmother     No Known Problems Maternal Grandfather     No Known Problems Paternal Grandmother     No Known Problems Paternal Grandfather     No Known Problems Son     No Known Problems Son     No Known Problems Daughter     No Known Problems Daughter     No Known Problems Daughter     Breast cancer Sister 77    No Known Problems Maternal Aunt     No Known Problems Maternal Aunt     No Known Problems Maternal Aunt     No Known Problems Maternal Aunt     No Known Problems Maternal Aunt     No Known Problems Maternal Aunt     No Known Problems Maternal Aunt     No Known Problems Maternal Aunt     No Known Problems Maternal Aunt     No Known Problems Maternal Aunt     No Known Problems Maternal Aunt     No Known Problems Maternal Aunt     No Known Problems Paternal Aunt     No Known Problems Paternal Aunt     Dementia Paternal Aunt     No Known Problems Paternal Aunt     No Known Problems Paternal Aunt     No Known Problems Paternal Aunt     No Known Problems Paternal Aunt     No Known Problems Paternal Aunt     Colon cancer Neg Hx     Endometrial cancer Neg Hx     Ovarian cancer Neg Hx        Meds/Allergies     (Not in a hospital admission)      No Known Allergies    Objective     /69   Pulse 96   Temp (!) 97 2 °F (36 2 °C) (Tympanic)   Resp 17   Ht 5' 3" (1 6 m)   Wt 83 9 kg (185 lb)   SpO2 98%   BMI 32 77 kg/m²       PHYSICAL EXAM    Gen: NAD  CV: RRR  CHEST: Clear  ABD: soft, NT/ND  EXT: no edema  Neuro: AAO      ASSESSMENT/PLAN:  This is a 73y o  year old female here for repeat colonoscopy      Plan/Procedure: Colonoscopy

## 2022-09-15 NOTE — ANESTHESIA POSTPROCEDURE EVALUATION
Post-Op Assessment Note    CV Status:  Stable  Pain Score: 0    Pain management: adequate     Mental Status:  Awake and somnolent   Hydration Status:  Stable   PONV Controlled:  None   Airway Patency:  Patent      Post Op Vitals Reviewed: Yes      Staff: CRNA         No complications documented      BP   108/60 (78)   Temp     Pulse   91   Resp   14   SpO2   99% on RA

## 2022-10-04 ENCOUNTER — PATIENT OUTREACH (OUTPATIENT)
Dept: INTERNAL MEDICINE CLINIC | Facility: CLINIC | Age: 67
End: 2022-10-04

## 2022-10-04 NOTE — PROGRESS NOTES
SW has received call from pt this date to relate she feels she is doing alright at present  SW reviewed with her she can call her Yuriy [address]  for [employer]  SW also gave her our [facility] # as well as the # for our [employer] which can possibly increase her dental coverage at [employer]  Pt to f/u re same   TERE also inquired if she was in need of MH as it was listed as a previous Goal   Pt relates, "no she prays when she is stressed and feels she is coping alright at present"  She will let SW know if she needs additional resources  She was denying any current needs  She did raise question re her dgt in law who also a pt at our Office  CM to f/u with dgt in law re same  Please re-consult TERE if needed  [FreeTextEntry1] : Documented by Turner Mitchell acting as a scribe for Dr. Ketan Morfin on 11/02/2020.\par \par All medical record entries made by the Scribe were at my, Dr. Ketan Morfin's, direction and personally dictated by me on 11/02/2020 . I have reviewed the chart and agree that the record accurately reflects my personal performance of the history, physical exam, assessment and plan. I have also personally directed, reviewed, and agree with the discharge instructions. \par

## 2022-10-31 ENCOUNTER — PATIENT OUTREACH (OUTPATIENT)
Dept: INTERNAL MEDICINE CLINIC | Facility: CLINIC | Age: 67
End: 2022-10-31

## 2022-10-31 DIAGNOSIS — Z78.9 NEED FOR FOLLOW-UP BY SOCIAL WORKER: Primary | ICD-10-CM

## 2022-10-31 NOTE — PROGRESS NOTES
SW has received call from pt who has receive a notice from [employer] re a letter which is indicating a change in her SSI from $120 00 to $ 3 34 which she does not understand  SW attempted to help pt call the [employer] but they were closed  Pt to have her son bring the letter to out Office for us to obtain a copy so the SW can reach out to pt and attempt to help her call the Social Security Office for clarification as needed

## 2022-11-07 ENCOUNTER — PATIENT OUTREACH (OUTPATIENT)
Dept: INTERNAL MEDICINE CLINIC | Facility: CLINIC | Age: 67
End: 2022-11-07

## 2022-11-07 NOTE — PROGRESS NOTES
TERE has reviewed the SSI Notice pt has received and it appears her SSI amount was decreased to $ 3 34/month  because of the Increase in her SSD amount which is now $689 00/ Month   SW has to leave her a message to return SW call if shehas additional questions

## 2022-11-14 ENCOUNTER — PATIENT OUTREACH (OUTPATIENT)
Dept: INTERNAL MEDICINE CLINIC | Facility: CLINIC | Age: 67
End: 2022-11-14

## 2022-11-14 NOTE — PROGRESS NOTES
SW received a return call from pt who asked for help to clarify her new SSI letters  TERE assisted with a call to the [employer] [phone] and spoke to Dakota who reviewed her file  She reviewed pt's info and was able to explain her SSDI was reduced temporarily because of her one large SSD payment  SS Office did review the amount she is scheduled to receives the $702 plus SSI $ 120 which equals $ 822 plus the [employer] of $ 22 00 / month  She was also informed there will be an increase to this amount due to [address] increase which is coming next year  Pt also has the Medicare Part B Buy in  Pt was satisfied with this info  Please re-consult TERE if needed

## 2022-12-03 DIAGNOSIS — E11.42 CONTROLLED TYPE 2 DIABETES MELLITUS WITH DIABETIC POLYNEUROPATHY, WITHOUT LONG-TERM CURRENT USE OF INSULIN (HCC): ICD-10-CM

## 2022-12-29 DIAGNOSIS — E11.42 TYPE 2 DIABETES MELLITUS WITH DIABETIC POLYNEUROPATHY, WITHOUT LONG-TERM CURRENT USE OF INSULIN (HCC): ICD-10-CM

## 2022-12-29 DIAGNOSIS — E11.65 TYPE 2 DIABETES MELLITUS WITH HYPERGLYCEMIA, WITHOUT LONG-TERM CURRENT USE OF INSULIN (HCC): ICD-10-CM

## 2022-12-29 DIAGNOSIS — I10 BENIGN ESSENTIAL HYPERTENSION: ICD-10-CM

## 2022-12-29 DIAGNOSIS — M54.42 CHRONIC BILATERAL LOW BACK PAIN WITH LEFT-SIDED SCIATICA: ICD-10-CM

## 2022-12-29 DIAGNOSIS — G89.29 CHRONIC BILATERAL LOW BACK PAIN WITH LEFT-SIDED SCIATICA: ICD-10-CM

## 2022-12-29 NOTE — TELEPHONE ENCOUNTER
Patient is presently in [facility] taking care of her granddaughter and wants her refills sent to [facility] [phone] [address]

## 2022-12-30 RX ORDER — LISINOPRIL AND HYDROCHLOROTHIAZIDE 20; 12.5 MG/1; MG/1
1 TABLET ORAL DAILY
Qty: 30 TABLET | Refills: 5 | Status: SHIPPED | OUTPATIENT
Start: 2022-12-30

## 2022-12-30 RX ORDER — GABAPENTIN 300 MG/1
600 CAPSULE ORAL 3 TIMES DAILY
Qty: 540 CAPSULE | Refills: 0 | Status: SHIPPED | OUTPATIENT
Start: 2022-12-30

## 2022-12-30 RX ORDER — GLIPIZIDE 5 MG/1
5 TABLET ORAL
Qty: 90 TABLET | Refills: 3 | Status: SHIPPED | OUTPATIENT
Start: 2022-12-30

## 2023-01-18 ENCOUNTER — PATIENT OUTREACH (OUTPATIENT)
Dept: INTERNAL MEDICINE CLINIC | Facility: CLINIC | Age: 68
End: 2023-01-18

## 2023-01-18 ENCOUNTER — OFFICE VISIT (OUTPATIENT)
Dept: INTERNAL MEDICINE CLINIC | Facility: CLINIC | Age: 68
End: 2023-01-18

## 2023-01-18 VITALS
BODY MASS INDEX: 28.77 KG/M2 | HEIGHT: 66 IN | DIASTOLIC BLOOD PRESSURE: 71 MMHG | WEIGHT: 179 LBS | HEART RATE: 92 BPM | TEMPERATURE: 98 F | SYSTOLIC BLOOD PRESSURE: 111 MMHG

## 2023-01-18 DIAGNOSIS — Z74.8 ASSISTANCE NEEDED WITH TRANSPORTATION: ICD-10-CM

## 2023-01-18 DIAGNOSIS — I10 BENIGN ESSENTIAL HYPERTENSION: ICD-10-CM

## 2023-01-18 DIAGNOSIS — M19.90 ARTHRITIS: ICD-10-CM

## 2023-01-18 DIAGNOSIS — E66.3 OVERWEIGHT (BMI 25.0-29.9): ICD-10-CM

## 2023-01-18 DIAGNOSIS — F32.89 OTHER DEPRESSION: ICD-10-CM

## 2023-01-18 DIAGNOSIS — G47.00 INSOMNIA, UNSPECIFIED TYPE: ICD-10-CM

## 2023-01-18 DIAGNOSIS — Z74.8 ASSISTANCE NEEDED WITH TRANSPORTATION: Primary | ICD-10-CM

## 2023-01-18 DIAGNOSIS — E11.42 TYPE 2 DIABETES MELLITUS WITH DIABETIC POLYNEUROPATHY, WITHOUT LONG-TERM CURRENT USE OF INSULIN (HCC): Primary | ICD-10-CM

## 2023-01-18 DIAGNOSIS — R06.83 SNORING: ICD-10-CM

## 2023-01-18 LAB — SL AMB POCT HEMOGLOBIN AIC: 7.9 (ref ?–6.5)

## 2023-01-18 NOTE — PATIENT INSTRUCTIONS
Please stop taking glipizide 5 mg after obtaining jardiance 25 mg from pharmacy  Continue to take metformin 1000 mg twice a day and jardiace 25 mg daily  Please purchase melatonin OTC and try 6 mg before bedtime  Please do home sleep study, please make appointment to  sleep study materials

## 2023-01-18 NOTE — PROGRESS NOTES
Mar has met with pt this date to asssit with transpotation options and to assist with insurance benefits with her new [employer]et[employer] plan  Sw did verify with Clerical team she has new coverage  The PCP listed was not our Office  MAR assisited with a call to [facility] [phone] and spoke to Blachly  She was able to update pt's PCP to Dr Glenna Echeverria Attending of our Office as per pt's request  A new card will be sent out and pt should get in about 2 week  MAR has also confirmed pt has free Transposations thru her Insurance   She has 31 trips ( 15 ROUND TRIPS) but needs to call 48 hours in advance  She needs to call [phone] to set up the rides  Pt also has Extra Benefits of $350 00  For Utilities, Healthy Food, over the counter items, gas,[employer] rides every 3 months  Pt is aware she can use [employer] on [employer], her local [employer] and [employer] etc   $150 00 in Safety devices /year  14 home delivered meals after a hospital stay  MAR has also competed a [employer] application which was mailed in today  Pt to call them to see when she is approved  MAR has also provided her the # for the [employer] [phone] for additional questions she has  MAR also provided he the #  DPW [phone] for additional questions she has    In addition, MAR has encouraged her to call on a regual basis to [address] [place] [address] and [facility] to see where she is on their waiting list

## 2023-01-18 NOTE — PROGRESS NOTES
INTERNAL MEDICINE OFFICE VISIT  [facility]   [address]  [address] [facility]    NAME: Alisson Hays  AGE: 78 y o  SEX: female    DATE OF ENCOUNTER: 1/18/2023    Assessment and Plan     1  Type 2 diabetes mellitus with diabetic polyneuropathy, without long-term current use of insulin ([facility])  A1c elevated to 7 9 about 1 point incresed from last time  Discontinue taking glipizide 5 mg to avoid hypoglycemia and patient does have insurance  GFR >70  Discussed stopping drinking soda and replacing with water  - Continue to take metformin 1000 mg twice a day  - Start jardiace 25 mg daily  - follow up in 3 months to check DM2 on jardiance and off glipizide    - POCT hemoglobin A1c  - Empagliflozin (Jardiance) 25 MG TABS; Take 1 tablet (25 mg total) by mouth every morning  Dispense: 30 tablet; Refill: 2    2  Benign essential hypertension  Blood pressure well controlled today 111/71  3  Arthritis  Knee OA with b/l knee crepitus    - Diclofenac Sodium (VOLTAREN) 1 %; Apply 2 g topically 3 (three) times a day  Dispense: 100 g; Refill: 1    4  Other depression  PHQ 6 score but likely elevated from insomnia and fatigue  She feels she is managing her mood despite challenges at this time  5  Insomnia, unspecified type  Insomnia with 2-3 hours of sleep  Doxepin did not work for her -- will discontinue medication  Will trial higher dose melatonin at 6 mg and obtain sleep study  - Ambulatory Referral to Sleep Medicine; Future  - Home Study; Future    6  Snoring  Patient does not know if she has apneic events since unwitnessed but does wake up after sleeping only 2-3 hours and does know she snores  She was told to get sleep apnea testing in the past which she couldn't perform due to transportation issues  Will order referral to sleep medicine and home sleep study  [facility]   - Ambulatory Referral to Sleep Medicine; Future  - Home Study; Future    7  Overweight (BMI 25 0-29  9)  Advised patient to pursue more weight loss to help with knee pain from osteoarthritis and diabetes  Patient lost about 6 lbs in past 6-7 months  8  Need for transport  Social work referral placed for [employer]  Patient unable to get transport for dexa scan or sleep study completion  Orders Placed This Encounter   Procedures   • POCT hemoglobin A1c   BMI Counseling: Body mass index is 29 33 kg/m²  The BMI is above normal  Nutrition recommendations include decreasing overall calorie intake, 3-5 servings of fruits/vegetables daily and decreasing soda and/or juice intake  Exercise recommendations include joining a gym       - Counseling Documentation: patient was counseled regarding: diagnostic results, instructions for management and risk factor reductions    Chief Complaint     Chief Complaint   Patient presents with   • Follow-up       History of Present Illness     LUCY Kennedy is a 73 y o  female with type 2 diabetes, hypertension, chronic bilateral low back pain, and past history of depression who presents for chronic conditions  DM2: A1c elevated to 7 9 from 6 8  She does endorse trying to cut down on carbohydrates  Breakfast she eats only coffee with very little bit of sugar and for lunch and dinner she had spaghetti  She does take Coca[employer]a few times a week  Hypertension: Blood pressure well controlled today 111/71  Adherent with medications  Insomnia: She has had sleeping difficulty --both falling asleep and staying asleep  2-3 hours a night  Tired during the day  She has tried doxepin which was prescribed at last visit however discontinued because it was not working  She was also told to try melatonin 3mg but is not sure if she uses it or not    She does say that she likely snores at night however does not recall waking up gasping for air she was told to do a sleep study in the past  She has been asked to do sleep study in the past but was not able to due to lack of transportation  The following portions of the patient's history were reviewed and updated as appropriate: allergies, current medications, past family history, past medical history, past social history, past surgical history and problem list     Review of Systems     Review of Systems   Constitutional: Positive for fatigue  Negative for chills, fever and unexpected weight change  HENT: Negative for ear pain and sore throat  Eyes: Negative for pain and visual disturbance  Respiratory: Negative for cough and shortness of breath  Cardiovascular: Negative for chest pain and palpitations  Gastrointestinal: Negative for abdominal pain and vomiting  Genitourinary: Negative for dysuria and hematuria  Musculoskeletal: Positive for arthralgias and back pain  Skin: Negative for color change and rash  Neurological: Negative for seizures and syncope  All other systems reviewed and are negative  Active Problem List     Patient Active Problem List   Diagnosis   • Benign essential hypertension   • Diabetic peripheral neuropathy (HCC)   • DM type 2 without retinopathy (HCC)   • Hyperlipidemia   • Chronic bilateral low back pain with left-sided sciatica   • History of right breast biopsy   • Breast pain, right   • Visit for screening mammogram   • Breast cyst, right   • Breast mass, right   • Carpal tunnel syndrome, bilateral   • Depression   • Type 2 diabetes mellitus with diabetic polyneuropathy, without long-term current use of insulin (HCC)   • Overweight (BMI 25 0-29  9)   • History of hysterectomy   • Chest pain on exertion   • Smoking       Objective     /71 (BP Location: Right arm, Patient Position: Sitting, Cuff Size: Large)   Pulse (!) 111   Temp 98 °F (36 7 °C) (Temporal)   Ht 5' 5 5" (1 664 m)   Wt 81 2 kg (179 lb)   BMI 29 33 kg/m²     Physical Exam  Vitals reviewed  Constitutional:       General: She is not in acute distress  Appearance: Normal appearance   She is normal weight  She is not ill-appearing  HENT:      Head: Normocephalic and atraumatic  Nose: Nose normal  No congestion  Mouth/Throat:      Mouth: Mucous membranes are moist       Pharynx: Oropharynx is clear  No oropharyngeal exudate  Eyes:      Conjunctiva/sclera: Conjunctivae normal       Pupils: Pupils are equal, round, and reactive to light  Cardiovascular:      Rate and Rhythm: Normal rate and regular rhythm  Pulses: Normal pulses  Heart sounds: Normal heart sounds  No murmur heard  Pulmonary:      Effort: Pulmonary effort is normal       Breath sounds: Normal breath sounds  No wheezing or rales  Abdominal:      General: Bowel sounds are normal  There is no distension  Palpations: Abdomen is soft  Tenderness: There is no abdominal tenderness  There is no guarding  Musculoskeletal:         General: No swelling  Normal range of motion  Cervical back: Normal range of motion and neck supple  Right lower leg: No edema  Left lower leg: No edema  Skin:     General: Skin is warm and dry  Capillary Refill: Capillary refill takes less than 2 seconds  Findings: No lesion or rash  Neurological:      General: No focal deficit present  Mental Status: She is alert and oriented to person, place, and time  Gait: Gait normal    Psychiatric:         Mood and Affect: Mood normal          Behavior: Behavior normal          Thought Content:  Thought content normal          Judgment: Judgment normal          Pertinent Laboratory/Diagnostic Studies:  CBC:   Lab Results   Component Value Date/Time    WBC 6 75 01/13/2021 07:29 AM    RBC 4 89 01/13/2021 07:29 AM    HGB 13 4 01/13/2021 07:29 AM    HCT 42 9 01/13/2021 07:29 AM    MCV 88 01/13/2021 07:29 AM    MCH 27 4 01/13/2021 07:29 AM    MCHC 31 2 (L) 01/13/2021 07:29 AM    RDW 13 5 01/13/2021 07:29 AM    MPV 10 4 01/13/2021 07:29 AM     01/13/2021 07:29 AM    NRBC 0 01/13/2021 07:29 AM NEUTOPHILPCT 58 01/13/2021 07:29 AM    LYMPHOPCT 30 01/13/2021 07:29 AM    MONOPCT 10 01/13/2021 07:29 AM    EOSPCT 2 01/13/2021 07:29 AM    BASOPCT 0 01/13/2021 07:29 AM    NEUTROABS 3 88 01/13/2021 07:29 AM    LYMPHSABS 2 03 01/13/2021 07:29 AM    MONOSABS 0 65 01/13/2021 07:29 AM    EOSABS 0 15 01/13/2021 07:29 AM     Chemistry Profile:   Lab Results   Component Value Date/Time    K 4 0 06/15/2022 10:20 AM     06/15/2022 10:20 AM    CO2 30 06/15/2022 10:20 AM    BUN 21 06/15/2022 10:20 AM    CREATININE 0 83 06/15/2022 10:20 AM    GLUC 262 (H) 07/14/2016 08:25 AM    GLUF 92 06/15/2022 10:20 AM    CALCIUM 9 8 06/15/2022 10:20 AM    CORRECTEDCA 10 5 (H) 12/08/2021 08:05 AM    AST 9 06/15/2022 10:20 AM    ALT 13 06/15/2022 10:20 AM    ALKPHOS 65 06/15/2022 10:20 AM    EGFR 74 06/15/2022 10:20 AM     Endocrine Studies:   Lab Results   Component Value Date/Time    HGBA1C 7 9 (A) 01/18/2023 09:57 AM    HGBA1C 6 8 (H) 06/15/2022 10:20 AM    GWB6CRMZBUOS 1 610 06/15/2022 10:20 AM    TRIG 107 06/15/2022 10:20 AM    CHOLESTEROL 187 06/15/2022 10:20 AM    HDL 49 (L) 06/15/2022 10:20 AM    LDLCALC 117 (H) 06/15/2022 10:20 AM     CBC: No results for input(s): WBC, RBC, HGB, HCT, MCV, MCH, MCHC, RDW, MPV, PLT, NRBC, NEUTOPHILPCT, LYMPHOPCT, MONOPCT, EOSPCT, BASOPCT, NEUTROABS, LYMPHSABS, MONOSABS, EOSABS, MONOSABS in the last 8784 hours  Chemistry Profile:   Results from Last 12 Months   Lab Units 06/15/22  1020   POTASSIUM mmol/L 4 0   CHLORIDE mmol/L 107   CO2 mmol/L 30   BUN mg/dL 21   CREATININE mg/dL 0 83   GLUCOSE FASTING mg/dL 92   CALCIUM mg/dL 9 8   AST U/L 9   ALT U/L 13   ALK PHOS U/L 65   EGFR ml/min/1 73sq m 74     Hepatology: No results for input(s): LIPASE, AMMONIA, AFP in the last 8784 hours        Current Medications     Current Outpatient Medications:   •  Accu-Chek Softclix Lancets lancets, Use 2 (two) times a day Use as instructed, Disp: 200 each, Rfl: 2  •  acetaminophen (TYLENOL) 325 mg tablet, Take 650 mg by mouth every 6 (six) hours as needed for mild pain , Disp: , Rfl:   •  aspirin (ECOTRIN LOW STRENGTH) 81 mg EC tablet, Take 1 tablet (81 mg total) by mouth daily, Disp: 90 tablet, Rfl: 1  •  Blood Glucose Monitoring Suppl (Accu-Chek Guide) w/Device KIT, Use 2 (two) times a day Use 2 times a day, Disp: 1 kit, Rfl: 0  •  Diclofenac Sodium (VOLTAREN) 1 %, Apply 2 g topically 3 (three) times a day, Disp: 100 g, Rfl: 1  •  Elastic Bandages & Supports (GNP Diabetic Socks Unisex XL) MISC, Use 1 each if needed (1 pair of socks for diabetic neuropathy), Disp: 2 each, Rfl: 0  •  gabapentin (NEURONTIN) 300 mg capsule, Take 2 capsules (600 mg total) by mouth 3 (three) times a day, Disp: 540 capsule, Rfl: 0  •  glipiZIDE (GLUCOTROL) 5 mg tablet, Take 1 tablet (5 mg total) by mouth daily with breakfast, Disp: 90 tablet, Rfl: 3  •  glucose blood (Accu-Chek Guide) test strip, Use 1 each 2 (two) times a day Use as instructed, Disp: 200 each, Rfl: 2  •  metFORMIN (GLUCOPHAGE) 1000 MG tablet, TAKE 1 TABLET BY MOUTH  TWICE DAILY WITH MEALS, Disp: 180 tablet, Rfl: 3  •  omeprazole (PriLOSEC) 20 mg delayed release capsule, TAKE 1 CAPSULE BY MOUTH  ONCE DAILY, Disp: 90 capsule, Rfl: 3  •  doxepin (SINEquan) 10 mg capsule, Take 1 capsule (10 mg total) by mouth daily at bedtime (Patient not taking: Reported on 1/18/2023), Disp: 30 capsule, Rfl: 2  •  lisinopril-hydrochlorothiazide (PRINZIDE,ZESTORETIC) 20-12 5 MG per tablet, Take 1 tablet by mouth daily, Disp: 30 tablet, Rfl: 5  •  lovastatin (MEVACOR) 20 mg tablet, Take 1 tablet (20 mg total) by mouth daily at bedtime, Disp: 90 tablet, Rfl: 3  •  nicotine (NICODERM CQ) 7 mg/24hr TD 24 hr patch, Place 1 patch on the skin every 24 hours (Patient not taking: Reported on 6/13/2022), Disp: 28 patch, Rfl: 0    Health Maintenance     Health Maintenance   Topic Date Due   • Medicare Annual Wellness Visit (AWV)  Never done   • Dental Periodic Exam  Never done   • Dental X-Ray: Bitewings Never done   • Dental X-Ray: Full Mouth  Never done   • Dental Prophylaxis  Never done   • Pneumococcal Vaccine: 65+ Years (1 - PCV) Never done   • Osteoporosis Screening  Never done   • COVID-19 Vaccine (3 - Booster for Pfizer series) 07/11/2021   • Urinary Incontinence Screening  Never done   • Falls: Plan of Care  Never done   • Influenza Vaccine (1) Never done   • Diabetic Foot Exam  12/10/2022   • BMI: Followup Plan  01/07/2023   • Breast Cancer Screening: Mammogram  01/21/2023   • Kidney Health Evaluation: GFR  06/15/2023   • Kidney Health Evaluation: Microalbumin/Creatinine Ratio  06/15/2023   • HEMOGLOBIN A1C  07/18/2023   • Fall Risk  01/18/2024   • BMI: Adult  01/18/2024   • DM Eye Exam  01/18/2024   • Colorectal Cancer Screening  09/12/2032   • Hepatitis C Screening  Completed   • HIB Vaccine  Aged Out   • IPV Vaccine  Aged Out   • Hepatitis A Vaccine  Aged Out   • Meningococcal ACWY Vaccine  Aged Out   • HPV Vaccine  Aged Out     Immunization History   Administered Date(s) Administered   • COVID-19 PFIZER VACCINE 0 3 ML IM 04/18/2021, 05/16/2021           Brenda Martinez MD  Internal Medicine  PGY-2  1/18/2023 10:13 AM

## 2023-01-26 ENCOUNTER — TELEPHONE (OUTPATIENT)
Dept: SLEEP CENTER | Facility: CLINIC | Age: 68
End: 2023-01-26

## 2023-01-26 NOTE — TELEPHONE ENCOUNTER
----- Message from Whitney Muse MD sent at 1/26/2023  2:12 PM EST -----  Approved    ----- Message -----  From: Stanislav Nunez  Sent: 1/24/2023   7:36 AM EST  To: Sleep Medicine [facility] Provider    This Home sleep study needs approval      If approved please sign and return to clerical pool  If denied please include reasons why  Also provide alternative testing if warranted  Please sign and return to clerical pool

## 2023-02-23 ENCOUNTER — TELEPHONE (OUTPATIENT)
Dept: INTERNAL MEDICINE CLINIC | Facility: CLINIC | Age: 68
End: 2023-02-23

## 2023-02-23 ENCOUNTER — OFFICE VISIT (OUTPATIENT)
Dept: INTERNAL MEDICINE CLINIC | Facility: CLINIC | Age: 68
End: 2023-02-23

## 2023-02-23 VITALS
BODY MASS INDEX: 31.57 KG/M2 | HEIGHT: 63 IN | OXYGEN SATURATION: 98 % | SYSTOLIC BLOOD PRESSURE: 111 MMHG | DIASTOLIC BLOOD PRESSURE: 74 MMHG | HEART RATE: 106 BPM | WEIGHT: 178.2 LBS | TEMPERATURE: 97.5 F

## 2023-02-23 DIAGNOSIS — Z76.89 RETURN TO WORK EVALUATION: Primary | ICD-10-CM

## 2023-02-23 NOTE — TELEPHONE ENCOUNTER
Patient received her letter to go back to work   However, patient states the letter needs include "no lifting restrictions"     Letter needs to be faxed to:   [phone] Attn: Maryanne Day  And emailed to:  [email or web address]    Patient is also asking to have the letter mailed to her

## 2023-02-23 NOTE — PROGRESS NOTES
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  She retired around 1 year ago but states that since then she has been bored at home and would like to get back out into the world and be active again  She also reports that her previous employer asked if she would like to come back to work, but they did require a medical letter stating that this is okay  She reports that overall she is feeling well and has no acute complaints  She also reports that she has no current ailments that would prevent her from functioning at work  At the end of the visit a letter was given to the patient stating that she may return to work with no restrictions  Follow up in our clinic in 2 month(s) for A1C recheck and MAW  Subjective   HISTORY OF PRESENT ILLNESS:  Fe Azul is a 78 y o  female with a past medical history of type 2 diabetes, hypertension, chronic low back pain, depression and hyperlipidemia who presented to the clinic today requesting a letter allowing her to go back to work  She was last seen in the clinic 1 month ago  At that time her hemoglobin A1c had increased from 6 8 in June to 7 9  At that time her glipizide was discontinued and she was started on Jardiance 25 mg daily in addition to her metformin 1000 mg twice daily  Today the patient reports that she has been compliant with her medications, however she is considering discontinuing her metformin due to reading about possible side effects  The patient was encouraged to continue taking these medications until her next A1c is obtained  She was agreeable to continuing these medications    She also reports that 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  Today she states that she has been bored at home and would like to go back to work  She also reports that her previous employer was asking if she would like to come back to work  Overall she was feeling well and had no acute complaints  She reports that she has no current ailments that would prevent her from working at full capacity  She ambulates without an assistance device and states that she feels well-balanced on her feet  At the end of the visit a letter was given to the patient stating that she may return to work with no restrictions  Review of Systems   Constitutional: Negative for activity change, appetite change, chills, diaphoresis, fatigue and fever  HENT: Negative for congestion, ear discharge, ear pain, hearing loss, sinus pressure, sinus pain and sore throat  Eyes: Negative for pain, discharge, redness and itching  Respiratory: Negative for cough, chest tightness, shortness of breath and wheezing  Cardiovascular: Negative for chest pain, palpitations and leg swelling  Gastrointestinal: Negative for abdominal distention, abdominal pain, blood in stool, constipation, diarrhea, nausea and vomiting  Genitourinary: Negative for difficulty urinating, dysuria, flank pain and frequency  Musculoskeletal: Negative for arthralgias, back pain and gait problem  Skin: Negative for color change, pallor and rash  Neurological: Negative for dizziness, weakness, light-headedness, numbness and headaches  Psychiatric/Behavioral: Negative for agitation, behavioral problems, confusion and decreased concentration  Objective     Vitals:    02/23/23 1326   BP: 111/74   BP Location: Left arm   Patient Position: Sitting   Cuff Size: Large   Pulse: (!) 106   Temp: 97 5 °F (36 4 °C)   TempSrc: Temporal   SpO2: 98%   Weight: 80 8 kg (178 lb 3 2 oz)   Height: 5' 3" (1 6 m)     Physical Exam  Constitutional:       Appearance: She is well-developed     HENT:      Head: Normocephalic and atraumatic  Nose: Nose normal    Eyes:      Conjunctiva/sclera: Conjunctivae normal       Pupils: Pupils are equal, round, and reactive to light  Neck:      Vascular: No JVD  Cardiovascular:      Rate and Rhythm: Normal rate and regular rhythm  Heart sounds: Normal heart sounds  No murmur heard  No friction rub  No gallop  Pulmonary:      Effort: Pulmonary effort is normal  No respiratory distress  Breath sounds: Normal breath sounds  No stridor  No wheezing or rales  Chest:      Chest wall: No tenderness  Abdominal:      General: Bowel sounds are normal  There is no distension  Palpations: Abdomen is soft  There is no mass  Tenderness: There is no abdominal tenderness  There is no guarding or rebound  Hernia: No hernia is present  Musculoskeletal:         General: No tenderness or deformity  Right lower leg: No edema  Left lower leg: No edema  Skin:     General: Skin is warm and dry  Neurological:      Mental Status: She is alert and oriented to person, place, and time  Psychiatric:         Mood and Affect: Mood normal          Behavior: Behavior normal          Thought Content:  Thought content normal          Judgment: Judgment normal          History     Current Outpatient Medications:   •  Accu-Chek Softclix Lancets lancets, Use 2 (two) times a day Use as instructed, Disp: 200 each, Rfl: 2  •  acetaminophen (TYLENOL) 325 mg tablet, Take 650 mg by mouth every 6 (six) hours as needed for mild pain , Disp: , Rfl:   •  aspirin (ECOTRIN LOW STRENGTH) 81 mg EC tablet, Take 1 tablet (81 mg total) by mouth daily, Disp: 90 tablet, Rfl: 1  •  Blood Glucose Monitoring Suppl (Accu-Chek Guide) w/Device KIT, Use 2 (two) times a day Use 2 times a day, Disp: 1 kit, Rfl: 0  •  Diclofenac Sodium (VOLTAREN) 1 %, Apply 2 g topically 3 (three) times a day, Disp: 100 g, Rfl: 1  •  Elastic Bandages & Supports (GNP Diabetic Socks Unisex XL) MISC, Use 1 each if needed (1 pair of socks for diabetic neuropathy), Disp: 2 each, Rfl: 0  •  Empagliflozin (Jardiance) 25 MG TABS, Take 1 tablet (25 mg total) by mouth every morning, Disp: 30 tablet, Rfl: 2  •  gabapentin (NEURONTIN) 300 mg capsule, Take 2 capsules (600 mg total) by mouth 3 (three) times a day, Disp: 540 capsule, Rfl: 0  •  glucose blood (Accu-Chek Guide) test strip, Use 1 each 2 (two) times a day Use as instructed, Disp: 200 each, Rfl: 2  •  lisinopril-hydrochlorothiazide (PRINZIDE,ZESTORETIC) 20-12 5 MG per tablet, Take 1 tablet by mouth daily, Disp: 30 tablet, Rfl: 5  •  lovastatin (MEVACOR) 20 mg tablet, Take 1 tablet (20 mg total) by mouth daily at bedtime, Disp: 90 tablet, Rfl: 3  •  metFORMIN (GLUCOPHAGE) 1000 MG tablet, TAKE 1 TABLET BY MOUTH  TWICE DAILY WITH MEALS, Disp: 180 tablet, Rfl: 3  •  omeprazole (PriLOSEC) 20 mg delayed release capsule, TAKE 1 CAPSULE BY MOUTH  ONCE DAILY, Disp: 90 capsule, Rfl: 3  •  doxepin (SINEquan) 10 mg capsule, Take 1 capsule (10 mg total) by mouth daily at bedtime (Patient not taking: Reported on 1/18/2023), Disp: 30 capsule, Rfl: 2  •  nicotine (NICODERM CQ) 7 mg/24hr TD 24 hr patch, Place 1 patch on the skin every 24 hours (Patient not taking: Reported on 6/13/2022), Disp: 28 patch, Rfl: 0  No Known Allergies   Past Medical History:   Diagnosis Date   • Arthritis    • Bump     bumped head yesterday at work "saw stars" no LOC, cat scan done was normal   • Circulation problem    • Diabetes mellitus (HCC)     blood sugar 125 on admission   • Encounter for screening colonoscopy     1 st one   • Headache    • Hyperlipidemia    • Hypertension    • Positive fecal occult blood test 08/30/2018    Added automatically from request for surgery [id number]   • Post concussion syndrome 09/14/2018   • Syncope      Past Surgical History:   Procedure Laterality Date   • BREAST BIOPSY Right 02/18/2022   • COLONOSCOPY N/A 9/26/2018    Procedure: COLONOSCOPY;  Surgeon: Myrna Melissa MD; Location: [facility]; Service: Gastroenterology   • HYSTERECTOMY  2017   • INCISIONAL BREAST BIOPSY      last assessed 17Aug2017   • WI COLONOSCOPY FLX DX W/COLLJ SPEC WHEN PFRMD N/A 7/5/2018    Procedure: COLONOSCOPY;  Surgeon: Pan Scales MD;  Location:  GI LAB; Service: Gastroenterology   • US GUIDED BREAST BIOPSY RIGHT COMPLETE Right 2/18/2022     Social History     Socioeconomic History   • Marital status:      Spouse name: Not on file   • Number of children: 6   • Years of education: Not on file   • Highest education level: Not on file   Occupational History   • Occupation: [employer]   Tobacco Use   • Smoking status: Every Day     Packs/day: 0 25     Types: Cigarettes   • Smokeless tobacco: Never   • Tobacco comments:     smokes less than 1pk/day   Vaping Use   • Vaping Use: Never used   Substance and Sexual Activity   • Alcohol use: Never   • Drug use: Never   • Sexual activity: Not Currently   Other Topics Concern   • Not on file   Social History Narrative    Daily caffeine consumption, 6-8 servings a day     Social Determinants of Health     Financial Resource Strain: Low Risk    • Difficulty of Paying Living Expenses: Not hard at all   Food Insecurity: No Food Insecurity   • Worried About Running Out of Food in the Last Year: Never true   • Ran Out of Food in the Last Year: Never true   Transportation Needs: No Transportation Needs   • Lack of Transportation (Medical): No   • Lack of Transportation (Non-Medical):  No   Physical Activity: Not on file   Stress: Not on file   Social Connections: Not on file   Intimate Partner Violence: Not on file   Housing Stability: Not on file     Family History   Problem Relation Age of Onset   • Breast cancer Cousin 48   • Alcohol abuse Mother    • Alcohol abuse Father    • Breast cancer Daughter 45   • No Known Problems Sister    • No Known Problems Maternal Grandmother    • No Known Problems Maternal Grandfather    • No Known Problems Paternal Grandmother    • No Known Problems Paternal Grandfather    • No Known Problems Son    • No Known Problems Son    • No Known Problems Daughter    • No Known Problems Daughter    • No Known Problems Daughter    • Breast cancer Sister 77   • No Known Problems Maternal Aunt    • No Known Problems Maternal Aunt    • No Known Problems Maternal Aunt    • No Known Problems Maternal Aunt    • No Known Problems Maternal Aunt    • No Known Problems Maternal Aunt    • No Known Problems Maternal Aunt    • No Known Problems Maternal Aunt    • No Known Problems Maternal Aunt    • No Known Problems Maternal Aunt    • No Known Problems Maternal Aunt    • No Known Problems Maternal Aunt    • No Known Problems Paternal Aunt    • No Known Problems Paternal Aunt    • Dementia Paternal Aunt    • No Known Problems Paternal Aunt    • No Known Problems Paternal Aunt    • No Known Problems Paternal Aunt    • No Known Problems Paternal Aunt    • No Known Problems Paternal Aunt    • Colon cancer Neg Hx    • Endometrial cancer Neg Hx    • Ovarian cancer Neg Hx        MD Milan Finley 73 Internal Medicine PGY-3  [address] [facility]  [address] [facility] , [address] [facility]    PLEASE NOTE:  This encounter was completed utilizing the M-Modal/Fluency Direct Speech Voice Recognition Software  Grammatical errors, random word insertions, pronoun errors and incomplete sentences are occasional consequences of the system due to software limitations, ambient noise and hardware issues  These may be missed by proof reading prior to affixing electronic signature  Any questions or concerns about the content, text or information contained within the body of this dictation should be directly addressed to the physician for clarification  Please do not hesitate to call me directly if you have any any questions or concerns

## 2023-02-23 NOTE — LETTER
February 23, 2023     Patient: Avi Blackman  YOB: 1955  Date of Visit: 2/23/2023      To Whom it May Concern:    Avi Blackman is under my professional care  Justin Vargas was seen in my office on 2/23/2023  Justin Vargas may return to work with no restrictions  If you have any questions or concerns, please don't hesitate to call           Sincerely,          Toña Marie MD        CC: No Recipients

## 2023-02-23 NOTE — PATIENT INSTRUCTIONS
Please give your work letter to your employer stating that it is okay to go back to work with no restrictions  Please continue your current medical regimen

## 2023-03-17 ENCOUNTER — TELEPHONE (OUTPATIENT)
Dept: INTERNAL MEDICINE CLINIC | Facility: CLINIC | Age: 68
End: 2023-03-17

## 2023-03-17 NOTE — TELEPHONE ENCOUNTER
Rosemary Rosales from [place] [address] went to visit beto at home  Patient blood sugar was low (60)  She retested after 20 minutes it went up to 103  Rosemary Rosales wants the doctor to be aware of her sugar being low  Patient is taking metformin and jardiance  Patient is taking Glipizide its not on her chart  Rosemary Rosales states that's what's making her sugar go low  She told beto she will call the doctor and let it be known  Patient has a lot going on with housing  She's on borderline with being homeless  Patient is low on food  Rosemary Rosales is concern if the doctor and a  can contact [address] [facility]

## 2023-03-21 ENCOUNTER — PATIENT OUTREACH (OUTPATIENT)
Dept: INTERNAL MEDICINE CLINIC | Facility: CLINIC | Age: 68
End: 2023-03-21

## 2023-03-21 ENCOUNTER — OFFICE VISIT (OUTPATIENT)
Dept: INTERNAL MEDICINE CLINIC | Facility: CLINIC | Age: 68
End: 2023-03-21

## 2023-03-21 VITALS
HEIGHT: 63 IN | DIASTOLIC BLOOD PRESSURE: 76 MMHG | WEIGHT: 178 LBS | HEART RATE: 90 BPM | BODY MASS INDEX: 31.54 KG/M2 | TEMPERATURE: 97.8 F | SYSTOLIC BLOOD PRESSURE: 138 MMHG

## 2023-03-21 DIAGNOSIS — I10 BENIGN ESSENTIAL HYPERTENSION: ICD-10-CM

## 2023-03-21 DIAGNOSIS — Z78.9 NEEDS ASSISTANCE WITH COMMUNITY RESOURCES: Primary | ICD-10-CM

## 2023-03-21 DIAGNOSIS — E11.42 TYPE 2 DIABETES MELLITUS WITH DIABETIC POLYNEUROPATHY, WITHOUT LONG-TERM CURRENT USE OF INSULIN (HCC): Primary | ICD-10-CM

## 2023-03-21 NOTE — PROGRESS NOTES
Name: Cruz Toth      : 1955      MRN: [id number]  Encounter Provider: SU Fernandez  Encounter Date: 3/21/2023   Encounter department: [address]     1  Type 2 diabetes mellitus with diabetic polyneuropathy, without long-term current use of insulin (HCC)  Assessment & Plan:    Lab Results   Component Value Date    HGBA1C 7 9 (A) 2023     Advised patient to stop glipizide due to low blood sugars in the morning  Continue metformin 1000 mg twice daily and Jardiance 25 mg daily  DM foot exam performed today  Counseled on s/s of hypoglycemia and how to treat  Continue monitoring blood sugars at home--patient interested in CGM--script [facility]  Recommend follow up as annette'd with PCP next month  Orders:  -     Continuous Blood Gluc  ([employer] [address] [place]) Sosa Alfonso; Check blood sugars multiple times per day  -     Continuous Blood Gluc Sensor ([employer] [place] 2 Sensor) MISC; Check blood sugars multiple times per day    2  Benign essential hypertension  Assessment & Plan:  BP Readings from Last 3 Encounters:   23 138/76   23 111/74   23 111/71     Home readings elevated---160 per patient  Patient interested in stopping BP medication   Given today's reading and reported home readings, recommend continuing lisinopril-HCTZ 20-12 5 mg daily  Subjective      HPI     Patient with past medical history of Type 2 DM, diabetic periphereal neuropathy, HTN, HLD, depression, low back pain presents today for evaluation of low blood sugars       She is checking blood sugars three times per day-she does not have log today but reports that following readings:  ~AM FBS ranging 68-99  ~Afternoon, several hours after breakfast usually around 105  ~Evening (2 hrs after dinner): 105-120   ~At  last OV on 23 glipizide was discontinued and Jardiance was started due to concern for hypoglycemia however patient reports she has been taking glipizide (last dose this morning), metformin 1000 mg BID and Jardiance 25 mg daily    HTN:   ~She reports home BP readings:   ~SBP120-160, DBP 60-80    ~Patient overall concerned with amount of medications she is taking and would like to come off some  ~She also reports increased stress related to finance, possible UGI shut off    ~She would like to get back to work and 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     Review of Systems   Constitutional: Negative for chills and fever  HENT: Negative for ear pain and sore throat  Eyes: Negative for pain and visual disturbance  Respiratory: Negative for cough and shortness of breath  Cardiovascular: Negative for chest pain and palpitations  Gastrointestinal: Negative for abdominal pain and vomiting  Genitourinary: Negative for dysuria and hematuria  Musculoskeletal: Negative for arthralgias and back pain  Skin: Negative for color change and rash  Neurological: Negative for seizures and syncope  All other systems reviewed and are negative        Current Outpatient Medications on File Prior to Visit   Medication Sig   • Accu-Chek Softclix Lancets lancets Use 2 (two) times a day Use as instructed   • acetaminophen (TYLENOL) 325 mg tablet Take 650 mg by mouth every 6 (six) hours as needed for mild pain    • aspirin (ECOTRIN LOW STRENGTH) 81 mg EC tablet Take 1 tablet (81 mg total) by mouth daily   • Blood Glucose Monitoring Suppl (Accu-Chek Guide) w/Device KIT Use 2 (two) times a day Use 2 times a day   • Diclofenac Sodium (VOLTAREN) 1 % Apply 2 g topically 3 (three) times a day   • Elastic Bandages & Supports (GNP Diabetic Socks Unisex XL) MISC Use 1 each if needed (1 pair of socks for diabetic neuropathy)   • Empagliflozin (Jardiance) 25 MG TABS Take 1 tablet (25 mg total) by mouth every morning   • gabapentin (NEURONTIN) 300 mg capsule Take 2 capsules (600 mg total) by mouth 3 (three) times a day   • glucose blood (Accu-Chek Guide) test strip Use 1 each 2 (two) times a day Use as instructed   • lisinopril-hydrochlorothiazide (PRINZIDE,ZESTORETIC) 20-12 5 MG per tablet Take 1 tablet by mouth daily   • lovastatin (MEVACOR) 20 mg tablet Take 1 tablet (20 mg total) by mouth daily at bedtime   • metFORMIN (GLUCOPHAGE) 1000 MG tablet TAKE 1 TABLET BY MOUTH  TWICE DAILY WITH MEALS   • omeprazole (PriLOSEC) 20 mg delayed release capsule TAKE 1 CAPSULE BY MOUTH  ONCE DAILY   • nicotine (NICODERM CQ) 7 mg/24hr TD 24 hr patch Place 1 patch on the skin every 24 hours (Patient not taking: Reported on 6/13/2022)   • [DISCONTINUED] doxepin (SINEquan) 10 mg capsule Take 1 capsule (10 mg total) by mouth daily at bedtime (Patient not taking: Reported on 1/18/2023)       Objective     /76 (BP Location: Left arm, Patient Position: Sitting)   Pulse 90   Temp 97 8 °F (36 6 °C) (Temporal)   Ht 5' 3" (1 6 m)   Wt 80 7 kg (178 lb)   BMI 31 53 kg/m²     Physical Exam  Vitals and nursing note reviewed  Constitutional:       General: She is not in acute distress  Appearance: Normal appearance  HENT:      Head: Normocephalic  Right Ear: External ear normal       Left Ear: External ear normal       Nose: Nose normal       Mouth/Throat:      Mouth: Mucous membranes are moist    Eyes:      Conjunctiva/sclera: Conjunctivae normal    Cardiovascular:      Rate and Rhythm: Normal rate and regular rhythm  Pulses: Normal pulses  no weak pulses          Dorsalis pedis pulses are 2+ on the right side and 2+ on the left side  Posterior tibial pulses are 2+ on the right side and 2+ on the left side  Heart sounds: Normal heart sounds  Pulmonary:      Effort: Pulmonary effort is normal       Breath sounds: Normal breath sounds  Abdominal:      General: Bowel sounds are normal  There is no distension  Palpations: Abdomen is soft  Musculoskeletal:         General: Normal range of motion  Cervical back: Normal range of motion  Feet:      Right foot:      Skin integrity: No ulcer, skin breakdown, erythema, warmth, callus or dry skin  Left foot:      Skin integrity: No ulcer, skin breakdown, erythema, warmth, callus or dry skin  Skin:     General: Skin is warm and dry  Capillary Refill: Capillary refill takes less than 2 seconds  Neurological:      Mental Status: She is alert and oriented to person, place, and time  Psychiatric:         Mood and Affect: Mood normal          Behavior: Behavior normal      Patient's shoes and socks removed  Right Foot/Ankle   Right Foot Inspection  Skin Exam: skin normal and skin intact  No dry skin, no warmth, no callus, no erythema, no maceration, no abnormal color, no pre-ulcer, no ulcer and no callus  Toe Exam: ROM and strength within normal limits  Sensory   Monofilament testing: intact    Vascular  The right DP pulse is 2+  The right PT pulse is 2+  Left Foot/Ankle  Left Foot Inspection  Skin Exam: skin normal and skin intact  No dry skin, no warmth, no erythema, no maceration, normal color, no pre-ulcer, no ulcer and no callus  Toe Exam: ROM and strength within normal limits  Sensory   Monofilament testing: intact    Vascular  The left DP pulse is 2+  The left PT pulse is 2+       Assign Risk Category  No deformity present  No loss of protective sensation  No weak pulses  Risk: 0      SU Britt

## 2023-03-21 NOTE — PATIENT INSTRUCTIONS
Diabetes and Nutrition   AMBULATORY CARE:   Nutrition plans  help keep blood sugar levels steady  They also help delay or prevent complications of diabetes, such as diabetic kidney disease  Call your local emergency number (911 in the [address]) if:   You have any of the following signs of a heart attack:      Squeezing, pressure, or pain in your chest    You may  also have any of the following:     Discomfort or pain in your back, neck, jaw, stomach, or arm    Shortness of breath    Nausea or vomiting    Lightheadedness or a sudden cold sweat      Seek care immediately if:   You have a low blood sugar level and it does not improve with treatment  Symptoms are trouble thinking, a pounding heartbeat, and sweating  Your blood sugar level is above 240 mg/dL and does not come down within 15 minutes of treatment  You have ketones in your blood or urine  You have nausea or are vomiting and cannot keep any food or liquid down  You have blurred or double vision  Your breath has a fruity, sweet smell, or your breathing is shallow  Call your doctor or diabetes care team if:   Your blood sugar levels are higher than your target goals  You often have low blood sugar levels  You have trouble coping with diabetes, or you feel anxious or depressed  You have questions or concerns about your condition or care  A dietitian will help you create a nutrition plan  to meet your needs and your family's needs  He or she may explain a plan such as the Dietary Approaches to Stop Hypertension (DASH) eating plan or the Mediterranean diet  The goal is for you to reach or maintain healthy weight, blood sugar, blood pressure, and lipid levels  You should meet with the dietitian at least 1 time each year  You will learn the following:   How food affects your blood sugar levels    How to create healthy eating habits    How to make food choices based on your activity level, weight, and glucose levels    How your favorite foods may fit into your plan    How to keep track of carbohydrates    Correct portion sizes for each food    Changes you can make to your plan if you get pregnant or are breastfeeding    What you can do before you meet with the dietitian:   Do not skip meals  The goal is to keep your blood sugar level steady  Blood sugar levels may drop too low if you have received insulin and do not eat  Eat more high-fiber foods  Examples include fresh or frozen fruits and vegetables, whole-grain breads, and beans  Fiber helps control or lower blood sugar and cholesterol levels  Choose whole fruits instead of fruit juice as much as possible  Sugar may be added to juice, and fiber may be removed  Choose heart-healthy fats  Foods high in heart-healthy fats include olive oil, nuts, avocados, and fatty fish, such as salmon and tuna  Foods high in unhealthy fats include red meat, full-fat dairy products, and soft margarine  Unhealthy fats can increase your risk for heart disease, increase bad cholesterol, and lower good cholesterol  Choose complex carbohydrates  Foods with complex carbohydrates include brown rice, whole-grain breads and cereals, and cooked beans  Foods with simple carbohydrates include white bread, white rice, most cold cereals, and snack foods  Your plan will include the amount of carbohydrate to have at one time or in a day  Your blood sugar level can get too high if you eat too much carbohydrate at one time  Blood sugar levels do not spike as high or drop as quickly with complex carbohydrates as with simple carbohydrates  Choose complex carbohydrates whenever possible  Have less sodium (salt)  The risk for high blood pressure (BP) increases with high-sodium foods  Limit high-sodium foods, such as soy sauce, potato chips, and canned soup  Do not add salt to food you cook  Limit your use of table salt  Read labels to have no more than 2,300 milligrams of sodium in one day           Limit artificial sweeteners  These may be found in food or drinks, such as diet soft drinks or other low-calorie beverages  Artificial sweeteners are low in calories  They may help you lower your overall calories and carbohydrates  It is important not to have more calories from other foods to make up for the calories saved  Artificial sweeteners do not have any nutrition  Eat whole foods and drink water as much as possible  Your plan may include beverages with artificial sweeteners for a short time  These can help you transition from high-sugar beverages to water  Use the plate method for each meal   This method can help you eat the right amount of carbohydrates and keep your blood sugar levels under control  Draw an imaginary line down the middle of a 9-inch dinner plate  On one side, draw another line to divide that section in half  Your plate will have one large section and 2 small sections  Fill the largest section with non-starchy vegetables  These include broccoli, spinach, cucumbers, peppers, cauliflower, and tomatoes  Add a starch to one of the small sections  Starches include pasta, rice, whole-grain bread, tortillas, corn, potatoes, and beans  Add meat or another source of protein to the other small section  Examples include chicken or turkey without skin, fish, lean beef or pork, low-fat cheese, tofu, and eggs  Add dairy products or fruit next to your plate if your meal plan allows  Examples of dairy include skim or 1% milk and low-fat yogurt  If you do not drink milk or eat dairy products, you may be able to add another serving of starchy food instead  Have a low-calorie or calorie-free drink with your meal   Examples include water or unsweetened tea or coffee  Reach and maintain a healthy weight:  A healthy weight can help you control your diabetes  You can maintain a healthy weight with a nutrition plan and regular physical activity   Ask your healthcare provider what a healthy weight is for you  Ask him or her to help you create a weight loss plan, if needed  Together you can set weight loss and maintenance goals  For example, your goal may be to lose at least 7% of your extra weight in the first 6 months  What you need to know if you choose to drink alcohol:   Alcohol can cause health problems  Alcohol can cause hypoglycemia (very low blood sugar level), especially if you use insulin  Alcohol can cause high blood sugar and BP levels, and weight gain if you drink too much  Hypoglycemia can happen hours after you drink alcohol  Check your blood sugar level for several hours after you drink alcohol  Have a source of fast-acting carbohydrates with you in case your level goes too low  You need immediate care if you have signs or symptoms of hypoglycemia, such as sweating, confusion, or fainting  Limit alcohol as directed  Your healthcare provider can tell you how many drinks are okay for you within 24 hours or within 1 week  Women 21 years or older and men 72 years or older should limit alcohol to 1 drink a day  Men aged 24 to 59 years should limit alcohol to 2 drinks a day  A drink of alcohol is 12 ounces of beer, 5 ounces of wine, or 1½ ounces of liquor  Always have food when you drink alcohol  Your blood sugar may fall to a low level if you drink when your stomach is empty  Follow up with your diabetes team as directed:  Write down your questions so you remember to ask them during your visits  © Copyright [employer] 2022 Information is for End User's use only and may not be sold, redistributed or otherwise used for commercial purposes  The above information is an  only  It is not intended as medical advice for individual conditions or treatments  Talk to your doctor, nurse or pharmacist before following any medical regimen to see if it is safe and effective for you

## 2023-03-21 NOTE — PROGRESS NOTES
Mar has met with pt as per her request to assist with several Financial Assistance applications and additional Resources for Housing options as pt fears they will lose their apt soon  Pt resides with her son who has lost his job and his disabled wife  She is retired and has a modest SS Income  MAR has assisted pt with a CAP application for help with their UGI bill  Pt to get her son's  signature and submit it  Pt share they will soon be behind with the rent and are applying for Rental Assistance through [employer]  Pt share they are behind on their cable as well  Pt ,son and dgt in law  get SNAP and SW   has provided some [employer] Bank options  MAR has reminded pt the she is on the [place] and as per today she is # 25 on the Runfaces     MAR called Ms Paul Kirby [phone] to see if pts name may come up soon  Pt has also indicated she just a letter from [facility] sent in the [facility] asking if she was interested  She spoke with them 2 weeks ago and they are keeping her on the list since she had not received their Letter  MAR advised she call them monthly to look for any updates    MAR will ask our [facility] to f/u with Pt/ Family re CAP / [place] Application and see if pt can get ON [employer] or any other Programs  ADDENDUM 3/24/23    MAR attempted to call pt this date but had to leave a message  Mar did inform her the [facility] housing did call back and she is # 24 and they are working with applicants # 8 at this time so her # is coming up but they can not say when  Mar has asked if she has submitted her CAP and [place] Applications  Mar will ask [facility] to f/u and help as able  CM Team to assist as indicated

## 2023-03-21 NOTE — ASSESSMENT & PLAN NOTE
Lab Results   Component Value Date    HGBA1C 7 9 (A) 01/18/2023     Advised patient to stop glipizide due to low blood sugars in the morning  Continue metformin 1000 mg twice daily and Jardiance 25 mg daily  DM foot exam performed today  Counseled on s/s of hypoglycemia and how to treat  Continue monitoring blood sugars at home--patient interested in CGM--script sent for [facility]  Recommend follow up as annette'd with PCP next month

## 2023-03-21 NOTE — TELEPHONE ENCOUNTER
Patient was made aware to stop taking the glipizide immediately  She understood and will be coming into the office today 3/21 at 3pm for further BG evaluation

## 2023-03-21 NOTE — ASSESSMENT & PLAN NOTE
BP Readings from Last 3 Encounters:   03/21/23 138/76   02/23/23 111/74   01/18/23 111/71     Home readings elevated---160 per patient  Patient interested in stopping BP medication   Given today's reading and reported home readings, recommend continuing lisinopril-HCTZ 20-12 5 mg daily

## 2023-04-06 ENCOUNTER — OFFICE VISIT (OUTPATIENT)
Dept: INTERNAL MEDICINE CLINIC | Facility: CLINIC | Age: 68
End: 2023-04-06

## 2023-04-06 VITALS
WEIGHT: 175.4 LBS | SYSTOLIC BLOOD PRESSURE: 98 MMHG | BODY MASS INDEX: 29.94 KG/M2 | HEIGHT: 64 IN | DIASTOLIC BLOOD PRESSURE: 64 MMHG | TEMPERATURE: 97.7 F | HEART RATE: 102 BPM | OXYGEN SATURATION: 98 %

## 2023-04-06 DIAGNOSIS — R06.83 SNORING: ICD-10-CM

## 2023-04-06 DIAGNOSIS — Z00.00 WELCOME TO MEDICARE PREVENTIVE VISIT: Primary | ICD-10-CM

## 2023-04-06 DIAGNOSIS — Z00.00 HEALTHCARE MAINTENANCE: ICD-10-CM

## 2023-04-06 DIAGNOSIS — E78.2 MIXED HYPERLIPIDEMIA: ICD-10-CM

## 2023-04-06 DIAGNOSIS — E11.42 TYPE 2 DIABETES MELLITUS WITH DIABETIC POLYNEUROPATHY, WITHOUT LONG-TERM CURRENT USE OF INSULIN (HCC): ICD-10-CM

## 2023-04-06 DIAGNOSIS — I10 BENIGN ESSENTIAL HYPERTENSION: ICD-10-CM

## 2023-04-06 RX ORDER — ATORVASTATIN CALCIUM 20 MG/1
20 TABLET, FILM COATED ORAL DAILY
Qty: 30 TABLET | Refills: 0 | Status: SHIPPED | OUTPATIENT
Start: 2023-04-06 | End: 2023-05-06

## 2023-04-06 NOTE — PROGRESS NOTES
" Assessment and Plan:     Problem List Items Addressed This Visit    None       Preventive health issues were discussed with patient, and age appropriate screening tests were ordered as noted in patient's After Visit Summary  Personalized health advice and appropriate referrals for health education or preventive services given if needed, as noted in patient's After Visit Summary  History of Present Illness:     Patient presents for a Medicare Wellness Visit    HPI   Patient Care Team:  Shahab Leo MD as PCP - General (Internal Medicine)  Amanda Wood DO as PCP - [address] [place] [address] (RTE)  Kasey Villela MD as Endoscopist  Hiwot Denise as Care Manager (Care Coordination)  TERE Mack as  Care Manager ([facility] [address])     Review of Systems:     Review of Systems     Problem List:     Patient Active Problem List   Diagnosis   • Benign essential hypertension   • Diabetic peripheral neuropathy ([address] )   • DM type 2 without retinopathy ([address] )   • Hyperlipidemia   • Chronic bilateral low back pain with left-sided sciatica   • History of right breast biopsy   • Breast pain, right   • Visit for screening mammogram   • Breast cyst, right   • Breast mass, right   • Carpal tunnel syndrome, bilateral   • Depression   • Type 2 diabetes mellitus with diabetic polyneuropathy, without long-term current use of insulin ([address] )   • Overweight (BMI 25 0-29  9)   • History of hysterectomy   • Chest pain on exertion   • Smoking      Past Medical and Surgical History:     Past Medical History:   Diagnosis Date   • Arthritis    • Bump     bumped head yesterday at work \"saw stars\" no LOC, cat scan done was normal   • Circulation problem    • Diabetes mellitus ([address] )     blood sugar 125 on admission   • Encounter for screening colonoscopy     1 st one   • Headache    • Hyperlipidemia    • Hypertension    • Positive fecal occult blood test 08/30/2018    Added automatically from request for surgery " [id number]   • Post concussion syndrome 09/14/2018   • Syncope      Past Surgical History:   Procedure Laterality Date   • BREAST BIOPSY Right 02/18/2022   • COLONOSCOPY N/A 9/26/2018    Procedure: COLONOSCOPY;  Surgeon: Maureen Patel MD;  Location: [facility] GI LAB; Service: Gastroenterology   • HYSTERECTOMY  2017   • INCISIONAL BREAST BIOPSY      last assessed 17Aug2017   • OK COLONOSCOPY FLX DX W/COLLJ SPEC WHEN PFRMD N/A 7/5/2018    Procedure: COLONOSCOPY;  Surgeon: Maureen Patel MD;  Location:  GI LAB;   Service: Gastroenterology   • US GUIDED BREAST BIOPSY RIGHT COMPLETE Right 2/18/2022      Family History:     Family History   Problem Relation Age of Onset   • Breast cancer Cousin 48   • Alcohol abuse Mother    • Alcohol abuse Father    • Breast cancer Daughter 45   • No Known Problems Sister    • No Known Problems Maternal Grandmother    • No Known Problems Maternal Grandfather    • No Known Problems Paternal Grandmother    • No Known Problems Paternal Grandfather    • No Known Problems Son    • No Known Problems Son    • No Known Problems Daughter    • No Known Problems Daughter    • No Known Problems Daughter    • Breast cancer Sister 77   • No Known Problems Maternal Aunt    • No Known Problems Maternal Aunt    • No Known Problems Maternal Aunt    • No Known Problems Maternal Aunt    • No Known Problems Maternal Aunt    • No Known Problems Maternal Aunt    • No Known Problems Maternal Aunt    • No Known Problems Maternal Aunt    • No Known Problems Maternal Aunt    • No Known Problems Maternal Aunt    • No Known Problems Maternal Aunt    • No Known Problems Maternal Aunt    • No Known Problems Paternal Aunt    • No Known Problems Paternal Aunt    • Dementia Paternal Aunt    • No Known Problems Paternal Aunt    • No Known Problems Paternal Aunt    • No Known Problems Paternal Aunt    • No Known Problems Paternal Aunt    • No Known Problems Paternal Aunt    • Colon cancer Neg Hx    • Endometrial cancer Neg Hx • Ovarian cancer Neg Hx       Social History:     Social History     Socioeconomic History   • Marital status:      Spouse name: Not on file   • Number of children: 6   • Years of education: Not on file   • Highest education level: Not on file   Occupational History   • Occupation: [employer]   Tobacco Use   • Smoking status: Every Day     Packs/day: 0 25     Types: Cigarettes   • Smokeless tobacco: Never   • Tobacco comments:     smokes less than 1pk/day   Vaping Use   • Vaping Use: Never used   Substance and Sexual Activity   • Alcohol use: Never   • Drug use: Never   • Sexual activity: Not Currently   Other Topics Concern   • Not on file   Social History Narrative    Daily caffeine consumption, 6-8 servings a day     Social Determinants of Health     Financial Resource Strain: Low Risk    • Difficulty of Paying Living Expenses: Not hard at all   Food Insecurity: No Food Insecurity   • Worried About Running Out of Food in the Last Year: Never true   • Ran Out of Food in the Last Year: Never true   Transportation Needs: No Transportation Needs   • Lack of Transportation (Medical): No   • Lack of Transportation (Non-Medical):  No   Physical Activity: Not on file   Stress: Not on file   Social Connections: Not on file   Intimate Partner Violence: Not on file   Housing Stability: Not on file      Medications and Allergies:     Current Outpatient Medications   Medication Sig Dispense Refill   • Accu-Chek Softclix Lancets lancets Use 2 (two) times a day Use as instructed 200 each 2   • acetaminophen (TYLENOL) 325 mg tablet Take 650 mg by mouth every 6 (six) hours as needed for mild pain      • aspirin (ECOTRIN LOW STRENGTH) 81 mg EC tablet Take 1 tablet (81 mg total) by mouth daily 90 tablet 1   • Blood Glucose Monitoring Suppl (Accu-Chek Guide) w/Device KIT Use 2 (two) times a day Use 2 times a day 1 kit 0   • Continuous Blood Gluc  (FreeStyle [place] 2 [place]) BRITANY Check blood sugars multiple times per day 1 each 0   • Continuous Blood Gluc Sensor (FreeStyle Mishel 2 Sensor) MISC Check blood sugars multiple times per day 6 each 3   • Diclofenac Sodium (VOLTAREN) 1 % Apply 2 g topically 3 (three) times a day 100 g 1   • Elastic Bandages & Supports (GNP Diabetic Socks Unisex XL) MISC Use 1 each if needed (1 pair of socks for diabetic neuropathy) 2 each 0   • Empagliflozin (Jardiance) 25 MG TABS Take 1 tablet (25 mg total) by mouth every morning 30 tablet 2   • gabapentin (NEURONTIN) 300 mg capsule Take 2 capsules (600 mg total) by mouth 3 (three) times a day 540 capsule 0   • glucose blood (Accu-Chek Guide) test strip Use 1 each 2 (two) times a day Use as instructed 200 each 2   • lisinopril-hydrochlorothiazide (PRINZIDE,ZESTORETIC) 20-12 5 MG per tablet Take 1 tablet by mouth daily 30 tablet 5   • lovastatin (MEVACOR) 20 mg tablet Take 1 tablet (20 mg total) by mouth daily at bedtime 90 tablet 3   • metFORMIN (GLUCOPHAGE) 1000 MG tablet TAKE 1 TABLET BY MOUTH  TWICE DAILY WITH MEALS 180 tablet 3   • nicotine (NICODERM CQ) 7 mg/24hr TD 24 hr patch Place 1 patch on the skin every 24 hours (Patient not taking: Reported on 6/13/2022) 28 patch 0   • omeprazole (PriLOSEC) 20 mg delayed release capsule TAKE 1 CAPSULE BY MOUTH  ONCE DAILY 90 capsule 3     No current facility-administered medications for this visit  No Known Allergies   Immunizations:     Immunization History   Administered Date(s) Administered   • COVID-19 PFIZER VACCINE 0 3 ML IM 04/18/2021, 05/16/2021      Health Maintenance:         Topic Date Due   • Breast Cancer Screening: Mammogram  01/21/2023   • Colorectal Cancer Screening  09/12/2032   • Hepatitis C Screening  Completed     There are no preventive care reminders to display for this patient  Medicare Screening Tests and Risk Assessments:     Raul Severe is here for her Welcome to Medicare visit  Health Risk Assessment:   Patient rates overall health as fair   Patient feels that their physical health rating is slightly worse  Patient is satisfied with their life  Eyesight was rated as same  Hearing was rated as same  Patient feels that their emotional and mental health rating is slightly worse  Patients states they are never, rarely angry  Patient states they are always unusually tired/fatigued  Pain experienced in the last 7 days has been a lot  Patient's pain rating has been 10/10  Patient states that she has experienced weight loss or gain in last 6 months  Fall Risk Screening: In the past year, patient has experienced: no history of falling in past year      Urinary Incontinence Screening:   Patient has not leaked urine accidently in the last six months  Home Safety:  Patient has trouble with stairs inside or outside of their home  Patient has working smoke alarms and has working carbon monoxide detector  Home safety hazards include: household clutter  Nutrition:   Current diet is Regular and Limited junk food  Medications:   Patient is currently taking over-the-counter supplements  OTC medications include: see medication list  Patient is able to manage medications  OTC tylenol    Activities of Daily Living (ADLs)/Instrumental Activities of Daily Living (IADLs):   Walk and transfer into and out of bed and chair?: Yes  Dress and groom yourself?: Yes    Bathe or shower yourself?: Yes    Feed yourself?  Yes  Do your laundry/housekeeping?: Yes  Manage your money, pay your bills and track your expenses?: Yes  Make your own meals?: Yes    Do your own shopping?: Yes    PREVENTIVE SCREENINGS      Cardiovascular Screening:    General: Screening Not Indicated and History Lipid Disorder      Diabetes Screening:     General: Screening Not Indicated and History Diabetes      Colorectal Cancer Screening:     General: Screening Current      Breast Cancer Screening:     General: Screening Current      Cervical Cancer Screening:    General: Screening Not Indicated      Lung Cancer Screening:     General: Screening Not Indicated      Hepatitis C Screening:    General: Screening Current    Screening, Brief Intervention, and Referral to Treatment (SBIRT)    Screening  Typical number of drinks in a day: 0  Typical number of drinks in a week: 0  Interpretation: Low risk drinking behavior  Single Item Drug Screening:  How often have you used an illegal drug (including marijuana) or a prescription medication for non-medical reasons in the past year? never    Single Item Drug Screen Score: 0  Interpretation: Negative screen for possible drug use disorder    No results found  Physical Exam:     There were no vitals taken for this visit      Physical Exam     Kapil Anne MD

## 2023-04-06 NOTE — PROGRESS NOTES
[facility] [address]    Medicare Annual Wellness Visit - Clinic Visit Note  Lucas Plata 78 y o  female   MRN: [id number]    Assessment and Plan            Preventive health issues were discussed with patient, and age appropriate screening tests were ordered as noted in patient's After Visit Summary  Personalized health advice and appropriate referrals for health education or preventive services given if needed, as noted in patient's After Visit Summary  Health Risk Assessment:   Patient rates overall health as fair  Patient feels that their physical health rating is slightly worse  Patient is satisfied with their life  Eyesight was rated as same  Hearing was rated as same  Patient feels that their emotional and mental health rating is slightly worse  Patients states they are never, rarely angry  Patient states they are always unusually tired/fatigued  Pain experienced in the last 7 days has been a lot  Patient's pain rating has been 10/10  Patient states that she has experienced weight loss or gain in last 6 months  Fall Risk Screening: In the past year, patient has experienced: no history of falling in past year      Urinary Incontinence Screening:   Patient has not leaked urine accidently in the last six months  Home Safety:  Patient has trouble with stairs inside or outside of their home  Patient has working smoke alarms and has working carbon monoxide detector  Home safety hazards include: household clutter  Nutrition:   Current diet is Regular and Limited junk food  Medications:   Patient is currently taking over-the-counter supplements  OTC medications include: see medication list  Patient is able to manage medications   OTC tylenol  Activities of Daily Living (ADLs)/Instrumental Activities of Daily Living (IADLs):   Walk and transfer into and out of bed and chair?: Yes  Dress and groom yourself?: Yes    Bathe or shower yourself?: Yes    Feed yourself? Yes  Do your laundry/housekeeping?: Yes  Manage your money, pay your bills and track your expenses?: Yes  Make your own meals?: Yes    Do your own shopping?: Yes    PREVENTIVE SCREENINGS      Cardiovascular Screening:    General: Screening Not Indicated and History Lipid Disorder    Diabetes Screening:     General: Screening Not Indicated and History Diabetes    Colorectal Cancer Screening:     General: Screening Current    Breast Cancer Screening:     General: Screening Current    Cervical Cancer Screening:    General: Screening Not Indicated    Lung Cancer Screening:     General: Screening Not Indicated    Hepatitis C Screening:    General: Screening Current    Screening, Brief Intervention, and Referral to Treatment (SBIRT)  Screening  Typical number of drinks in a day: 0  Typical number of drinks in a week: 0  Interpretation: Low risk drinking behavior  Single Item Drug Screening:  How often have you used an illegal drug (including marijuana) or a prescription medication for non-medical reasons in the past year? never  Single Item Drug Screen Score: 0  Interpretation: Negative screen for possible drug use disorder    No results found  Diagnoses and all orders for this visit:  Welcome to Medicare preventive visit  Type 2 diabetes mellitus with diabetic polyneuropathy, without long-term current use of insulin ([facility] )      Diabetes Mellitus type 2: Patient with history of diabetes mellitus type 2  Previous HbA1c was 7 1  Plan to get repeat HbA1c in 1 months with other labs  Blood glucoses ranging 120-160        -On metformin 1000 mg b i d   -On jardiance 25 mg daily  -On gabapentin 600 mg t i d  for diabetic neuropathy  -Discussed diet and lifestyle modifications with patient  -Last diabetic eye exam in 12/2021     Hypertension: Patient with history of hypertension   Patient is not monitoring blood pressures at home, but does have a blood pressure monitor  Due to SBP in 90s today, discussed monitoring of BP at home and call in to our clinic if consistently low    -On Prinzide 20-12 5 mg daily  -Discussed at home BP monitoring with patient  -Patient advised to monitor her blood pressure 3 times per week, once early morning and once in mid afternoon     Health Maintenance  -Screening mammogram scheduled for May 2023  -Has an upcoming slepe study in April 2023  -Last screening colonoscopy in February 2022, normal with adequate bowel prep  Recommended for repeat in 10 years    -Patient with history of total hysterectomy including removal of cervical cuff, no indication for Pap smears going forward    BMI Counseling: Body mass index is 30 11 kg/m²  The BMI is above normal  Nutrition recommendations include reducing portion sizes, decreasing overall calorie intake, consuming healthier snacks and moderation in carbohydrate intake  Exercise recommendations include exercising 3-5 times per week  Plan  -Follow up visit with our clinic in 3 months to review diabetes and hypertension    Subjective     History of Present Illness:  Patient Care Team:  Tommy Grant MD as PCP - General (Internal Medicine)  Justin Galo DO as PCP - [address] [place] [address] ([facility])  Nannette Lepe MD as Endoscopist  Avi Vizcaino as Care Manager ([employer])  TERE Siegel as  Care Manager ([employer])    Patient is a 50-year-old female with history of hypertension, hyperlipidemia, diabetes mellitus type 2, chronic bilateral low back pain  She is here at clinic today for medicare annual wellness visit  Patient reports that after her last visit she stopped taking glipizide  She is currently on metformin and Jardiance only  Reports of glucoses being well controlled with her glucometer showing blood glucoses in the range of 120-160 with 1-2 readings of 200-210  The readings on the higher end very usually postprandial and not fasting  She reports compliance with the medication    Her blood pressure was a little bit on the lower end today however, her blood pressure at prior clinic visits has been between 313-578 systolics and therefore I discussed with the patient to monitor her BP readings at least once a day at home and call into our clinic for therapy consistently on the lower end  She was placed on lovastatin but reports that she did not tolerate the medication well  As per the patient, she felt uneasy after taking the medication and she only took it for a few days  She denies any muscle pain resulting from this medication  We discussed switching to atorvastatin and she is agreeable to this at this point  Patient to follow-up with her clinic in 3 months to discuss diabetes  Review of Systems   Constitutional: Negative for chills, fatigue and fever  Respiratory: Negative for shortness of breath  Cardiovascular: Negative for chest pain and palpitations  Gastrointestinal: Negative for abdominal pain, constipation and diarrhea  Endocrine: Negative for polydipsia and polyuria  Neurological: Negative for dizziness, light-headedness and headaches           Current Outpatient Medications:   •  Accu-Chek Softclix Lancets lancets, Use 2 (two) times a day Use as instructed, Disp: 200 each, Rfl: 2  •  acetaminophen (TYLENOL) 325 mg tablet, Take 650 mg by mouth every 6 (six) hours as needed for mild pain , Disp: , Rfl:   •  atorvastatin (LIPITOR) 20 mg tablet, Take 1 tablet (20 mg total) by mouth daily, Disp: 30 tablet, Rfl: 0  •  Blood Glucose Monitoring Suppl (Accu-Chek Guide) w/Device KIT, Use 2 (two) times a day Use 2 times a day, Disp: 1 kit, Rfl: 0  •  Continuous Blood Gluc  (FreeStyle [place] 2 [place]) BRITANY, Check blood sugars multiple times per day, Disp: 1 each, Rfl: 0  •  Continuous Blood Gluc Sensor (FreeStyle [place] 2 Sensor) MISC, Check blood sugars multiple times per day, Disp: 6 each, Rfl: 3  •  Diclofenac Sodium (VOLTAREN) 1 %, Apply 2 g topically 3 (three) times a day, Disp: 100 "g, Rfl: 1  •  Elastic Bandages & Supports (GNP Diabetic Socks Unisex XL) MISC, Use 1 each if needed (1 pair of socks for diabetic neuropathy), Disp: 2 each, Rfl: 0  •  Empagliflozin (Jardiance) 25 MG TABS, Take 1 tablet (25 mg total) by mouth every morning, Disp: 30 tablet, Rfl: 2  •  gabapentin (NEURONTIN) 300 mg capsule, Take 2 capsules (600 mg total) by mouth 3 (three) times a day, Disp: 540 capsule, Rfl: 0  •  glucose blood (Accu-Chek Guide) test strip, Use 1 each 2 (two) times a day Use as instructed, Disp: 200 each, Rfl: 2  •  lisinopril-hydrochlorothiazide (PRINZIDE,ZESTORETIC) 20-12 5 MG per tablet, Take 1 tablet by mouth daily, Disp: 30 tablet, Rfl: 5  •  metFORMIN (GLUCOPHAGE) 1000 MG tablet, TAKE 1 TABLET BY MOUTH  TWICE DAILY WITH MEALS, Disp: 180 tablet, Rfl: 3  •  nicotine (NICODERM CQ) 7 mg/24hr TD 24 hr patch, Place 1 patch on the skin every 24 hours, Disp: 28 patch, Rfl: 0  No Known Allergies  Past Medical History:   Diagnosis Date   • Arthritis    • Bump     bumped head yesterday at work \"saw stars\" no LOC, cat scan done was normal   • Circulation problem    • Diabetes mellitus (HCC)     blood sugar 125 on admission   • Encounter for screening colonoscopy     1 st one   • Headache    • Hyperlipidemia    • Hypertension    • Positive fecal occult blood test 08/30/2018    Added automatically from request for surgery [id number]   • Post concussion syndrome 09/14/2018   • Syncope      Past Surgical History:   Procedure Laterality Date   • BREAST BIOPSY Right 02/18/2022   • COLONOSCOPY N/A 9/26/2018    Procedure: COLONOSCOPY;  Surgeon: Maureen Patel MD;  [facility]: [facility] GI LAB; Service: Gastroenterology   • HYSTERECTOMY  2017   • INCISIONAL BREAST BIOPSY      last assessed 17Aug2017   • NC COLONOSCOPY FLX DX W/COLLJ SPEC WHEN PFRMD N/A 7/5/2018    Procedure: COLONOSCOPY;  Surgeon: Maureen Patel MD;  Location:  GI LAB;   Service: Gastroenterology   • US GUIDED BREAST BIOPSY RIGHT COMPLETE Right " "2/18/2022     Family History   Problem Relation Age of Onset   • Breast cancer Cousin 48   • Alcohol abuse Mother    • Alcohol abuse Father    • Breast cancer Daughter 45   • No Known Problems Sister    • No Known Problems Maternal Grandmother    • No Known Problems Maternal Grandfather    • No Known Problems Paternal Grandmother    • No Known Problems Paternal Grandfather    • No Known Problems Son    • No Known Problems Son    • No Known Problems Daughter    • No Known Problems Daughter    • No Known Problems Daughter    • Breast cancer Sister 77   • No Known Problems Maternal Aunt    • No Known Problems Maternal Aunt    • No Known Problems Maternal Aunt    • No Known Problems Maternal Aunt    • No Known Problems Maternal Aunt    • No Known Problems Maternal Aunt    • No Known Problems Maternal Aunt    • No Known Problems Maternal Aunt    • No Known Problems Maternal Aunt    • No Known Problems Maternal Aunt    • No Known Problems Maternal Aunt    • No Known Problems Maternal Aunt    • No Known Problems Paternal Aunt    • No Known Problems Paternal Aunt    • Dementia Paternal Aunt    • No Known Problems Paternal Aunt    • No Known Problems Paternal Aunt    • No Known Problems Paternal Aunt    • No Known Problems Paternal Aunt    • No Known Problems Paternal Aunt    • Colon cancer Neg Hx    • Endometrial cancer Neg Hx    • Ovarian cancer Neg Hx      Social History     Substance and Sexual Activity   Alcohol Use Never     Social History     Substance and Sexual Activity   Drug Use Never     Social History     Tobacco Use   Smoking Status Every Day   • Packs/day: 0 25   • Types: Cigarettes   Smokeless Tobacco Never   Tobacco Comments    smokes less than 1pk/day       Objective     Vitals:    04/06/23 1354   BP: 98/64   BP Location: Left arm   Patient Position: Sitting   Cuff Size: Large   Pulse: 102   Temp: 97 7 °F (36 5 °C)   TempSrc: Temporal   SpO2: 98%   Weight: 79 6 kg (175 lb 6 4 oz)   Height: 5' 4\" (1 626 m) " Physical Exam  Vitals reviewed  HENT:      Head: Normocephalic  Cardiovascular:      Rate and Rhythm: Normal rate and regular rhythm  Pulses: Normal pulses  Heart sounds: Normal heart sounds  Pulmonary:      Effort: Pulmonary effort is normal       Breath sounds: Normal breath sounds  Abdominal:      General: Bowel sounds are normal       Palpations: Abdomen is soft  Skin:     General: Skin is warm and dry  Capillary Refill: Capillary refill takes less than 2 seconds  Neurological:      Mental Status: She is alert and oriented to person, place, and time  Care Time Delivered:   I have spent 40 minutes with patient today in which greater than 50% of this time was spent in counseling/coordination of care  Nakia Moore MD  Internal Medicine Residency PGY-2  [place]    ==  PLEASE NOTE:  This encounter was completed utilizing the [employer] Voice Recognition Software  Grammatical errors, random word insertions, pronoun errors and incomplete sentences are occasional consequences of the system due to software limitations, ambient noise and hardware issues  These may be missed by proof reading prior to affixing electronic signature  Any questions or concerns about the content, text or information contained within the body of this dictation should be directly addressed to the physician for clarification  Please do not hesitate to call me directly if you have any any questions or concerns

## 2023-04-06 NOTE — PATIENT INSTRUCTIONS
Thank you for visiting our clinic! It was nice seeing you! Today, we discussed-  - Diet and lifestyle modifications and strategies for weight loss  - Maintaining a BP log and measuring BP 3 times per week, once in early morning and then in mid afternoon  - Maintaining a blood glucose log and measuring your blood sugars at least once daily  - Please follow up on your lab work, we will give you a call with results once lab results are available  - Please follow up with us in 3 months to review your diabetes    Upcoming appointments: [facility] (Address: [address] [place] [address]) 04/13 - 10AM     5/3/2023 10:30 AM Yadira Zambrano MD [facility] [place]     5/12/2023 8:00 AM BE MAMMO RBC 3 BE RBC MAMMO [id number] BE RBC       Medicare Preventive Visit Patient Instructions  Thank you for completing your Welcome to Medicare Visit or Medicare Annual Wellness Visit today  Your next wellness visit will be due in one year (4/6/2024)  The screening/preventive services that you may require over the next 5-10 years are detailed below  Some tests may not apply to you based off risk factors and/or age  Screening tests ordered at today's visit but not completed yet may show as past due  Also, please note that scanned in results may not display below  Preventive Screenings:  Service Recommendations Previous Testing/Comments   Colorectal Cancer Screening  * Colonoscopy    * Fecal Occult Blood Test (FOBT)/Fecal Immunochemical Test (FIT)  * Fecal DNA/Cologuard Test  * Flexible Sigmoidoscopy Age: 39-70 years old   Colonoscopy: every 10 years (may be performed more frequently if at higher risk)  OR  FOBT/FIT: every 1 year  OR  Cologuard: every 3 years  OR  Sigmoidoscopy: every 5 years  Screening may be recommended earlier than age 39 if at higher risk for colorectal cancer   Also, an individualized decision between you and your healthcare provider will decide whether screening between the ages of 74-80 would be appropriate  Colonoscopy: 09/15/2022  FOBT/FIT: Not on file  Cologuard: Not on file  Sigmoidoscopy: Not on file    Screening Current     Breast Cancer Screening Age: 36 years old  Frequency: every 1-2 years  Not required if history of left and right mastectomy Mammogram: 01/21/2022    Screening Current   Cervical Cancer Screening Between the ages of 21-29, pap smear recommended once every 3 years  Between the ages of 33-67, can perform pap smear with HPV co-testing every 5 years  Recommendations may differ for women with a history of total hysterectomy, cervical cancer, or abnormal pap smears in past  Pap Smear: 01/07/2022    Screening Not Indicated   Hepatitis C Screening Once for adults born between 1945 and 1965  More frequently in patients at high risk for Hepatitis C Hep C Antibody: 01/13/2021    Screening Current   Diabetes Screening 1-2 times per year if you're at risk for diabetes or have pre-diabetes Fasting glucose: 92 mg/dL (6/15/2022)  A1C: 7 9 (1/18/2023)  Screening Not Indicated  History Diabetes   Cholesterol Screening Once every 5 years if you don't have a lipid disorder  May order more often based on risk factors  Lipid panel: 06/15/2022    Screening Not Indicated  History Lipid Disorder     Other Preventive Screenings Covered by Medicare:  Abdominal Aortic Aneurysm (AAA) Screening: covered once if your at risk  You're considered to be at risk if you have a family history of AAA  Lung Cancer Screening: covers low dose CT scan once per year if you meet all of the following conditions: (1) Age 50-69; (2) No signs or symptoms of lung cancer; (3) Current smoker or have quit smoking within the last 15 years; (4) You have a tobacco smoking history of at least 20 pack years (packs per day multiplied by number of years you smoked); (5) You get a written order from a healthcare provider    Glaucoma Screening: covered annually if you're considered high risk: (1) You have diabetes OR (2) Family history of glaucoma OR (3)  aged 48 and older OR (4)  American aged 72 and older  Osteoporosis Screening: covered every 2 years if you meet one of the following conditions: (1) You're estrogen deficient and at risk for osteoporosis based off medical history and other findings; (2) Have a vertebral abnormality; (3) On glucocorticoid therapy for more than 3 months; (4) Have primary hyperparathyroidism; (5) On osteoporosis medications and need to assess response to drug therapy  Last bone density test (DXA Scan): Not on file  HIV Screening: covered annually if you're between the age of 12-76  Also covered annually if you are younger than 13 and older than 72 with risk factors for HIV infection  For pregnant patients, it is covered up to 3 times per pregnancy  Immunizations:  Immunization Recommendations   Influenza Vaccine Annual influenza vaccination during flu season is recommended for all persons aged >= 6 months who do not have contraindications   Pneumococcal Vaccine   * Pneumococcal conjugate vaccine = PCV13 (Prevnar 13), PCV15 (Vaxneuvance), PCV20 (Prevnar 20)  * Pneumococcal polysaccharide vaccine = PPSV23 (Pneumovax) Adults 25-60 years old: 1-3 doses may be recommended based on certain risk factors  Adults 72 years old: 1-2 doses may be recommended based off what pneumonia vaccine you previously received   Hepatitis B Vaccine 3 dose series if at intermediate or high risk (ex: diabetes, end stage renal disease, liver disease)   Tetanus (Td) Vaccine - COST NOT COVERED BY MEDICARE PART B Following completion of primary series, a booster dose should be given every 10 years to maintain immunity against tetanus  Td may also be given as tetanus wound prophylaxis  Tdap Vaccine - COST NOT COVERED BY MEDICARE PART B Recommended at least once for all adults  For pregnant patients, recommended with each pregnancy     Shingles Vaccine (Shingrix) - COST NOT COVERED BY MEDICARE PART B  2 shot series recommended in those aged 48 and above     Health Maintenance Due:      Topic Date Due    Breast Cancer Screening: Mammogram  01/21/2023    Colorectal Cancer Screening  09/12/2032    Hepatitis C Screening  Completed     Immunizations Due:  There are no preventive care reminders to display for this patient  Advance Directives   What are advance directives? Advance directives are legal documents that state your wishes and plans for medical care  These plans are made ahead of time in case you lose your ability to make decisions for yourself  Advance directives can apply to any medical decision, such as the treatments you want, and if you want to donate organs  What are the types of advance directives? There are many types of advance directives, and each state has rules about how to use them  You may choose a combination of any of the following:  Living will: This is a written record of the treatment you want  You can also choose which treatments you do not want, which to limit, and which to stop at a certain time  This includes surgery, medicine, IV fluid, and tube feedings  Durable power of  for healthcare [facility]): This is a written record that states who you want to make healthcare choices for you when you are unable to make them for yourself  This person, called a proxy, is usually a family member or a friend  You may choose more than 1 proxy  Do not resuscitate (DNR) order:  A DNR order is used in case your heart stops beating or you stop breathing  It is a request not to have certain forms of treatment, such as CPR  A DNR order may be included in other types of advance directives  Medical directive: This covers the care that you want if you are in a coma, near death, or unable to make decisions for yourself  You can list the treatments you want for each condition   Treatment may include pain medicine, surgery, blood transfusions, dialysis, IV or tube feedings, and a ventilator (breathing machine)  Values history: This document has questions about your views, beliefs, and how you feel and think about life  This information can help others choose the care that you would choose  Why are advance directives important? An advance directive helps you control your care  Although spoken wishes may be used, it is better to have your wishes written down  Spoken wishes can be misunderstood, or not followed  Treatments may be given even if you do not want them  An advance directive may make it easier for your family to make difficult choices about your care  Cigarette Smoking and Your Health   Risks to your health if you smoke:  Nicotine and other chemicals found in tobacco damage every cell in your body  Even if you are a light smoker, you have an increased risk for cancer, heart disease, and lung disease  If you are pregnant or have diabetes, smoking increases your risk for complications  Benefits to your health if you stop smoking: You decrease respiratory symptoms such as coughing, wheezing, and shortness of breath  You reduce your risk for cancers of the lung, mouth, throat, kidney, bladder, pancreas, stomach, and cervix  If you already have cancer, you increase the benefits of chemotherapy  You also reduce your risk for cancer returning or a second cancer from developing  You reduce your risk for heart disease, blood clots, heart attack, and stroke  You reduce your risk for lung infections, and diseases such as pneumonia, asthma, chronic bronchitis, and emphysema  Your circulation improves  More oxygen can be delivered to your body  If you have diabetes, you lower your risk for complications, such as kidney, artery, and eye diseases  You also lower your risk for nerve damage  Nerve damage can lead to amputations, poor vision, and blindness  You improve your body's ability to heal and to fight infections    For more information and support to stop smoking:   [employer]  Phone: 4- 821 - 635-0305  Web Address: www [employer]  Weight Management   Why it is important to manage your weight:  Being overweight increases your risk of health conditions such as heart disease, high blood pressure, type 2 diabetes, and certain types of cancer  It can also increase your risk for osteoarthritis, sleep apnea, and other respiratory problems  Aim for a slow, steady weight loss  Even a small amount of weight loss can lower your risk of health problems  How to lose weight safely:  A safe and healthy way to lose weight is to eat fewer calories and get regular exercise  You can lose up about 1 pound a week by decreasing the number of calories you eat by 500 calories each day  Healthy meal plan for weight management:  A healthy meal plan includes a variety of foods, contains fewer calories, and helps you stay healthy  A healthy meal plan includes the following:  Eat whole-grain foods more often  A healthy meal plan should contain fiber  Fiber is the part of grains, fruits, and vegetables that is not broken down by your body  Whole-grain foods are healthy and provide extra fiber in your diet  Some examples of whole-grain foods are whole-wheat breads and pastas, oatmeal, brown rice, and bulgur  Eat a variety of vegetables every day  Include dark, leafy greens such as spinach, kale, irwin greens, and mustard greens  Eat yellow and orange vegetables such as carrots, sweet potatoes, and winter squash  Eat a variety of fruits every day  Choose fresh or canned fruit (canned in its own juice or light syrup) instead of juice  Fruit juice has very little or no fiber  Eat low-fat dairy foods  Drink fat-free (skim) milk or 1% milk  Eat fat-free yogurt and low-fat cottage cheese  Try low-fat cheeses such as mozzarella and other reduced-fat cheeses  Choose meat and other protein foods that are low in fat  Choose beans or other legumes such as split peas or lentils   Choose fish, skinless poultry (chicken or turkey), or lean cuts of red meat (beef or pork)  Before you cook meat or poultry, cut off any visible fat  Use less fat and oil  Try baking foods instead of frying them  Add less fat, such as margarine, sour cream, regular salad dressing and mayonnaise to foods  Eat fewer high-fat foods  Some examples of high-fat foods include french fries, doughnuts, ice cream, and cakes  Eat fewer sweets  Limit foods and drinks that are high in sugar  This includes candy, cookies, regular soda, and sweetened drinks  Exercise:  Exercise at least 30 minutes per day on most days of the week  Some examples of exercise include walking, biking, dancing, and swimming  You can also fit in more physical activity by taking the stairs instead of the elevator or parking farther away from stores  Ask your healthcare provider about the best exercise plan for you  © Copyright [employer] 2018 Information is for End User's use only and may not be sold, redistributed or otherwise used for commercial purposes   All illustrations and images included in CareNotes® are the copyrighted property of A D A M , Inc  or [address] [place] [address]

## 2023-04-24 ENCOUNTER — PATIENT OUTREACH (OUTPATIENT)
Dept: INTERNAL MEDICINE CLINIC | Facility: CLINIC | Age: 68
End: 2023-04-24

## 2023-04-24 NOTE — PROGRESS NOTES
TERE met briefly with pt who is asking for help to get her checking routing info faxed to the [employer]  SW has made a copy of same ans Para Ashleigh will f/u tomorrow to help her fax I into the correct worker  Pt is also shares her son is not providing his W2 form that is needed for Utility Assistance application for [employer]  She will f/u with [place] re same  Support offered  ]  CM Team to f/u and assist as indicated

## 2023-04-25 ENCOUNTER — PATIENT OUTREACH (OUTPATIENT)
Dept: MULTI SPECIALTY CLINIC | Facility: CLINIC | Age: 68
End: 2023-04-25

## 2023-04-25 NOTE — PROGRESS NOTES
Chart Review     Patient drop off copies of her bank information at the PCP offices for [address] [place] [address] to fax over to the Social Security Department  [address] [place] [address] call to the Social Security Department to request the right fax number to sent the document  Representative Mrs Estefanía Main provide to [address] [place] [address] the fax number ([phone])  CMOC thanked the representative for the information  [address] [place] [address] fax over the document and wait to confirm that the document was send  Document scanned in chat for futures needs

## 2023-04-28 DIAGNOSIS — I10 BENIGN ESSENTIAL HYPERTENSION: ICD-10-CM

## 2023-04-28 DIAGNOSIS — E11.42 TYPE 2 DIABETES MELLITUS WITH DIABETIC POLYNEUROPATHY, WITHOUT LONG-TERM CURRENT USE OF INSULIN (HCC): ICD-10-CM

## 2023-05-01 DIAGNOSIS — M19.90 ARTHRITIS: ICD-10-CM

## 2023-05-01 DIAGNOSIS — E11.42 TYPE 2 DIABETES MELLITUS WITH DIABETIC POLYNEUROPATHY, WITHOUT LONG-TERM CURRENT USE OF INSULIN (HCC): ICD-10-CM

## 2023-05-01 DIAGNOSIS — I10 BENIGN ESSENTIAL HYPERTENSION: ICD-10-CM

## 2023-05-01 RX ORDER — ATORVASTATIN CALCIUM 20 MG/1
20 TABLET, FILM COATED ORAL DAILY
Qty: 30 TABLET | Refills: 0 | Status: SHIPPED | OUTPATIENT
Start: 2023-05-01 | End: 2023-05-01 | Stop reason: SDUPTHER

## 2023-05-01 RX ORDER — LISINOPRIL AND HYDROCHLOROTHIAZIDE 20; 12.5 MG/1; MG/1
1 TABLET ORAL DAILY
Qty: 30 TABLET | Refills: 5 | Status: SHIPPED | OUTPATIENT
Start: 2023-05-01 | End: 2023-05-01 | Stop reason: SDUPTHER

## 2023-05-01 RX ORDER — LISINOPRIL AND HYDROCHLOROTHIAZIDE 20; 12.5 MG/1; MG/1
1 TABLET ORAL DAILY
Qty: 90 TABLET | Refills: 2 | Status: SHIPPED | OUTPATIENT
Start: 2023-05-01

## 2023-05-01 RX ORDER — ATORVASTATIN CALCIUM 20 MG/1
20 TABLET, FILM COATED ORAL DAILY
Qty: 90 TABLET | Refills: 2 | Status: SHIPPED | OUTPATIENT
Start: 2023-05-01 | End: 2023-05-31

## 2023-05-03 ENCOUNTER — PATIENT OUTREACH (OUTPATIENT)
Dept: MULTI SPECIALTY CLINIC | Facility: CLINIC | Age: 68
End: 2023-05-03

## 2023-05-04 NOTE — TELEPHONE ENCOUNTER
Pt did not receive packet  I read her the instructions over the phone  Pt may have daughter stop by the office to  the packet       Pt is aware of which meds need to be held Recommendation Preamble: The following recommendations were made during the visit: -clean and clear detergent \\n-children Zyrtec at night if needed Render Risk Assessment In Note?: no Detail Level: Zone

## 2023-05-04 NOTE — PROGRESS NOTES
Chart Review    CMOC call the patient to verify if the patient's son want to cooperate with the document required from [place]  The patient informed that her son do not want to cooperate and she will inform that to MAURICIO Knott  At this point Waldemar Cavazos informed the patient that she will close the case since if her son does not cooperate  The patient understood and thanked Waldemar Cavazos for all the help  Waldemar Cavazos inform the patient that for any other needs she must contact her PCP offices       CMOC will now be closing case and communicate with team

## 2023-05-05 ENCOUNTER — PATIENT OUTREACH (OUTPATIENT)
Dept: INTERNAL MEDICINE CLINIC | Facility: CLINIC | Age: 68
End: 2023-05-05

## 2023-05-05 NOTE — PROGRESS NOTES
SW has received an IN BASKET update from [employer]  She has had to close the case as pt; sson would not cooperate with the [facility] application  Patient does not have any further questions, concerns, or other needs at this time  Patient has my contact # and PCP office # if needed  Social Work to remain available to assist as indicated  Please re-consult Social Work if needed

## 2023-05-12 ENCOUNTER — HOSPITAL ENCOUNTER (OUTPATIENT)
Dept: MAMMOGRAPHY | Facility: CLINIC | Age: 68
Discharge: HOME | End: 2023-05-12

## 2023-05-12 VITALS — HEIGHT: 64 IN | WEIGHT: 175 LBS | BODY MASS INDEX: 29.88 KG/M2

## 2023-05-12 DIAGNOSIS — Z12.31 SCREENING MAMMOGRAM FOR HIGH-RISK PATIENT: ICD-10-CM

## 2023-05-25 DIAGNOSIS — E11.65 TYPE 2 DIABETES MELLITUS WITH HYPERGLYCEMIA, WITHOUT LONG-TERM CURRENT USE OF INSULIN (HCC): ICD-10-CM

## 2023-05-25 RX ORDER — LANCETS
EACH MISCELLANEOUS 2 TIMES DAILY
Qty: 200 EACH | Refills: 2 | Status: SHIPPED | OUTPATIENT
Start: 2023-05-25

## 2023-05-25 RX ORDER — BLOOD SUGAR DIAGNOSTIC
1 STRIP MISCELLANEOUS 2 TIMES DAILY
Qty: 200 EACH | Refills: 2 | Status: SHIPPED | OUTPATIENT
Start: 2023-05-25

## 2023-05-30 DIAGNOSIS — E11.42 CONTROLLED TYPE 2 DIABETES MELLITUS WITH DIABETIC POLYNEUROPATHY, WITHOUT LONG-TERM CURRENT USE OF INSULIN (HCC): ICD-10-CM

## 2023-05-31 DIAGNOSIS — M19.90 ARTHRITIS: ICD-10-CM

## 2023-06-29 ENCOUNTER — PATIENT OUTREACH (OUTPATIENT)
Dept: MULTI SPECIALTY CLINIC | Facility: CLINIC | Age: 68
End: 2023-06-29

## 2023-06-29 DIAGNOSIS — M19.90 ARTHRITIS: ICD-10-CM

## 2023-06-29 NOTE — PROGRESS NOTES
Chart review    The patient called [facility] to let them know that the Robin had an apartment available for her and that she was still interested in getting one  The patient has her appointment with Laurie Romero on Friday 6/30/2023  [facility] thanked the patient for the information and offered its services if necessary  [facility] will update her team about the patient news

## 2023-06-30 ENCOUNTER — APPOINTMENT (OUTPATIENT)
Dept: LAB | Facility: CLINIC | Age: 68
End: 2023-06-30
Payer: COMMERCIAL

## 2023-06-30 DIAGNOSIS — E11.42 TYPE 2 DIABETES MELLITUS WITH DIABETIC POLYNEUROPATHY, WITHOUT LONG-TERM CURRENT USE OF INSULIN (HCC): ICD-10-CM

## 2023-06-30 DIAGNOSIS — Z00.00 WELCOME TO MEDICARE PREVENTIVE VISIT: ICD-10-CM

## 2023-06-30 LAB
ANION GAP SERPL CALCULATED.3IONS-SCNC: 7 MMOL/L
BASOPHILS # BLD AUTO: 0.03 THOUSANDS/ÂΜL (ref 0–0.1)
BASOPHILS NFR BLD AUTO: 0 % (ref 0–1)
BUN SERPL-MCNC: 16 MG/DL (ref 5–25)
CALCIUM SERPL-MCNC: 9.8 MG/DL (ref 8.3–10.1)
CHLORIDE SERPL-SCNC: 110 MMOL/L (ref 96–108)
CHOLEST SERPL-MCNC: 149 MG/DL
CO2 SERPL-SCNC: 26 MMOL/L (ref 21–32)
CREAT SERPL-MCNC: 0.9 MG/DL (ref 0.6–1.3)
CREAT UR-MCNC: 119 MG/DL
EOSINOPHIL # BLD AUTO: 0.1 THOUSAND/ÂΜL (ref 0–0.61)
EOSINOPHIL NFR BLD AUTO: 1 % (ref 0–6)
ERYTHROCYTE [DISTWIDTH] IN BLOOD BY AUTOMATED COUNT: 21.1 % (ref 11.6–15.1)
EST. AVERAGE GLUCOSE BLD GHB EST-MCNC: 174 MG/DL
GFR SERPL CREATININE-BSD FRML MDRD: 66 ML/MIN/1.73SQ M
GLUCOSE P FAST SERPL-MCNC: 115 MG/DL (ref 65–99)
HBA1C MFR BLD: 7.7 %
HCT VFR BLD AUTO: 31.2 % (ref 34.8–46.1)
HDLC SERPL-MCNC: 51 MG/DL
HGB BLD-MCNC: 8.7 G/DL (ref 11.5–15.4)
IMM GRANULOCYTES # BLD AUTO: 0.02 THOUSAND/UL (ref 0–0.2)
IMM GRANULOCYTES NFR BLD AUTO: 0 % (ref 0–2)
LDLC SERPL CALC-MCNC: 84 MG/DL (ref 0–100)
LYMPHOCYTES # BLD AUTO: 1.57 THOUSANDS/ÂΜL (ref 0.6–4.47)
LYMPHOCYTES NFR BLD AUTO: 20 % (ref 14–44)
MCH RBC QN AUTO: 21 PG (ref 26.8–34.3)
MCHC RBC AUTO-ENTMCNC: 27.9 G/DL (ref 31.4–37.4)
MCV RBC AUTO: 75 FL (ref 82–98)
MICROALBUMIN UR-MCNC: 13.1 MG/L (ref 0–20)
MICROALBUMIN/CREAT 24H UR: 11 MG/G CREATININE (ref 0–30)
MONOCYTES # BLD AUTO: 0.91 THOUSAND/ÂΜL (ref 0.17–1.22)
MONOCYTES NFR BLD AUTO: 11 % (ref 4–12)
NEUTROPHILS # BLD AUTO: 5.34 THOUSANDS/ÂΜL (ref 1.85–7.62)
NEUTS SEG NFR BLD AUTO: 68 % (ref 43–75)
NRBC BLD AUTO-RTO: 0 /100 WBCS
PLATELET # BLD AUTO: 419 THOUSANDS/UL (ref 149–390)
PMV BLD AUTO: 9.5 FL (ref 8.9–12.7)
POTASSIUM SERPL-SCNC: 4.2 MMOL/L (ref 3.5–5.3)
RBC # BLD AUTO: 4.15 MILLION/UL (ref 3.81–5.12)
SODIUM SERPL-SCNC: 143 MMOL/L (ref 135–147)
TRIGL SERPL-MCNC: 72 MG/DL
TSH SERPL DL<=0.05 MIU/L-ACNC: 1.69 UIU/ML (ref 0.45–4.5)
WBC # BLD AUTO: 7.97 THOUSAND/UL (ref 4.31–10.16)

## 2023-06-30 PROCEDURE — 85025 COMPLETE CBC W/AUTO DIFF WBC: CPT

## 2023-06-30 PROCEDURE — 80061 LIPID PANEL: CPT

## 2023-06-30 PROCEDURE — 80048 BASIC METABOLIC PNL TOTAL CA: CPT

## 2023-06-30 PROCEDURE — 83036 HEMOGLOBIN GLYCOSYLATED A1C: CPT

## 2023-06-30 PROCEDURE — 84443 ASSAY THYROID STIM HORMONE: CPT

## 2023-06-30 PROCEDURE — 36415 COLL VENOUS BLD VENIPUNCTURE: CPT

## 2023-06-30 PROCEDURE — 3061F NEG MICROALBUMINURIA REV: CPT | Performed by: INTERNAL MEDICINE

## 2023-07-03 ENCOUNTER — OFFICE VISIT (OUTPATIENT)
Dept: INTERNAL MEDICINE CLINIC | Facility: CLINIC | Age: 68
End: 2023-07-03

## 2023-07-03 VITALS
SYSTOLIC BLOOD PRESSURE: 102 MMHG | BODY MASS INDEX: 28.68 KG/M2 | WEIGHT: 168 LBS | HEIGHT: 64 IN | HEART RATE: 101 BPM | TEMPERATURE: 98.1 F | DIASTOLIC BLOOD PRESSURE: 65 MMHG

## 2023-07-03 DIAGNOSIS — R30.0 DYSURIA: ICD-10-CM

## 2023-07-03 DIAGNOSIS — E11.42 TYPE 2 DIABETES MELLITUS WITH DIABETIC POLYNEUROPATHY, WITHOUT LONG-TERM CURRENT USE OF INSULIN (HCC): Primary | ICD-10-CM

## 2023-07-03 DIAGNOSIS — Z72.0 TOBACCO ABUSE: ICD-10-CM

## 2023-07-03 DIAGNOSIS — D64.9 ANEMIA, UNSPECIFIED TYPE: ICD-10-CM

## 2023-07-03 PROCEDURE — 1160F RVW MEDS BY RX/DR IN RCRD: CPT | Performed by: INTERNAL MEDICINE

## 2023-07-03 PROCEDURE — 99214 OFFICE O/P EST MOD 30 MIN: CPT | Performed by: INTERNAL MEDICINE

## 2023-07-03 PROCEDURE — 1159F MED LIST DOCD IN RCRD: CPT | Performed by: INTERNAL MEDICINE

## 2023-07-03 RX ORDER — FERROUS SULFATE TAB EC 324 MG (65 MG FE EQUIVALENT) 324 (65 FE) MG
324 TABLET DELAYED RESPONSE ORAL
Qty: 180 TABLET | Refills: 0 | Status: SHIPPED | OUTPATIENT
Start: 2023-07-03 | End: 2023-07-04

## 2023-07-03 NOTE — PATIENT INSTRUCTIONS
Diabetes and Exercise   WHAT YOU NEED TO KNOW:   Physical activity, such as exercise, can help keep your blood sugar level steady or improve insulin resistance. Activity can help decrease your risk for heart disease, and help you lose weight. Exercise can also help lower your A1c or keep it at goal. Your diabetes care team will help you create an exercise plan. The plan will be based on the type of diabetes you have and your starting fitness level. DISCHARGE INSTRUCTIONS:   Call your local emergency number (911 in the [address]) if:   You have chest pain or shortness of breath. Return to the emergency department if:   You have a low blood sugar level and it does not improve with treatment. Symptoms are trouble thinking, a pounding heartbeat, and sweating. Your blood sugar level is above 240 mg/dL and does not come down within 15 minutes of treatment. You have blurred or double vision. Your breath has a fruity, sweet smell, or your breathing is shallow. Call your doctor or diabetes care team if:   You have ketones in your blood or urine. You have a fever. Your blood sugar levels are higher than your target goals. You often have low blood sugar levels. Your skin is red, dry, warm, or swollen. You have a wound that does not heal.    You have trouble coping with diabetes, or you feel anxious or depressed. You have questions or concerns about your condition or care. Tips to help you create and meet your exercise goals:   Set a goal for 150 minutes (2.5 hours) of moderate to vigorous aerobic activity each week. Aerobic activity helps your heart stay strong. Aerobic activity includes walking, bicycling, dancing, swimming, and raking leaves. Spread aerobic activity over 3 to 5 days. Do not take more than 2 days off in a row. It is best to do at least 10 minutes at a time and 30 minutes each day. You can work up to these goals. Remember that any activity is better than no activity.  Over time, you can make exercise more intense or last longer. You can also add more days of exercise as your fitness level improves. Your diabetes care team can help you make a step-by-step plan to achieve your goals. Set a strength training goal of 2 to 3 times a week. Take at least 1 day off in between strength training sessions. Strength training helps you keep the muscles you have and build new muscles. Strength training includes lifting weights, climbing stairs, yoga, and barry chi.         Older adults should include balance training 2 to 3 times each week. These include walking backwards, standing on one foot, and walking heel to toe in a straight line. Other healthy activity tips:   Stretch before and after you exercise to prevent injury. Drink water or liquids that do not contain sugar before, during, and after exercise. Ask your dietitian or healthcare provider which liquids you should drink when you exercise. Do not  sit for longer than 30 minutes at a time during your day. If you cannot walk around, at least stand up. This will help you stay active and keep your blood circulating. Try to be active throughout your day. Exercise and blood sugar levels:  Check your blood sugar level before and after exercise, if you use insulin. Healthcare providers may tell you to change the amount of insulin you take or food you eat. If your blood sugar level is high, check your blood or urine for ketones before you exercise. Do not exercise if your blood sugar level is high and you have ketones. If your blood sugar level is less than 100 mg/dL, have a carbohydrate snack before you exercise. Examples are 4 to 6 crackers, ½ banana, 8 ounces (1 cup) of milk, or 4 ounces (½ cup) of juice. Follow up with your doctor or diabetes care team as directed:  Write down your questions so you remember to ask them during your visits.   © Copyright [employer] 2022 Information is for End User's use only and may not be sold, redistributed or otherwise used for commercial purposes. The above information is an  only. It is not intended as medical advice for individual conditions or treatments. Talk to your doctor, nurse or pharmacist before following any medical regimen to see if it is safe and effective for you.

## 2023-07-04 RX ORDER — FERROUS SULFATE TAB EC 324 MG (65 MG FE EQUIVALENT) 324 (65 FE) MG
324 TABLET DELAYED RESPONSE ORAL EVERY OTHER DAY
Qty: 90 TABLET | Refills: 1 | Status: SHIPPED | OUTPATIENT
Start: 2023-07-04

## 2023-07-04 NOTE — PROGRESS NOTES
[address] [facility]  INTERNAL MEDICINE OFFICE VISIT     PATIENT INFORMATION     Letty Joseph   78 y.o. female   MRN: [id number]    ASSESSMENT/PLAN     Diagnoses and all orders for this visit:    Type 2 diabetes mellitus with diabetic polyneuropathy, without long-term current use of insulin ([address])  Given symptomatic hypoglycemic episodes, decreasing in Jardiance to 10 mg tablets daily. SGLT2's can also predispose to UTI given glucosuria. See "Dysuria" for further plan. Patient to continue checking sugars at home with the medication change and to call the clinic if sugars become uncontrolled. Advised to continue with lifestyle and diet modifications. -     Empagliflozin (JARDIANCE) 10 MG TABS tablet; Take 1 tablet (10 mg total) by mouth every morning    Dysuria  Given symptoms have been ongoing for a month without systemic signs, will confirm UTI with urinalysis and treat accordingly. -     UA w Reflex to Microscopic w Reflex to Culture    Anemia, unspecified type  Hemoglobin drop appears to be chronic. Given smoking history, will obtain chest x-ray based off clinical picture. Does not qualify for lung cancer screening at this time given only 10-pack-year smoking history. Recent colonoscopy in 2022 without concerning findings. Patient recently had mammography showing stable, benign 8 mm mass. Concern for iron deficiency anemia given low MCV. Will start on ferrous sulfate 324 mg tablet every other day and obtain iron panel as soon as possible. We will also obtain reticulocyte count as part of work-up. Next steps depending on imaging and blood work as noted above. -     XR chest pa & lateral; Future  -     ferrous sulfate 324 (65 Fe) mg; Take 1 tablet (324 mg total) by mouth every other day  -     Iron Panel (Includes Ferritin, Iron Sat%, Iron, and TIBC); Future  -     Retic Count with Reticulocyte HGB;  Future    Tobacco Abuse  Patient does note that she wants to try and quit smoking. Unfortunately, was not able to discuss medical interventions such as Varenicline at this visit. Patient has tried nicotine patch in the past and states it does not work. At next visit, should discuss other therapies, such as Varenicline, for smoking cessation. Weight Loss  Imaging and work-up as noted above. Patient advised to have a food log for 3 days to determine caloric intake. Log to be reviewed by provider at next visit to determine if weight loss is inappropriate or not relative to caloric intake and exercise. Schedule a follow-up appointment in 4-6 weeks. HEALTH MAINTENANCE     Immunization History   Administered Date(s) Administered   • COVID-19 PFIZER VACCINE 0.3 ML IM 04/18/2021, 05/16/2021     CHIEF COMPLAINT     Chief Complaint   Patient presents with   • Follow-up     diabetes      HISTORY OF PRESENT ILLNESS     Javad Mercer is a 78 y.o. patient who presents to the clinic for follow up. She complains of long-term fatigue and inability to perform at baseline due to it. She also notes that she has been unable to gain weight despite eating a lot. She states she has lost 7lbs in the last month. Per EMR documentation, she's lost 7lb since May and 10lb since February. Also notes night sweats sometimes. In regards to her diabetes, patient states that she is compliant with all of her medications. Patient has not missed any dosages. She does note that her fasting sugars have ranged between 80 and 160. A couple of times a week, she endorses lightheadedness associated with fasting sugars in the 80s. Symptoms resolve when she eats breakfast.  She does endorse eating multiple meals per day as she is trying to gain weight. Patient also endorses that her neuropathy has worsened and that her gabapentin is no longer providing relief. Patient states that the gabapentin had worked in the past, however is now much less effective.     Patient recently had lab work completed which was discussed during the course of the visit. Of note, patient has had a hemoglobin drop from 13.4 - 8.7 over 2.5 year period. She endorses progressive fatigue for months. Endorses sometimes feeling palpitations. She now gets fatigued just walking up the steps. Denies any chest pain, vertigo, leg swelling, numbness, weakness. Patient does also endorse urinary pain for the last month. Denies any fevers, chills aside from the intermittent night sweats mentioned above. Notes some burning with urination and increased urinary frequency. Denies any flank or back pain. Denies any hematuria. REVIEW OF SYSTEMS     Review of Systems   Constitutional: Positive for fatigue and unexpected weight change. Negative for chills and fever. Eyes: Negative for visual disturbance. Respiratory: Negative for cough, chest tightness and wheezing. Cardiovascular: Positive for palpitations. Negative for chest pain and leg swelling. Gastrointestinal: Negative for abdominal distention, abdominal pain, constipation, diarrhea, nausea and vomiting. Endocrine: Positive for polyuria. Genitourinary: Positive for dysuria. Negative for decreased urine volume, difficulty urinating, flank pain, hematuria, vaginal bleeding and vaginal pain. Musculoskeletal: Negative for arthralgias and back pain. Skin: Negative for color change. Neurological: Negative for dizziness, weakness, light-headedness, numbness and headaches. Psychiatric/Behavioral: Negative for confusion and decreased concentration. OBJECTIVE     Vitals:    07/03/23 1347   BP: 102/65   BP Location: Left arm   Patient Position: Sitting   Cuff Size: Large   Pulse: 101   Temp: 98.1 °F (36.7 °C)   TempSrc: Temporal   Weight: 76.2 kg (168 lb)   Height: 5' 4" (1.626 m)     Physical Exam  Vitals reviewed. Constitutional:       General: She is not in acute distress. Appearance: Normal appearance. She is not ill-appearing or diaphoretic.    HENT:      Head: Normocephalic and atraumatic. Eyes:      Conjunctiva/sclera: Conjunctivae normal.      Comments: Pale conjunctiva   Cardiovascular:      Rate and Rhythm: Regular rhythm. Tachycardia present. Pulses: Normal pulses. Pulmonary:      Effort: Pulmonary effort is normal. No respiratory distress. Breath sounds: No wheezing or rales. Comments: Mildly diminished at bases  Abdominal:      General: Abdomen is flat. There is no distension. Tenderness: There is no abdominal tenderness. There is no guarding. Musculoskeletal:      Right lower leg: No edema. Left lower leg: No edema. Lymphadenopathy:      Cervical: No cervical adenopathy. Upper Body:      Right upper body: No axillary adenopathy. Left upper body: No axillary adenopathy. Skin:     Capillary Refill: Capillary refill takes less than 2 seconds. Neurological:      Mental Status: She is alert. Mental status is at baseline.    Psychiatric:         Mood and Affect: Mood normal.         Behavior: Behavior normal.       CURRENT MEDICATIONS     Current Outpatient Medications:   •  Accu-Chek Softclix Lancets lancets, Use 2 (two) times a day Use as instructed, Disp: 200 each, Rfl: 2  •  atorvastatin (LIPITOR) 20 mg tablet, Take 1 tablet (20 mg total) by mouth daily, Disp: 90 tablet, Rfl: 2  •  Diclofenac Sodium (VOLTAREN) 1 %, APPLY 2 GRAMS TOPICALLY THREE TIMES A DAY, Disp: 100 g, Rfl: 2  •  Elastic Bandages & Supports (GNP Diabetic Socks Unisex XL) MISC, Use 1 each if needed (1 pair of socks for diabetic neuropathy), Disp: 2 each, Rfl: 0  •  Empagliflozin (JARDIANCE) 10 MG TABS tablet, Take 1 tablet (10 mg total) by mouth every morning, Disp: 90 tablet, Rfl: 0  •  ferrous sulfate 324 (65 Fe) mg, Take 1 tablet (324 mg total) by mouth 2 (two) times a day before meals, Disp: 180 tablet, Rfl: 0  •  gabapentin (NEURONTIN) 300 mg capsule, Take 2 capsules (600 mg total) by mouth 3 (three) times a day, Disp: 540 capsule, Rfl: 0  • glucose blood (Accu-Chek Guide) test strip, Use 1 each 2 (two) times a day Use as instructed, Disp: 200 each, Rfl: 2  •  lisinopril-hydrochlorothiazide (PRINZIDE,ZESTORETIC) 20-12.5 MG per tablet, Take 1 tablet by mouth daily, Disp: 90 tablet, Rfl: 2  •  metFORMIN (GLUCOPHAGE) 1000 MG tablet, Take 1 tablet (1,000 mg total) by mouth 2 (two) times a day with meals, Disp: 180 tablet, Rfl: 3  •  acetaminophen (TYLENOL) 325 mg tablet, Take 650 mg by mouth every 6 (six) hours as needed for mild pain  (Patient not taking: Reported on 7/3/2023), Disp: , Rfl:   •  Continuous Blood Gluc  (FreeStyle [place] 2 [place]) BRITANY, Check blood sugars multiple times per day (Patient not taking: Reported on 7/3/2023), Disp: 1 each, Rfl: 0  •  Continuous Blood Gluc Sensor (FreeStyle [place] 2 Sensor) MISC, Check blood sugars multiple times per day (Patient not taking: Reported on 7/3/2023), Disp: 6 each, Rfl: 3  •  nicotine (NICODERM CQ) 7 mg/24hr TD 24 hr patch, Place 1 patch on the skin every 24 hours (Patient not taking: Reported on 7/3/2023), Disp: 28 patch, Rfl: 0    PAST MEDICAL & SURGICAL HISTORY     Past Medical History:   Diagnosis Date   • Arthritis    • Bump     bumped head yesterday at work "saw stars" no LOC, cat scan done was normal   • Circulation problem    • Diabetes mellitus ([address])     blood sugar 125 on admission   • Encounter for screening colonoscopy     1 st one   • Headache    • Hyperlipidemia    • Hypertension    • Positive fecal occult blood test 08/30/2018    Added automatically from request for surgery [id number]   • Post concussion syndrome 09/14/2018   • Syncope      Past Surgical History:   Procedure Laterality Date   • BREAST BIOPSY Right 02/18/2022   • COLONOSCOPY N/A 9/26/2018    Procedure: COLONOSCOPY;  Surgeon: Emily Gracia MD;  Location: [facility];   Service: Gastroenterology   • HYSTERECTOMY  2017   • INCISIONAL BREAST BIOPSY      last assessed 17Aug2017   • RI COLONOSCOPY FLX DX W/COLLJ SPEC WHEN PFRMD N/A 7/5/2018    Procedure: COLONOSCOPY;  Surgeon: Ray Singh MD;  Location: [facility]; Service: Gastroenterology   • US GUIDED BREAST BIOPSY RIGHT COMPLETE Right 2/18/2022     SOCIAL & FAMILY HISTORY     Social History     Socioeconomic History   • Marital status:      Spouse name: Not on file   • Number of children: 6   • Years of education: Not on file   • Highest education level: Not on file   Occupational History   • Occupation: [employer]   Tobacco Use   • Smoking status: Every Day     Packs/day: 0.25     Types: Cigarettes   • Smokeless tobacco: Never   • Tobacco comments:     smokes less than 1pk/day   Vaping Use   • Vaping Use: Never used   Substance and Sexual Activity   • Alcohol use: Never   • Drug use: Never   • Sexual activity: Not Currently   Other Topics Concern   • Not on file   Social History Narrative    Daily caffeine consumption, 6-8 servings a day     Social Determinants of Health     Financial Resource Strain: Low Risk  (2/23/2023)    Overall Financial Resource Strain (CARDIA)    • Difficulty of Paying Living Expenses: Not hard at all   Food Insecurity: No Food Insecurity (2/23/2023)    Hunger Vital Sign    • Worried About Running Out of Food in the Last Year: Never true    • Ran Out of Food in the Last Year: Never true   Transportation Needs: No Transportation Needs (2/23/2023)    PRAPARE - Transportation    • Lack of Transportation (Medical): No    • Lack of Transportation (Non-Medical):  No   Physical Activity: Inactive (2/10/2021)    Exercise Vital Sign    • Days of Exercise per Week: 0 days    • Minutes of Exercise per Session: 0 min   Stress: No Stress Concern Present (2/10/2021)    [address] [facility]    • Feeling of Stress : Not at all   Social Connections: Socially Isolated (2/10/2021)    Social Connection and Isolation Panel [NHANES]    • Frequency of Communication with Friends and Family: Once a week    • Frequency of Social Gatherings with Friends and Family: Once a week    • Attends Jain Services: Never    • Active Member of Clubs or Organizations: No    • Attends Club or Organization Meetings: Never    • Marital Status:    Intimate Partner Violence: Not At Risk (2/10/2021)    Humiliation, Afraid, Rape, and Kick questionnaire    • Fear of Current or Ex-Partner: No    • Emotionally Abused: No    • Physically Abused: No    • Sexually Abused: No   Housing Stability: Not on file     Social History     Substance and Sexual Activity   Alcohol Use Never     Social History     Substance and Sexual Activity   Drug Use Never     Social History     Tobacco Use   Smoking Status Every Day   • Packs/day: 0.25   • Types: Cigarettes   Smokeless Tobacco Never   Tobacco Comments    smokes less than 1pk/day     Family History   Problem Relation Age of Onset   • Breast cancer Cousin 48   • Alcohol abuse Mother    • Alcohol abuse Father    • Breast cancer Daughter 45   • No Known Problems Sister    • No Known Problems Maternal Grandmother    • No Known Problems Maternal Grandfather    • No Known Problems Paternal Grandmother    • No Known Problems Paternal Grandfather    • No Known Problems Son    • No Known Problems Son    • No Known Problems Daughter    • No Known Problems Daughter    • No Known Problems Daughter    • Breast cancer Sister 77   • No Known Problems Maternal Aunt    • No Known Problems Maternal Aunt    • No Known Problems Maternal Aunt    • No Known Problems Maternal Aunt    • No Known Problems Maternal Aunt    • No Known Problems Maternal Aunt    • No Known Problems Maternal Aunt    • No Known Problems Maternal Aunt    • No Known Problems Maternal Aunt    • No Known Problems Maternal Aunt    • No Known Problems Maternal Aunt    • No Known Problems Maternal Aunt    • No Known Problems Paternal Aunt    • No Known Problems Paternal Aunt    • Dementia Paternal Aunt    • No Known Problems Paternal Aunt    • No Known Problems Paternal Aunt    • No Known Problems Paternal Aunt    • No Known Problems Paternal Aunt    • No Known Problems Paternal Aunt    • Colon cancer Neg Hx    • Endometrial cancer Neg Hx    • Ovarian cancer Neg Hx          ==  Anna Mirza DO  [facility] Internal Medicine [facility]idency, [address] [place] [address] [address] [place] [address] [facility] [address], [address] [place] [address] Road  Office: [phone]  Fax: [phone]     ====================================  PLEASE NOTE:  This encounter was completed utilizing the [employer] Voice Recognition Software. Grammatical errors, random word insertions, pronoun errors and incomplete sentences are occasional consequences of the system due to software limitations, ambient noise and hardware issues. These may be missed by proof reading prior to affixing electronic signature. Any questions or concerns about the content, text or information contained within the body of this dictation should be directly addressed to the physician for clarification. Please do not hesitate to call me directly if you have any any questions or concerns.

## 2023-07-10 ENCOUNTER — APPOINTMENT (OUTPATIENT)
Dept: LAB | Facility: CLINIC | Age: 68
End: 2023-07-10
Payer: COMMERCIAL

## 2023-07-10 DIAGNOSIS — D64.9 ANEMIA, UNSPECIFIED TYPE: ICD-10-CM

## 2023-07-10 LAB
BACTERIA UR QL AUTO: ABNORMAL /HPF
BILIRUB UR QL STRIP: NEGATIVE
BUDDING YEAST: PRESENT
CLARITY UR: ABNORMAL
COLOR UR: YELLOW
FERRITIN SERPL-MCNC: 5 NG/ML (ref 11–307)
GLUCOSE UR STRIP-MCNC: ABNORMAL MG/DL
HGB RETIC QN AUTO: 24.7 PG (ref 30–38.3)
HGB UR QL STRIP.AUTO: NEGATIVE
IMM RETICS NFR: 35.1 % (ref 0–14)
IRON SATN MFR SERPL: 7 % (ref 15–50)
IRON SERPL-MCNC: 26 UG/DL (ref 50–170)
KETONES UR STRIP-MCNC: NEGATIVE MG/DL
LEUKOCYTE ESTERASE UR QL STRIP: ABNORMAL
MUCOUS THREADS UR QL AUTO: ABNORMAL
NITRITE UR QL STRIP: NEGATIVE
NON-SQ EPI CELLS URNS QL MICRO: ABNORMAL /HPF
PH UR STRIP.AUTO: 6 [PH]
PROT UR STRIP-MCNC: ABNORMAL MG/DL
RBC #/AREA URNS AUTO: ABNORMAL /HPF
RETICS # AUTO: ABNORMAL 10*3/UL (ref 14097–95744)
RETICS # CALC: 1.82 % (ref 0.37–1.87)
SP GR UR STRIP.AUTO: 1.03 (ref 1–1.03)
TIBC SERPL-MCNC: 367 UG/DL (ref 250–450)
UROBILINOGEN UR STRIP-ACNC: <2 MG/DL
WBC #/AREA URNS AUTO: ABNORMAL /HPF

## 2023-07-10 PROCEDURE — 83550 IRON BINDING TEST: CPT

## 2023-07-10 PROCEDURE — 36415 COLL VENOUS BLD VENIPUNCTURE: CPT

## 2023-07-10 PROCEDURE — 81001 URINALYSIS AUTO W/SCOPE: CPT | Performed by: STUDENT IN AN ORGANIZED HEALTH CARE EDUCATION/TRAINING PROGRAM

## 2023-07-10 PROCEDURE — 83540 ASSAY OF IRON: CPT

## 2023-07-10 PROCEDURE — 85046 RETICYTE/HGB CONCENTRATE: CPT

## 2023-07-10 PROCEDURE — 82728 ASSAY OF FERRITIN: CPT

## 2023-07-11 DIAGNOSIS — R31.29 MICROSCOPIC HEMATURIA: Primary | ICD-10-CM

## 2023-07-11 DIAGNOSIS — R31.29 MICROSCOPIC HEMATURIA: ICD-10-CM

## 2023-07-11 DIAGNOSIS — R30.0 DYSURIA: Primary | ICD-10-CM

## 2023-07-11 RX ORDER — FLUCONAZOLE 150 MG/1
150 TABLET ORAL ONCE
Qty: 1 TABLET | Refills: 0 | Status: SHIPPED | OUTPATIENT
Start: 2023-07-11 | End: 2023-07-11

## 2023-07-25 ENCOUNTER — HOSPITAL ENCOUNTER (OUTPATIENT)
Dept: RADIOLOGY | Facility: HOSPITAL | Age: 68
Discharge: HOME | End: 2023-07-25
Payer: COMMERCIAL

## 2023-07-25 DIAGNOSIS — D64.9 ANEMIA, UNSPECIFIED TYPE: ICD-10-CM

## 2023-07-25 PROCEDURE — 71046 X-RAY EXAM CHEST 2 VIEWS: CPT

## 2023-07-28 ENCOUNTER — HOSPITAL ENCOUNTER (OUTPATIENT)
Dept: RADIOLOGY | Facility: HOSPITAL | Age: 68
Discharge: HOME | End: 2023-07-28
Payer: COMMERCIAL

## 2023-07-28 ENCOUNTER — ANCILLARY ORDERS (OUTPATIENT)
Dept: INTERNAL MEDICINE CLINIC | Facility: CLINIC | Age: 68
End: 2023-07-28

## 2023-07-28 DIAGNOSIS — R31.29 MICROSCOPIC HEMATURIA: ICD-10-CM

## 2023-07-28 PROCEDURE — 76857 US EXAM PELVIC LIMITED: CPT

## 2023-08-01 ENCOUNTER — OFFICE VISIT (OUTPATIENT)
Dept: INTERNAL MEDICINE CLINIC | Facility: CLINIC | Age: 68
End: 2023-08-01

## 2023-08-01 VITALS
SYSTOLIC BLOOD PRESSURE: 114 MMHG | HEIGHT: 64 IN | HEART RATE: 89 BPM | DIASTOLIC BLOOD PRESSURE: 70 MMHG | WEIGHT: 169 LBS | TEMPERATURE: 97.8 F | BODY MASS INDEX: 28.85 KG/M2

## 2023-08-01 DIAGNOSIS — M54.42 CHRONIC BILATERAL LOW BACK PAIN WITH LEFT-SIDED SCIATICA: ICD-10-CM

## 2023-08-01 DIAGNOSIS — E78.2 MIXED HYPERLIPIDEMIA: ICD-10-CM

## 2023-08-01 DIAGNOSIS — E11.65 TYPE 2 DIABETES MELLITUS WITH HYPERGLYCEMIA, WITHOUT LONG-TERM CURRENT USE OF INSULIN (HCC): Primary | ICD-10-CM

## 2023-08-01 DIAGNOSIS — G89.29 CHRONIC BILATERAL LOW BACK PAIN WITH LEFT-SIDED SCIATICA: ICD-10-CM

## 2023-08-01 DIAGNOSIS — D64.9 ANEMIA, UNSPECIFIED TYPE: ICD-10-CM

## 2023-08-01 DIAGNOSIS — E66.3 OVERWEIGHT (BMI 25.0-29.9): ICD-10-CM

## 2023-08-01 DIAGNOSIS — E11.42 TYPE 2 DIABETES MELLITUS WITH DIABETIC POLYNEUROPATHY, WITHOUT LONG-TERM CURRENT USE OF INSULIN (HCC): ICD-10-CM

## 2023-08-01 DIAGNOSIS — Z12.31 SCREENING MAMMOGRAM, ENCOUNTER FOR: ICD-10-CM

## 2023-08-01 PROCEDURE — 99213 OFFICE O/P EST LOW 20 MIN: CPT | Performed by: INTERNAL MEDICINE

## 2023-08-01 RX ORDER — LANCING DEVICE/LANCETS
KIT MISCELLANEOUS DAILY
Qty: 1 KIT | Refills: 0 | Status: SHIPPED | OUTPATIENT
Start: 2023-08-01

## 2023-08-01 RX ORDER — BLOOD SUGAR DIAGNOSTIC
1 STRIP MISCELLANEOUS DAILY
Qty: 200 EACH | Refills: 2 | Status: SHIPPED | OUTPATIENT
Start: 2023-08-01

## 2023-08-01 RX ORDER — LANCETS
EACH MISCELLANEOUS ONCE
Qty: 200 EACH | Refills: 2 | Status: SHIPPED | OUTPATIENT
Start: 2023-08-01 | End: 2023-08-01

## 2023-08-01 RX ORDER — GABAPENTIN 300 MG/1
600 CAPSULE ORAL 3 TIMES DAILY
Qty: 540 CAPSULE | Refills: 0 | Status: SHIPPED | OUTPATIENT
Start: 2023-08-01

## 2023-08-01 NOTE — PROGRESS NOTES
[address] [facility] Visit Note  Soni Wallace 78 y.o. female   MRN: [id number]    Assessment and Plan      Diagnoses and all orders for this visit:    Type 2 diabetes mellitus with hyperglycemia, without long-term current use of insulin (HCC)  -     glucose blood (Accu-Chek Guide) test strip; Use 1 each in the morning Use as instructed  -     Accu-Chek Softclix Lancets lancets; Use 1 (one) time for 1 dose Use as instructed  -     Lancets Misc. (Accu-Chek Softclix Lancet Dev) KIT; Use in the morning  Type 2 diabetes mellitus with diabetic polyneuropathy, without long-term current use of insulin (HCC)  -     gabapentin (NEURONTIN) 300 mg capsule; Take 2 capsules (600 mg total) by mouth 3 (three) times a day  Chronic bilateral low back pain with left-sided sciatica  -     gabapentin (NEURONTIN) 300 mg capsule; Take 2 capsules (600 mg total) by mouth 3 (three) times a day  Overweight (BMI 25.0-29. 9)  Mixed hyperlipidemia  Anemia, unspecified type  -     CBC and differential; Future  -     Ambulatory Referral to Gastroenterology; Future  Screening mammogram, encounter for  -     Mammo screening bilateral w 3d & cad; Future            Iron deficiency anemia:  -Based on recent iron panel with TIBC around 360s and low iron levels  -This can also be correlated with MCV of 75 based on recent CBC with hemoglobin around 8.7  -Currently on supplements with ferrous sulfate  -Prior colonoscopy from September 2022 noted to have normal colon  -Provided with a GI referral for further investigation and work-up  -We will continue to monitor her weight loss. Patient courage to increase p.o. intake for now. -Repeat CBC ordered    Diabetes Mellitus type 2: Patient with history of diabetes mellitus type 2.  Previous HbA1c was 7.7 from June 2023.       -On metformin 1000 mg b.i.d.  -On jardiance 10 mg daily  -On gabapentin 600 mg t.i.d. for diabetic neuropathy  -Discussed diet and lifestyle modifications with patient  -Last diabetic eye exam in 12/2021, patient refusing another referral at this time     CKD stage II: Patient with history chronic kidney disease stage 2 likely secondary to history of diabetes and hypertension.  - Avoid nephrotoxic medications including NSAIDs  - Discussed optimization of BP to prevent further progression of kidney disease  - Monitor sCr at future visits    Hypertension:    -On Prinzide 20-12.5 mg daily  -Discussed at home BP monitoring with patient  -Patient advised to monitor her blood pressure 3 times per week, once early morning and once in mid afternoon     Health Maintenance  -Screening mammogram in May 2023 that showed no mammographic evidence of malignancy with follow-up recommended at 1 year interval, repeat mammogram ordered today  -Had a sleep study at home performed in April 2023 that was inconclusive and patient was recommended for a lab sleep study  -Last screening colonoscopy in February 2022, normal with adequate bowel prep. Recommended for repeat in 10 years.   -Patient with history of total hysterectomy including removal of cervical cuff, no indication for Pap smears going forward     BMI Counseling: Body mass index is 29.01 kg/m². The BMI is above normal. Nutrition recommendations include reducing portion sizes, decreasing overall calorie intake, consuming healthier snacks and moderation in carbohydrate intake. Exercise recommendations include exercising 3-5 times per week.     Plan  -See above  -Follow up visit with our clinic in 3 months to review diabetes and hypertension    Subjective     History of Present Illness:  Patient is a 60-year-old female with history of hypertension, hyperlipidemia, diabetes mellitus type 2, chronic bilateral low back pain. She is here at clinic today for discussion about ongoing weight loss and iron deficiency anemia.   As per the patient, she experiences mild to minimal symptoms in terms of her anemia including mild fatigue and tiredness. I spoke with the patient about her recent colonoscopy results from 2022 as well as that her weight loss could be attributed to her being on 2 antidiabetic medications including metformin and Jardiance. She remains compliant with her iron supplementation for anemia and has been on it so far for about a month. I advised the patient to follow-up on a CBC in about a month or so to begin keep following her hemoglobin levels. As advised the patient about going to see gastroenterology for further work-up in regards to her anemia. She does state that she used to be constipated before starting iron supplements and that she had to strain when having a bowel movement. She currently denies seeing any blood in her stool or seeing any blood on the tissue wiping. In regards to her diabetes, she remains compliant with her antidiabetic medications but is not currently monitoring her sugars because she ran out of the strips and lancets which were refilled today. Patient advised to follow-up with her clinic in 3 months. Review of Systems   Constitutional: Negative for chills and fever. Respiratory: Negative for cough and shortness of breath. Cardiovascular: Negative for chest pain and palpitations. Gastrointestinal: Negative for abdominal pain, blood in stool, nausea and vomiting. Neurological: Negative for dizziness and headaches.          Current Outpatient Medications:   •  Accu-Chek Softclix Lancets lancets, Use 1 (one) time for 1 dose Use as instructed, Disp: 200 each, Rfl: 2  •  Diclofenac Sodium (VOLTAREN) 1 %, APPLY 2 GRAMS TOPICALLY THREE TIMES A DAY, Disp: 100 g, Rfl: 2  •  Elastic Bandages & Supports (GNP Diabetic Socks Unisex XL) MISC, Use 1 each if needed (1 pair of socks for diabetic neuropathy), Disp: 2 each, Rfl: 0  •  Empagliflozin (JARDIANCE) 10 MG TABS tablet, Take 1 tablet (10 mg total) by mouth every morning, Disp: 90 tablet, Rfl: 0  •  ferrous sulfate 324 (65 Fe) mg, Take 1 tablet (324 mg total) by mouth every other day, Disp: 90 tablet, Rfl: 1  •  gabapentin (NEURONTIN) 300 mg capsule, Take 2 capsules (600 mg total) by mouth 3 (three) times a day, Disp: 540 capsule, Rfl: 0  •  glucose blood (Accu-Chek Guide) test strip, Use 1 each in the morning Use as instructed, Disp: 200 each, Rfl: 2  •  Lancets Misc.  (Accu-Chek Softclix Lancet Dev) KIT, Use in the morning, Disp: 1 kit, Rfl: 0  •  lisinopril-hydrochlorothiazide (PRINZIDE,ZESTORETIC) 20-12.5 MG per tablet, Take 1 tablet by mouth daily, Disp: 90 tablet, Rfl: 2  •  metFORMIN (GLUCOPHAGE) 1000 MG tablet, Take 1 tablet (1,000 mg total) by mouth 2 (two) times a day with meals, Disp: 180 tablet, Rfl: 3  •  acetaminophen (TYLENOL) 325 mg tablet, Take 650 mg by mouth every 6 (six) hours as needed for mild pain  (Patient not taking: Reported on 7/3/2023), Disp: , Rfl:   •  atorvastatin (LIPITOR) 20 mg tablet, Take 1 tablet (20 mg total) by mouth daily, Disp: 90 tablet, Rfl: 2  •  Continuous Blood Gluc  (FreeStyle Mishel 2 Ashby) BRITANY, Check blood sugars multiple times per day (Patient not taking: Reported on 7/3/2023), Disp: 1 each, Rfl: 0  •  Continuous Blood Gluc Sensor (FreeStyle Mishel 2 Sensor) MISC, Check blood sugars multiple times per day (Patient not taking: Reported on 7/3/2023), Disp: 6 each, Rfl: 3  •  nicotine (NICODERM CQ) 7 mg/24hr TD 24 hr patch, Place 1 patch on the skin every 24 hours (Patient not taking: Reported on 7/3/2023), Disp: 28 patch, Rfl: 0  No Known Allergies  Past Medical History:   Diagnosis Date   • Arthritis    • Bump     bumped head yesterday at work "saw stars" no LOC, cat scan done was normal   • Circulation problem    • Diabetes mellitus ([address])     blood sugar 125 on admission   • Encounter for screening colonoscopy     1 st one   • Headache    • Hyperlipidemia    • Hypertension    • Positive fecal occult blood test 08/30/2018    Added automatically from request for surgery [id number]   • Post concussion syndrome 09/14/2018   • Syncope      Past Surgical History:   Procedure Laterality Date   • BREAST BIOPSY Right 02/18/2022   • COLONOSCOPY N/A 9/26/2018    Procedure: COLONOSCOPY;  Surgeon: Venora Brittle, MD;  Location: [facility] GI LAB; Service: Gastroenterology   • HYSTERECTOMY  2017   • INCISIONAL BREAST BIOPSY      last assessed 17Aug2017   • DE COLONOSCOPY FLX DX W/COLLJ SPEC WHEN PFRMD N/A 7/5/2018    Procedure: COLONOSCOPY;  Surgeon: Venora Brittle, MD;  Location:  GI LAB;   Service: Gastroenterology   • US GUIDED BREAST BIOPSY RIGHT COMPLETE Right 2/18/2022     Family History   Problem Relation Age of Onset   • Breast cancer Cousin 48   • Alcohol abuse Mother    • Alcohol abuse Father    • Breast cancer Daughter 45   • No Known Problems Sister    • No Known Problems Maternal Grandmother    • No Known Problems Maternal Grandfather    • No Known Problems Paternal Grandmother    • No Known Problems Paternal Grandfather    • No Known Problems Son    • No Known Problems Son    • No Known Problems Daughter    • No Known Problems Daughter    • No Known Problems Daughter    • Breast cancer Sister 77   • No Known Problems Maternal Aunt    • No Known Problems Maternal Aunt    • No Known Problems Maternal Aunt    • No Known Problems Maternal Aunt    • No Known Problems Maternal Aunt    • No Known Problems Maternal Aunt    • No Known Problems Maternal Aunt    • No Known Problems Maternal Aunt    • No Known Problems Maternal Aunt    • No Known Problems Maternal Aunt    • No Known Problems Maternal Aunt    • No Known Problems Maternal Aunt    • No Known Problems Paternal Aunt    • No Known Problems Paternal Aunt    • Dementia Paternal Aunt    • No Known Problems Paternal Aunt    • No Known Problems Paternal Aunt    • No Known Problems Paternal Aunt    • No Known Problems Paternal Aunt    • No Known Problems Paternal Aunt    • Colon cancer Neg Hx    • Endometrial cancer Neg Hx    • Ovarian cancer Neg Hx Social History     Substance and Sexual Activity   Alcohol Use Never     Social History     Substance and Sexual Activity   Drug Use Never     Social History     Tobacco Use   Smoking Status Every Day   • Packs/day: 0.25   • Types: Cigarettes   Smokeless Tobacco Never   Tobacco Comments    smokes less than 1pk/day       Objective     Vitals:    08/01/23 1434   BP: 114/70   BP Location: Left arm   Patient Position: Sitting   Cuff Size: Large   Pulse: 89   Temp: 97.8 °F (36.6 °C)   TempSrc: Temporal   Weight: 76.7 kg (169 lb)   Height: 5' 4" (1.626 m)       Physical Exam  Vitals reviewed. HENT:      Head: Normocephalic. Cardiovascular:      Rate and Rhythm: Normal rate and regular rhythm. Pulses: Normal pulses. Heart sounds: Normal heart sounds. Pulmonary:      Effort: Pulmonary effort is normal.      Breath sounds: Normal breath sounds. Abdominal:      General: Bowel sounds are normal.      Palpations: Abdomen is soft. Skin:     General: Skin is warm and dry. Capillary Refill: Capillary refill takes less than 2 seconds. Neurological:      Mental Status: She is alert and oriented to person, place, and time. Care Time Delivered:   I have spent 44 minutes with patient today in which greater than 50% of this time was spent in counseling/coordination of care. Raisa Draper MD  Internal Medicine Residency PGY-3  [facility]      ==  PLEASE NOTE:  This encounter was completed utilizing the [employer]utorial[employer] Voice Recognition Software. Grammatical errors, random word insertions, pronoun errors and incomplete sentences are occasional consequences of the system due to software limitations, ambient noise and hardware issues. These may be missed by proof reading prior to affixing electronic signature.  Any questions or concerns about the content, text or information contained within the body of this dictation should be directly addressed to the physician for clarification. Please do not hesitate to call me directly if you have any any questions or concerns.

## 2023-08-01 NOTE — PATIENT INSTRUCTIONS
Thank you for visiting our clinic! It was nice seeing you!     Today, we discussed-  - Diet and lifestyle modifications and strategies for weight loss  - Maintaining a blood glucose log and measuring your blood sugars at least once daily  - Please follow up on your lab work, we will give you a call with results once lab results are available  - Please follow up with us in 3 months to review your chronic conditions

## 2023-08-07 ENCOUNTER — TELEPHONE (OUTPATIENT)
Dept: UROLOGY | Facility: AMBULATORY SURGERY CENTER | Age: 68
End: 2023-08-07

## 2023-08-07 NOTE — TELEPHONE ENCOUNTER
New Patient    What is the reason for the patient’s appointment?: referral   R30.0 (ICD-10-CM) - Dysuria   R31.29 (ICD-10-CM) - Microscopic hematuria       What office location does the patient prefer?:     Does patient have Imaging/Lab Results: yes     Have patient records been requested?: no   If No, are the records showing in Epic: yes       INSURANCE:   Do we accept the patient's insurance or is the patient Self-Pay?: yes     Insurance Provider: [employer]   Plan Type/Number:   Member ID#:       HISTORY:   Has the patient had any previous Urologist(s)?: no    Was the patient seen in the ED?: no    Has the patient had any outside testing done?: no    Does the patient have a personal history of cancer?: no    Please review chart and advise if October will be ok or if she needs to be seen somewhere else.      Pt can be reached at [phone]

## 2023-09-05 ENCOUNTER — APPOINTMENT (OUTPATIENT)
Dept: LAB | Facility: CLINIC | Age: 68
End: 2023-09-05
Payer: COMMERCIAL

## 2023-09-05 DIAGNOSIS — D64.9 ANEMIA, UNSPECIFIED TYPE: ICD-10-CM

## 2023-09-05 LAB
BASOPHILS # BLD AUTO: 0.05 THOUSANDS/ÂΜL (ref 0–0.1)
BASOPHILS NFR BLD AUTO: 1 % (ref 0–1)
EOSINOPHIL # BLD AUTO: 0.12 THOUSAND/ÂΜL (ref 0–0.61)
EOSINOPHIL NFR BLD AUTO: 2 % (ref 0–6)
ERYTHROCYTE [DISTWIDTH] IN BLOOD BY AUTOMATED COUNT: 22.5 % (ref 11.6–15.1)
HCT VFR BLD AUTO: 31 % (ref 34.8–46.1)
HGB BLD-MCNC: 8.9 G/DL (ref 11.5–15.4)
IMM GRANULOCYTES # BLD AUTO: 0.01 THOUSAND/UL (ref 0–0.2)
IMM GRANULOCYTES NFR BLD AUTO: 0 % (ref 0–2)
LYMPHOCYTES # BLD AUTO: 1.69 THOUSANDS/ÂΜL (ref 0.6–4.47)
LYMPHOCYTES NFR BLD AUTO: 27 % (ref 14–44)
MCH RBC QN AUTO: 21.9 PG (ref 26.8–34.3)
MCHC RBC AUTO-ENTMCNC: 28.7 G/DL (ref 31.4–37.4)
MCV RBC AUTO: 76 FL (ref 82–98)
MONOCYTES # BLD AUTO: 0.69 THOUSAND/ÂΜL (ref 0.17–1.22)
MONOCYTES NFR BLD AUTO: 11 % (ref 4–12)
NEUTROPHILS # BLD AUTO: 3.73 THOUSANDS/ÂΜL (ref 1.85–7.62)
NEUTS SEG NFR BLD AUTO: 59 % (ref 43–75)
NRBC BLD AUTO-RTO: 0 /100 WBCS
PLATELET # BLD AUTO: 355 THOUSANDS/UL (ref 149–390)
PMV BLD AUTO: 9.5 FL (ref 8.9–12.7)
RBC # BLD AUTO: 4.06 MILLION/UL (ref 3.81–5.12)
WBC # BLD AUTO: 6.29 THOUSAND/UL (ref 4.31–10.16)

## 2023-09-05 PROCEDURE — 85025 COMPLETE CBC W/AUTO DIFF WBC: CPT

## 2023-09-05 PROCEDURE — 36415 COLL VENOUS BLD VENIPUNCTURE: CPT

## 2023-09-13 ENCOUNTER — RA CDI HCC (OUTPATIENT)
Dept: OTHER | Facility: HOSPITAL | Age: 68
End: 2023-09-13

## 2023-09-13 NOTE — PROGRESS NOTES
[facility] coding opportunities       Chart reviewed, no opportunity found: [id number]        Patients Insurance     Medicare Insurance: [employer] Advantage

## 2023-09-27 ENCOUNTER — CONSULT (OUTPATIENT)
Dept: GASTROENTEROLOGY | Facility: CLINIC | Age: 68
End: 2023-09-27
Payer: COMMERCIAL

## 2023-09-27 VITALS
SYSTOLIC BLOOD PRESSURE: 108 MMHG | HEIGHT: 64 IN | DIASTOLIC BLOOD PRESSURE: 60 MMHG | WEIGHT: 166 LBS | BODY MASS INDEX: 28.34 KG/M2 | TEMPERATURE: 98 F

## 2023-09-27 DIAGNOSIS — D64.9 ANEMIA, UNSPECIFIED TYPE: ICD-10-CM

## 2023-09-27 DIAGNOSIS — R63.4 UNINTENTIONAL WEIGHT LOSS: Primary | ICD-10-CM

## 2023-09-27 PROCEDURE — 99204 OFFICE O/P NEW MOD 45 MIN: CPT | Performed by: INTERNAL MEDICINE

## 2023-09-27 NOTE — PATIENT INSTRUCTIONS
Scheduled date of EGD(as of today):10/06/2023  Physician performing EGD: Elan  Location of EGD: [facility] [place]   Instructions reviewed with patient by: Wilfrido Claire  Clearances:  None   Patient is DIABETIC    Approved with Elan and Pio Bae

## 2023-10-02 ENCOUNTER — OFFICE VISIT (OUTPATIENT)
Dept: SLEEP CENTER | Facility: CLINIC | Age: 68
End: 2023-10-02
Payer: COMMERCIAL

## 2023-10-02 VITALS
OXYGEN SATURATION: 97 % | HEART RATE: 106 BPM | DIASTOLIC BLOOD PRESSURE: 70 MMHG | WEIGHT: 165 LBS | SYSTOLIC BLOOD PRESSURE: 124 MMHG | HEIGHT: 64 IN | BODY MASS INDEX: 28.17 KG/M2

## 2023-10-02 DIAGNOSIS — R06.83 SNORING: ICD-10-CM

## 2023-10-02 DIAGNOSIS — I10 BENIGN ESSENTIAL HYPERTENSION: ICD-10-CM

## 2023-10-02 DIAGNOSIS — Z72.0 TOBACCO ABUSE: ICD-10-CM

## 2023-10-02 DIAGNOSIS — G47.34 SLEEP RELATED HYPOXIA: Primary | ICD-10-CM

## 2023-10-02 DIAGNOSIS — E66.3 OVERWEIGHT (BMI 25.0-29.9): ICD-10-CM

## 2023-10-02 DIAGNOSIS — F43.9 STRESS AT HOME: ICD-10-CM

## 2023-10-02 DIAGNOSIS — E11.42 TYPE 2 DIABETES MELLITUS WITH DIABETIC POLYNEUROPATHY, WITHOUT LONG-TERM CURRENT USE OF INSULIN (HCC): ICD-10-CM

## 2023-10-02 DIAGNOSIS — G47.00 INSOMNIA, UNSPECIFIED TYPE: ICD-10-CM

## 2023-10-02 PROCEDURE — 99204 OFFICE O/P NEW MOD 45 MIN: CPT | Performed by: INTERNAL MEDICINE

## 2023-10-02 RX ORDER — ZOLPIDEM TARTRATE 5 MG/1
5 TABLET ORAL AS NEEDED
Qty: 1 TABLET | Refills: 0 | Status: SHIPPED | OUTPATIENT
Start: 2023-10-02 | End: 2023-10-03

## 2023-10-02 NOTE — PROGRESS NOTES
Consultation - [facility]  80 y.o. female  GDU:8/50/2694  [facility]:[id number]  DOS:10/2/2023    Physician Requesting Consult: Desiree Machuca MD             Reason for Consult : At your kind request I saw Lupe Shaffer for initial sleep evaluation today. Home sleep testing was undertaken to evaluate for sleep disordered breathing and patient is here to review results and further options. The study demonstrated a respiratory event index (RASHAWN) of 1.0. The lowest SpO2 recorded is 69% at 298.6 minutes during the study was spent with saturations below 90%. No snoring was recorded. Additional comments by patient: She had difficulty setting up the equipment. PFSH, Problem List, Medications & Allergies were reviewed in EMR. Vania Bailey  has a past medical history of Arthritis, Bump, Circulation problem, Diabetes mellitus ([address]), Encounter for screening colonoscopy, Headache, Hyperlipidemia, Hypertension, Positive fecal occult blood test (08/30/2018), Post concussion syndrome (09/14/2018), and Syncope. She has a current medication list which includes the following prescription(s): acetaminophen, atorvastatin, diclofenac sodium, gnp diabetic socks unisex xl, empagliflozin, ferrous sulfate, gabapentin, accu-chek guide, accu-chek softclix lancet dev, lisinopril-hydrochlorothiazide, metformin, and accu-chek softclix lancets. HPI: Study was undertaken for complaint of "I was not sleeping "but this has now resolved. Jenifer langstonly sleeps alone and is aware of Snore loudly that is reported by her daughter and at times she awakens herself with snoring. Additional Complaints: None. Restless Leg Syndrome: reports no suggestive symptoms but reports lower extremity pain due to nerve neuropathy. .    Parasomnia: no features reported    Sleep Routine (averaged): Typical Bedtime: 9 PM.  Gets OOB: 6 AM. TIB:9 hrs. Sleep latency: several hours while watching TV or playing computer games. Sleep Interruptions: 3-4,  [Need to use bathroom]able to fall back asleep. Awakens: Spontaneously  Upon awakening:usually feels refreshed. [She estimates getting 6-7 hrs sleep.] Daytime Function:Jenifer reports excessive daytime sleepiness [takes planned naps for approximately 2 hours]. She rated [herself] at Total score: 4 /24 on the [place] Sleepiness Scale. Habits:   reports that she has been smoking cigarettes. She has a 10.00 pack-year smoking history. She has never used smokeless tobacco.;  reports no history of alcohol use.;[ reports no history of drug use. 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 .    Occupation:     Family History: Negative for sleep disturbance. ROS: Significant for around 10 pounds weight loss in the past few months. She is reporting no nasal, respiratory or cardiac symptoms. She has increased stress being sole caregiver to her brother and dealing with his health. Sky Lute EXAM:  /70   Pulse (!) 106   Ht 5' 4" (1.626 m)   Wt 74.8 kg (165 lb)   SpO2 97%   BMI 28.32 kg/m²    General: Well groomed female, well appearing, in no apparent distress. Neurological: Alert and orientated; cooperative; Cranial nerves intact;    Psychiatric: Speech: Clear and coherent; normal mood, affect & thought   Skin: Warm and dry; Color& Hydration good; no facial rashes or lesions   HEENT:  Craniofacial anatomy: normal Sinuses: Non-tender. TMJ: Normal   Eyes: EOM's intact; conjunctiva/corneas clear   Ears: Externally appear normal     Nasal Airway: is patent Septum: Intact; Mucosa: Normal; Turbinates: Normal; Rhinorrhea: None  Mouth: Lips: Normal posture; Dentition: normal , edentulous and dentures - upper & lower.  Mucosa: Moist; Hard Palate:normal    Oropharryx: crowded and AP narrowing Tongue: Mallampati:Class IV and MobileSoft Palate:  redundant Tonsils: absent  Neck:; [Neck Circumference: 14 ";] Supple; no abnormal masses; Thyroid: Normal. Trachea: Central.    Lymph: No cervical or submandibular Lymhadenopathy  Heart: S1,S2 normal; RRR; no gallop; no murmur   Lungs: Respiratory Effort: Normal. Air entry good bilaterally. No wheezes. No rales  Abdomen:  Soft & non-tender    Extremities: No pedal edema. No clubbing or cyanosis. Musculoskeletal:  Motor normal; Gait: Normal.       IMPRESSION: Primary/Secondary Sleep Diagnoses (to Medical or Psych conditions) & Comorbidities   1. Sleep related hypoxia  Split Study      2. Snoring  Ambulatory Referral to Sleep Medicine    Split Study      3. Insomnia, unspecified type  Ambulatory Referral to Sleep Medicine      4. Stress at home        5. Tobacco abuse        6. Benign essential hypertension        7. Overweight (BMI 25.0-29.9)        8. Type 2 diabetes mellitus with diabetic polyneuropathy, without long-term current use of insulin ([address])             PLAN:   1. With respect to above conditions, comprehensive counseling provided on pathophysiology; effects on symptoms and comorbidities, diagnostic strategies & limitations; treatment options; risks or no treament; risks & benefits of the various therapeutic options; costs and insurance aspects. 2. I advised weight reduction, avoiding sleeping supine, using alcohol or sedating medications close to bed time and on safe driving practices. 3. Sleep testing is indicated and since she is willing to try CPAP, a split study will be scheduled. 4.  Multi component Cognitive behavioral therapy for Insomnia undertaken - Sleep Restriction, Stimulus control, Relaxation techniques and Sleep hygiene were discussed. 5.  I advised smoking cessation and she is contemplating. 6. Follow-up to be scheduled after testing and initiation of therapy to review results, further details of treatment options and to adjust therapy.     Sincerely,      Authenticated electronically on 22/16/65   Board Certified Specialist     Portions of the record may have been created with voice recognition software. Occasional wrong word or "sound a like" substitutions may have occurred due to the inherent limitations of voice recognition software. There may also be notations and random deletions of words or characters from malfunctioning software. Read the chart carefully and recognize, using context, where substitutions/deletions have occurred.

## 2023-10-02 NOTE — PATIENT INSTRUCTIONS
What you can do to improve your sleep: (Sleep Hygiene) Basic rules for a good night's sleep  Create a regular sleep schedule. This will help you form a sleep routine. Keep a record of your sleep patterns, and any sleeping problems you have. Bring the record to follow-up visits with healthcare providers. Avoid prolonged use of light-emitting screens before bedtime or watching TV in bed. Avoid forcing sleep. Do not take naps. Naps could make it hard for you to fall asleep at bedtime. Deal with your worries before bedtime. Keep your bedroom cool, quiet, and dark. Turn on white noise, such as a fan, to help you relax. Do not use your bed for any activity that will keep you awake. Do not read, exercise, eat, or watch TV in your bedroom. Get up if you do not fall asleep within 20 minutes. Move to another room and do something relaxing until you become sleepy. Limit caffeine, alcohol, nicotine and food to earlier in the day. Only drink caffeine in the morning. Do not drink alcohol within 6 hours of bedtime. Do not eat a heavy meal right before you go to bed. Avoid smoking, especially in the evening. Exercise regularly. Daily exercise will help you sleep better. Do not exercise within 4 hours of bedtime. Stimulus control therapy rules  1. Go to bed only when sleepy. 2. Do not watch television, read, eat, or worry while in bed. Use bed only for sleep and sex. 3. Get out of bed if unable to fall asleep within 20 minutes and go to another room. Return to bed only when sleepy. Repeat this step as many times as necessary throughout the night. 4. Set an alarm clock to wake up at a fixed time each morning, including weekends. 5. Do not take a nap during the day. Data from: [address], 1958 [facility] Nonpharmacologic treatments of insomnia. J Clin Psychiatry 7500; 53:37. Go to AASM website for more information: Sleepeducation. org  Recommended Reading: Book by mimi Fernando   No More sleepless nights    What is JOANNA?   Obstructive sleep apnea is a common and serious sleep disorder that causes you to stop breathing during sleep. The airway repeatedly becomes blocked, limiting the amount of air that reaches your lungs. When this happens, you may snore loudly or making choking noises as you try to breathe. Your brain and body becomes oxygen deprived and you may wake up. This may happen a few times a night, or in more severe cases, several hundred times a night. Sleep apnea can make you wake up in the morning feeling tired or unrefreshed even though you have had a full night of sleep. During the day, you may feel fatigued, have difficulty concentrating or you may even unintentionally fall asleep. This is because your body is waking up numerous times throughout the night, even though you might not be conscious of each awakening. The lack of oxygen your body receives can have negative long-term consequences for your health. This includes:  High blood pressure  Heart disease  Irregular heart rhythms  Stroke  Pre-diabetes and diabetes  Depression  Testing  An objective evaluation of your sleep may be needed before your board certified sleep physician can make a diagnosis. Options include:   In-lab overnight sleep study  This type of sleep study requires you to stay overnight at a sleep center, in a bed that may resemble a hotel room. You will sleep with sensors hooked up to various parts of your body. These sensors record your brain waves, heartbeat, breathing and movement. An overnight sleep study also provides your doctor with the most complete information about your sleep. Learn more about an overnight sleep study. Home sleep apnea test  Some patients with high risk factors for obstructive sleep apnea and no other medical disorders may be candidates for a home sleep apnea test. The testing equipment differs in that it is less complicated than what is used in an overnight sleep study.  As such, does not give all the data an in-lab will and if negative, may not mean you do not have the problem. Treatment for sleep apnea includes using a continuous positive airway pressure (CPAP) machine to keep your airway open during sleep. A mask is placed over your nose and mouth, or just your nose. The mask is hooked to the CPAP machine that blows a gentle stream of air into the mask when you breathe. This helps keep your airway open so you can breathe more regularly. Extra oxygen may be given to you through the machine. You may be given a mouth device. It looks like a mouth guard or dental retainer and stops your tongue and mouth tissues from blocking your throat while you sleep. Surgery may be needed to remove extra tissues that block your mouth, throat, or nose. Manage sleep apnea:   Do not smoke. Nicotine and other chemicals in cigarettes and cigars can cause lung damage. Ask your healthcare provider for information if you currently smoke and need help to quit. E-cigarettes or smokeless tobacco still contain nicotine. Talk to your healthcare provider before you use these products. Do not drink alcohol or take sedative medicine before you go to sleep. Alcohol and sedatives can relax the muscles and tissues around your throat. This can block the airflow to your lungs. Maintain a healthy weight. Excess tissue around your throat may restrict your breathing. Ask your healthcare provider for information if you need to lose weight. Sleep on your side or use pillows designed to prevent sleep apnea. This prevents your tongue or other tissues from blocking your throat. You can also raise the head of your bed. Driving Safety. Refrain from driving when drowsy. Follow up with your healthcare provider as directed: Write down your questions so you remember to ask them during your visits. Go to AASM website for more information: Sleepeducation. org  What is JOANNA?    Obstructive sleep apnea is a common and serious sleep disorder that causes you to stop breathing during sleep. The airway repeatedly becomes blocked, limiting the amount of air that reaches your lungs. When this happens, you may snore loudly or making choking noises as you try to breathe. Your brain and body becomes oxygen deprived and you may wake up. This may happen a few times a night, or in more severe cases, several hundred times a night. Sleep apnea can make you wake up in the morning feeling tired or unrefreshed even though you have had a full night of sleep. During the day, you may feel fatigued, have difficulty concentrating or you may even unintentionally fall asleep. This is because your body is waking up numerous times throughout the night, even though you might not be conscious of each awakening. The lack of oxygen your body receives can have negative long-term consequences for your health. This includes:  High blood pressure  Heart disease  Irregular heart rhythms  Stroke  Pre-diabetes and diabetes  Depression  Testing  An objective evaluation of your sleep may be needed before your board certified sleep physician can make a diagnosis. Options include:   In-lab overnight sleep study  This type of sleep study requires you to stay overnight at a sleep center, in a bed that may resemble a hotel room. You will sleep with sensors hooked up to various parts of your body. These sensors record your brain waves, heartbeat, breathing and movement. An overnight sleep study also provides your doctor with the most complete information about your sleep. Learn more about an overnight sleep study. Home sleep apnea test  Some patients with high risk factors for obstructive sleep apnea and no other medical disorders may be candidates for a home sleep apnea test. The testing equipment differs in that it is less complicated than what is used in an overnight sleep study. As such, does not give all the data an in-lab will and if negative, may not mean you do not have the problem.   Treatment for sleep apnea includes using a continuous positive airway pressure (CPAP) machine to keep your airway open during sleep. A mask is placed over your nose and mouth, or just your nose. The mask is hooked to the CPAP machine that blows a gentle stream of air into the mask when you breathe. This helps keep your airway open so you can breathe more regularly. Extra oxygen may be given to you through the machine. You may be given a mouth device. It looks like a mouth guard or dental retainer and stops your tongue and mouth tissues from blocking your throat while you sleep. Surgery may be needed to remove extra tissues that block your mouth, throat, or nose. Manage sleep apnea:   Do not smoke. Nicotine and other chemicals in cigarettes and cigars can cause lung damage. Ask your healthcare provider for information if you currently smoke and need help to quit. E-cigarettes or smokeless tobacco still contain nicotine. Talk to your healthcare provider before you use these products. Do not drink alcohol or take sedative medicine before you go to sleep. Alcohol and sedatives can relax the muscles and tissues around your throat. This can block the airflow to your lungs. Maintain a healthy weight. Excess tissue around your throat may restrict your breathing. Ask your healthcare provider for information if you need to lose weight. Sleep on your side or use pillows designed to prevent sleep apnea. This prevents your tongue or other tissues from blocking your throat. You can also raise the head of your bed. Driving Safety. Refrain from driving when drowsy. Follow up with your healthcare provider as directed: Write down your questions so you remember to ask them during your visits. Go to AASM website for more information: Sleepeducation. org    Nursing Support:  When: Monday through Friday 7A-5PM except holidays  Where: Our direct line is [phone]. If you are having a true emergency please call 911.   In the event that the line is busy or it is after hours please leave a voice message and we will return your call. Please speak clearly, leaving your full name, birth date, best number to reach you and the reason for your call. Medication refills: We will need the name of the medication, the dosage, the ordering provider, whether you get a 30 or 90 day refill, and the pharmacy name and address. Medications will be ordered by the provider only. Nurses cannot call in prescriptions. Please allow 7 days for medication refills. Physician requested updates: If your provider requested that you call with an update after starting medication, please be ready to provide us the medication and dosage, what time you take your medication, the time you attempt to fall asleep, time you fall asleep, when you wake up, and what time you get out of bed. Sleep Study Results: We will contact you with sleep study results and/or next steps after the physician has reviewed your testing.

## 2023-10-03 ENCOUNTER — TELEPHONE (OUTPATIENT)
Dept: RADIOLOGY | Facility: HOSPITAL | Age: 68
End: 2023-10-03

## 2023-10-03 ENCOUNTER — TELEPHONE (OUTPATIENT)
Dept: GASTROENTEROLOGY | Facility: CLINIC | Age: 68
End: 2023-10-03

## 2023-10-03 DIAGNOSIS — R63.4 UNINTENTIONAL WEIGHT LOSS: Primary | ICD-10-CM

## 2023-10-03 NOTE — TELEPHONE ENCOUNTER
I called pt  Number listed is not in service I tried 3x to be sure    I called emergency contact pts daughter left a full message advising pt needs to complete blood work today before [address]    I asked for a call back if this is not able to be completed CT will have to be rescheduled also I asked for an updated working phone number for patient

## 2023-10-03 NOTE — TELEPHONE ENCOUNTER
We received call from Radiology       Pt needs Bun and creatine       Please place orders patient needs to complete today before ct tomorrow

## 2023-10-03 NOTE — TELEPHONE ENCOUNTER
Spoke with Martha at the GI Office and she will have Doctor enter orders for a Bun, Creat. And gfr once orders are placed then she will call patient to let her know to get them done prior to the appointment.

## 2023-10-04 ENCOUNTER — CONSULT (OUTPATIENT)
Dept: UROLOGY | Facility: AMBULATORY SURGERY CENTER | Age: 68
End: 2023-10-04
Payer: COMMERCIAL

## 2023-10-04 ENCOUNTER — HOSPITAL ENCOUNTER (OUTPATIENT)
Dept: RADIOLOGY | Facility: HOSPITAL | Age: 68
Discharge: HOME | End: 2023-10-04

## 2023-10-04 VITALS
RESPIRATION RATE: 16 BRPM | SYSTOLIC BLOOD PRESSURE: 118 MMHG | DIASTOLIC BLOOD PRESSURE: 74 MMHG | HEART RATE: 104 BPM | OXYGEN SATURATION: 96 % | HEIGHT: 64 IN | WEIGHT: 165 LBS | BODY MASS INDEX: 28.17 KG/M2

## 2023-10-04 DIAGNOSIS — R63.4 UNINTENTIONAL WEIGHT LOSS: ICD-10-CM

## 2023-10-04 DIAGNOSIS — R30.0 DYSURIA: Primary | ICD-10-CM

## 2023-10-04 DIAGNOSIS — D64.9 ANEMIA, UNSPECIFIED TYPE: ICD-10-CM

## 2023-10-04 DIAGNOSIS — R31.29 MICROSCOPIC HEMATURIA: ICD-10-CM

## 2023-10-04 LAB
SL AMB  POCT GLUCOSE, UA: 2000
SL AMB LEUKOCYTE ESTERASE,UA: NORMAL
SL AMB POCT BILIRUBIN,UA: NORMAL
SL AMB POCT BLOOD,UA: NORMAL
SL AMB POCT CLARITY,UA: CLEAR
SL AMB POCT COLOR,UA: YELLOW
SL AMB POCT KETONES,UA: NORMAL
SL AMB POCT NITRITE,UA: NORMAL
SL AMB POCT PH,UA: 5
SL AMB POCT SPECIFIC GRAVITY,UA: 1.01
SL AMB POCT URINE PROTEIN: NORMAL
SL AMB POCT UROBILINOGEN: 0.2

## 2023-10-04 PROCEDURE — 99203 OFFICE O/P NEW LOW 30 MIN: CPT | Performed by: NURSE PRACTITIONER

## 2023-10-04 PROCEDURE — 81002 URINALYSIS NONAUTO W/O SCOPE: CPT | Performed by: NURSE PRACTITIONER

## 2023-10-04 NOTE — PROGRESS NOTES
10/4/2023    Assessment and Plan    80 y.o. female managed by NEW PATIENT    1. Microscopic Hematuria   · Urinalysis with microscopy reveals RBC, UA 10-20 per high-power field  · CT abdomen pelvis ordered by PCP, pending results  · Urine for cytology ordered  · Cystoscopy ordered  · Follow-up in the office for cystoscopy with physician        History of Present Illness  Yue Bailey is a 80 y.o. female here for follow up evaluation of microscopic hematuria noted on examination by PCP. Patient reports she is unaware of the reason why she is here in the office today. Upon review of medical record it appears urinalysis with microscopy revealed RBC, UA 10-20 per high-power field. This prompted her PCP to perform CT of the abdomen pelvis. Patient reports no lower urinary tract symptoms. She reports smoking about 8 cigarettes/day. She denies the use/abuse of illicit substances and alcohol. She denies a family history of urothelial carcinomas. She reports a family history of breast cancer in her daughter. 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 Review of Systems   Constitutional: Negative for chills and fever. Respiratory: Negative for cough and shortness of breath. Cardiovascular: Negative for chest pain. Gastrointestinal: Negative for abdominal distention, abdominal pain, blood in stool, nausea and vomiting. Genitourinary: Negative for difficulty urinating, dysuria, enuresis, flank pain, frequency, hematuria and urgency. Skin: Negative for rash.                   Past Medical History  Past Medical History:   Diagnosis Date   • Arthritis    • Bump     bumped head yesterday at work "saw stars" no LOC, cat scan done was normal   • Circulation problem    • Diabetes mellitus ([address])     blood sugar 125 on admission   • Encounter for screening colonoscopy 1 st one   • Headache    • Hyperlipidemia    • Hypertension    • Positive fecal occult blood test 08/30/2018    Added automatically from request for surgery [id number]   • Post concussion syndrome 09/14/2018   • Syncope        Past Social History  Past Surgical History:   Procedure Laterality Date   • BREAST BIOPSY Right 02/18/2022   • COLONOSCOPY N/A 9/26/2018    Procedure: COLONOSCOPY;  Surgeon: Virgilio Portillo MD;  Location: [facility] GI [facility]; Service: Gastroenterology   • HYSTERECTOMY  2017   • INCISIONAL BREAST BIOPSY      last assessed 17Aug2017   • TX COLONOSCOPY FLX DX W/COLLJ SPEC WHEN PFRMD N/A 7/5/2018    Procedure: COLONOSCOPY;  Surgeon: Virgilio Portillo MD;  Location:  GI LAB;   Service: Gastroenterology   • US GUIDED BREAST BIOPSY RIGHT COMPLETE Right 2/18/2022     Social History     Tobacco Use   Smoking Status Every Day   • Packs/day: 0.50   • Years: 20.00   • Total pack years: 10.00   • Types: Cigarettes   Smokeless Tobacco Never   Tobacco Comments    smokes less than 1pk/day       Past Family History  Family History   Problem Relation Age of Onset   • Breast cancer Cousin 48   • Alcohol abuse Mother    • Alcohol abuse Father    • Breast cancer Daughter 45   • No Known Problems Sister    • No Known Problems Maternal Grandmother    • No Known Problems Maternal Grandfather    • No Known Problems Paternal Grandmother    • No Known Problems Paternal Grandfather    • No Known Problems Son    • No Known Problems Son    • No Known Problems Daughter    • No Known Problems Daughter    • No Known Problems Daughter    • Breast cancer Sister 77   • No Known Problems Maternal Aunt    • No Known Problems Maternal Aunt    • No Known Problems Maternal Aunt    • No Known Problems Maternal Aunt    • No Known Problems Maternal Aunt    • No Known Problems Maternal Aunt    • No Known Problems Maternal Aunt    • No Known Problems Maternal Aunt    • No Known Problems Maternal Aunt    • No Known Problems Maternal Aunt    • No Known Problems Maternal Aunt    • No Known Problems Maternal Aunt    • No Known Problems Paternal Aunt    • No Known Problems Paternal Aunt    • Dementia Paternal Aunt    • No Known Problems Paternal Aunt    • No Known Problems Paternal Aunt    • No Known Problems Paternal Aunt    • No Known Problems Paternal Aunt    • No Known Problems Paternal Aunt    • Colon cancer Neg Hx    • Endometrial cancer Neg Hx    • Ovarian cancer Neg Hx        Past Social history  Social History     Socioeconomic History   • Marital status:      Spouse name: Not on file   • Number of children: 6   • Years of education: Not on file   • Highest education level: Not on file   Occupational History   • Occupation: [employer]   Tobacco Use   • Smoking status: Every Day     Packs/day: 0.50     Years: 20.00     Total pack years: 10.00     Types: Cigarettes   • Smokeless tobacco: Never   • Tobacco comments:     smokes less than 1pk/day   Vaping Use   • Vaping Use: Never used   Substance and Sexual Activity   • Alcohol use: Never   • Drug use: Never   • Sexual activity: Not Currently   Other Topics Concern   • Not on file   Social History Narrative    Daily caffeine consumption, 6-8 servings a day     Social Determinants of Health     Financial Resource Strain: Low Risk  (2/23/2023)    Overall Financial Resource Strain (CARDIA)    • Difficulty of Paying Living Expenses: Not hard at all   Food Insecurity: No Food Insecurity (2/23/2023)    Hunger Vital Sign    • Worried About Running Out of Food in the Last Year: Never true    • Ran Out of Food in the Last Year: Never true   Transportation Needs: No Transportation Needs (2/23/2023)    PRAPARE - Transportation    • Lack of Transportation (Medical): No    • Lack of Transportation (Non-Medical):  No   Physical Activity: Inactive (2/10/2021)    Exercise Vital Sign    • Days of Exercise per Week: 0 days    • Minutes of Exercise per Session: 0 min   Stress: No Stress Concern Present (2/10/2021)    [facility]    • Feeling of Stress : Not at all   Social Connections: Socially Isolated (2/10/2021)    Social Connection and Isolation Panel [NHANES]    • Frequency of Communication with Friends and Family: Once a week    • Frequency of Social Gatherings with Friends and Family: Once a week    • Attends Buddhism Services: Never    • Active Member of Clubs or Organizations: No    • Attends Club or Organization Meetings: Never    • Marital Status:    Intimate Partner Violence: Not At Risk (2/10/2021)    Humiliation, Afraid, Rape, and Kick questionnaire    • Fear of Current or Ex-Partner: No    • Emotionally Abused: No    • Physically Abused: No    • Sexually Abused: No   Housing Stability: Not on file       Current Medications  Current Outpatient Medications   Medication Sig Dispense Refill   • acetaminophen (TYLENOL) 325 mg tablet Take 650 mg by mouth every 6 (six) hours as needed for mild pain     • Diclofenac Sodium (VOLTAREN) 1 % APPLY 2 GRAMS TOPICALLY THREE TIMES A  g 2   • Elastic Bandages & Supports (GNP Diabetic Socks Unisex XL) MISC Use 1 each if needed (1 pair of socks for diabetic neuropathy) 2 each 0   • ferrous sulfate 324 (65 Fe) mg Take 1 tablet (324 mg total) by mouth every other day 90 tablet 1   • gabapentin (NEURONTIN) 300 mg capsule Take 2 capsules (600 mg total) by mouth 3 (three) times a day 540 capsule 0   • glucose blood (Accu-Chek Guide) test strip Use 1 each in the morning Use as instructed 200 each 2   • Lancets Misc.  (Accu-Chek Softclix Lancet Dev) KIT Use in the morning 1 kit 0   • lisinopril-hydrochlorothiazide (PRINZIDE,ZESTORETIC) 20-12.5 MG per tablet Take 1 tablet by mouth daily 90 tablet 2   • metFORMIN (GLUCOPHAGE) 1000 MG tablet Take 1 tablet (1,000 mg total) by mouth 2 (two) times a day with meals 180 tablet 3   • Accu-Chek Softclix Lancets lancets Use 1 (one) time for 1 dose Use as instructed 200 each 2   • atorvastatin (LIPITOR) 20 mg tablet Take 1 tablet (20 mg total) by mouth daily 90 tablet 2   • Empagliflozin (JARDIANCE) 10 MG TABS tablet Take 1 tablet (10 mg total) by mouth every morning 90 tablet 0   • zolpidem (AMBIEN) 5 mg tablet Take 1 tablet (5 mg total) by mouth as needed for sleep (for use during sleep study) for up to 1 day 1 tablet 0     No current facility-administered medications for this visit. Allergies  No Known Allergies      The following portions of the patient's history were reviewed and updated as appropriate: allergies, current medications, past medical history, past social history, past surgical history and problem list.      Vitals  Vitals:    10/04/23 1302   BP: 118/74   BP Location: Left arm   Patient Position: Sitting   Cuff Size: Adult   Pulse: 104   Resp: 16   SpO2: 96%   Weight: 74.8 kg (165 lb)   Height: 5' 4" (1.626 m)           Physical Exam  Physical Exam  Vitals reviewed. Constitutional:       General: She is not in acute distress. Appearance: Normal appearance. Cardiovascular:      Rate and Rhythm: Normal rate and regular rhythm. Heart sounds: Normal heart sounds. Pulmonary:      Effort: Pulmonary effort is normal. No respiratory distress. Breath sounds: Normal breath sounds. Abdominal:      Tenderness: There is no right CVA tenderness or left CVA tenderness. Musculoskeletal:         General: Normal range of motion. Skin:     General: Skin is warm and dry. Neurological:      General: No focal deficit present. Mental Status: She is alert. Psychiatric:         Mood and Affect: Mood normal.         Behavior: Behavior normal.           Results  No results found for this or any previous visit (from the past 1 hour(s)). ]  No results found for: "PSA"  Lab Results   Component Value Date    CALCIUM 9.8 06/30/2023    K 4.2 06/30/2023    CO2 26 06/30/2023     (H) 06/30/2023    BUN 16 06/30/2023    CREATININE 0.90 06/30/2023     Lab Results   Component Value Date    WBC 6.29 09/05/2023    HGB 8.9 (L) 09/05/2023    HCT 31.0 (L) 09/05/2023    MCV 76 (L) 09/05/2023     09/05/2023           Orders  Orders Placed This Encounter   Procedures   • Cystoscopy     Microscopic hematuria, +smoker     Standing Status:   Future     Standing Expiration Date:   10/4/2024   • POCT urine dip       [facility]

## 2023-10-05 ENCOUNTER — APPOINTMENT (OUTPATIENT)
Dept: LAB | Facility: CLINIC | Age: 68
End: 2023-10-05
Payer: COMMERCIAL

## 2023-10-05 ENCOUNTER — ANESTHESIA EVENT (OUTPATIENT)
Dept: ANESTHESIOLOGY | Facility: HOSPITAL | Age: 68
End: 2023-10-05

## 2023-10-05 ENCOUNTER — ANESTHESIA (OUTPATIENT)
Dept: ANESTHESIOLOGY | Facility: HOSPITAL | Age: 68
End: 2023-10-05

## 2023-10-05 DIAGNOSIS — R63.4 UNINTENTIONAL WEIGHT LOSS: ICD-10-CM

## 2023-10-05 LAB
ANION GAP SERPL CALCULATED.3IONS-SCNC: 9 MMOL/L
BUN SERPL-MCNC: 16 MG/DL (ref 5–25)
CALCIUM SERPL-MCNC: 9.7 MG/DL (ref 8.4–10.2)
CHLORIDE SERPL-SCNC: 100 MMOL/L (ref 96–108)
CO2 SERPL-SCNC: 29 MMOL/L (ref 21–32)
CREAT SERPL-MCNC: 0.87 MG/DL (ref 0.6–1.3)
GFR SERPL CREATININE-BSD FRML MDRD: 69 ML/MIN/1.73SQ M
GLUCOSE SERPL-MCNC: 273 MG/DL (ref 65–140)
POTASSIUM SERPL-SCNC: 3.8 MMOL/L (ref 3.5–5.3)
SODIUM SERPL-SCNC: 138 MMOL/L (ref 135–147)

## 2023-10-05 PROCEDURE — 36415 COLL VENOUS BLD VENIPUNCTURE: CPT

## 2023-10-05 PROCEDURE — 80048 BASIC METABOLIC PNL TOTAL CA: CPT

## 2023-10-05 PROCEDURE — 88112 CYTOPATH CELL ENHANCE TECH: CPT | Performed by: PATHOLOGY

## 2023-10-06 ENCOUNTER — ANESTHESIA (OUTPATIENT)
Dept: GASTROENTEROLOGY | Facility: HOSPITAL | Age: 68
End: 2023-10-06

## 2023-10-06 ENCOUNTER — ANESTHESIA EVENT (OUTPATIENT)
Dept: GASTROENTEROLOGY | Facility: HOSPITAL | Age: 68
End: 2023-10-06

## 2023-10-06 ENCOUNTER — HOSPITAL ENCOUNTER (OUTPATIENT)
Dept: GASTROENTEROLOGY | Facility: HOSPITAL | Age: 68
Setting detail: OUTPATIENT SURGERY
End: 2023-10-06
Attending: INTERNAL MEDICINE
Payer: COMMERCIAL

## 2023-10-06 VITALS
SYSTOLIC BLOOD PRESSURE: 122 MMHG | BODY MASS INDEX: 28.85 KG/M2 | DIASTOLIC BLOOD PRESSURE: 70 MMHG | RESPIRATION RATE: 20 BRPM | WEIGHT: 169 LBS | OXYGEN SATURATION: 99 % | HEART RATE: 95 BPM | HEIGHT: 64 IN | TEMPERATURE: 96.8 F

## 2023-10-06 DIAGNOSIS — D64.9 ANEMIA, UNSPECIFIED TYPE: ICD-10-CM

## 2023-10-06 DIAGNOSIS — R63.4 UNINTENTIONAL WEIGHT LOSS: ICD-10-CM

## 2023-10-06 LAB — GLUCOSE SERPL-MCNC: 130 MG/DL (ref 65–140)

## 2023-10-06 PROCEDURE — 82948 REAGENT STRIP/BLOOD GLUCOSE: CPT

## 2023-10-06 PROCEDURE — 88341 IMHCHEM/IMCYTCHM EA ADD ANTB: CPT | Performed by: PATHOLOGY

## 2023-10-06 PROCEDURE — 88342 IMHCHEM/IMCYTCHM 1ST ANTB: CPT | Performed by: PATHOLOGY

## 2023-10-06 PROCEDURE — 88305 TISSUE EXAM BY PATHOLOGIST: CPT | Performed by: PATHOLOGY

## 2023-10-06 RX ORDER — PROPOFOL 10 MG/ML
INJECTION, EMULSION INTRAVENOUS CONTINUOUS PRN
Status: DISCONTINUED | OUTPATIENT
Start: 2023-10-06 | End: 2023-10-06

## 2023-10-06 RX ORDER — LIDOCAINE HYDROCHLORIDE 10 MG/ML
INJECTION, SOLUTION EPIDURAL; INFILTRATION; INTRACAUDAL; PERINEURAL AS NEEDED
Status: DISCONTINUED | OUTPATIENT
Start: 2023-10-06 | End: 2023-10-06

## 2023-10-06 RX ORDER — PROPOFOL 10 MG/ML
INJECTION, EMULSION INTRAVENOUS AS NEEDED
Status: DISCONTINUED | OUTPATIENT
Start: 2023-10-06 | End: 2023-10-06

## 2023-10-06 RX ORDER — GLYCOPYRROLATE 0.2 MG/ML
INJECTION INTRAMUSCULAR; INTRAVENOUS AS NEEDED
Status: DISCONTINUED | OUTPATIENT
Start: 2023-10-06 | End: 2023-10-06

## 2023-10-06 RX ORDER — SODIUM CHLORIDE 9 MG/ML
INJECTION, SOLUTION INTRAVENOUS CONTINUOUS PRN
Status: DISCONTINUED | OUTPATIENT
Start: 2023-10-06 | End: 2023-10-06

## 2023-10-06 RX ADMIN — LIDOCAINE HYDROCHLORIDE 70 MG: 10 INJECTION, SOLUTION EPIDURAL; INFILTRATION; INTRACAUDAL at 08:25

## 2023-10-06 RX ADMIN — GLYCOPYRROLATE 0.1 MG: 0.2 INJECTION, SOLUTION INTRAMUSCULAR; INTRAVENOUS at 08:25

## 2023-10-06 RX ADMIN — PROPOFOL 100 MG: 10 INJECTION, EMULSION INTRAVENOUS at 08:25

## 2023-10-06 RX ADMIN — PROPOFOL 100 MCG/KG/MIN: 10 INJECTION, EMULSION INTRAVENOUS at 08:26

## 2023-10-06 RX ADMIN — SODIUM CHLORIDE: 9 INJECTION, SOLUTION INTRAVENOUS at 08:21

## 2023-10-06 RX ADMIN — PROPOFOL 30 MG: 10 INJECTION, EMULSION INTRAVENOUS at 08:29

## 2023-10-06 NOTE — ANESTHESIA PREPROCEDURE EVALUATION
Procedure:  PRE-OP ONLY  Weight loss and anemia    Relevant Problems   CARDIO   (+) Benign essential hypertension   (+) Breast pain, right   (+) Chest pain on exertion   (+) Hyperlipidemia      ENDO   (+) DM type 2 without retinopathy (HCC)   (+) Type 2 diabetes mellitus with diabetic polyneuropathy, without long-term current use of insulin (HCC) (Hb a1c 6.8)      GYN   (+) History of hysterectomy      MUSCULOSKELETAL   (+) Chronic bilateral low back pain with left-sided sciatica      NEURO/PSYCH   (+) Chronic bilateral low back pain with left-sided sciatica   (+) Depression   (+) Diabetic peripheral neuropathy (HCC)      PULMONARY   (+) JOANNA (obstructive sleep apnea)   (+) Smoking     Recent labs personally reviewed:  Lab Results   Component Value Date    WBC 6.29 09/05/2023    HGB 8.9 (L) 09/05/2023     09/05/2023     Lab Results   Component Value Date    K 3.8 10/05/2023    BUN 16 10/05/2023    CREATININE 0.87 10/05/2023     No results found for: "PTT"   No results found for: "INR"    Lab Results   Component Value Date    HGBA1C 7.7 (H) 06/30/2023

## 2023-10-06 NOTE — ANESTHESIA POSTPROCEDURE EVALUATION
Post-Op Assessment Note    CV Status:  Stable  Pain Score: 0    Pain management: adequate     Mental Status:  Sleepy   Hydration Status:  Euvolemic   PONV Controlled:  Controlled   Airway Patency:  Patent      Post Op Vitals Reviewed: Yes      Staff: CRNA         There were no known notable events for this encounter.     BP   119/67   Temp    96.8   Pulse   90   Resp   14   SpO2   100% RA

## 2023-10-06 NOTE — H&P
History and Physical - [facility]  Oliva Fernández 80 y.o. female MRN: [id number]                  HPI: Oliva Fernández is a 80y.o. year old female who presents for EGD due to anemia and weight loss. Had a recent colonoscopy. REVIEW OF SYSTEMS: Per the HPI, and otherwise unremarkable. Historical Information   Past Medical History:   Diagnosis Date   • Arthritis    • Bump     bumped head yesterday at work "saw stars" no LOC, cat scan done was normal   • Circulation problem    • Diabetes mellitus ([address])     blood sugar 125 on admission   • Encounter for screening colonoscopy     1 st one   • Headache    • Hyperlipidemia    • Hypertension    • Positive fecal occult blood test 08/30/2018    Added automatically from request for surgery [id number]   • Post concussion syndrome 09/14/2018   • Syncope      Past Surgical History:   Procedure Laterality Date   • BREAST BIOPSY Right 02/18/2022   • COLONOSCOPY N/A 9/26/2018    Procedure: COLONOSCOPY;  Surgeon: Ezekiel Briceño MD;  Location: [facility] GI LAB; Service: Gastroenterology   • HYSTERECTOMY  2017   • INCISIONAL BREAST BIOPSY      last assessed 17Aug2017   • ME COLONOSCOPY FLX DX W/COLLJ SPEC WHEN PFRMD N/A 7/5/2018    Procedure: COLONOSCOPY;  Surgeon: Ezekiel Briceño MD;  Location:  GI LAB;   Service: Gastroenterology   • US GUIDED BREAST BIOPSY RIGHT COMPLETE Right 2/18/2022     Social History   Social History     Substance and Sexual Activity   Alcohol Use Never     Social History     Substance and Sexual Activity   Drug Use Never     Social History     Tobacco Use   Smoking Status Every Day   • Packs/day: 0.50   • Years: 20.00   • Total pack years: 10.00   • Types: Cigarettes   Smokeless Tobacco Never   Tobacco Comments    smokes less than 1pk/day     Family History   Problem Relation Age of Onset   • Breast cancer Cousin 48   • Alcohol abuse Mother    • Alcohol abuse Father    • Breast cancer Daughter 45   • No Known Problems Sister    • No Known Problems Maternal Grandmother    • No Known Problems Maternal Grandfather    • No Known Problems Paternal Grandmother    • No Known Problems Paternal Grandfather    • No Known Problems Son    • No Known Problems Son    • No Known Problems Daughter    • No Known Problems Daughter    • No Known Problems Daughter    • Breast cancer Sister 77   • No Known Problems Maternal Aunt    • No Known Problems Maternal Aunt    • No Known Problems Maternal Aunt    • No Known Problems Maternal Aunt    • No Known Problems Maternal Aunt    • No Known Problems Maternal Aunt    • No Known Problems Maternal Aunt    • No Known Problems Maternal Aunt    • No Known Problems Maternal Aunt    • No Known Problems Maternal Aunt    • No Known Problems Maternal Aunt    • No Known Problems Maternal Aunt    • No Known Problems Paternal Aunt    • No Known Problems Paternal Aunt    • Dementia Paternal Aunt    • No Known Problems Paternal Aunt    • No Known Problems Paternal Aunt    • No Known Problems Paternal Aunt    • No Known Problems Paternal Aunt    • No Known Problems Paternal Aunt    • Colon cancer Neg Hx    • Endometrial cancer Neg Hx    • Ovarian cancer Neg Hx        Meds/Allergies       Current Outpatient Medications:   •  acetaminophen (TYLENOL) 325 mg tablet  •  Diclofenac Sodium (VOLTAREN) 1 %  •  Empagliflozin (JARDIANCE) 10 MG TABS tablet  •  ferrous sulfate 324 (65 Fe) mg  •  gabapentin (NEURONTIN) 300 mg capsule  •  glucose blood (Accu-Chek Guide) test strip  •  Lancets Misc.  (Accu-Chek Softclix Lancet Dev) KIT  •  lisinopril-hydrochlorothiazide (PRINZIDE,ZESTORETIC) 20-12.5 MG per tablet  •  metFORMIN (GLUCOPHAGE) 1000 MG tablet  •  Accu-Chek Softclix Lancets lancets  •  atorvastatin (LIPITOR) 20 mg tablet  •  Elastic Bandages & Supports (GNP Diabetic Socks Unisex XL) MISC  •  zolpidem (AMBIEN) 5 mg tablet    No Known Allergies    Objective     /76   Pulse 84   Temp 97.6 °F (36.4 °C) (Tympanic)   Resp 18   Ht 5' 4" (1.626 m)   Wt 76.7 kg (169 lb)   SpO2 99%   BMI 29.01 kg/m²       PHYSICAL EXAM    Gen: NAD  Head: NCAT  CV: RRR  CHEST: Clear  ABD: soft, NT/ND  EXT: no edema      ASSESSMENT/PLAN:  This is a 80y.o. year old female here for EGD, and she is stable and optimized for her procedure.

## 2023-10-06 NOTE — ANESTHESIA PREPROCEDURE EVALUATION
Procedure:  EGD  Weight loss and anemia    Relevant Problems   CARDIO   (+) Benign essential hypertension   (+) Breast pain, right   (+) Chest pain on exertion   (+) Hyperlipidemia      ENDO   (+) DM type 2 without retinopathy (HCC)   (+) Type 2 diabetes mellitus with diabetic polyneuropathy, without long-term current use of insulin (HCC) (Hb a1c 6.8)      GYN   (+) History of hysterectomy      MUSCULOSKELETAL   (+) Chronic bilateral low back pain with left-sided sciatica      NEURO/PSYCH   (+) Chronic bilateral low back pain with left-sided sciatica   (+) Depression   (+) Diabetic peripheral neuropathy (HCC)      PULMONARY   (+) JOANNA (obstructive sleep apnea)   (+) Smoking      Recent labs personally reviewed:  Lab Results   Component Value Date    WBC 6.29 09/05/2023    HGB 8.9 (L) 09/05/2023     09/05/2023     Lab Results   Component Value Date    K 3.8 10/05/2023    BUN 16 10/05/2023    CREATININE 0.87 10/05/2023     No results found for: "PTT"   No results found for: "INR"    Lab Results   Component Value Date    HGBA1C 7.7 (H) 06/30/2023         Physical Exam    Airway    Mallampati score: III  TM Distance: >3 FB  Neck ROM: full     Dental   No notable dental hx     Cardiovascular      Pulmonary  Pulmonary exam normal     Other Findings        Anesthesia Plan  ASA Score- 3     Anesthesia Type- IV sedation with anesthesia with ASA Monitors. Additional Monitors:   Airway Plan:     Comment: Supplemental O2, etco2 monitoring  . Plan Factors-    Chart reviewed. Induction- intravenous. Postoperative Plan-     Informed Consent- Anesthetic plan and risks discussed with patient. I personally reviewed this patient with the CRNA. Discussed and agreed on the Anesthesia Plan with the CRNA. Julián Maria

## 2023-10-09 ENCOUNTER — HOSPITAL ENCOUNTER (OUTPATIENT)
Dept: RADIOLOGY | Facility: HOSPITAL | Age: 68
Discharge: HOME | End: 2023-10-09
Payer: COMMERCIAL

## 2023-10-09 PROCEDURE — G1004 CDSM NDSC: HCPCS

## 2023-10-09 PROCEDURE — 74177 CT ABD & PELVIS W/CONTRAST: CPT

## 2023-10-09 PROCEDURE — 71260 CT THORAX DX C+: CPT

## 2023-10-09 RX ADMIN — IOHEXOL 100 ML: 350 INJECTION, SOLUTION INTRAVENOUS at 08:02

## 2023-10-11 PROCEDURE — 88112 CYTOPATH CELL ENHANCE TECH: CPT | Performed by: PATHOLOGY

## 2023-10-12 PROCEDURE — 88342 IMHCHEM/IMCYTCHM 1ST ANTB: CPT | Performed by: PATHOLOGY

## 2023-10-12 PROCEDURE — 88341 IMHCHEM/IMCYTCHM EA ADD ANTB: CPT | Performed by: PATHOLOGY

## 2023-10-12 PROCEDURE — 88305 TISSUE EXAM BY PATHOLOGIST: CPT | Performed by: PATHOLOGY

## 2023-10-16 NOTE — PROGRESS NOTES
[facility] RADIATION ONCOLOGY  [address] [address]  Dept Phone: [phone]    Simulation Note    Patient Name:  Keren Taylor  YOB: 1939  MRN:  [id number]  Account Number:  [id number]  [facility] - Outpatient Consultation  Karina Lehman 80 y.o. female MRN: [id number]  Encounter: [id number]        ASSESSMENT AND PLAN   Patient is a 42-year-old female with a past medical history of type 2 diabetes, hypertension, JOANNA who presents for evaluation of anemia and unintentional weight loss    1. Anemia, unspecified type  · Patient with iron deficiency anemia  · Normal hemoglobin of 13.4 in January, now 8.9, MCV 76 and iron panel showing severe iron deficiency  · No source of bleeding seen  · Underwent colonoscopy previously which was good prep (in 2022) and all observed locations were normal  · Will plan for EGD for further evaluation and plan for biopsies to rule out H pylori and celiac disease  · May need capsule endoscopy if EGD is also normal  · Consider hematology referral  - Ambulatory Referral to Gastroenterology  - CT chest abdomen pelvis w contrast; Future  - EGD; Future    2. Unintentional weight loss  · Documented weight loss since march despite good appetite   · Weight loss is unintentional  · Lost about 12 lbs since march  Will obtain CT chest abdomen pelvis for further evaluation  · Colonoscopy was done in 2022- good prep, will not repeat  · Plan for EGD as above  - CT chest abdomen pelvis w contrast; Future  - EGD; Future    Orders Placed This Encounter   Procedures   • CT chest abdomen pelvis w contrast   • EGD         HISTORY OF PRESENT ILLNESS     Patient is a 42-year-old female with a past medical history of type 2 diabetes, hypertension, JOANNA who presents for evaluation of anemia and unintentional weight loss. Patient denies any signs of bleeding including hematemesis, melena, hematochezia. She denies any abdominal pain, nausea, vomiting. She denies any heartburn or dysphagia. She reports unintentional weight loss. Has been ongoing for the past few months. She reports having a good appetite but still losing weight.   She did start Jardiance for   diabetes. She is a chronic smoker and has been smoking more lately as she has been stressed taking care of her paralyzed brother. She denies any significant alcohol consumption. REVIEW OF SYSTEMS     CONSTITUTIONAL: Denies any fever, chills, rigors, and weight loss. HEENT: No earache or tinnitus. Denies hearing loss or visual disturbances. CARDIOVASCULAR: No chest pain or palpitations. RESPIRATORY: Denies any cough, hemoptysis, shortness of breath or dyspnea on exertion. GASTROINTESTINAL: As noted in the History of Present Illness. GENITOURINARY: No problems with urination. Denies any hematuria or dysuria. NEUROLOGIC: No dizziness or vertigo, denies headaches. MUSCULOSKELETAL: Denies any muscle or joint pain. SKIN: Denies skin rashes or itching. ENDOCRINE: Denies excessive thirst. Denies intolerance to heat or cold. PSYCHOSOCIAL: Denies depression or anxiety. Denies any recent memory loss. Historical information     Past Medical History:   Diagnosis Date   • Arthritis    • Bump     bumped head yesterday at work "saw stars" no LOC, cat scan done was normal   • Circulation problem    • Diabetes mellitus ([address])     blood sugar 125 on admission   • Encounter for screening colonoscopy     1 st one   • Headache    • Hyperlipidemia    • Hypertension    • Positive fecal occult blood test 08/30/2018    Added automatically from request for surgery [id number]   • Post concussion syndrome 09/14/2018   • Syncope      Past Surgical History:   Procedure Laterality Date   • BREAST BIOPSY Right 02/18/2022   • COLONOSCOPY N/A 9/26/2018    Procedure: COLONOSCOPY;  Surgeon: Riya Weinberg MD;  Location: [facility] GI LAB; Service: Gastroenterology   • HYSTERECTOMY  2017   • INCISIONAL BREAST BIOPSY      last assessed 17Aug2017   • SD COLONOSCOPY FLX DX W/COLLJ SPEC WHEN PFRMD N/A 7/5/2018    Procedure: COLONOSCOPY;  Surgeon: Riya Weinberg MD;  Location:  GI LAB;   Service: Gastroenterology   • US GUIDED BREAST BIOPSY RIGHT COMPLETE Right 2/18/2022     Social History   Social History     Substance and Sexual Activity   Alcohol Use Never     Social History     Substance and Sexual Activity   Drug Use Never     Social History     Tobacco Use   Smoking Status Every Day   • Packs/day: 0.50   • Years: 20.00   • Total pack years: 10.00   • Types: Cigarettes   Smokeless Tobacco Never   Tobacco Comments    smokes less than 1pk/day     Family History   Problem Relation Age of Onset   • Breast cancer Cousin 48   • Alcohol abuse Mother    • Alcohol abuse Father    • Breast cancer Daughter 45   • No Known Problems Sister    • No Known Problems Maternal Grandmother    • No Known Problems Maternal Grandfather    • No Known Problems Paternal Grandmother    • No Known Problems Paternal Grandfather    • No Known Problems Son    • No Known Problems Son    • No Known Problems Daughter    • No Known Problems Daughter    • No Known Problems Daughter    • Breast cancer Sister 77   • No Known Problems Maternal Aunt    • No Known Problems Maternal Aunt    • No Known Problems Maternal Aunt    • No Known Problems Maternal Aunt    • No Known Problems Maternal Aunt    • No Known Problems Maternal Aunt    • No Known Problems Maternal Aunt    • No Known Problems Maternal Aunt    • No Known Problems Maternal Aunt    • No Known Problems Maternal Aunt    • No Known Problems Maternal Aunt    • No Known Problems Maternal Aunt    • No Known Problems Paternal Aunt    • No Known Problems Paternal Aunt    • Dementia Paternal Aunt    • No Known Problems Paternal Aunt    • No Known Problems Paternal Aunt    • No Known Problems Paternal Aunt    • No Known Problems Paternal Aunt    • No Known Problems Paternal Aunt    • Colon cancer Neg Hx    • Endometrial cancer Neg Hx    • Ovarian cancer Neg Hx        Allergies       Current Outpatient Medications:   •  Accu-Chek Softclix Lancets lancets  •  acetaminophen (TYLENOL) 325 mg tablet  •  atorvastatin (LIPITOR) 20 mg tablet  •  Diclofenac Sodium (VOLTAREN) 1 %  •  Elastic Bandages & Supports (GNP Diabetic Socks Unisex XL) MISC  •  Empagliflozin (JARDIANCE) 10 MG TABS tablet  •  ferrous sulfate 324 (65 Fe) mg  •  gabapentin (NEURONTIN) 300 mg capsule  •  glucose blood (Accu-Chek Guide) test strip  •  Lancets Misc. (Accu-Chek Softclix Lancet Dev) KIT  •  lisinopril-hydrochlorothiazide (PRINZIDE,ZESTORETIC) 20-12.5 MG per tablet  •  metFORMIN (GLUCOPHAGE) 1000 MG tablet    No Known Allergies        Objective assessment       Blood pressure 108/60, temperature 98 °F (36.7 °C), temperature source Tympanic, height 5' 4" (1.626 m), weight 75.3 kg (166 lb). Body mass index is 28.49 kg/m². PHYSICAL EXAM:         Physical Exam:   GENERAL: NAD  HEENT:  NC/AT, MMM, no scleral icterus  CARDIAC:  Regular rate and rhythm  PULMONARY:  No respiratory distress, no wheezing/rales/rhonci, non-labored breathing  ABDOMEN:  Soft, NT/ND, +BS, no rebound/guarding/rigidity  Extremities:  No edema, cyanosis, or clubbing  NEUROLOGIC:  Alert/oriented x3. SKIN:  No rashes or erythema      Lab Results:      No visits with results within 1 Day(s) from this visit.    Latest known visit with results is:   Appointment on 09/05/2023   Component Date Value   • WBC 09/05/2023 6.29    • RBC 09/05/2023 4.06    • Hemoglobin 09/05/2023 8.9 (L)    • Hematocrit 09/05/2023 31.0 (L)    • MCV 09/05/2023 76 (L)    • MCH 09/05/2023 21.9 (L)    • MCHC 09/05/2023 28.7 (L)    • RDW 09/05/2023 22.5 (H)    • MPV 09/05/2023 9.5    • Platelets 30/90/0661 355    • nRBC 09/05/2023 0    • Neutrophils Relative 09/05/2023 59    • Immat GRANS % 09/05/2023 0    • Lymphocytes Relative 09/05/2023 27    • Monocytes Relative 09/05/2023 11    • Eosinophils Relative 09/05/2023 2    • Basophils Relative 09/05/2023 1    • Neutrophils Absolute 09/05/2023 3.73    • Immature Grans Absolute 09/05/2023 0.01    • Lymphocytes Absolute 09/05/2023 1.69    • Monocytes Absolute 09/05/2023 0.69    • Eosinophils Absolute 09/05/2023 0.12    • Basophils Absolute 09/05/2023 0.05          Radiology Results:      No results found.       Jeanette Alicia MD  Gastroenterology Fellow

## 2023-10-17 DIAGNOSIS — A04.8 H. PYLORI INFECTION: Primary | ICD-10-CM

## 2023-10-17 NOTE — TELEPHONE ENCOUNTER
Patient returning call for results, I reviewed biopsy results and quad therapy antibx regimen, she understood no further questions. She is aware to have stool study done in 1 month after completing regimen to confirm eradication.

## 2023-10-18 DIAGNOSIS — E11.42 TYPE 2 DIABETES MELLITUS WITH DIABETIC POLYNEUROPATHY, WITHOUT LONG-TERM CURRENT USE OF INSULIN (HCC): ICD-10-CM

## 2023-10-18 RX ORDER — EMPAGLIFLOZIN 10 MG/1
10 TABLET, FILM COATED ORAL EVERY MORNING
Qty: 90 TABLET | Refills: 0 | Status: SHIPPED | OUTPATIENT
Start: 2023-10-18

## 2023-10-18 RX ORDER — BISMUTH SUBSALICYLATE 262 MG/1
262 TABLET, CHEWABLE ORAL
Qty: 56 TABLET | Refills: 0 | Status: SHIPPED | OUTPATIENT
Start: 2023-10-18 | End: 2023-11-01

## 2023-10-18 RX ORDER — PANTOPRAZOLE SODIUM 40 MG/1
40 TABLET, DELAYED RELEASE ORAL
Qty: 28 TABLET | Refills: 0 | Status: SHIPPED | OUTPATIENT
Start: 2023-10-18 | End: 2023-11-01

## 2023-10-18 RX ORDER — TETRACYCLINE HYDROCHLORIDE 500 MG/1
500 CAPSULE ORAL 4 TIMES DAILY
Qty: 56 CAPSULE | Refills: 0 | Status: SHIPPED | OUTPATIENT
Start: 2023-10-18 | End: 2023-11-01

## 2023-10-18 RX ORDER — METRONIDAZOLE 250 MG/1
250 TABLET ORAL EVERY 6 HOURS
Qty: 56 TABLET | Refills: 0 | Status: SHIPPED | OUTPATIENT
Start: 2023-10-18 | End: 2023-11-01

## 2023-10-19 ENCOUNTER — TELEPHONE (OUTPATIENT)
Age: 68
End: 2023-10-19

## 2023-10-19 NOTE — TELEPHONE ENCOUNTER
Pt asking about a single pill medication that was supposed to be send to the pharmacy. Pt is unsure what it is. Pt was last seen by Marilou Porter on 10/4/23. I do not see anything in the note and pt does not know what the medication was.     Pt can be reached at [phone]

## 2023-10-19 NOTE — TELEPHONE ENCOUNTER
Patient is not sure what the name of the medication is that is at the pharmacy. She will call our office when she gets home to verify the name of the medication she is unsure about.

## 2023-11-21 ENCOUNTER — TELEPHONE (OUTPATIENT)
Age: 68
End: 2023-11-21

## 2023-11-21 ENCOUNTER — OFFICE VISIT (OUTPATIENT)
Dept: INTERNAL MEDICINE CLINIC | Facility: CLINIC | Age: 68
End: 2023-11-21

## 2023-11-21 VITALS
SYSTOLIC BLOOD PRESSURE: 160 MMHG | HEIGHT: 64 IN | BODY MASS INDEX: 28.17 KG/M2 | WEIGHT: 165 LBS | DIASTOLIC BLOOD PRESSURE: 86 MMHG | HEART RATE: 97 BPM | TEMPERATURE: 98.1 F

## 2023-11-21 DIAGNOSIS — I10 BENIGN ESSENTIAL HYPERTENSION: ICD-10-CM

## 2023-11-21 DIAGNOSIS — G89.29 CHRONIC BILATERAL LOW BACK PAIN WITH LEFT-SIDED SCIATICA: ICD-10-CM

## 2023-11-21 DIAGNOSIS — R31.29 MICROSCOPIC HEMATURIA: ICD-10-CM

## 2023-11-21 DIAGNOSIS — E66.3 OVERWEIGHT (BMI 25.0-29.9): ICD-10-CM

## 2023-11-21 DIAGNOSIS — E78.2 MIXED HYPERLIPIDEMIA: ICD-10-CM

## 2023-11-21 DIAGNOSIS — D64.9 ANEMIA, UNSPECIFIED TYPE: ICD-10-CM

## 2023-11-21 DIAGNOSIS — M54.42 CHRONIC BILATERAL LOW BACK PAIN WITH LEFT-SIDED SCIATICA: ICD-10-CM

## 2023-11-21 DIAGNOSIS — E11.42 TYPE 2 DIABETES MELLITUS WITH DIABETIC POLYNEUROPATHY, WITHOUT LONG-TERM CURRENT USE OF INSULIN (HCC): Primary | ICD-10-CM

## 2023-11-21 PROCEDURE — 99213 OFFICE O/P EST LOW 20 MIN: CPT | Performed by: INTERNAL MEDICINE

## 2023-11-21 RX ORDER — METHOCARBAMOL 500 MG/1
500 TABLET, FILM COATED ORAL 2 TIMES DAILY PRN
Qty: 20 TABLET | Refills: 0 | Status: SHIPPED | OUTPATIENT
Start: 2023-11-21 | End: 2023-12-01

## 2023-11-21 NOTE — TELEPHONE ENCOUNTER
Caller: Patient     Doctor/Office: [facility]     Call regarding :  Appt      Call was transferred to: [facility]

## 2023-11-21 NOTE — PROGRESS NOTES
[address] [facility] Visit Note  lFory Ross 80 y.o. female   MRN: [id number]    Assessment and Plan      Diagnoses and all orders for this visit:  Type 2 diabetes mellitus with diabetic polyneuropathy, without long-term current use of insulin (HCC)  -     Hemoglobin A1C; Future  Anemia, unspecified type  Mixed hyperlipidemia  Overweight (BMI 25.0-29. 9)  Chronic bilateral low back pain with left-sided sciatica  -     methocarbamol (ROBAXIN) 500 mg tablet; Take 1 tablet (500 mg total) by mouth 2 (two) times a day as needed for muscle spasms for up to 10 days  -     Ambulatory Referral to Orthopedic Surgery; Future          Weight loss and iron deficiency anemia:  -Based on recent iron panel with TIBC around 367 and low iron levels  -This can also be correlated with MCV of 76 based on recent CBC with hemoglobin around 8.9  -Currently on supplements with ferrous sulfate  -Prior colonoscopy from September 2022 noted to have normal colon  -EGD recently with moderate edematous and erythematous mucosa in the antrum of the stomach with otherwise normal esophagus and duodenum. she was subsequently recommended for CT scan with contrast which showed no suspicious findings identified.  -Following with GI  -UA indicative of microscopic hematuria and was subsequently seen by interventional radiology at which point, a cystoscopy was ordered. Based on chart review patient has an appointment with urology on 12th January 2024. Diabetes Mellitus type 2: Patient with history of diabetes mellitus type 2.  Previous HbA1c was 7.7 from June 2023.   -Follow up A1c ordered  -On metformin 1000 mg b.i.d.  -On jardiance 10 mg daily  -On gabapentin 600 mg t.i.d. for diabetic neuropathy  -On atorvastatin 20 mg daily  -Discussed diet and lifestyle modifications with patient  -Last diabetic eye exam in 12/2021, patient refusing another referral at this time     CKD stage II: Patient with history chronic kidney disease stage 2 likely secondary to history of diabetes and hypertension.  - Avoid nephrotoxic medications including NSAIDs  - Discussed optimization of BP to prevent further progression of kidney disease  - Monitor sCr at future visits     Hypertension:  Well controlled at home, reports not taking her BP meds this morning due to having to go to multiple other appointments/visits.  -On Prinzide 20-12.5 mg daily  -Discussed at home BP monitoring with patient  -Patient advised to monitor her blood pressure 3 times per week, once early morning and once in mid afternoo     Health Maintenance  -Screening mammogram in May 2023 that showed no mammographic evidence of malignancy with follow-up recommended at 1 year interval, repeat mammogram ordered today  -Had a sleep study at home performed in April 2023 that was inconclusive and patient was recommended for a lab sleep study  -Last screening colonoscopy in February 2022, normal with adequate bowel prep. Recommended for repeat in 10 years.   -Patient with history of total hysterectomy including removal of cervical cuff, no indication for Pap smears going forward     BMI Counseling: Body mass index is 28.32 kg/m². The BMI is above normal. Nutrition recommendations include reducing portion sizes, decreasing overall calorie intake, consuming healthier snacks and moderation in carbohydrate intake. Exercise recommendations include exercising 3-5 times per week. Plan  -Follow up visit with our clinic in 3 months to review diabetes and hypertension      Subjective     History of Present Illness:  Patient is a 41-year-old female with history of hypertension, hyperlipidemia, diabetes mellitus type 2, chronic bilateral low back pain. Patient reports feeling well today. Intrajugular history of diabetes, she reports her blood sugars around  fasting in the morning. She has remained compliant with her medications including Jardiance and metformin. Follows a healthy diet. She states that she lives in a 1 bedroom apartment and also cares for her brother who is currently paralyzed. We spoke about continuing to follow-up with GI and urology. Specifically for urology, she has an upcoming appointment in January and was counseled about this. I spoke with the patient about following up on pending lab work. I discussed her recent lab results and advised her that she does have some microscopic hematuria in her urine microscopic lab study and that she needs to follow-up with urology. No other concerns at this time. Patient to follow-up with her clinic in 3 months    Review of Systems   Constitutional:  Negative for chills and fever. Respiratory:  Negative for chest tightness and shortness of breath. Cardiovascular:  Negative for chest pain and palpitations. Gastrointestinal:  Negative for abdominal pain, nausea and vomiting. Neurological:  Negative for dizziness, seizures, syncope and light-headedness. Current Outpatient Medications:     Accu-Chek Softclix Lancets lancets, Use 1 (one) time for 1 dose Use as instructed, Disp: 200 each, Rfl: 2    acetaminophen (TYLENOL) 325 mg tablet, Take 650 mg by mouth every 6 (six) hours as needed for mild pain, Disp: , Rfl:     Diclofenac Sodium (VOLTAREN) 1 %, APPLY 2 GRAMS TOPICALLY THREE TIMES A DAY, Disp: 100 g, Rfl: 2    ferrous sulfate 324 (65 Fe) mg, Take 1 tablet (324 mg total) by mouth every other day, Disp: 90 tablet, Rfl: 1    gabapentin (NEURONTIN) 300 mg capsule, Take 2 capsules (600 mg total) by mouth 3 (three) times a day, Disp: 540 capsule, Rfl: 0    Jardiance 10 MG TABS tablet, TAKE 1 TABLET BY MOUTH ONCE DAILY IN THE MORNING, Disp: 90 tablet, Rfl: 0    Lancets Misc.  (Accu-Chek Softclix Lancet Dev) KIT, Use in the morning, Disp: 1 kit, Rfl: 0    lisinopril-hydrochlorothiazide (PRINZIDE,ZESTORETIC) 20-12.5 MG per tablet, Take 1 tablet by mouth daily, Disp: 90 tablet, Rfl: 2    metFORMIN (GLUCOPHAGE) 1000 MG tablet, Take 1 tablet (1,000 mg total) by mouth 2 (two) times a day with meals, Disp: 180 tablet, Rfl: 3    methocarbamol (ROBAXIN) 500 mg tablet, Take 1 tablet (500 mg total) by mouth 2 (two) times a day as needed for muscle spasms for up to 10 days, Disp: 20 tablet, Rfl: 0    atorvastatin (LIPITOR) 20 mg tablet, Take 1 tablet (20 mg total) by mouth daily, Disp: 90 tablet, Rfl: 2    Elastic Bandages & Supports (GNP Diabetic Socks Unisex XL) MISC, Use 1 each if needed (1 pair of socks for diabetic neuropathy), Disp: 2 each, Rfl: 0    glucose blood (Accu-Chek Guide) test strip, Use 1 each in the morning Use as instructed, Disp: 200 each, Rfl: 2  No Known Allergies  Past Medical History:   Diagnosis Date    Arthritis     Bump     bumped head yesterday at work "saw stars" no LOC, cat scan done was normal    Circulation problem     Diabetes mellitus (HCC)     blood sugar 125 on admission    Encounter for screening colonoscopy     1 st one    Headache     Hyperlipidemia     Hypertension     Positive fecal occult blood test 08/30/2018    Added automatically from request for surgery [id number]    Post concussion syndrome 09/14/2018    Syncope      Past Surgical History:   Procedure Laterality Date    BREAST BIOPSY Right 02/18/2022    COLONOSCOPY N/A 9/26/2018    Procedure: COLONOSCOPY;  Surgeon: Jian Madera MD;  Location: [facility] GI LAB; Service: Gastroenterology    HYSTERECTOMY  2017    INCISIONAL BREAST BIOPSY      last assessed [id number]    DC COLONOSCOPY FLX DX W/COLLJ SPEC WHEN PFRMD N/A 7/5/2018    Procedure: COLONOSCOPY;  Surgeon: Jian Madera MD;  Location:  GI LAB;   Service: Gastroenterology    US GUIDED BREAST BIOPSY RIGHT COMPLETE Right 2/18/2022     Family History   Problem Relation Age of Onset    Breast cancer Cousin 48    Alcohol abuse Mother     Alcohol abuse Father     Breast cancer Daughter 45    No Known Problems Sister     No Known Problems Maternal Grandmother     No Known Problems Maternal Grandfather     No Known Problems Paternal Grandmother     No Known Problems Paternal Grandfather     No Known Problems Son     No Known Problems Son     No Known Problems Daughter     No Known Problems Daughter     No Known Problems Daughter     Breast cancer Sister 77    No Known Problems Maternal Aunt     No Known Problems Maternal Aunt     No Known Problems Maternal Aunt     No Known Problems Maternal Aunt     No Known Problems Maternal Aunt     No Known Problems Maternal Aunt     No Known Problems Maternal Aunt     No Known Problems Maternal Aunt     No Known Problems Maternal Aunt     No Known Problems Maternal Aunt     No Known Problems Maternal Aunt     No Known Problems Maternal Aunt     No Known Problems Paternal Aunt     No Known Problems Paternal Aunt     Dementia Paternal Aunt     No Known Problems Paternal Aunt     No Known Problems Paternal Aunt     No Known Problems Paternal Aunt     No Known Problems Paternal Aunt     No Known Problems Paternal Aunt     Colon cancer Neg Hx     Endometrial cancer Neg Hx     Ovarian cancer Neg Hx      Social History     Substance and Sexual Activity   Alcohol Use Never       Social History     Substance and Sexual Activity   Drug Use Never     Social History     Tobacco Use   Smoking Status Every Day    Packs/day: 0.50    Years: 20.00    Total pack years: 10.00    Types: Cigarettes   Smokeless Tobacco Never   Tobacco Comments    smokes less than 1pk/day       Objective     Vitals:    11/21/23 1332   BP: 160/86   BP Location: Right arm   Patient Position: Sitting   Cuff Size: Large   Pulse: 97   Temp: 98.1 °F (36.7 °C)   TempSrc: Temporal   Weight: 74.8 kg (165 lb)   Height: 5' 4" (1.626 m)       Physical Exam  Vitals reviewed. HENT:      Head: Normocephalic. Cardiovascular:      Rate and Rhythm: Normal rate and regular rhythm. Pulses: Normal pulses. Heart sounds: Normal heart sounds.    Pulmonary:      Effort: Pulmonary effort is normal. Comments: Slightly diminished breath sounds in lower lung fields  Abdominal:      General: Bowel sounds are normal.      Palpations: Abdomen is soft. Skin:     General: Skin is warm and dry. Capillary Refill: Capillary refill takes less than 2 seconds. Neurological:      Mental Status: She is alert and oriented to person, place, and time. Care Time Delivered:   I have spent 32 minutes with patient today in which greater than 50% of this time was spent in counseling/coordination of care. Minnie Mccabe MD  Internal Medicine Residency PGY-3  [facility]      ==  PLEASE NOTE:  This encounter was completed utilizing the [employer] Voice Recognition Software. Grammatical errors, random word insertions, pronoun errors and incomplete sentences are occasional consequences of the system due to software limitations, ambient noise and hardware issues. These may be missed by proof reading prior to affixing electronic signature. Any questions or concerns about the content, text or information contained within the body of this dictation should be directly addressed to the physician for clarification. Please do not hesitate to call me directly if you have any any questions or concerns.

## 2023-11-21 NOTE — PATIENT INSTRUCTIONS
Thank you for visiting our clinic! It was nice seeing you!     Today, we discussed-  - Diet and lifestyle modifications and strategies for weight loss  - Maintaining a blood glucose log and measuring your blood sugars at least once daily  - Please follow up with us in 6 weeks to review your diabetes

## 2023-11-24 ENCOUNTER — APPOINTMENT (OUTPATIENT)
Dept: LAB | Facility: CLINIC | Age: 68
End: 2023-11-24
Payer: COMMERCIAL

## 2023-11-24 ENCOUNTER — TELEPHONE (OUTPATIENT)
Dept: INTERNAL MEDICINE CLINIC | Facility: CLINIC | Age: 68
End: 2023-11-24

## 2023-11-24 DIAGNOSIS — E11.42 TYPE 2 DIABETES MELLITUS WITH DIABETIC POLYNEUROPATHY, WITHOUT LONG-TERM CURRENT USE OF INSULIN (HCC): ICD-10-CM

## 2023-11-24 DIAGNOSIS — A04.8 H. PYLORI INFECTION: ICD-10-CM

## 2023-11-24 DIAGNOSIS — G89.29 CHRONIC SHOULDER PAIN, UNSPECIFIED LATERALITY: ICD-10-CM

## 2023-11-24 DIAGNOSIS — M54.6 CHRONIC THORACIC BACK PAIN, UNSPECIFIED BACK PAIN LATERALITY: Primary | ICD-10-CM

## 2023-11-24 DIAGNOSIS — M25.519 CHRONIC SHOULDER PAIN, UNSPECIFIED LATERALITY: ICD-10-CM

## 2023-11-24 DIAGNOSIS — G89.29 CHRONIC THORACIC BACK PAIN, UNSPECIFIED BACK PAIN LATERALITY: Primary | ICD-10-CM

## 2023-11-24 LAB
EST. AVERAGE GLUCOSE BLD GHB EST-MCNC: 177 MG/DL
HBA1C MFR BLD: 7.8 %

## 2023-11-24 PROCEDURE — 83036 HEMOGLOBIN GLYCOSYLATED A1C: CPT

## 2023-11-24 PROCEDURE — 36415 COLL VENOUS BLD VENIPUNCTURE: CPT

## 2023-11-24 NOTE — TELEPHONE ENCOUNTER
Patient tried to get PT appointments scheduled. She was told to get a new referral that says shoulder and back pain. The referral she has currently does not say it.

## 2023-11-27 ENCOUNTER — APPOINTMENT (OUTPATIENT)
Dept: LAB | Facility: CLINIC | Age: 68
End: 2023-11-27
Payer: COMMERCIAL

## 2023-11-27 ENCOUNTER — TELEPHONE (OUTPATIENT)
Dept: INTERNAL MEDICINE CLINIC | Facility: CLINIC | Age: 68
End: 2023-11-27

## 2023-11-27 PROCEDURE — 87338 HPYLORI STOOL AG IA: CPT

## 2023-11-27 NOTE — TELEPHONE ENCOUNTER
Patient said she spoke with pcp about providing a letter so that she is given a voucher for an additional bedroom. Patient lives in a 1 bedroom apartment where she takes care of her disabled brother Azul Bateman. Patient said that pcp said he would right letter for housing. I asked patient if possible, did Prasanth's pcp say that he would right letter. Patient said letter was supposed to be written by her pcp. Please advise.

## 2023-11-28 LAB — H PYLORI AG STL QL IA: NEGATIVE

## 2023-11-30 NOTE — TELEPHONE ENCOUNTER
I see a letter from Dr. Diya Vick already in the chart written on 11/21 regarding housing.  Can you please check it and let us know if anything else is required

## 2023-12-11 ENCOUNTER — HOSPITAL ENCOUNTER (OUTPATIENT)
Dept: RADIOLOGY | Facility: HOSPITAL | Age: 68
Discharge: HOME | End: 2023-12-11
Attending: ORTHOPAEDIC SURGERY
Payer: COMMERCIAL

## 2023-12-11 ENCOUNTER — OFFICE VISIT (OUTPATIENT)
Dept: OBGYN CLINIC | Facility: HOSPITAL | Age: 68
End: 2023-12-11
Payer: COMMERCIAL

## 2023-12-11 VITALS — WEIGHT: 164.9 LBS | HEIGHT: 64 IN | BODY MASS INDEX: 28.15 KG/M2

## 2023-12-11 DIAGNOSIS — M54.42 CHRONIC BILATERAL LOW BACK PAIN WITH LEFT-SIDED SCIATICA: ICD-10-CM

## 2023-12-11 DIAGNOSIS — G89.29 CHRONIC BILATERAL LOW BACK PAIN WITH LEFT-SIDED SCIATICA: ICD-10-CM

## 2023-12-11 DIAGNOSIS — R52 PAIN: Primary | ICD-10-CM

## 2023-12-11 DIAGNOSIS — M54.16 LUMBAR RADICULOPATHY: ICD-10-CM

## 2023-12-11 DIAGNOSIS — M51.36 DEGENERATIVE DISC DISEASE, LUMBAR: ICD-10-CM

## 2023-12-11 DIAGNOSIS — R52 PAIN: ICD-10-CM

## 2023-12-11 PROCEDURE — 72110 X-RAY EXAM L-2 SPINE 4/>VWS: CPT

## 2023-12-11 PROCEDURE — 99214 OFFICE O/P EST MOD 30 MIN: CPT | Performed by: ORTHOPAEDIC SURGERY

## 2023-12-11 NOTE — PROGRESS NOTES
Assessment & Plan/Medical Decision Makin y.o. female with Back Pain, Right Radicular Leg Pain, and Ambulatory Dysfunction and imaging findings most notable for lumbar spondylosis         The clinical, physical and imaging findings were reviewed with the patient. Adan Ladd  has a constellation of findings consistent with Lumbar Radiculopathy, Lumbar Facet/Arthrosis Pain, and Ambulatory Dysfunction in the setting of lumbar degenerative disease. Worsening low back and right > left lower extremity pain with ambulatory dysfunction. Fortunately patient remains neurologically intact and functional. Physical exam showing mild lower extremity weakness, decreased lumbar ROM. Right shoulder pain with ROM and palpation. We discussed the treatment options including physical therapy, at home exercises, activity modifications, chiropractic medicine, oral medications, interventional spine procedures. At this time recommend continued conservative treatments. Given worsening symptoms with mild lower extremity weakness and stenosis noted on previous imaging, will plan to obtain updated MRI lumbar spine to further evaluate patient's symptoms. Will review MRI in detail with patient at follow-up visit and discuss further treatment options at that time. Referral to pain management for evaluation and treatment. Discussed potential role of steroid injection at or near the source of pain to provide targeted relief. Patient instructed to return to office/ER sooner if symptoms are not improving, getting worse, or new worrisome/neurologic symptoms arise. Patient will follow up after MRI. Subjective:      Chief Complaint: Back Pain    HPI:  Sandra Gamboa is a 80 y.o. female presenting for initial visit with chief complaint of back pain. Ongoing back pain for about 1.5 years that has been worsening over the past 6 months or so.  Pain is across low back and radiates into lateral aspect of right > left lower extremity with numbness/tingling and cramping. Pain worse with prolonged sitting, standing and walking. Only able to walk a few blocks until worsening low back and right leg pain and needing to stop and rest. Also notes her back and legs feel tired and heavy at times. Reports she feels like her knees are going to give out on her at times as well. She does note leaning forward when she walks. Back pain has become more debilitating and interferes with daily functioning. Denies any dorian trauma. Denies fever or chills, no night sweats. Denies any bladder or bowel changes. Also with ongoing right shoulder pain, worse with movement. Denies significant neck pain. Intermittent arm pain with numbness/tingling in the morning. Back and legs are most bothersome. Denies dorian upper extremity weakness. PMH T2DM. Conservative therapy includes the following:   Medications: tylenol as needed    Injections: denies recent     Physical Therapy: has done PT in the past  Chiropractic Medicine: has not attempted  Accupunture/Massage Therapy: has not attempted   These therapeutic modalities were ineffective at providing sustained pain relief/functional improvement.      Nicotine dependent: smokes about 8 cigarettes per day  Occupation: worked as CNA  Living situation: Lives with family   ADLs: patient is able to perform     Objective:     Family History   Problem Relation Age of Onset    Breast cancer Cousin 48    Alcohol abuse Mother     Alcohol abuse Father     Breast cancer Daughter 45    No Known Problems Sister     No Known Problems Maternal Grandmother     No Known Problems Maternal Grandfather     No Known Problems Paternal Grandmother     No Known Problems Paternal Grandfather     No Known Problems Son     No Known Problems Son     No Known Problems Daughter     No Known Problems Daughter     No Known Problems Daughter     Breast cancer Sister 77    No Known Problems Maternal Aunt     No Known Problems Maternal Aunt     No Known Problems Maternal Aunt     No Known Problems Maternal Aunt     No Known Problems Maternal Aunt     No Known Problems Maternal Aunt     No Known Problems Maternal Aunt     No Known Problems Maternal Aunt     No Known Problems Maternal Aunt     No Known Problems Maternal Aunt     No Known Problems Maternal Aunt     No Known Problems Maternal Aunt     No Known Problems Paternal Aunt     No Known Problems Paternal Aunt     Dementia Paternal Aunt     No Known Problems Paternal Aunt     No Known Problems Paternal Aunt     No Known Problems Paternal Aunt     No Known Problems Paternal Aunt     No Known Problems Paternal Aunt     Colon cancer Neg Hx     Endometrial cancer Neg Hx     Ovarian cancer Neg Hx        Past Medical History:   Diagnosis Date    Arthritis     Bump     bumped head yesterday at work "saw stars" no LOC, cat scan done was normal    Circulation problem     Diabetes mellitus (HCC)     blood sugar 125 on admission    Encounter for screening colonoscopy     1 st one    Headache     Hyperlipidemia     Hypertension     Positive fecal occult blood test 08/30/2018    Added automatically from request for surgery [id number]    Post concussion syndrome 09/14/2018    Syncope        Current Outpatient Medications   Medication Sig Dispense Refill    acetaminophen (TYLENOL) 325 mg tablet Take 650 mg by mouth every 6 (six) hours as needed for mild pain      Diclofenac Sodium (VOLTAREN) 1 % APPLY 2 GRAMS TOPICALLY THREE TIMES A  g 2    Elastic Bandages & Supports (GNP Diabetic Socks Unisex XL) MISC Use 1 each if needed (1 pair of socks for diabetic neuropathy) 2 each 0    ferrous sulfate 324 (65 Fe) mg Take 1 tablet (324 mg total) by mouth every other day 90 tablet 1    gabapentin (NEURONTIN) 300 mg capsule Take 2 capsules (600 mg total) by mouth 3 (three) times a day 540 capsule 0    glucose blood (Accu-Chek Guide) test strip Use 1 each in the morning Use as instructed 200 each 2    Jardiance 10 MG TABS tablet TAKE 1 TABLET BY MOUTH ONCE DAILY IN THE MORNING 90 tablet 0    Lancets Misc. (Accu-Chek Softclix Lancet Dev) KIT Use in the morning 1 kit 0    lisinopril-hydrochlorothiazide (PRINZIDE,ZESTORETIC) 20-12.5 MG per tablet Take 1 tablet by mouth daily 90 tablet 2    metFORMIN (GLUCOPHAGE) 1000 MG tablet Take 1 tablet (1,000 mg total) by mouth 2 (two) times a day with meals 180 tablet 3    Accu-Chek Softclix Lancets lancets Use 1 (one) time for 1 dose Use as instructed 200 each 2    atorvastatin (LIPITOR) 20 mg tablet Take 1 tablet (20 mg total) by mouth daily 90 tablet 2    methocarbamol (ROBAXIN) 500 mg tablet Take 1 tablet (500 mg total) by mouth 2 (two) times a day as needed for muscle spasms for up to 10 days 20 tablet 0     No current facility-administered medications for this visit. Past Surgical History:   Procedure Laterality Date    BREAST BIOPSY Right 02/18/2022    COLONOSCOPY N/A 9/26/2018    Procedure: COLONOSCOPY;  Surgeon: Riya Weinberg MD;  Location: [facility]; Service: Gastroenterology    HYSTERECTOMY  2017    INCISIONAL BREAST BIOPSY      last assessed 17Aug2017    OR COLONOSCOPY FLX DX W/COLLJ SPEC WHEN PFRMD N/A 7/5/2018    Procedure: COLONOSCOPY;  Surgeon: Riya Weinberg MD;  Location:  GI LAB;   Service: Gastroenterology    US GUIDED BREAST BIOPSY RIGHT COMPLETE Right 2/18/2022       Social History     Socioeconomic History    Marital status:      Spouse name: Not on file    Number of children: 6    Years of education: Not on file    Highest education level: Not on file   Occupational History    Occupation: [employer]   Tobacco Use    Smoking status: Every Day     Packs/day: 0.50     Years: 20.00     Total pack years: 10.00     Types: Cigarettes    Smokeless tobacco: Never    Tobacco comments:     smokes less than 1pk/day   Vaping Use    Vaping Use: Never used   Substance and Sexual Activity    Alcohol use: Never    Drug use: Never    Sexual activity: Not Currently   Other Topics Concern    Not on file   Social History Narrative    Daily caffeine consumption, 6-8 servings a day     Social Determinants of Health     Financial Resource Strain: Low Risk  (2/23/2023)    Overall Financial Resource Strain (CARDIA)     Difficulty of Paying Living Expenses: Not hard at all   Food Insecurity: No Food Insecurity (2/23/2023)    Hunger Vital Sign     Worried About Running Out of Food in the Last Year: Never true     Ran Out of Food in the Last Year: Never true   Transportation Needs: No Transportation Needs (2/23/2023)    PRAPARE - Transportation     Lack of Transportation (Medical): No     Lack of Transportation (Non-Medical):  No   Physical Activity: Inactive (2/10/2021)    Exercise Vital Sign     Days of Exercise per Week: 0 days     Minutes of Exercise per Session: 0 min   Stress: No Stress Concern Present (2/10/2021)    [facility]     Feeling of Stress : Not at all   Social Connections: Socially Isolated (2/10/2021)    Social Connection and Isolation Panel [NHANES]     Frequency of Communication with Friends and Family: Once a week     Frequency of Social Gatherings with Friends and Family: Once a week     Attends Spiritism Services: Never     Active Member of Clubs or Organizations: No     Attends Club or Organization Meetings: Never     Marital Status:    Intimate Partner Violence: Not At Risk (2/10/2021)    Humiliation, Afraid, Rape, and Kick questionnaire     Fear of Current or Ex-Partner: No     Emotionally Abused: No     Physically Abused: No     Sexually Abused: No   Housing Stability: Not on file       No Known Allergies    Review of Systems  General- denies fever/chills  HEENT- denies hearing loss or sore throat  Eyes- denies eye pain or visual disturbances, denies red eyes  Respiratory- denies cough or SOB  Cardio- denies chest pain or palpitations  GI- denies abdominal pain  Endocrine- denies urinary frequency  Urinary- denies pain with urination  Musculoskeletal- Negative except noted above  Skin- denies rashes or wounds  Neurological- denies dizziness or headache  Psychiatric- denies anxiety or difficulty concentrating    Physical Exam  Ht 5' 4" (1.626 m)   Wt 74.8 kg (164 lb 14.5 oz)   BMI 28.31 kg/m²     General/Constitutional: No apparent distress: well-nourished and well developed. Lymphatic: No appreciable lymphadenopathy  Respiratory: Non-labored breathing  Vascular: No edema, swelling or tenderness, except as noted in detailed exam.  Integumentary: No impressive skin lesions present, except as noted in detailed exam.  Psych: Normal mood and affect, oriented to person, place and time.   MSK: normal other than stated in HPI and exam  Gait & balance: no evidence of myelopathic gait, ambulates Independently     Lumbar spine range of motion:  -Forward flexion to 90  -Extension to neutral  -Lateral bend 25 right, 25 left  -Rotation 25 right, 25 left  There tenderness with palpation along lumbar paraspinal musculature, no midline tenderness     Neurologic:  Upper Extremity Motor Function    Right  Left    Deltoid  5/5  5/5    Bicep  5/5  5/5    Wrist extension  5/5  5/5    Tricep  5/5  5/5    Finger flexion/  5/5  5/5    Hand intrinsic  5/5  5/5      Lower Extremity Motor Function    Right  Left    Iliopsoas  5/5  5/5    Quadriceps 5/5 5/5   Tibialis anterior  5/5  5/5    EHL  5/5  5/5    Gastroc. muscle  5/5  5/5    Heel rise  4/5  4/5    Toe rise  4/5  4/5      Sensory: light touch is intact to bilateral upper and lower extremities     Reflexes:    Right Left   Patellar 1+ 1+   Achilles 1+ 1+   Babinski neg neg     Other tests:  Straight Leg Raise: negative  Kong SI: negative  JHONATAN SI: negative  Greater troch: no tenderness   Internal/external hip ROM: intact, no pain   Flexion/extension knee ROM: intact, no pain   Vascular: WWP extremities, 2+DP bilateral    Tandem gait: equivocal  Shoulder: pain with right shoulder ROM    Diagnostic Tests   IMAGING: I have personally reviewed the images and these are my findings:  Lumbar Spine X-rays from 12/11/2023: multi level lumbar spondylosis with loss of disc height, osteophyte formation and facet hypertrophy, no apparent spondylolisthesis, no appreciated lytic/blastic lesions, no obvious instability    Electronic Medical Records were reviewed including office notes, imaging studies    Procedures, if performed today     None performed       Portions of the record may have been created with voice recognition software. Occasional wrong word or "sound a like" substitutions may have occurred due to the inherent limitations of voice recognition software. Read the chart carefully and recognize, using context, where substitutions have occurred.

## 2023-12-18 ENCOUNTER — HOSPITAL ENCOUNTER (OUTPATIENT)
Dept: RADIOLOGY | Age: 68
Discharge: HOME | End: 2023-12-18
Payer: COMMERCIAL

## 2023-12-18 DIAGNOSIS — M54.16 LUMBAR RADICULOPATHY: ICD-10-CM

## 2023-12-18 DIAGNOSIS — M51.36 DEGENERATIVE DISC DISEASE, LUMBAR: ICD-10-CM

## 2023-12-18 PROCEDURE — G1004 CDSM NDSC: HCPCS

## 2023-12-18 PROCEDURE — 72148 MRI LUMBAR SPINE W/O DYE: CPT

## 2023-12-26 ENCOUNTER — OFFICE VISIT (OUTPATIENT)
Dept: OBGYN CLINIC | Facility: HOSPITAL | Age: 68
End: 2023-12-26
Payer: COMMERCIAL

## 2023-12-26 VITALS
SYSTOLIC BLOOD PRESSURE: 150 MMHG | DIASTOLIC BLOOD PRESSURE: 72 MMHG | WEIGHT: 164.9 LBS | BODY MASS INDEX: 28.15 KG/M2 | HEART RATE: 108 BPM | HEIGHT: 64 IN

## 2023-12-26 DIAGNOSIS — M51.36 DEGENERATIVE DISC DISEASE, LUMBAR: ICD-10-CM

## 2023-12-26 DIAGNOSIS — M54.16 LUMBAR RADICULOPATHY: ICD-10-CM

## 2023-12-26 DIAGNOSIS — G89.29 CHRONIC BILATERAL LOW BACK PAIN WITH LEFT-SIDED SCIATICA: Primary | ICD-10-CM

## 2023-12-26 DIAGNOSIS — M54.42 CHRONIC BILATERAL LOW BACK PAIN WITH LEFT-SIDED SCIATICA: Primary | ICD-10-CM

## 2023-12-26 PROCEDURE — 99214 OFFICE O/P EST MOD 30 MIN: CPT | Performed by: ORTHOPAEDIC SURGERY

## 2023-12-26 RX ORDER — MELOXICAM 7.5 MG/1
7.5 TABLET ORAL DAILY
Qty: 14 TABLET | Refills: 0 | Status: SHIPPED | OUTPATIENT
Start: 2023-12-26

## 2023-12-26 RX ORDER — TIZANIDINE 4 MG/1
4 TABLET ORAL
Qty: 14 TABLET | Refills: 0 | Status: SHIPPED | OUTPATIENT
Start: 2023-12-26

## 2023-12-26 NOTE — PROGRESS NOTES
Assessment & Plan/Medical Decision Makin y.o. female with Back Pain, Right Radicular Leg Pain, and Ambulatory Dysfunction and imaging findings most notable for lumbar spondylosis         The clinical, physical and imaging findings were reviewed with the patient.  Jenifer  has a constellation of findings consistent with Lumbar Radiculopathy, Lumbar Facet/Arthrosis Pain, and Ambulatory Dysfunction in the setting of lumbar degenerative disease. Here for lumbar MRI review. Continued low back, gluteal and right > left lower extremity pain with ambulatory dysfunction.  Fortunately patient remains neurologically intact and functional. Physical exam showing mild lower extremity weakness, decreased lumbar ROM.  We discussed the treatment options including physical therapy, at home exercises, activity modifications, chiropractic medicine, oral medications, interventional spine procedures.  At this time recommend continued conservative treatments.  She has upcoming appointment with spine & pain management on 2024 for evaluation and treatment. Would recommend patient undergo lumbar RORO. Discussed potential role of steroid injection at or near the source of pain to provide targeted relief.  Referral placed for physical therapy to work on core strengthening, lumbar ROM, strengthening, and stretching exercises.  Meloxicam and Zanaflex rx sent to patient's pharmacy. Discussed reasoning for prescribing medication, proper usage, and potential side effects.  Patient instructed to return to office/ER sooner if symptoms are not improving, getting worse, or new worrisome/neurologic symptoms arise. Patient should follow up in 3 months after continued conservative treatment if pain does not improve.     Subjective:      Chief Complaint: Back Pain    HPI:  Jenifer Smith is a 68 y.o. female presenting for initial visit with chief complaint of back pain. Ongoing back pain for about 1.5 years that has been worsening over the  past 6 months or so. Pain is across low back and radiates into lateral aspect of right > left lower extremity with numbness/tingling and cramping. Pain worse with prolonged sitting, standing and walking. Only able to walk a few blocks until worsening low back and right leg pain and needing to stop and rest. Also notes her back and legs feel tired and heavy at times. Reports she feels like her knees are going to give out on her at times as well. She does note leaning forward when she walks. Back pain has become more debilitating and interferes with daily functioning. Denies any dorian trauma. Denies fever or chills, no night sweats. Denies any bladder or bowel changes.      Also with ongoing right shoulder pain, worse with movement. Denies significant neck pain. Intermittent arm pain with numbness/tingling in the morning. Back and legs are most bothersome. Denies dorian upper extremity weakness.    PMH T2DM (HbA1c 7.8 on 11/24/2023).    Conservative therapy includes the following:   Medications: tylenol as needed    Injections: denies recent     Physical Therapy: has done PT in the past  Chiropractic Medicine: has not attempted  Accupunture/Massage Therapy: has not attempted   These therapeutic modalities were ineffective at providing sustained pain relief/functional improvement.     Nicotine dependent: smokes about 8 cigarettes per day  Occupation: worked as CNA  Living situation: Lives with family   ADLs: patient is able to perform     Update on 12/26/2023  Jenifer is here for follow up, MRI lumbar spine review. She remains unchanged since initial visit. Continues with low back, gluteal and right > left lower extremity pain. Pain radiates into lateral right lower extremity to her foot with numbness/tingling. Pain worse with prolonged sitting, standing and walking. Only able to walk a few blocks before needing to stop due to worsening back and right lower extremity pain. Also notes her back and legs get tired with  "ambulation. Subjective lower extremity weakness. Patient has upcoming appointment with spine & pain management on 1/18/2024. Takes tylenol with mild relief. Also on 600mg gabapentin tid without improvement.     Objective:     Family History   Problem Relation Age of Onset    Breast cancer Cousin 50    Alcohol abuse Mother     Alcohol abuse Father     Breast cancer Daughter 38    No Known Problems Sister     No Known Problems Maternal Grandmother     No Known Problems Maternal Grandfather     No Known Problems Paternal Grandmother     No Known Problems Paternal Grandfather     No Known Problems Son     No Known Problems Son     No Known Problems Daughter     No Known Problems Daughter     No Known Problems Daughter     Breast cancer Sister 66    No Known Problems Maternal Aunt     No Known Problems Maternal Aunt     No Known Problems Maternal Aunt     No Known Problems Maternal Aunt     No Known Problems Maternal Aunt     No Known Problems Maternal Aunt     No Known Problems Maternal Aunt     No Known Problems Maternal Aunt     No Known Problems Maternal Aunt     No Known Problems Maternal Aunt     No Known Problems Maternal Aunt     No Known Problems Maternal Aunt     No Known Problems Paternal Aunt     No Known Problems Paternal Aunt     Dementia Paternal Aunt     No Known Problems Paternal Aunt     No Known Problems Paternal Aunt     No Known Problems Paternal Aunt     No Known Problems Paternal Aunt     No Known Problems Paternal Aunt     Colon cancer Neg Hx     Endometrial cancer Neg Hx     Ovarian cancer Neg Hx        Past Medical History:   Diagnosis Date    Arthritis     Bump     bumped head yesterday at work \"saw stars\" no LOC, cat scan done was normal    Circulation problem     Diabetes mellitus (HCC)     blood sugar 125 on admission    Encounter for screening colonoscopy     1 st one    Headache     Hyperlipidemia     Hypertension     Positive fecal occult blood test 08/30/2018    Added automatically from " request for surgery [id number]    Post concussion syndrome 09/14/2018    Syncope        Current Outpatient Medications   Medication Sig Dispense Refill    meloxicam (Mobic) 7.5 mg tablet Take 1 tablet (7.5 mg total) by mouth daily Please take one tablet daily in the morning with food. Please do not take any other anti-inflammatory medications such as Aleve (Naproxen), Motrin (Ibuprofen), or Advil (Ibuprofen) while taking meloxicam. 14 tablet 0    tiZANidine (ZANAFLEX) 4 mg tablet Take 1 tablet (4 mg total) by mouth daily at bedtime as needed for muscle spasms May make you drowsy. Do not drive until you know how it affects you. 14 tablet 0    Accu-Chek Softclix Lancets lancets Use 1 (one) time for 1 dose Use as instructed 200 each 2    acetaminophen (TYLENOL) 325 mg tablet Take 650 mg by mouth every 6 (six) hours as needed for mild pain      atorvastatin (LIPITOR) 20 mg tablet Take 1 tablet (20 mg total) by mouth daily 90 tablet 2    Diclofenac Sodium (VOLTAREN) 1 % APPLY 2 GRAMS TOPICALLY THREE TIMES A  g 2    Elastic Bandages & Supports (GNP Diabetic Socks Unisex XL) MISC Use 1 each if needed (1 pair of socks for diabetic neuropathy) 2 each 0    ferrous sulfate 324 (65 Fe) mg Take 1 tablet (324 mg total) by mouth every other day 90 tablet 1    gabapentin (NEURONTIN) 300 mg capsule Take 2 capsules (600 mg total) by mouth 3 (three) times a day 540 capsule 0    glucose blood (Accu-Chek Guide) test strip Use 1 each in the morning Use as instructed 200 each 2    Jardiance 10 MG TABS tablet TAKE 1 TABLET BY MOUTH ONCE DAILY IN THE MORNING 90 tablet 0    Lancets Misc. (Accu-Chek Softclix Lancet Dev) KIT Use in the morning 1 kit 0    lisinopril-hydrochlorothiazide (PRINZIDE,ZESTORETIC) 20-12.5 MG per tablet Take 1 tablet by mouth daily 90 tablet 2    metFORMIN (GLUCOPHAGE) 1000 MG tablet Take 1 tablet (1,000 mg total) by mouth 2 (two) times a day with meals 180 tablet 3     No current facility-administered medications  for this visit.       Past Surgical History:   Procedure Laterality Date    BREAST BIOPSY Right 02/18/2022    COLONOSCOPY N/A 9/26/2018    Procedure: COLONOSCOPY;  Surgeon: Joie Morejon MD;  Location: [facility];  Service: Gastroenterology    HYSTERECTOMY  2017    INCISIONAL BREAST BIOPSY      last assessed [id number]    SC COLONOSCOPY FLX DX W/COLLJ SPEC WHEN PFRMD N/A 7/5/2018    Procedure: COLONOSCOPY;  Surgeon: Joie Morejon MD;  Location:  GI LAB;  Service: Gastroenterology    US GUIDED BREAST BIOPSY RIGHT COMPLETE Right 2/18/2022       Social History     Socioeconomic History    Marital status:      Spouse name: Not on file    Number of children: 6    Years of education: Not on file    Highest education level: Not on file   Occupational History    Occupation: [employer]   Tobacco Use    Smoking status: Every Day     Current packs/day: 0.50     Average packs/day: 0.5 packs/day for 20.0 years (10.0 ttl pk-yrs)     Types: Cigarettes    Smokeless tobacco: Never    Tobacco comments:     smokes less than 1pk/day   Vaping Use    Vaping status: Never Used   Substance and Sexual Activity    Alcohol use: Never    Drug use: Never    Sexual activity: Not Currently   Other Topics Concern    Not on file   Social History Narrative    Daily caffeine consumption, 6-8 servings a day     Social Determinants of Health     Financial Resource Strain: Low Risk  (2/23/2023)    Overall Financial Resource Strain (CARDIA)     Difficulty of Paying Living Expenses: Not hard at all   Food Insecurity: No Food Insecurity (2/23/2023)    Hunger Vital Sign     Worried About Running Out of Food in the Last Year: Never true     Ran Out of Food in the Last Year: Never true   Transportation Needs: No Transportation Needs (2/23/2023)    PRAPARE - Transportation     Lack of Transportation (Medical): No     Lack of Transportation (Non-Medical): No   Physical Activity: Inactive (2/10/2021)    Exercise Vital Sign     Days of  "Exercise per Week: 0 days     Minutes of Exercise per Session: 0 min   Stress: No Stress Concern Present (2/10/2021)    [employer] [place] [employer] - Occupational Stress Questionnaire     Feeling of Stress : Not at all   Social Connections: Socially Isolated (2/10/2021)    Social Connection and Isolation Panel [NHANES]     Frequency of Communication with Friends and Family: Once a week     Frequency of Social Gatherings with Friends and Family: Once a week     Attends Buddhist Services: Never     Active Member of Clubs or Organizations: No     Attends Club or Organization Meetings: Never     Marital Status:    Intimate Partner Violence: Not At Risk (2/10/2021)    Humiliation, Afraid, Rape, and Kick questionnaire     Fear of Current or Ex-Partner: No     Emotionally Abused: No     Physically Abused: No     Sexually Abused: No   Housing Stability: Not on file       No Known Allergies    Review of Systems  General- denies fever/chills  HEENT- denies hearing loss or sore throat  Eyes- denies eye pain or visual disturbances, denies red eyes  Respiratory- denies cough or SOB  Cardio- denies chest pain or palpitations  GI- denies abdominal pain  Endocrine- denies urinary frequency  Urinary- denies pain with urination  Musculoskeletal- Negative except noted above  Skin- denies rashes or wounds  Neurological- denies dizziness or headache  Psychiatric- denies anxiety or difficulty concentrating    Physical Exam  /72   Pulse (!) 108   Ht 5' 4\" (1.626 m)   Wt 74.8 kg (164 lb 14.5 oz)   BMI 28.31 kg/m²     General/Constitutional: No apparent distress: well-nourished and well developed.  Lymphatic: No appreciable lymphadenopathy  Respiratory: Non-labored breathing  Vascular: No edema, swelling or tenderness, except as noted in detailed exam.  Integumentary: No impressive skin lesions present, except as noted in detailed exam.  Psych: Normal mood and affect, oriented to person, place and time.  MSK: " normal other than stated in HPI and exam  Gait & balance: no evidence of myelopathic gait, ambulates Independently     Lumbar spine range of motion:  -Forward flexion to 90  -Extension to neutral  -Lateral bend 25 right, 25 left  -Rotation 25 right, 25 left  There tenderness with palpation along lumbar paraspinal musculature, no midline tenderness     Neurologic:  Upper Extremity Motor Function    Right  Left    Deltoid  5/5  5/5    Bicep  5/5  5/5    Wrist extension  5/5  5/5    Tricep  5/5  5/5    Finger flexion/  5/5  5/5    Hand intrinsic  5/5  5/5      Lower Extremity Motor Function    Right  Left    Iliopsoas  5/5  5/5    Quadriceps 5/5 5/5   Tibialis anterior  5/5  5/5    EHL  5/5  5/5    Gastroc. muscle  5/5  5/5    Heel rise  4/5  4/5    Toe rise  4+/5  4+/5      Sensory: light touch is intact to bilateral upper and lower extremities     Reflexes:    Right Left   Patellar 1+ 1+   Achilles 1+ 1+   Babinski neg neg     Other tests:  Straight Leg Raise: negative  Kong SI: negative  JHONATAN SI: negative  Greater troch: no tenderness   Internal/external hip ROM: intact, no pain   Flexion/extension knee ROM: intact, no pain   Vascular: WWP extremities, 2+DP bilateral    Tandem gait: equivocal  Shoulder: pain with right shoulder ROM    Diagnostic Tests   IMAGING: I have personally reviewed the images and these are my findings:  Lumbar Spine X-rays from 12/11/2023: multi level lumbar spondylosis with loss of disc height, osteophyte formation and facet hypertrophy, no apparent spondylolisthesis, no appreciated lytic/blastic lesions, no obvious instability    MRI lumbar spine from 12/16/2023: multi level lumbar disc degeneration with disc desiccation, loss of disc height, facet and ligamentum hypertrophy, L2-3 moderate stenosis, L3-4 moderate stenosis with left-sided severe lateral recess stenosis, L4-5 bilateral lateral recess stenosis    Electronic Medical Records were reviewed including office notes, imaging  "studies    Procedures, if performed today     None performed       Portions of the record may have been created with voice recognition software.  Occasional wrong word or \"sound a like\" substitutions may have occurred due to the inherent limitations of voice recognition software.  Read the chart carefully and recognize, using context, where substitutions have occurred.      "

## 2024-01-09 DIAGNOSIS — M54.42 CHRONIC BILATERAL LOW BACK PAIN WITH LEFT-SIDED SCIATICA: ICD-10-CM

## 2024-01-09 DIAGNOSIS — M54.16 LUMBAR RADICULOPATHY: ICD-10-CM

## 2024-01-09 DIAGNOSIS — G89.29 CHRONIC BILATERAL LOW BACK PAIN WITH LEFT-SIDED SCIATICA: ICD-10-CM

## 2024-01-09 DIAGNOSIS — M51.36 DEGENERATIVE DISC DISEASE, LUMBAR: ICD-10-CM

## 2024-01-09 RX ORDER — MELOXICAM 7.5 MG/1
7.5 TABLET ORAL DAILY
Qty: 14 TABLET | Refills: 0 | OUTPATIENT
Start: 2024-01-09

## 2024-01-09 RX ORDER — TIZANIDINE 4 MG/1
4 TABLET ORAL
Qty: 14 TABLET | Refills: 0 | OUTPATIENT
Start: 2024-01-09

## 2024-01-11 NOTE — PROGRESS NOTES
"Assessment/Plan:    Microscopic hematuria  The patient has had a negative hematuria workup.  Recommend annual urinalysis with PCP and consideration for repeat workup in 3-5 years if micro hematuria persists.  If the patient has gross hematuria then they should see us immediately.          Subjective:      Patient ID: Jenifer Smith is a 68 y.o. female.    HPI  Jenifer Smith is a 68 y.o. female here for follow up evaluation of microscopic hematuria noted on examination by PCP.      UA 10-20 per high-power field.  This prompted her PCP to perform CT AP with contrast which was normal.  Cystoscopy today was unremarkable.    Patient reports no lower urinary tract symptoms.  She reports smoking about 8 cigarettes/day.  She denies the use/abuse of illicit substances and alcohol.      She denies a family history of urothelial carcinomas.  She reports a family history of breast cancer in her daughter.  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     Past Surgical History:   Procedure Laterality Date    BREAST BIOPSY Right 02/18/2022    COLONOSCOPY N/A 9/26/2018    Procedure: COLONOSCOPY;  Surgeon: Joie Morejon MD;  Location: [facility];  Service: Gastroenterology    HYSTERECTOMY  2017    INCISIONAL BREAST BIOPSY      last assessed [id number]    ID COLONOSCOPY FLX DX W/COLLJ SPEC WHEN PFRMD N/A 7/5/2018    Procedure: COLONOSCOPY;  Surgeon: Joie Morejon MD;  Location:  GI LAB;  Service: Gastroenterology    US GUIDED BREAST BIOPSY RIGHT COMPLETE Right 2/18/2022        Past Medical History:   Diagnosis Date    Arthritis     Bump     bumped head yesterday at work \"saw stars\" no LOC, cat scan done was normal    Circulation problem     Diabetes mellitus (HCC)     blood sugar 125 on admission    Encounter for screening colonoscopy     1 st one    Headache     Hyperlipidemia     " "Hypertension     Positive fecal occult blood test 08/30/2018    Added automatically from request for surgery [id number]    Post concussion syndrome 09/14/2018    Syncope              Review of Systems   Constitutional:  Negative for chills and fever.   HENT:  Negative for ear pain and sore throat.    Eyes:  Negative for pain and visual disturbance.   Respiratory:  Negative for cough and shortness of breath.    Cardiovascular:  Negative for chest pain and palpitations.   Gastrointestinal:  Negative for abdominal pain and vomiting.   Genitourinary:  Negative for dysuria and hematuria.   Musculoskeletal:  Negative for arthralgias and back pain.   Skin:  Negative for color change and rash.   Neurological:  Negative for seizures and syncope.   All other systems reviewed and are negative.        Objective:      /100 (BP Location: Left arm, Patient Position: Sitting, Cuff Size: Standard)   Pulse 83   Resp 18   Ht 5' 4\" (1.626 m)   Wt 74.4 kg (164 lb)   SpO2 100%   BMI 28.15 kg/m²     No results found for: \"PSA\"       Physical Exam  Vitals reviewed.   Constitutional:       General: She is not in acute distress.     Appearance: Normal appearance. She is not ill-appearing, toxic-appearing or diaphoretic.   HENT:      Head: Normocephalic and atraumatic.   Eyes:      Extraocular Movements: Extraocular movements intact.      Pupils: Pupils are equal, round, and reactive to light.   Pulmonary:      Effort: Pulmonary effort is normal.   Abdominal:      General: Abdomen is flat. There is no distension.      Palpations: Abdomen is soft. There is no mass.      Tenderness: There is no abdominal tenderness. There is no guarding or rebound.      Hernia: No hernia is present.   Skin:     General: Skin is warm.   Neurological:      General: No focal deficit present.      Mental Status: She is alert and oriented to person, place, and time. Mental status is at baseline.   Psychiatric:         Mood and Affect: Mood normal.         " "Behavior: Behavior normal.         Thought Content: Thought content normal.              Cystoscopy     Date/Time  1/12/2024 8:30 AM     Performed by  Sergo Man MD   Authorized by  Sergo Man MD     Universal Protocol:  Consent: Written consent obtained.  Risks and benefits: risks, benefits and alternatives were discussed  Consent given by: patient  Time out: Immediately prior to procedure a \"time out\" was called to verify the correct patient, procedure, equipment, support staff and site/side marked as required.  Patient understanding: patient states understanding of the procedure being performed  Patient consent: the patient's understanding of the procedure matches consent given  Procedure consent: procedure consent matches procedure scheduled  Patient identity confirmed: verbally with patient      Procedure Details:  Procedure type: cystoscopy    Patient tolerance: Patient tolerated the procedure well with no immediate complications    Additional Procedure Details: A female MA was in the room and served as a chaperone and assistant    The patient's external genitals were sterilely prepped and draped.  Viscous lidocaine was introduced into the urethra  A flexible cystoscope was introduced into the urethra    Urethra: Normal  Bladder: No lesions. Ureteral orifices in orthotopic position.  No diverticula or trabeculations          Narrative & Impression   CT CHEST, ABDOMEN AND PELVIS WITH IV CONTRAST     INDICATION:   D64.9: Anemia, unspecified  R63.4: Abnormal weight loss.     COMPARISON:  None.     TECHNIQUE: CT examination of the chest, abdomen and pelvis was performed. Multiplanar 2D reformatted images were created from the source data.     This examination, like all CT scans performed in the [facility], was performed utilizing techniques to minimize radiation dose exposure, including the use of iterative reconstruction and automated exposure control. Radiation dose length   product " (DLP) for this visit:  740.96 mGy-cm     IV Contrast:  100 mL of iohexol (OMNIPAQUE)  Enteric Contrast: Enteric contrast was not administered.     FINDINGS:     CHEST     LUNGS: Patent central airways.  Mild emphysema.  No suspicious nodules or masses.     PLEURA:  Unremarkable.     HEART/GREAT VESSELS: Mild coronary calcification. No thoracic aortic aneurysm. There are foci of air in the main pulmonary artery trunk, likely related to IV injection.     MEDIASTINUM AND ESTUARDO:  Unremarkable.     CHEST WALL AND LOWER NECK:  Unremarkable.     ABDOMEN     LIVER/BILIARY TREE:  Unremarkable.     GALLBLADDER:  No calcified gallstones. No pericholecystic inflammatory change.     SPLEEN:  Unremarkable.     PANCREAS:  Unremarkable.     ADRENAL GLANDS:  Unremarkable.     KIDNEYS/URETERS:  Unremarkable. No hydronephrosis.     STOMACH AND BOWEL:  Unremarkable.     APPENDIX: A normal appendix was visualized.     ABDOMINOPELVIC CAVITY:  No ascites.  No pneumoperitoneum.  No lymphadenopathy.     VESSELS:  Unremarkable for patient's age.     PELVIS     REPRODUCTIVE ORGANS: Surgical changes of prior hysterectomy.     URINARY BLADDER:  Unremarkable.     ABDOMINAL WALL/INGUINAL REGIONS:  There is a small fat-containing umbilical hernia.     OSSEOUS STRUCTURES:  No acute fracture or destructive osseous lesion.  Spinal degenerative changes are noted.     IMPRESSION:     No suspicious findings identified on CT chest, abdomen or pelvis.  Additional findings as described above.          Orders  Orders Placed This Encounter   Procedures    Cystoscopy     This order was created via procedure documentation    POCT urine dip

## 2024-01-12 ENCOUNTER — PROCEDURE VISIT (OUTPATIENT)
Dept: UROLOGY | Facility: AMBULATORY SURGERY CENTER | Age: 69
End: 2024-01-12
Payer: COMMERCIAL

## 2024-01-12 VITALS
HEART RATE: 83 BPM | WEIGHT: 164 LBS | HEIGHT: 64 IN | SYSTOLIC BLOOD PRESSURE: 160 MMHG | BODY MASS INDEX: 28 KG/M2 | OXYGEN SATURATION: 100 % | RESPIRATION RATE: 18 BRPM | DIASTOLIC BLOOD PRESSURE: 100 MMHG

## 2024-01-12 DIAGNOSIS — R31.29 MICROSCOPIC HEMATURIA: Primary | ICD-10-CM

## 2024-01-12 PROCEDURE — 81002 URINALYSIS NONAUTO W/O SCOPE: CPT | Performed by: UROLOGY

## 2024-01-12 PROCEDURE — 52000 CYSTOURETHROSCOPY: CPT | Performed by: UROLOGY

## 2024-01-12 NOTE — ASSESSMENT & PLAN NOTE
The patient has had a negative hematuria workup.  Recommend annual urinalysis with PCP and consideration for repeat workup in 3-5 years if micro hematuria persists.  If the patient has gross hematuria then they should see us immediately.

## 2024-01-15 ENCOUNTER — OFFICE VISIT (OUTPATIENT)
Dept: INTERNAL MEDICINE CLINIC | Facility: CLINIC | Age: 69
End: 2024-01-15

## 2024-01-15 VITALS
HEIGHT: 64 IN | WEIGHT: 167 LBS | DIASTOLIC BLOOD PRESSURE: 80 MMHG | SYSTOLIC BLOOD PRESSURE: 145 MMHG | BODY MASS INDEX: 28.51 KG/M2 | TEMPERATURE: 97.3 F | HEART RATE: 89 BPM

## 2024-01-15 DIAGNOSIS — M54.42 CHRONIC BILATERAL LOW BACK PAIN WITH LEFT-SIDED SCIATICA: ICD-10-CM

## 2024-01-15 DIAGNOSIS — M48.46XA: ICD-10-CM

## 2024-01-15 DIAGNOSIS — I10 BENIGN ESSENTIAL HYPERTENSION: ICD-10-CM

## 2024-01-15 DIAGNOSIS — M51.36 DEGENERATIVE DISC DISEASE, LUMBAR: ICD-10-CM

## 2024-01-15 DIAGNOSIS — G89.29 CHRONIC BILATERAL LOW BACK PAIN WITH LEFT-SIDED SCIATICA: ICD-10-CM

## 2024-01-15 DIAGNOSIS — Z13.820 SCREENING FOR OSTEOPOROSIS: ICD-10-CM

## 2024-01-15 DIAGNOSIS — M54.16 LUMBAR RADICULOPATHY: ICD-10-CM

## 2024-01-15 DIAGNOSIS — E11.42 TYPE 2 DIABETES MELLITUS WITH DIABETIC POLYNEUROPATHY, WITHOUT LONG-TERM CURRENT USE OF INSULIN (HCC): Primary | ICD-10-CM

## 2024-01-15 DIAGNOSIS — Z72.0 TOBACCO ABUSE: ICD-10-CM

## 2024-01-15 DIAGNOSIS — E78.2 MIXED HYPERLIPIDEMIA: ICD-10-CM

## 2024-01-15 DIAGNOSIS — M43.06: ICD-10-CM

## 2024-01-15 PROCEDURE — 99213 OFFICE O/P EST LOW 20 MIN: CPT | Performed by: INTERNAL MEDICINE

## 2024-01-15 RX ORDER — MELOXICAM 7.5 MG/1
7.5 TABLET ORAL DAILY
Qty: 4 TABLET | Refills: 0 | Status: SHIPPED | OUTPATIENT
Start: 2024-01-15 | End: 2024-01-18 | Stop reason: SDUPTHER

## 2024-01-15 RX ORDER — ATORVASTATIN CALCIUM 20 MG/1
20 TABLET, FILM COATED ORAL DAILY
Qty: 90 TABLET | Refills: 2 | Status: SHIPPED | OUTPATIENT
Start: 2024-01-15 | End: 2024-10-11

## 2024-01-15 RX ORDER — LISINOPRIL AND HYDROCHLOROTHIAZIDE 20; 12.5 MG/1; MG/1
1 TABLET ORAL DAILY
Qty: 90 TABLET | Refills: 2 | Status: SHIPPED | OUTPATIENT
Start: 2024-01-15

## 2024-01-15 NOTE — PATIENT INSTRUCTIONS
Thank you for visiting our clinic! It was nice seeing you!    Today, we discussed-  - Maintaining a blood glucose log and measuring your blood sugars at least once daily  - Please follow up with us in 3 months to review your diabetes

## 2024-01-15 NOTE — PROGRESS NOTES
[facility]  INTERNAL MEDICINE RESIDENCY    Clinic Visit Note  Jenifer Smith 68 y.o. female   MRN: [id number]    Assessment and Plan      Diagnoses and all orders for this visit:    Type 2 diabetes mellitus with diabetic polyneuropathy, without long-term current use of insulin (HCC)  -     atorvastatin (LIPITOR) 20 mg tablet; Take 1 tablet (20 mg total) by mouth daily  -     Empagliflozin (Jardiance) 10 MG TABS tablet; Take 1 tablet (10 mg total) by mouth every morning  Mixed hyperlipidemia  Benign essential hypertension  -     lisinopril-hydrochlorothiazide (PRINZIDE,ZESTORETIC) 20-12.5 MG per tablet; Take 1 tablet by mouth daily  Tobacco abuse  Lumbar radiculopathy  -     meloxicam (Mobic) 7.5 mg tablet; Take 1 tablet (7.5 mg total) by mouth daily Please take one tablet daily in the morning with food. Please do not take any other anti-inflammatory medications such as Aleve (Naproxen), Motrin (Ibuprofen), or Advil (Ibuprofen) while taking meloxicam.  Chronic bilateral low back pain with left-sided sciatica  -     meloxicam (Mobic) 7.5 mg tablet; Take 1 tablet (7.5 mg total) by mouth daily Please take one tablet daily in the morning with food. Please do not take any other anti-inflammatory medications such as Aleve (Naproxen), Motrin (Ibuprofen), or Advil (Ibuprofen) while taking meloxicam.  Screening for osteoporosis  Closed fracture of lumbar vertebra with spondylolysis  -     DXA bone density spine hip and pelvis; Future  Degenerative disc disease, lumbar  -     DXA bone density spine hip and pelvis; Future  -     meloxicam (Mobic) 7.5 mg tablet; Take 1 tablet (7.5 mg total) by mouth daily Please take one tablet daily in the morning with food. Please do not take any other anti-inflammatory medications such as Aleve (Naproxen), Motrin (Ibuprofen), or Advil (Ibuprofen) while taking meloxicam.          Lumbar spondylosis and chronic low back pain: Based on recent MRI imaging findings and  patient's reported history, she has had chronic low back pain.  -Was recently seen by orthopedics for this  -Provided with a referral to physical therapy  -She does have an upcoming spine and pain management appointment  -On meloxicam and Zanaflex PRN  -She is also on gabapentin 600 mg 3 times daily     Iron deficiency anemia:  -Based on recent iron panel with TIBC around 367 and low iron levels  -This can also be correlated with MCV of 76 based on recent CBC with hemoglobin around 8.9  -Currently on supplements with ferrous sulfate  -Prior colonoscopy from September 2022 noted to have normal colon  -EGD from October 2023 with moderate edematous and erythematous mucosa in the antrum of the stomach with otherwise normal esophagus and duodenum. she was subsequently recommended for CT scan with contrast which showed no suspicious findings identified.  -Following with GI  -UA indicative of microscopic hematuria and was subsequently seen by interventional radiology at which point, a cystoscopy was ordered.  He underwent cystoscopy with urology in December 2023 that was unremarkable.  She was advised by urology to follow-up with them if she develops gross hematuria.     Diabetes Mellitus type 2: Patient with history of diabetes mellitus type 2. Previous HbA1c was 7.8 from November 2023, will reassess A1c at next visit. Her sugars from glucometer are averaging 109-115 across a 30 day period. Patient denying any hypoglycemic episode events. Remains compliant with diet and medications. States that she recently had an appointment with an ophthalmologist in December.     -On metformin 1000 mg b.i.d.  -On jardiance 10 mg daily  -On gabapentin 600 mg t.i.d. for diabetic neuropathy  -On atorvastatin 20 mg daily  -Discussed diet and lifestyle modifications with patient  -Last diabetic eye exam in 12/2023 as per patient     CKD stage II: Patient with history chronic kidney disease stage 2 likely secondary to history of diabetes and  hypertension.  - Avoid nephrotoxic medications including NSAIDs  - Discussed optimization of BP to prevent further progression of kidney disease  - Monitor sCr at future visits     Hypertension:  Well controlled at home, reports not taking her BP meds this morning due to having to go to multiple other appointments/visits.  -On Prinzide 20-12.5 mg daily  -Discussed at home BP monitoring with patient  -Patient advised to monitor her blood pressure 3 times per week, once early morning and once in mid afternoo     Health Maintenance  -Screening mammogram in May 2023 that showed no mammographic evidence of malignancy with follow-up recommended at 1 year interval, repeat mammogram ordered today  -Had a sleep study at home performed in April 2023 that was inconclusive and patient was recommended for a lab sleep study  -Last screening colonoscopy in February 2022, normal with adequate bowel prep. Recommended for repeat in 10 years.   -Patient with history of total hysterectomy including removal of cervical cuff, no indication for Pap smears going forward     Plan  -Follow up visit with our clinic in 3 months to review diabetes and hypertension     BMI Counseling: Body mass index is 28.67 kg/m². The BMI is above normal. Nutrition recommendations include reducing portion sizes and 3-5 servings of fruits/vegetables daily. Exercise recommendations include moderate aerobic physical activity for 150 minutes/week and exercising 3-5 times per week.    Subjective     History of Present Illness:  Patient is a 68-year-old female with history of hypertension, hyperlipidemia, diabetes mellitus type 2, chronic bilateral low back pain.  Patient reports feeling well today. Due to patient's history of chronic low back pain, she was recently seen by orthopedic surgery and had lumbar MRI performed with imaging findings that were indicative of lumbar spondylosis. For her diabetes, her sugars from glucometer are averaging 109-115 across a 30  day period. Patient denying any hypoglycemic episode events. Remains compliant with diet and medications. States that she recently had an appointment with an ophthalmologist in December. In terms of her BP, she has a BP cuff at home and monitors her BP once daily. Reporting of readings at home around 110-120s mmHg systolics. Based on her recent MRI scan, I spoke with the patient that she is at increased risk for fracture and that she should be screened for osteoporosis and risk of fractures. She is interested in getting a DEXA scan which was ordered at todays visit. No other acute concerns at this time. Patient to follow up with our clinic in 3 months.     Review of Systems   Constitutional:  Negative for activity change, chills, fatigue and fever.   Respiratory:  Negative for cough and shortness of breath.    Cardiovascular:  Negative for chest pain and palpitations.   Gastrointestinal:  Negative for abdominal pain, nausea and vomiting.   Neurological:  Negative for dizziness and headaches.       Current Outpatient Medications:     Accu-Chek Softclix Lancets lancets, Use 1 (one) time for 1 dose Use as instructed, Disp: 200 each, Rfl: 2    atorvastatin (LIPITOR) 20 mg tablet, Take 1 tablet (20 mg total) by mouth daily, Disp: 90 tablet, Rfl: 2    Diclofenac Sodium (VOLTAREN) 1 %, APPLY 2 GRAMS TOPICALLY THREE TIMES A DAY, Disp: 100 g, Rfl: 2    ferrous sulfate 324 (65 Fe) mg, Take 1 tablet (324 mg total) by mouth every other day, Disp: 90 tablet, Rfl: 1    gabapentin (NEURONTIN) 300 mg capsule, Take 2 capsules (600 mg total) by mouth 3 (three) times a day, Disp: 540 capsule, Rfl: 0    glucose blood (Accu-Chek Guide) test strip, Use 1 each in the morning Use as instructed, Disp: 200 each, Rfl: 2    Jardiance 10 MG TABS tablet, TAKE 1 TABLET BY MOUTH ONCE DAILY IN THE MORNING, Disp: 90 tablet, Rfl: 0    Lancets Misc. (Accu-Chek Softclix Lancet Dev) KIT, Use in the morning, Disp: 1 kit, Rfl: 0     "lisinopril-hydrochlorothiazide (PRINZIDE,ZESTORETIC) 20-12.5 MG per tablet, Take 1 tablet by mouth daily, Disp: 90 tablet, Rfl: 2    meloxicam (Mobic) 7.5 mg tablet, Take 1 tablet (7.5 mg total) by mouth daily Please take one tablet daily in the morning with food. Please do not take any other anti-inflammatory medications such as Aleve (Naproxen), Motrin (Ibuprofen), or Advil (Ibuprofen) while taking meloxicam., Disp: 14 tablet, Rfl: 0    metFORMIN (GLUCOPHAGE) 1000 MG tablet, Take 1 tablet (1,000 mg total) by mouth 2 (two) times a day with meals, Disp: 180 tablet, Rfl: 3    tiZANidine (ZANAFLEX) 4 mg tablet, Take 1 tablet (4 mg total) by mouth daily at bedtime as needed for muscle spasms May make you drowsy. Do not drive until you know how it affects you., Disp: 14 tablet, Rfl: 0    acetaminophen (TYLENOL) 325 mg tablet, Take 650 mg by mouth every 6 (six) hours as needed for mild pain, Disp: , Rfl:     Elastic Bandages & Supports (GNP Diabetic Socks Unisex XL) MISC, Use 1 each if needed (1 pair of socks for diabetic neuropathy), Disp: 2 each, Rfl: 0  No Known Allergies  Past Medical History:   Diagnosis Date    Arthritis     Bump     bumped head yesterday at work \"saw stars\" no LOC, cat scan done was normal    Circulation problem     Diabetes mellitus (HCC)     blood sugar 125 on admission    Encounter for screening colonoscopy     1 st one    Headache     Hyperlipidemia     Hypertension     Positive fecal occult blood test 08/30/2018    Added automatically from request for surgery [id number]    Post concussion syndrome 09/14/2018    Syncope      Past Surgical History:   Procedure Laterality Date    BREAST BIOPSY Right 02/18/2022    COLONOSCOPY N/A 9/26/2018    Procedure: COLONOSCOPY;  Surgeon: Joie Morejon MD;  Location: [facility];  Service: Gastroenterology    HYSTERECTOMY  2017    INCISIONAL BREAST BIOPSY      last assessed 17Aug2017    IA COLONOSCOPY FLX DX W/COLLJ SPEC WHEN PFRMD N/A 7/5/2018    Procedure: " COLONOSCOPY;  Surgeon: Joie Morejon MD;  Location:  GI LAB;  Service: Gastroenterology    US GUIDED BREAST BIOPSY RIGHT COMPLETE Right 2/18/2022     Family History   Problem Relation Age of Onset    Breast cancer Cousin 50    Alcohol abuse Mother     Alcohol abuse Father     Breast cancer Daughter 38    No Known Problems Sister     No Known Problems Maternal Grandmother     No Known Problems Maternal Grandfather     No Known Problems Paternal Grandmother     No Known Problems Paternal Grandfather     No Known Problems Son     No Known Problems Son     No Known Problems Daughter     No Known Problems Daughter     No Known Problems Daughter     Breast cancer Sister 66    No Known Problems Maternal Aunt     No Known Problems Maternal Aunt     No Known Problems Maternal Aunt     No Known Problems Maternal Aunt     No Known Problems Maternal Aunt     No Known Problems Maternal Aunt     No Known Problems Maternal Aunt     No Known Problems Maternal Aunt     No Known Problems Maternal Aunt     No Known Problems Maternal Aunt     No Known Problems Maternal Aunt     No Known Problems Maternal Aunt     No Known Problems Paternal Aunt     No Known Problems Paternal Aunt     Dementia Paternal Aunt     No Known Problems Paternal Aunt     No Known Problems Paternal Aunt     No Known Problems Paternal Aunt     No Known Problems Paternal Aunt     No Known Problems Paternal Aunt     Colon cancer Neg Hx     Endometrial cancer Neg Hx     Ovarian cancer Neg Hx      Social History     Substance and Sexual Activity   Alcohol Use Never       Social History     Substance and Sexual Activity   Drug Use Never     Social History     Tobacco Use   Smoking Status Every Day    Current packs/day: 0.50    Average packs/day: 0.5 packs/day for 20.0 years (10.0 ttl pk-yrs)    Types: Cigarettes   Smokeless Tobacco Never   Tobacco Comments    smokes less than 1pk/day       Objective     Vitals:    01/15/24 1538   BP: 145/80   BP Location: Left arm  "  Patient Position: Sitting   Cuff Size: Large   Pulse: 89   Temp: (!) 97.3 °F (36.3 °C)   TempSrc: Temporal   Weight: 75.8 kg (167 lb)   Height: 5' 4\" (1.626 m)       Physical Exam  Vitals reviewed.   HENT:      Head: Normocephalic.   Cardiovascular:      Rate and Rhythm: Normal rate and regular rhythm.      Pulses: Normal pulses.      Heart sounds: Normal heart sounds.   Pulmonary:      Effort: Pulmonary effort is normal.      Breath sounds: Normal breath sounds.   Abdominal:      General: Bowel sounds are normal.      Palpations: Abdomen is soft.   Skin:     General: Skin is warm and dry.      Capillary Refill: Capillary refill takes less than 2 seconds.   Neurological:      Mental Status: She is alert and oriented to person, place, and time.           Care Time Delivered:   I have spent 34 minutes with patient today in which greater than 50% of this time was spent in counseling/coordination of care.      Morris Landrum MD  Internal Medicine Residency PGY-3  [facility], [place]      ==  PLEASE NOTE:  This encounter was completed utilizing the [employer] Voice Recognition Software. Grammatical errors, random word insertions, pronoun errors and incomplete sentences are occasional consequences of the system due to software limitations, ambient noise and hardware issues.These may be missed by proof reading prior to affixing electronic signature. Any questions or concerns about the content, text or information contained within the body of this dictation should be directly addressed to the physician for clarification. Please do not hesitate to call me directly if you have any any questions or concerns.  "

## 2024-01-18 ENCOUNTER — CONSULT (OUTPATIENT)
Dept: PAIN MEDICINE | Facility: CLINIC | Age: 69
End: 2024-01-18
Payer: COMMERCIAL

## 2024-01-18 VITALS
WEIGHT: 166 LBS | BODY MASS INDEX: 28.34 KG/M2 | DIASTOLIC BLOOD PRESSURE: 70 MMHG | SYSTOLIC BLOOD PRESSURE: 125 MMHG | HEIGHT: 64 IN

## 2024-01-18 DIAGNOSIS — M54.16 LUMBAR RADICULOPATHY: Primary | ICD-10-CM

## 2024-01-18 DIAGNOSIS — M48.061 SPINAL STENOSIS OF LUMBAR REGION, UNSPECIFIED WHETHER NEUROGENIC CLAUDICATION PRESENT: ICD-10-CM

## 2024-01-18 DIAGNOSIS — M47.816 LUMBAR SPONDYLOSIS: ICD-10-CM

## 2024-01-18 DIAGNOSIS — M79.18 MYOFASCIAL PAIN: ICD-10-CM

## 2024-01-18 PROBLEM — F33.9 DEPRESSION, RECURRENT (HCC): Status: ACTIVE | Noted: 2018-09-14

## 2024-01-18 PROCEDURE — 99204 OFFICE O/P NEW MOD 45 MIN: CPT | Performed by: ANESTHESIOLOGY

## 2024-01-18 RX ORDER — MELOXICAM 7.5 MG/1
7.5 TABLET ORAL DAILY PRN
Qty: 30 TABLET | Refills: 1 | Status: SHIPPED | OUTPATIENT
Start: 2024-01-18

## 2024-01-18 RX ORDER — TIZANIDINE 4 MG/1
4 TABLET ORAL
Qty: 30 TABLET | Refills: 1 | Status: SHIPPED | OUTPATIENT
Start: 2024-01-18

## 2024-01-18 NOTE — PROGRESS NOTES
Assessment  1. Lumbar radiculopathy    2. Lumbar spondylosis    3. Spinal stenosis of lumbar region, unspecified whether neurogenic claudication present    4. Myofascial pain        Plan  68-year-old with a history of hypertension and diabetes, referred by Dr. Agosto, presenting for initial consultation regarding a longstanding history of lumbosacral back pain with radicular symptoms in the L5 distribution of bilateral lower extremities worse on the right than left.  She denies any trauma or inciting event.  The patient has found relief with meloxicam and tizanidine as needed.  She does take gabapentin 600 mg 3 times daily with minimal relief.  Physical therapy was not helpful.  MRI of the lumbar spine demonstrates multilevel spondylosis with varying degrees of central and foraminal stenosis.  Moderate left foraminal stenosis at L3-4 and moderate right foraminal stenosis at L5-S1.  Central stenosis most severe at L3-4 and L4-5.    1.  I will schedule the patient for L5-S1 LESI  2.  Refill of meloxicam 7.5 mg daily as needed was provided  3.  Refill of tizanidine 4 mg nightly as needed was provided for her myofascial pain  4.  Patient will continue with gabapentin as prescribed  5.  Patient will continue with her home exercise program  6.  I will follow-up with patient in 6 weeks       Complete risks and benefits including bleeding, infection, tissue reaction, nerve injury and allergic reaction were discussed. The approach was demonstrated using models and literature was provided. Verbal and written consent was obtained.    My impressions and treatment recommendations were discussed in detail with the patient who verbalized understanding and had no further questions.  Discharge instructions were provided. I personally saw and examined the patient and I agree with the above discussed plan of care.    No orders of the defined types were placed in this encounter.    No orders of the defined types were placed in this  encounter.      History of Present Illness    Jenifer Smith is a 68 y.o. female with a history of hypertension and diabetes, referred by Dr. Agosto, presenting for initial consultation regarding a longstanding history of lumbosacral back pain with radiation into the lateral aspect of bilateral lower extremities worse on the right than left with associated numbness and subjective weakness.  She denies any trauma or inciting event.  She denies any bladder or bowel incontinence or saddle anesthesia.  The patient has found relief with meloxicam and tizanidine as needed.  She does take gabapentin 600 mg 3 times daily with minimal relief.  Physical therapy was not helpful.  MRI of the lumbar spine demonstrates multilevel spondylosis with varying degrees of central and foraminal stenosis.  Moderate left foraminal stenosis at L3-4 and moderate right foraminal stenosis at L5-S1.  Central stenosis most severe at L3-4 and L4-5.  The patient rates her pain a 10 out of 10 and the pain is constant.  The pain does not follow any particular pattern throughout the day.  The pain described as burning, numbness, sharp, pins-and-needles, and pressure-like.  The pain is decreased with prayer and exercise.  The pain is increased with lying down, standing, bending, and walking.  She does find some relief with heat and ice application.    Other than as stated above, the patient denies any interval changes in medications, medical condition, mental condition, symptoms, or allergies since the last office visit.    I have personally reviewed and/or updated the patient's past medical history, past surgical history, family history, social history, current medications, allergies, and vital signs today.     Review of Systems    Patient Active Problem List   Diagnosis    Benign essential hypertension    Diabetic peripheral neuropathy (HCC)    DM type 2 without retinopathy (HCC)    Hyperlipidemia    Chronic bilateral low back pain with  "left-sided sciatica    History of right breast biopsy    Breast pain, right    Visit for screening mammogram    Breast cyst, right    Breast mass, right    Carpal tunnel syndrome, bilateral    Depression    Type 2 diabetes mellitus with diabetic polyneuropathy, without long-term current use of insulin (HCC)    Overweight (BMI 25.0-29.9)    History of hysterectomy    Chest pain on exertion    Smoking    JOANNA (obstructive sleep apnea)    Microscopic hematuria       Past Medical History:   Diagnosis Date    Arthritis     Bump     bumped head yesterday at work \"saw stars\" no LOC, cat scan done was normal    Circulation problem     Diabetes mellitus (HCC)     blood sugar 125 on admission    Encounter for screening colonoscopy     1 st one    Headache     Hyperlipidemia     Hypertension     Positive fecal occult blood test 08/30/2018    Added automatically from request for surgery [id number]    Post concussion syndrome 09/14/2018    Syncope        Past Surgical History:   Procedure Laterality Date    BREAST BIOPSY Right 02/18/2022    COLONOSCOPY N/A 9/26/2018    Procedure: COLONOSCOPY;  Surgeon: Joie Morejon MD;  Location: [facility] GI LAB;  Service: Gastroenterology    HYSTERECTOMY  2017    INCISIONAL BREAST BIOPSY      last assessed [id number]    KS COLONOSCOPY FLX DX W/COLLJ SPEC WHEN PFRMD N/A 7/5/2018    Procedure: COLONOSCOPY;  Surgeon: Joie Morejon MD;  Location:  GI LAB;  Service: Gastroenterology    US GUIDED BREAST BIOPSY RIGHT COMPLETE Right 2/18/2022       Family History   Problem Relation Age of Onset    Breast cancer Cousin 50    Alcohol abuse Mother     Alcohol abuse Father     Breast cancer Daughter 38    No Known Problems Sister     No Known Problems Maternal Grandmother     No Known Problems Maternal Grandfather     No Known Problems Paternal Grandmother     No Known Problems Paternal Grandfather     No Known Problems Son     No Known Problems Son     No Known Problems Daughter     No Known Problems " Daughter     No Known Problems Daughter     Breast cancer Sister 66    No Known Problems Maternal Aunt     No Known Problems Maternal Aunt     No Known Problems Maternal Aunt     No Known Problems Maternal Aunt     No Known Problems Maternal Aunt     No Known Problems Maternal Aunt     No Known Problems Maternal Aunt     No Known Problems Maternal Aunt     No Known Problems Maternal Aunt     No Known Problems Maternal Aunt     No Known Problems Maternal Aunt     No Known Problems Maternal Aunt     No Known Problems Paternal Aunt     No Known Problems Paternal Aunt     Dementia Paternal Aunt     No Known Problems Paternal Aunt     No Known Problems Paternal Aunt     No Known Problems Paternal Aunt     No Known Problems Paternal Aunt     No Known Problems Paternal Aunt     Colon cancer Neg Hx     Endometrial cancer Neg Hx     Ovarian cancer Neg Hx        Social History     Occupational History    Occupation: [employer]   Tobacco Use    Smoking status: Every Day     Current packs/day: 0.50     Average packs/day: 0.5 packs/day for 20.0 years (10.0 ttl pk-yrs)     Types: Cigarettes    Smokeless tobacco: Never    Tobacco comments:     smokes less than 1pk/day   Vaping Use    Vaping status: Never Used   Substance and Sexual Activity    Alcohol use: Never    Drug use: Never    Sexual activity: Not Currently       Current Outpatient Medications on File Prior to Visit   Medication Sig    Accu-Chek Softclix Lancets lancets Use 1 (one) time for 1 dose Use as instructed    acetaminophen (TYLENOL) 325 mg tablet Take 650 mg by mouth every 6 (six) hours as needed for mild pain    atorvastatin (LIPITOR) 20 mg tablet Take 1 tablet (20 mg total) by mouth daily    Diclofenac Sodium (VOLTAREN) 1 % APPLY 2 GRAMS TOPICALLY THREE TIMES A DAY    Elastic Bandages & Supports (GNP Diabetic Socks Unisex XL) MISC Use 1 each if needed (1 pair of socks for diabetic neuropathy)    Empagliflozin (Jardiance) 10 MG TABS tablet Take 1 tablet (10  "mg total) by mouth every morning    ferrous sulfate 324 (65 Fe) mg Take 1 tablet (324 mg total) by mouth every other day    gabapentin (NEURONTIN) 300 mg capsule Take 2 capsules (600 mg total) by mouth 3 (three) times a day    glucose blood (Accu-Chek Guide) test strip Use 1 each in the morning Use as instructed    Lancets Misc. (Accu-Chek Softclix Lancet Dev) KIT Use in the morning    lisinopril-hydrochlorothiazide (PRINZIDE,ZESTORETIC) 20-12.5 MG per tablet Take 1 tablet by mouth daily    meloxicam (Mobic) 7.5 mg tablet Take 1 tablet (7.5 mg total) by mouth daily Please take one tablet daily in the morning with food. Please do not take any other anti-inflammatory medications such as Aleve (Naproxen), Motrin (Ibuprofen), or Advil (Ibuprofen) while taking meloxicam.    metFORMIN (GLUCOPHAGE) 1000 MG tablet Take 1 tablet (1,000 mg total) by mouth 2 (two) times a day with meals    tiZANidine (ZANAFLEX) 4 mg tablet Take 1 tablet (4 mg total) by mouth daily at bedtime as needed for muscle spasms May make you drowsy. Do not drive until you know how it affects you.     No current facility-administered medications on file prior to visit.       No Known Allergies    Physical Exam    /70   Ht 5' 4\" (1.626 m)   Wt 75.3 kg (166 lb)   BMI 28.49 kg/m²     Constitutional: normal, well developed, well nourished, alert, in no distress and non-toxic and no overt pain behavior.  Eyes: anicteric  HEENT: grossly intact  Neck: supple, symmetric, trachea midline and no masses   Pulmonary:even and unlabored  Cardiovascular:No edema or pitting edema present  Skin:Normal without rashes or lesions and well hydrated  Psychiatric:Mood and affect appropriate  Neurologic:Cranial Nerves II-XII grossly intact  Musculoskeletal:antalgic gait.  Bilateral lumbar paraspinals tender to palpation from L2-L5.  Bilateral SI joints tender to palpation.  Bilateral patellar and Achilles reflexes were 2-4 and symmetrical.  No clonus noted bilaterally. "  Bilateral lower extremity strength 5 out of 5 in all muscle groups.  Sensation intact light touch in L3-S1 dermatomes bilaterally.  Positive straight leg raise on the right and negative on the left.  Positive Sanya's test on the right.    Imaging      Study Result    Narrative & Impression   MRI LUMBAR SPINE WITHOUT CONTRAST     INDICATION: M54.16: Radiculopathy, lumbar region  M51.36: Other intervertebral disc degeneration, lumbar region.     COMPARISON:  None.     TECHNIQUE:  Multiplanar, multisequence imaging of the lumbar spine was performed. .        IMAGE QUALITY:  Diagnostic     FINDINGS:     VERTEBRAL BODIES:  There are 5 lumbar type vertebral bodies. There is trace anterolisthesis of L2-L3 and L4-L5. There is scoliosis. Normal marrow signal is identified within the visualized bony structures.  No discrete marrow lesion.     SACRUM:  Normal signal within the sacrum. No evidence of insufficiency or stress fracture.     DISTAL CORD AND CONUS:  Normal size and signal within the distal cord and conus.     PARASPINAL SOFT TISSUES:  Paraspinal soft tissues are unremarkable.     LOWER THORACIC DISC SPACES:  Normal disc height and signal.  No disc herniation, canal stenosis or foraminal narrowing.     LUMBAR DISC SPACES:     L1-L2:  Normal.     L2-L3: There is a bulging annulus. There is facet arthrosis. There is mild mass effect on the thecal sac. There is mild foraminal narrowing.     L3-L4: There is a bulging annulus, asymmetric to the left with annular fissure. There is facet arthrosis. There is mild to moderate mass effect on the thecal sac. There is moderate left foraminal narrowing with contact of the exiting nerve root.     L4-L5: There is a bulging annulus. There is facet arthrosis. There is mild mass effect on the thecal sac. There is mild to moderate left and mild right foraminal narrowing.     L5-S1: There is a bulging annulus, asymmetric to the right. There is facet arthrosis. There is mild mass  effect on thecal sac. There is moderate right and mild left foraminal narrowing.     OTHER FINDINGS:  None.     IMPRESSION:     Degenerative changes of the lumbar spine, as described above.     Multifactorial disease results in moderate left foraminal narrowing at L3-L4 with contact of the exiting L3 nerve root.     Moderate right foraminal narrowing with contact of the exiting right L5 nerve root is also present at L5-S1.        Workstation performed: [id number]           PACS Images     Show images for XR spine lumbar minimum 4 views non injury  Study Result    Narrative & Impression         LUMBAR SPINE     INDICATION:   Pain, unspecified.      COMPARISON:  Comparison made with previous examination(s) dated (CT) 09-Oct-2023,(DX) 25-Jul-2023,(DX) 24-Jun-2021,(NM) 28-May-2021,(DX) 09-Jan-2021.     VIEWS:  XR SPINE LUMBAR MINIMUM 4 VIEWS NON INJURY     FINDINGS:     There are 5 non rib bearing lumbar vertebral bodies.      There is no evidence of acute fracture or destructive osseous lesion.     There is multilevel degenerative disc disease, greater on the left at most levels, than the right. There is a secondary dextroscoliosis of the lumbar spine whose apex is at L3. Diffuse disc space narrowing is greatest at L4-5 and L5-S1.     The osseous structures are demineralized but intact.     There is a mild/grade 1 anterolisthesis of L2 on L3 that does not change significantly on flexion or extension.     There is calcification of the abdominal aorta, without aneurysm suggested.     IMPRESSION:        There is now suggestion of a mild anterolisthesis of L2 on L3 without other significant change in appearance from 6/24/2021.     Electronically signed: 12/11/2023 07:12 PM Ray Kiran MD

## 2024-01-22 ENCOUNTER — HOSPITAL ENCOUNTER (OUTPATIENT)
Dept: RADIOLOGY | Facility: CLINIC | Age: 69
Discharge: HOME | End: 2024-01-22
Payer: COMMERCIAL

## 2024-01-22 VITALS
DIASTOLIC BLOOD PRESSURE: 80 MMHG | OXYGEN SATURATION: 100 % | TEMPERATURE: 97.1 F | HEART RATE: 90 BPM | SYSTOLIC BLOOD PRESSURE: 157 MMHG | RESPIRATION RATE: 18 BRPM

## 2024-01-22 DIAGNOSIS — M54.16 LUMBAR RADICULOPATHY: ICD-10-CM

## 2024-01-22 PROCEDURE — 62323 NJX INTERLAMINAR LMBR/SAC: CPT | Performed by: ANESTHESIOLOGY

## 2024-01-22 RX ORDER — METHYLPREDNISOLONE ACETATE 80 MG/ML
80 INJECTION, SUSPENSION INTRA-ARTICULAR; INTRALESIONAL; INTRAMUSCULAR; PARENTERAL; SOFT TISSUE ONCE
Status: COMPLETED | OUTPATIENT
Start: 2024-01-22 | End: 2024-01-22

## 2024-01-22 RX ADMIN — METHYLPREDNISOLONE ACETATE 80 MG: 80 INJECTION, SUSPENSION INTRA-ARTICULAR; INTRALESIONAL; INTRAMUSCULAR; SOFT TISSUE at 14:25

## 2024-01-22 RX ADMIN — IOHEXOL 1 ML: 300 INJECTION, SOLUTION INTRAVENOUS at 14:25

## 2024-01-22 NOTE — DISCHARGE INSTRUCTIONS
Epidural Steroid Injection   WHAT YOU NEED TO KNOW:   An epidural steroid injection (RORO) is a procedure to inject steroid medicine into the epidural space. The epidural space is between your spinal cord and vertebrae. Steroids reduce inflammation and fluid buildup in your spine that may be causing pain. You may be given pain medicine along with the steroids.          ACTIVITY  Do not drive or operate machinery today.  No strenuous activity today - bending, lifting, etc.  You may resume normal activites starting tomorrow - start slowly and as tolerated.  You may shower today, but no tub baths or hot tubs.  You may have numbness for several hours from the local anesthetic. Please use caution and common sense, especially with weight-bearing activities.    CARE OF THE INJECTION SITE  If you have soreness or pain, apply ice to the area today (20 minutes on/20 minutes off).  Starting tomorrow, you may use warm, moist heat or ice if needed.  You may have an increase or change in your discomfort for 36-48 hours after your treatment.  Apply ice and continue with any pain medication you have been prescribed.  Notify the [facility] if you have any of the following: redness, drainage, swelling, headache, stiff neck or fever above 100°F.    SPECIAL INSTRUCTIONS  Our office will contact you in approximately 7 days for a progress report.    MEDICATIONS  Continue to take all routine medications.  Our office may have instructed you to hold some medications.    As no general anesthesia was used in today's procedure, you should not experience any side effects related to anesthesia.     If you are diabetic, the steroids used in today's injection may temporarily increase your blood sugar levels after the first few days after your injection. Please keep a close eye on your sugars and alert the doctor who manages your diabetes if your sugars are significantly high from your baseline or you are symptomatic.     If you have a  problem specifically related to your procedure, please call our office at [phone].  Problems not related to your procedure should be directed to your primary care physician.

## 2024-01-22 NOTE — H&P
"History of Present Illness: The patient is a 68 y.o. female who presents with complaints of low back and leg pain.    Past Medical History:   Diagnosis Date    Arthritis     Bump     bumped head yesterday at work \"saw stars\" no LOC, cat scan done was normal    Circulation problem     Diabetes mellitus (HCC)     blood sugar 125 on admission    Encounter for screening colonoscopy     1 st one    Headache     Hyperlipidemia     Hypertension     Positive fecal occult blood test 08/30/2018    Added automatically from request for surgery [id number]    Post concussion syndrome 09/14/2018    Syncope        Past Surgical History:   Procedure Laterality Date    BREAST BIOPSY Right 02/18/2022    COLONOSCOPY N/A 9/26/2018    Procedure: COLONOSCOPY;  Surgeon: Joie Morejon MD;  Location: [facility] GI LAB;  Service: Gastroenterology    HYSTERECTOMY  2017    INCISIONAL BREAST BIOPSY      last assessed 17Aug2017    KS COLONOSCOPY FLX DX W/COLLJ SPEC WHEN PFRMD N/A 7/5/2018    Procedure: COLONOSCOPY;  Surgeon: Joie Morejon MD;  Location:  GI LAB;  Service: Gastroenterology    US GUIDED BREAST BIOPSY RIGHT COMPLETE Right 2/18/2022         Current Outpatient Medications:     Accu-Chek Softclix Lancets lancets, Use 1 (one) time for 1 dose Use as instructed, Disp: 200 each, Rfl: 2    acetaminophen (TYLENOL) 325 mg tablet, Take 650 mg by mouth every 6 (six) hours as needed for mild pain, Disp: , Rfl:     atorvastatin (LIPITOR) 20 mg tablet, Take 1 tablet (20 mg total) by mouth daily, Disp: 90 tablet, Rfl: 2    Diclofenac Sodium (VOLTAREN) 1 %, APPLY 2 GRAMS TOPICALLY THREE TIMES A DAY, Disp: 100 g, Rfl: 2    Elastic Bandages & Supports (GNP Diabetic Socks Unisex XL) MISC, Use 1 each if needed (1 pair of socks for diabetic neuropathy), Disp: 2 each, Rfl: 0    Empagliflozin (Jardiance) 10 MG TABS tablet, Take 1 tablet (10 mg total) by mouth every morning, Disp: 90 tablet, Rfl: 0    ferrous sulfate 324 (65 Fe) mg, Take 1 tablet (324 mg " total) by mouth every other day, Disp: 90 tablet, Rfl: 1    gabapentin (NEURONTIN) 300 mg capsule, Take 2 capsules (600 mg total) by mouth 3 (three) times a day, Disp: 540 capsule, Rfl: 0    glucose blood (Accu-Chek Guide) test strip, Use 1 each in the morning Use as instructed, Disp: 200 each, Rfl: 2    Lancets Misc. (Accu-Chek Softclix Lancet Dev) KIT, Use in the morning, Disp: 1 kit, Rfl: 0    lisinopril-hydrochlorothiazide (PRINZIDE,ZESTORETIC) 20-12.5 MG per tablet, Take 1 tablet by mouth daily, Disp: 90 tablet, Rfl: 2    meloxicam (Mobic) 7.5 mg tablet, Take 1 tablet (7.5 mg total) by mouth daily as needed for moderate pain, Disp: 30 tablet, Rfl: 1    metFORMIN (GLUCOPHAGE) 1000 MG tablet, Take 1 tablet (1,000 mg total) by mouth 2 (two) times a day with meals, Disp: 180 tablet, Rfl: 3    tiZANidine (ZANAFLEX) 4 mg tablet, Take 1 tablet (4 mg total) by mouth daily at bedtime as needed for muscle spasms, Disp: 30 tablet, Rfl: 1  No current facility-administered medications for this encounter.    No Known Allergies    Physical Exam:   Vitals:    01/22/24 1430   BP: 157/80   Pulse: 90   Resp: 18   Temp:    SpO2: 100%     General: Awake, Alert, Oriented x 3, Mood and affect appropriate  Respiratory: Respirations even and unlabored  Cardiovascular: Peripheral pulses intact; no edema  Musculoskeletal Exam: Bilateral lumbar paraspinals TTP    ASA Score: 3    Patient/Chart Verification  Patient ID Verified: Verbal  ID Band Applied: No  Consents Confirmed: Procedural, To be obtained in the Pre-Procedure area  Interval H&P(within 24 hr) Complete (required for Outpatients and Surgery Admit only): To be obtained in the Pre-Procedure area  Allergies Reviewed: Yes  Anticoag/NSAID held?: NA  Currently on antibiotics?: No    Assessment:   1. Lumbar radiculopathy        Plan: L5-S1 LESI

## 2024-01-24 ENCOUNTER — PATIENT OUTREACH (OUTPATIENT)
Dept: INTERNAL MEDICINE CLINIC | Facility: CLINIC | Age: 69
End: 2024-01-24

## 2024-01-24 NOTE — PROGRESS NOTES
SW returned call to pt who had questions re her dgt in law's medical coverage options.  SW referred pt to contact our Financial Counselors.

## 2024-01-29 ENCOUNTER — TELEPHONE (OUTPATIENT)
Dept: PAIN MEDICINE | Facility: CLINIC | Age: 69
End: 2024-01-29

## 2024-02-05 DIAGNOSIS — E11.42 TYPE 2 DIABETES MELLITUS WITH DIABETIC POLYNEUROPATHY, WITHOUT LONG-TERM CURRENT USE OF INSULIN (HCC): ICD-10-CM

## 2024-02-05 DIAGNOSIS — G89.29 CHRONIC BILATERAL LOW BACK PAIN WITH LEFT-SIDED SCIATICA: ICD-10-CM

## 2024-02-05 DIAGNOSIS — M54.42 CHRONIC BILATERAL LOW BACK PAIN WITH LEFT-SIDED SCIATICA: ICD-10-CM

## 2024-02-05 RX ORDER — GABAPENTIN 300 MG/1
600 CAPSULE ORAL 3 TIMES DAILY
Qty: 540 CAPSULE | Refills: 0 | Status: SHIPPED | OUTPATIENT
Start: 2024-02-05

## 2024-02-07 ENCOUNTER — TELEPHONE (OUTPATIENT)
Dept: INTERNAL MEDICINE CLINIC | Facility: CLINIC | Age: 69
End: 2024-02-07

## 2024-03-14 ENCOUNTER — PATIENT OUTREACH (OUTPATIENT)
Dept: INTERNAL MEDICINE CLINIC | Facility: CLINIC | Age: 69
End: 2024-03-14

## 2024-03-14 ENCOUNTER — OFFICE VISIT (OUTPATIENT)
Dept: INTERNAL MEDICINE CLINIC | Facility: CLINIC | Age: 69
End: 2024-03-14

## 2024-03-14 ENCOUNTER — APPOINTMENT (OUTPATIENT)
Dept: LAB | Facility: CLINIC | Age: 69
End: 2024-03-14
Payer: COMMERCIAL

## 2024-03-14 VITALS
HEART RATE: 97 BPM | SYSTOLIC BLOOD PRESSURE: 151 MMHG | OXYGEN SATURATION: 98 % | BODY MASS INDEX: 28.08 KG/M2 | DIASTOLIC BLOOD PRESSURE: 85 MMHG | WEIGHT: 163.6 LBS | TEMPERATURE: 97.7 F

## 2024-03-14 DIAGNOSIS — Z86.39 H/O TYPE 2 DIABETES MELLITUS: ICD-10-CM

## 2024-03-14 DIAGNOSIS — M47.816 LUMBAR SPONDYLOSIS: ICD-10-CM

## 2024-03-14 DIAGNOSIS — F33.9 DEPRESSION, RECURRENT (HCC): ICD-10-CM

## 2024-03-14 DIAGNOSIS — D64.9 ANEMIA, UNSPECIFIED TYPE: ICD-10-CM

## 2024-03-14 DIAGNOSIS — G89.29 CHRONIC BILATERAL LOW BACK PAIN WITH LEFT-SIDED SCIATICA: ICD-10-CM

## 2024-03-14 DIAGNOSIS — M79.18 MYOFASCIAL PAIN: ICD-10-CM

## 2024-03-14 DIAGNOSIS — Z91.89 AT HIGH RISK FOR FRACTURE: ICD-10-CM

## 2024-03-14 DIAGNOSIS — R10.819 SUPRAPUBIC TENDERNESS: ICD-10-CM

## 2024-03-14 DIAGNOSIS — Z91.89 AT RISK OF FRACTURE DUE TO OSTEOPOROSIS: ICD-10-CM

## 2024-03-14 DIAGNOSIS — M54.42 CHRONIC BILATERAL LOW BACK PAIN WITH LEFT-SIDED SCIATICA: ICD-10-CM

## 2024-03-14 DIAGNOSIS — E53.8 LOW SERUM VITAMIN B12: Primary | ICD-10-CM

## 2024-03-14 DIAGNOSIS — I10 BENIGN ESSENTIAL HYPERTENSION: ICD-10-CM

## 2024-03-14 DIAGNOSIS — M54.16 LUMBAR RADICULOPATHY: ICD-10-CM

## 2024-03-14 DIAGNOSIS — M81.0 AT RISK OF FRACTURE DUE TO OSTEOPOROSIS: ICD-10-CM

## 2024-03-14 DIAGNOSIS — E11.42 TYPE 2 DIABETES MELLITUS WITH DIABETIC POLYNEUROPATHY, WITHOUT LONG-TERM CURRENT USE OF INSULIN (HCC): ICD-10-CM

## 2024-03-14 DIAGNOSIS — E11.42 TYPE 2 DIABETES MELLITUS WITH DIABETIC POLYNEUROPATHY, WITHOUT LONG-TERM CURRENT USE OF INSULIN (HCC): Primary | ICD-10-CM

## 2024-03-14 LAB
ANION GAP SERPL CALCULATED.3IONS-SCNC: 10 MMOL/L (ref 4–13)
BASOPHILS # BLD AUTO: 0.03 THOUSANDS/ÂΜL (ref 0–0.1)
BASOPHILS NFR BLD AUTO: 0 % (ref 0–1)
BUN SERPL-MCNC: 20 MG/DL (ref 5–25)
CALCIUM SERPL-MCNC: 10 MG/DL (ref 8.4–10.2)
CHLORIDE SERPL-SCNC: 107 MMOL/L (ref 96–108)
CO2 SERPL-SCNC: 26 MMOL/L (ref 21–32)
CREAT SERPL-MCNC: 0.88 MG/DL (ref 0.6–1.3)
EOSINOPHIL # BLD AUTO: 0.09 THOUSAND/ÂΜL (ref 0–0.61)
EOSINOPHIL NFR BLD AUTO: 1 % (ref 0–6)
ERYTHROCYTE [DISTWIDTH] IN BLOOD BY AUTOMATED COUNT: 19.1 % (ref 11.6–15.1)
GFR SERPL CREATININE-BSD FRML MDRD: 68 ML/MIN/1.73SQ M
GLUCOSE SERPL-MCNC: 110 MG/DL (ref 65–140)
HCT VFR BLD AUTO: 36 % (ref 34.8–46.1)
HGB BLD-MCNC: 11.5 G/DL (ref 11.5–15.4)
IMM GRANULOCYTES # BLD AUTO: 0.03 THOUSAND/UL (ref 0–0.2)
IMM GRANULOCYTES NFR BLD AUTO: 0 % (ref 0–2)
LYMPHOCYTES # BLD AUTO: 1.74 THOUSANDS/ÂΜL (ref 0.6–4.47)
LYMPHOCYTES NFR BLD AUTO: 23 % (ref 14–44)
MCH RBC QN AUTO: 26.3 PG (ref 26.8–34.3)
MCHC RBC AUTO-ENTMCNC: 31.9 G/DL (ref 31.4–37.4)
MCV RBC AUTO: 82 FL (ref 82–98)
MONOCYTES # BLD AUTO: 0.76 THOUSAND/ÂΜL (ref 0.17–1.22)
MONOCYTES NFR BLD AUTO: 10 % (ref 4–12)
NEUTROPHILS # BLD AUTO: 4.81 THOUSANDS/ÂΜL (ref 1.85–7.62)
NEUTS SEG NFR BLD AUTO: 66 % (ref 43–75)
NRBC BLD AUTO-RTO: 0 /100 WBCS
PLATELET # BLD AUTO: 319 THOUSANDS/UL (ref 149–390)
PMV BLD AUTO: 10.4 FL (ref 8.9–12.7)
POTASSIUM SERPL-SCNC: 4.2 MMOL/L (ref 3.5–5.3)
RBC # BLD AUTO: 4.38 MILLION/UL (ref 3.81–5.12)
SL AMB  POCT GLUCOSE, UA: NORMAL
SL AMB LEUKOCYTE ESTERASE,UA: NEGATIVE
SL AMB POCT BILIRUBIN,UA: NEGATIVE
SL AMB POCT BLOOD,UA: NEGATIVE
SL AMB POCT CLARITY,UA: CLEAR
SL AMB POCT COLOR,UA: YELLOW
SL AMB POCT HEMOGLOBIN AIC: 6.9 (ref ?–6.5)
SL AMB POCT KETONES,UA: NEGATIVE
SL AMB POCT NITRITE,UA: NEGATIVE
SL AMB POCT PH,UA: 6
SL AMB POCT SPECIFIC GRAVITY,UA: 1.02
SL AMB POCT URINE PROTEIN: NEGATIVE
SL AMB POCT UROBILINOGEN: 0.2
SODIUM SERPL-SCNC: 143 MMOL/L (ref 135–147)
VIT B12 SERPL-MCNC: 390 PG/ML (ref 180–914)
WBC # BLD AUTO: 7.46 THOUSAND/UL (ref 4.31–10.16)

## 2024-03-14 PROCEDURE — 99213 OFFICE O/P EST LOW 20 MIN: CPT | Performed by: STUDENT IN AN ORGANIZED HEALTH CARE EDUCATION/TRAINING PROGRAM

## 2024-03-14 PROCEDURE — 81002 URINALYSIS NONAUTO W/O SCOPE: CPT | Performed by: STUDENT IN AN ORGANIZED HEALTH CARE EDUCATION/TRAINING PROGRAM

## 2024-03-14 PROCEDURE — 85025 COMPLETE CBC W/AUTO DIFF WBC: CPT

## 2024-03-14 PROCEDURE — 82607 VITAMIN B-12: CPT

## 2024-03-14 PROCEDURE — 36415 COLL VENOUS BLD VENIPUNCTURE: CPT

## 2024-03-14 PROCEDURE — 80048 BASIC METABOLIC PNL TOTAL CA: CPT

## 2024-03-14 PROCEDURE — 83036 HEMOGLOBIN GLYCOSYLATED A1C: CPT | Performed by: STUDENT IN AN ORGANIZED HEALTH CARE EDUCATION/TRAINING PROGRAM

## 2024-03-14 RX ORDER — MELOXICAM 7.5 MG/1
7.5 TABLET ORAL DAILY PRN
Qty: 10 TABLET | Refills: 0 | Status: SHIPPED | OUTPATIENT
Start: 2024-03-14 | End: 2024-03-24

## 2024-03-14 RX ORDER — GABAPENTIN 300 MG/1
600 CAPSULE ORAL 2 TIMES DAILY
Qty: 540 CAPSULE | Refills: 0 | Status: SHIPPED | OUTPATIENT
Start: 2024-03-14

## 2024-03-14 RX ORDER — TIZANIDINE 4 MG/1
4 TABLET ORAL
Qty: 10 TABLET | Refills: 0 | Status: SHIPPED | OUTPATIENT
Start: 2024-03-14 | End: 2024-03-24

## 2024-03-14 RX ORDER — GABAPENTIN 300 MG/1
900 CAPSULE ORAL
Qty: 60 CAPSULE | Refills: 5 | Status: SHIPPED | OUTPATIENT
Start: 2024-03-14

## 2024-03-14 RX ORDER — CYANOCOBALAMIN (VITAMIN B-12) 500 MCG
500 TABLET ORAL DAILY
Qty: 30 TABLET | Refills: 0 | Status: SHIPPED | OUTPATIENT
Start: 2024-03-14 | End: 2024-04-13

## 2024-03-14 NOTE — PATIENT INSTRUCTIONS
Thank you for visiting our clinic! It was nice seeing you!    Today, we discussed-  - Diet and lifestyle modifications and strategies for weight loss  - Maintaining a BP log and measuring BP 3 times per week, once in early morning and then in mid afternoon  - Maintaining a blood glucose log and measuring your blood sugars at least once daily  - Please follow up on your lab work, we will give you a call with results once lab results are available  - Please call [phone] to schedule your DEXA scan  - Please follow up with us in 6 weeks to review your diabetes with myself

## 2024-03-14 NOTE — PROGRESS NOTES
[facility]  INTERNAL MEDICINE RESIDENCY    Clinic Visit Note  Jenifer Smith 68 y.o. female   MRN: [id number]    Assessment and Plan      Diagnoses and all orders for this visit:    Type 2 diabetes mellitus with diabetic polyneuropathy, without long-term current use of insulin (HCC)  -     POCT hemoglobin A1c  -     Vitamin B12; Future  -     Basic metabolic panel; Future  -     gabapentin (NEURONTIN) 300 mg capsule; Take 2 capsules (600 mg total) by mouth 2 (two) times a day  Suprapubic tenderness  -     POCT urine dip  At high risk for fracture  -     DXA bone density spine hip and pelvis; Future  At risk of fracture due to osteoporosis  -     DXA bone density spine hip and pelvis; Future  H/O type 2 diabetes mellitus  -     Vitamin B12; Future  Depression, recurrent (HCC)  Lumbar spondylosis  Lumbar radiculopathy  -     tiZANidine (ZANAFLEX) 4 mg tablet; Take 1 tablet (4 mg total) by mouth daily at bedtime as needed for muscle spasms for up to 10 days  -     meloxicam (Mobic) 7.5 mg tablet; Take 1 tablet (7.5 mg total) by mouth daily as needed for moderate pain for up to 10 days  -     gabapentin (NEURONTIN) 300 mg capsule; Take 3 capsules (900 mg total) by mouth daily at bedtime  Benign essential hypertension  Anemia, unspecified type  -     CBC and differential; Future  Myofascial pain  -     meloxicam (Mobic) 7.5 mg tablet; Take 1 tablet (7.5 mg total) by mouth daily as needed for moderate pain for up to 10 days  Chronic bilateral low back pain with left-sided sciatica  -     gabapentin (NEURONTIN) 300 mg capsule; Take 2 capsules (600 mg total) by mouth 2 (two) times a day  -     Ambulatory Referral to Social Work Care Management Program; Future        Lumbar spondylosis and chronic low back pain: Based on recent MRI imaging findings and patient's reported history, she has had chronic low back pain.  -Was recently seen by orthopedics for this  -Provided with a referral to physical therapy  previously  -Spoke with our clinic for scheduling an appointment with spine and pain management  -On meloxicam and Zanaflex PRN, given short course refill until she can see them again  -Gabapentin increased to following schedule: 600 mg in morning, 600 mg in afternoon, 900 mg at bedtime     Iron deficiency anemia:  -Based on recent iron panel with TIBC around 367 and low iron levels  -This can also be correlated with MCV of 76 based on recent CBC with hemoglobin around 8.9  -Currently on supplements with ferrous sulfate  -Prior colonoscopy from September 2022 noted to have normal colon  -EGD from October 2023 with moderate edematous and erythematous mucosa in the antrum of the stomach with otherwise normal esophagus and duodenum. she was subsequently recommended for CT scan with contrast which showed no suspicious findings identified.  -Following with GI  -UA indicative of microscopic hematuria and was subsequently seen by interventional radiology at which point, a cystoscopy was ordered.  He underwent cystoscopy with urology in December 2023 that was unremarkable.  She was advised by urology to follow-up with them if she develops gross hematuria.     Diabetes Mellitus type 2: Patient with history of diabetes mellitus type 2. Previous HbA1c was 7.8 from November 2023, A1c today at 6.9. Her sugars from glucometer are averaging  across a 30 day period. Patient denying any hypoglycemic episode events. Remains compliant with diet and medications. States that she recently had an appointment with an ophthalmologist in December.      -On metformin 1000 mg b.i.d.  -Jardiance discontinued  -On gabapentin dosage as mentioned above  -On atorvastatin 20 mg daily  -Discussed diet and lifestyle modifications with patient  -Last diabetic eye exam in 12/2023 as per patient  -Foot exam done today     CKD stage II: Patient with history chronic kidney disease stage 2 likely secondary to history of diabetes and hypertension.  -  Avoid nephrotoxic medications including NSAIDs  - Discussed optimization of BP to prevent further progression of kidney disease  - Monitor sCr at future visits     Hypertension:  Systolic today at 151 mmHG, repeat check at 156 mmHg. Patient wants to hold off on increasing antihypertensive regimen at this time.   -On Prinzide 20-12.5 mg daily  -Discussed at home BP monitoring with patient  -Patient advised to monitor her blood pressure 3 times per week, once early morning and once in mid afternoo     Health Maintenance  -Screening mammogram in May 2023 that showed no mammographic evidence of malignancy with follow-up recommended at 1 year interval, repeat mammogram ordered today  -Had a sleep study at home performed in April 2023 that was inconclusive and patient was recommended for a lab sleep study  -Last screening colonoscopy in February 2022, normal with adequate bowel prep. Recommended for repeat in 10 years.   -Patient with history of total hysterectomy including removal of cervical cuff, no indication for Pap smears going forward  -DEXA ordered today     Plan  -Patient to follow up with our clinic in 6 weeks for DM2 with myself, patient advised about upcoming changes to PCP status (new PCP Dr. Robles)      BMI Counseling: Body mass index is 28.08 kg/m². The BMI is above normal. Nutrition recommendations include reducing portion sizes and decreasing overall calorie intake. Exercise recommendations include moderate aerobic physical activity for 150 minutes/week and joining a gym.          Subjective     History of Present Illness:  Patient is a 68-year-old female with history of hypertension, hyperlipidemia, diabetes mellitus type 2, chronic bilateral low back pain.  Today in terms of her diabetes, patient reports that her average blood sugars are between 97-1 08 and she did bring the glucometer.  Since her A1c today was 6.9, we spoke with the patient about discontinuing the Jardiance at this time and she is  agreeable to this.  She also reports of chronic low back pain and leg pain that has progressively become worse.  She states that she has been trying to schedule an appointment with spine and pain medicine for pain medications but is unable to do so at this time.  Spoke with the patient about doing a short course refill for tizanidine and meloxicam at this time.  We will work with her clinic coordinator to schedule an appointment for pain medicine for her.  Also advised the patient to follow-up on DEXA scan since she needs it because she is at high risk for fractures.  She is reporting of mild suprapubic pain and urgency because of which urine dipstick was done at today's clinic visit and was noted to be negative.  Patient denies any hematuria or flank pain at this time.  Patient has multiple stressors at home including being involved in taking care of her brother which causes significant distress for her.  Her clinic  is now aware and is working with the patient in terms of getting her support for this.  Referral to social care program was also ordered at this visit.  In terms of her elevated blood pressures, I spoke with the patient about going up on antihypertensive regimen but she would like to monitor it at home with a blood pressure cuff and will call into our clinic if the pressures are very high.  She is to follow-up with our clinic in 6 weeks for review of diabetes.    Review of Systems   Constitutional:  Negative for chills, fatigue and fever.   Respiratory:  Negative for cough and shortness of breath.    Cardiovascular:  Negative for chest pain and palpitations.   Gastrointestinal:  Negative for abdominal pain and vomiting.   Genitourinary:  Positive for frequency and urgency. Negative for difficulty urinating, flank pain, hematuria and vaginal bleeding.   Psychiatric/Behavioral:  Positive for sleep disturbance. Negative for agitation, confusion, hallucinations, self-injury and suicidal ideas.  "         Current Outpatient Medications:     acetaminophen (TYLENOL) 325 mg tablet, Take 650 mg by mouth every 6 (six) hours, Disp: , Rfl:     atorvastatin (LIPITOR) 20 mg tablet, Take 1 tablet (20 mg total) by mouth daily, Disp: 90 tablet, Rfl: 2    Diclofenac Sodium (VOLTAREN) 1 %, APPLY 2 GRAMS TOPICALLY THREE TIMES A DAY, Disp: 100 g, Rfl: 2    ferrous sulfate 324 (65 Fe) mg, Take 1 tablet (324 mg total) by mouth every other day, Disp: 90 tablet, Rfl: 1    gabapentin (NEURONTIN) 300 mg capsule, Take 2 capsules (600 mg total) by mouth 2 (two) times a day, Disp: 540 capsule, Rfl: 0    gabapentin (NEURONTIN) 300 mg capsule, Take 3 capsules (900 mg total) by mouth daily at bedtime, Disp: 60 capsule, Rfl: 5    lisinopril-hydrochlorothiazide (PRINZIDE,ZESTORETIC) 20-12.5 MG per tablet, Take 1 tablet by mouth daily, Disp: 90 tablet, Rfl: 2    meloxicam (Mobic) 7.5 mg tablet, Take 1 tablet (7.5 mg total) by mouth daily as needed for moderate pain for up to 10 days, Disp: 10 tablet, Rfl: 0    metFORMIN (GLUCOPHAGE) 1000 MG tablet, Take 1 tablet (1,000 mg total) by mouth 2 (two) times a day with meals, Disp: 180 tablet, Rfl: 3    tiZANidine (ZANAFLEX) 4 mg tablet, Take 1 tablet (4 mg total) by mouth daily at bedtime as needed for muscle spasms for up to 10 days, Disp: 10 tablet, Rfl: 0    Accu-Chek Softclix Lancets lancets, Use 1 (one) time for 1 dose Use as instructed, Disp: 200 each, Rfl: 2    Elastic Bandages & Supports (GNP Diabetic Socks Unisex XL) MISC, Use 1 each if needed (1 pair of socks for diabetic neuropathy), Disp: 2 each, Rfl: 0    glucose blood (Accu-Chek Guide) test strip, Use 1 each in the morning Use as instructed, Disp: 200 each, Rfl: 2    Lancets Misc. (Accu-Chek Softclix Lancet Dev) KIT, Use in the morning, Disp: 1 kit, Rfl: 0  No Known Allergies  Past Medical History:   Diagnosis Date    Arthritis     Bump     bumped head yesterday at work \"saw stars\" no LOC, cat scan done was normal    Circulation " problem     Diabetes mellitus (HCC)     blood sugar 125 on admission    Encounter for screening colonoscopy     1 st one    Headache     Hyperlipidemia     Hypertension     Positive fecal occult blood test 08/30/2018    Added automatically from request for surgery [id number]    Post concussion syndrome 09/14/2018    Syncope      Past Surgical History:   Procedure Laterality Date    BREAST BIOPSY Right 02/18/2022    COLONOSCOPY N/A 9/26/2018    Procedure: COLONOSCOPY;  Surgeon: Joie Morjeon MD;  Location: [facility];  Service: Gastroenterology    HYSTERECTOMY  2017    INCISIONAL BREAST BIOPSY      last assessed 17Aug2017    TN COLONOSCOPY FLX DX W/COLLJ SPEC WHEN PFRMD N/A 7/5/2018    Procedure: COLONOSCOPY;  Surgeon: Joie Morejon MD;  Location:  GI LAB;  Service: Gastroenterology    US GUIDED BREAST BIOPSY RIGHT COMPLETE Right 2/18/2022     Family History   Problem Relation Age of Onset    Breast cancer Cousin 50    Alcohol abuse Mother     Alcohol abuse Father     Breast cancer Daughter 38    No Known Problems Sister     No Known Problems Maternal Grandmother     No Known Problems Maternal Grandfather     No Known Problems Paternal Grandmother     No Known Problems Paternal Grandfather     No Known Problems Son     No Known Problems Son     No Known Problems Daughter     No Known Problems Daughter     No Known Problems Daughter     Breast cancer Sister 66    No Known Problems Maternal Aunt     No Known Problems Maternal Aunt     No Known Problems Maternal Aunt     No Known Problems Maternal Aunt     No Known Problems Maternal Aunt     No Known Problems Maternal Aunt     No Known Problems Maternal Aunt     No Known Problems Maternal Aunt     No Known Problems Maternal Aunt     No Known Problems Maternal Aunt     No Known Problems Maternal Aunt     No Known Problems Maternal Aunt     No Known Problems Paternal Aunt     No Known Problems Paternal Aunt     Dementia Paternal Aunt     No Known Problems Paternal  Aunt     No Known Problems Paternal Aunt     No Known Problems Paternal Aunt     No Known Problems Paternal Aunt     No Known Problems Paternal Aunt     Colon cancer Neg Hx     Endometrial cancer Neg Hx     Ovarian cancer Neg Hx      Social History     Substance and Sexual Activity   Alcohol Use Never       Social History     Substance and Sexual Activity   Drug Use Never     Social History     Tobacco Use   Smoking Status Every Day    Current packs/day: 0.50    Average packs/day: 0.5 packs/day for 20.0 years (10.0 ttl pk-yrs)    Types: Cigarettes   Smokeless Tobacco Never   Tobacco Comments    smokes less than 1pk/day       Objective     Vitals:    03/14/24 1011   BP: 151/85   BP Location: Left arm   Patient Position: Sitting   Cuff Size: Standard   Pulse: 97   Temp: 97.7 °F (36.5 °C)   TempSrc: Temporal   SpO2: 98%   Weight: 74.2 kg (163 lb 9.6 oz)       Physical Exam  Vitals reviewed.   HENT:      Head: Normocephalic.   Cardiovascular:      Rate and Rhythm: Normal rate and regular rhythm.      Pulses: Normal pulses.           Dorsalis pedis pulses are 2+ on the right side and 2+ on the left side.        Posterior tibial pulses are 2+ on the right side and 2+ on the left side.      Heart sounds: Normal heart sounds.   Pulmonary:      Effort: Pulmonary effort is normal.      Breath sounds: Normal breath sounds.   Abdominal:      General: Bowel sounds are normal.      Palpations: Abdomen is soft.      Comments: Mild suprapubic tenderness   Musculoskeletal:        Feet:    Feet:      Right foot:      Skin integrity: No ulcer, skin breakdown, erythema, warmth, callus or dry skin.      Left foot:      Skin integrity: No ulcer, skin breakdown, erythema, warmth, callus or dry skin.   Skin:     General: Skin is warm and dry.      Capillary Refill: Capillary refill takes less than 2 seconds.   Neurological:      Mental Status: She is alert and oriented to person, place, and time.       Patient's shoes and socks  removed.    Right Foot/Ankle   Right Foot Inspection  Skin Exam: skin normal and skin intact. No dry skin, no warmth, no callus, no erythema, no maceration, no abnormal color, no pre-ulcer, no ulcer and no callus.     Sensory   Proprioception: diminished  Monofilament testing: diminished    Vascular  Capillary refills: < 3 seconds  The right DP pulse is 2+. The right PT pulse is 2+.     Left Foot/Ankle  Left Foot Inspection  Skin Exam: skin normal and skin intact. No dry skin, no warmth, no erythema, no maceration, normal color, no pre-ulcer, no ulcer and no callus.     Sensory   Proprioception: intact  Monofilament testing: intact    Vascular  Capillary refills: < 3 seconds  The left DP pulse is 2+. The left PT pulse is 2+.             Care Time Delivered:   I have spent 48 minutes with patient today in which greater than 50% of this time was spent in counseling/coordination of care.      Morris Landrum MD  Internal Medicine Residency PGY-3  [facility] [address], [facility]      ==  PLEASE NOTE:  This encounter was completed utilizing the Dragon [employer] Voice Recognition Software. Grammatical errors, random word insertions, pronoun errors and incomplete sentences are occasional consequences of the system due to software limitations, ambient noise and hardware issues.These may be missed by proof reading prior to affixing electronic signature. Any questions or concerns about the content, text or information contained within the body of this dictation should be directly addressed to the physician for clarification. Please do not hesitate to call me directly if you have any any questions or concerns.

## 2024-03-14 NOTE — PROGRESS NOTES
SW has met with pt as per PCP request.  Pt has raised questions on how to get additional help for her Brother Prasanth Madden who is also a pt at our Office. Sw hs reviewed witherh the Process of how to request this help through his .  Pt shares the she and her brother are residing in her one bedroom apt is in [place]. They are on the waiting list for a 2 bedroom unit.    SW has also inquired if pt is in need of any additional support OP MH counseling but pt declines same.    Pt is independent her ADLS.    Patient does not have any further questions, concerns, or other needs at this time.  Patient has my contact # and PCP office # if needed.  Social Work to remain available to assist as indicated.    Please re-consult Social Work if needed.

## 2024-03-26 ENCOUNTER — OFFICE VISIT (OUTPATIENT)
Dept: OBGYN CLINIC | Facility: HOSPITAL | Age: 69
End: 2024-03-26
Payer: COMMERCIAL

## 2024-03-26 VITALS
HEIGHT: 64 IN | BODY MASS INDEX: 27.93 KG/M2 | SYSTOLIC BLOOD PRESSURE: 137 MMHG | DIASTOLIC BLOOD PRESSURE: 82 MMHG | HEART RATE: 108 BPM | WEIGHT: 163.58 LBS

## 2024-03-26 DIAGNOSIS — M54.16 LUMBAR RADICULOPATHY: ICD-10-CM

## 2024-03-26 PROCEDURE — 99214 OFFICE O/P EST MOD 30 MIN: CPT | Performed by: ORTHOPAEDIC SURGERY

## 2024-03-26 RX ORDER — TIZANIDINE 4 MG/1
4 TABLET ORAL
Qty: 30 TABLET | Refills: 0 | Status: SHIPPED | OUTPATIENT
Start: 2024-03-26 | End: 2024-04-25

## 2024-03-26 NOTE — PROGRESS NOTES
Assessment & Plan/Medical Decision Makin y.o. female with Back Pain, Right Radicular Leg Pain, and Ambulatory Dysfunction and imaging findings most notable for lumbar spondylosis         The clinical, physical and imaging findings were reviewed with the patient.  Jenifer  has a constellation of findings consistent with Lumbar Radiculopathy, Lumbar Facet/Arthrosis Pain, and Ambulatory Dysfunction in the setting of lumbar degenerative disease.  Fortunately patient remains neurologically intact and functional. Physical exam showing mild lower extremity weakness, decreased lumbar ROM.  We discussed the treatment options including physical therapy, at home exercises, activity modifications, chiropractic medicine, oral medications, interventional spine procedures.  Did briefly discuss role of surgical intervention, however patient wishes to continue with conservative treatment at this time.    She recently underwent interventional spine procedure, L5-S1 ILESI on 2024 with significant benefit x 3 weeks. She does report re-aggravating her symptoms after care-giving for her brother. I think it is reasonable to undergo repeat injection given benefit and improvement in functional ability. She does have some continued mild right lower extremity weakness.   Refill of Zanaflex rx sent to patient's pharmacy. Patient reports improvement when taking zanaflex. Denies unfavorable side effects. Discussed reasoning for prescribing medication, proper usage, and potential side effects.  Continue with medications as previously prescribed if providing pain/symptom relief.  Would recommend formal physical therapy to work on core strengthening, lumbar ROM, strengthening, and stretching exercises. Right lower extremity strengthening exercises. Referral to physical therapy provided at previous visit, however, patient has been doing at-home exercises and stretches and wishes to hold off on formal physical therapy at this time.    Patient instructed to return to office/ER sooner if symptoms are not improving, getting worse, or new worrisome/neurologic symptoms arise. Patient should after continued conservative treatment/repeat injection if pain does not improve.     Subjective:      Chief Complaint: Back Pain    HPI:  Jenifer Smith is a 68 y.o. female presenting for initial visit with chief complaint of back pain. Ongoing back pain for about 1.5 years that has been worsening over the past 6 months or so. Pain is across low back and radiates into lateral aspect of right > left lower extremity with numbness/tingling and cramping. Pain worse with prolonged sitting, standing and walking. Only able to walk a few blocks until worsening low back and right leg pain and needing to stop and rest. Also notes her back and legs feel tired and heavy at times. Reports she feels like her knees are going to give out on her at times as well. She does note leaning forward when she walks. Back pain has become more debilitating and interferes with daily functioning. Denies any dorian trauma. Denies fever or chills, no night sweats. Denies any bladder or bowel changes.      Also with ongoing right shoulder pain, worse with movement. Denies significant neck pain. Intermittent arm pain with numbness/tingling in the morning. Back and legs are most bothersome. Denies dorian upper extremity weakness.    PMH T2DM (HbA1c 7.8 on 11/24/2023).    Conservative therapy includes the following:   Medications: tylenol as needed    Injections: denies recent     Physical Therapy: has done PT in the past  Chiropractic Medicine: has not attempted  Accupunture/Massage Therapy: has not attempted   These therapeutic modalities were ineffective at providing sustained pain relief/functional improvement.     Nicotine dependent: smokes about 8 cigarettes per day  Occupation: worked as CNA  Living situation: Lives with family   ADLs: patient is able to perform     Update on  12/26/2023  Jenifer is here for follow up, MRI lumbar spine review. She remains unchanged since initial visit. Continues with low back, gluteal and right > left lower extremity pain. Pain radiates into lateral right lower extremity to her foot with numbness/tingling. Pain worse with prolonged sitting, standing and walking. Only able to walk a few blocks before needing to stop due to worsening back and right lower extremity pain. Also notes her back and legs get tired with ambulation. Subjective lower extremity weakness. Patient has upcoming appointment with spine & pain management on 1/18/2024. Takes tylenol with mild relief. Also on 600mg gabapentin tid without improvement.     Update on 3/26/2024:  Jenifer is here for follow up. She underwent L5-S1 ILESI by Dr. Bansal on 1/22/2024. She reports significant improvement in back and right lower extremity pain after injection, noting about 90% pain improvement x3 weeks. Notes feeling more steady on her feet and was able to walk farther than before, about 10 minutes without needing to stop. However, she has to help with care-giving for her brother, noting her symptoms were recently re-aggravated. Currently, she reports return of significant low back, right gluteal and right lateral right lower extremity pain. Also with numbness/tingling into lateral right foot. She has difficulty caring for her brother and performing daily activities due to pain and symptoms. She has been doing at-home exercises and stretches with some benefit, noting the strength in her right leg feels somewhat improved. Has been taking zanaflex and gabapentin with some relief. Tylenol without relief.    Objective:     Family History   Problem Relation Age of Onset    Breast cancer Cousin 50    Alcohol abuse Mother     Alcohol abuse Father     Breast cancer Daughter 38    No Known Problems Sister     No Known Problems Maternal Grandmother     No Known Problems Maternal Grandfather     No Known Problems  "Paternal Grandmother     No Known Problems Paternal Grandfather     No Known Problems Son     No Known Problems Son     No Known Problems Daughter     No Known Problems Daughter     No Known Problems Daughter     Breast cancer Sister 66    No Known Problems Maternal Aunt     No Known Problems Maternal Aunt     No Known Problems Maternal Aunt     No Known Problems Maternal Aunt     No Known Problems Maternal Aunt     No Known Problems Maternal Aunt     No Known Problems Maternal Aunt     No Known Problems Maternal Aunt     No Known Problems Maternal Aunt     No Known Problems Maternal Aunt     No Known Problems Maternal Aunt     No Known Problems Maternal Aunt     No Known Problems Paternal Aunt     No Known Problems Paternal Aunt     Dementia Paternal Aunt     No Known Problems Paternal Aunt     No Known Problems Paternal Aunt     No Known Problems Paternal Aunt     No Known Problems Paternal Aunt     No Known Problems Paternal Aunt     Colon cancer Neg Hx     Endometrial cancer Neg Hx     Ovarian cancer Neg Hx        Past Medical History:   Diagnosis Date    Arthritis     Bump     bumped head yesterday at work \"saw stars\" no LOC, cat scan done was normal    Circulation problem     Diabetes mellitus (HCC)     blood sugar 125 on admission    Encounter for screening colonoscopy     1 st one    Headache     Hyperlipidemia     Hypertension     Positive fecal occult blood test 08/30/2018    Added automatically from request for surgery [id number]    Post concussion syndrome 09/14/2018    Syncope        Current Outpatient Medications   Medication Sig Dispense Refill    tiZANidine (ZANAFLEX) 4 mg tablet Take 1 tablet (4 mg total) by mouth daily at bedtime as needed for muscle spasms 30 tablet 0    Accu-Chek Softclix Lancets lancets Use 1 (one) time for 1 dose Use as instructed 200 each 2    acetaminophen (TYLENOL) 325 mg tablet Take 650 mg by mouth every 6 (six) hours      atorvastatin (LIPITOR) 20 mg tablet Take 1 tablet (20 " mg total) by mouth daily 90 tablet 2    Diclofenac Sodium (VOLTAREN) 1 % APPLY 2 GRAMS TOPICALLY THREE TIMES A  g 2    Elastic Bandages & Supports (GNP Diabetic Socks Unisex XL) MISC Use 1 each if needed (1 pair of socks for diabetic neuropathy) 2 each 0    ferrous sulfate 324 (65 Fe) mg Take 1 tablet (324 mg total) by mouth every other day 90 tablet 1    gabapentin (NEURONTIN) 300 mg capsule Take 2 capsules (600 mg total) by mouth 2 (two) times a day 540 capsule 0    gabapentin (NEURONTIN) 300 mg capsule Take 3 capsules (900 mg total) by mouth daily at bedtime 60 capsule 5    glucose blood (Accu-Chek Guide) test strip Use 1 each in the morning Use as instructed 200 each 2    Lancets Misc. (Accu-Chek Softclix Lancet Dev) KIT Use in the morning 1 kit 0    lisinopril-hydrochlorothiazide (PRINZIDE,ZESTORETIC) 20-12.5 MG per tablet Take 1 tablet by mouth daily 90 tablet 2    meloxicam (Mobic) 7.5 mg tablet Take 1 tablet (7.5 mg total) by mouth daily as needed for moderate pain for up to 10 days 10 tablet 0    metFORMIN (GLUCOPHAGE) 1000 MG tablet Take 1 tablet (1,000 mg total) by mouth 2 (two) times a day with meals 180 tablet 3    vitamin B-12 (CYANOCOBALAMIN) 500 MCG TABS Take 1 tablet (500 mcg total) by mouth daily 30 tablet 0     No current facility-administered medications for this visit.       Past Surgical History:   Procedure Laterality Date    BREAST BIOPSY Right 02/18/2022    COLONOSCOPY N/A 9/26/2018    Procedure: COLONOSCOPY;  Surgeon: Joie Morejon MD;  Location: [facility];  Service: Gastroenterology    HYSTERECTOMY  2017    INCISIONAL BREAST BIOPSY      last assessed 17Aug2017    ME COLONOSCOPY FLX DX W/COLLJ SPEC WHEN PFRMD N/A 7/5/2018    Procedure: COLONOSCOPY;  Surgeon: Joie Morejon MD;  Location:  GI LAB;  Service: Gastroenterology    US GUIDED BREAST BIOPSY RIGHT COMPLETE Right 2/18/2022       Social History     Socioeconomic History    Marital status:      Spouse name: Not  on file    Number of children: 6    Years of education: Not on file    Highest education level: Not on file   Occupational History    Occupation: [employer]   Tobacco Use    Smoking status: Every Day     Current packs/day: 0.50     Average packs/day: 0.5 packs/day for 20.0 years (10.0 ttl pk-yrs)     Types: Cigarettes    Smokeless tobacco: Never    Tobacco comments:     smokes less than 1pk/day   Vaping Use    Vaping status: Never Used   Substance and Sexual Activity    Alcohol use: Never    Drug use: Never    Sexual activity: Not Currently   Other Topics Concern    Not on file   Social History Narrative    Daily caffeine consumption, 6-8 servings a day     Social Determinants of Health     Financial Resource Strain: Low Risk  (1/15/2024)    Overall Financial Resource Strain (CARDIA)     Difficulty of Paying Living Expenses: Not hard at all   Food Insecurity: No Food Insecurity (1/15/2024)    Hunger Vital Sign     Worried About Running Out of Food in the Last Year: Never true     Ran Out of Food in the Last Year: Never true   Transportation Needs: No Transportation Needs (1/15/2024)    PRAPARE - Transportation     Lack of Transportation (Medical): No     Lack of Transportation (Non-Medical): No   Physical Activity: Inactive (2/10/2021)    Exercise Vital Sign     Days of Exercise per Week: 0 days     Minutes of Exercise per Session: 0 min   Stress: No Stress Concern Present (2/10/2021)    [employer] [employer] - Occupational Stress Questionnaire     Feeling of Stress : Not at all   Social Connections: Socially Isolated (2/10/2021)    Social Connection and Isolation Panel [NHANES]     Frequency of Communication with Friends and Family: Once a week     Frequency of Social Gatherings with Friends and Family: Once a week     Attends Pent[employer]al Services: Never     Active Member of Clubs or Organizations: No     Attends Club or Organization Meetings: Never     Marital Status:    Intimate  "Partner Violence: Not At Risk (2/10/2021)    Humiliation, Afraid, Rape, and Kick questionnaire     Fear of Current or Ex-Partner: No     Emotionally Abused: No     Physically Abused: No     Sexually Abused: No   Housing Stability: High Risk (3/14/2024)    Housing Stability Vital Sign     Unable to Pay for Housing in the Last Year: Yes     Number of Places Lived in the Last Year: 1     Unstable Housing in the Last Year: Yes       No Known Allergies    Review of Systems  General- denies fever/chills  HEENT- denies hearing loss or sore throat  Eyes- denies eye pain or visual disturbances, denies red eyes  Respiratory- denies cough or SOB  Cardio- denies chest pain or palpitations  GI- denies abdominal pain  Endocrine- denies urinary frequency  Urinary- denies pain with urination  Musculoskeletal- Negative except noted above  Skin- denies rashes or wounds  Neurological- denies dizziness or headache  Psychiatric- denies anxiety or difficulty concentrating    Physical Exam  /82   Pulse (!) 108   Ht 5' 4\" (1.626 m)   Wt 74.2 kg (163 lb 9.3 oz)   BMI 28.08 kg/m²     General/Constitutional: No apparent distress: well-nourished and well developed.  Lymphatic: No appreciable lymphadenopathy  Respiratory: Non-labored breathing  Vascular: No edema, swelling or tenderness, except as noted in detailed exam.  Integumentary: No impressive skin lesions present, except as noted in detailed exam.  Psych: Normal mood and affect, oriented to person, place and time.  MSK: normal other than stated in HPI and exam  Gait & balance: no evidence of myelopathic gait, ambulates Independently     Lumbar spine range of motion:  -Forward flexion to 90  -Extension to neutral  -Lateral bend 25 right, 25 left  -Rotation 25 right, 25 left  There tenderness with palpation along lumbar paraspinal musculature, no midline tenderness     Neurologic:  Upper Extremity Motor Function    Right  Left    Deltoid  5/5  5/5    Bicep  5/5  5/5    Wrist " "extension  5/5  5/5    Tricep  5/5  5/5    Finger flexion/  5/5  5/5    Hand intrinsic  5/5  5/5      Lower Extremity Motor Function    Right  Left    Iliopsoas  5/5  5/5    Quadriceps 5/5 5/5   Tibialis anterior  5/5  5/5    EHL  5/5  5/5    Gastroc. muscle  5/5  5/5    Heel rise  4/5  4/5    Toe rise  4+/5  4+/5      Sensory: light touch is intact to bilateral upper and lower extremities     Reflexes:    Right Left   Patellar 1+ 1+   Achilles 1+ 1+   Babinski neg neg     Other tests:  Straight Leg Raise: negative  Kong SI: negative  JHONATAN SI: negative  Greater troch: no tenderness   Internal/external hip ROM: intact, no pain   Flexion/extension knee ROM: intact, no pain   Vascular: WWP extremities, 2+DP bilateral    Tandem gait: equivocal  Shoulder: pain with right shoulder ROM    Diagnostic Tests   IMAGING: I have personally reviewed the images and these are my findings:  Lumbar Spine X-rays from 12/11/2023: multi level lumbar spondylosis with loss of disc height, osteophyte formation and facet hypertrophy, no apparent spondylolisthesis, no appreciated lytic/blastic lesions, no obvious instability    MRI lumbar spine from 12/16/2023: multi level lumbar disc degeneration with disc desiccation, loss of disc height, facet and ligamentum hypertrophy, L2-3 moderate stenosis, L3-4 moderate stenosis with left-sided severe lateral recess stenosis, L4-5 bilateral lateral recess stenosis    Electronic Medical Records were reviewed including office notes, imaging studies    Procedures, if performed today     None performed       Portions of the record may have been created with voice recognition software.  Occasional wrong word or \"sound a like\" substitutions may have occurred due to the inherent limitations of voice recognition software.  Read the chart carefully and recognize, using context, where substitutions have occurred.      "

## 2024-03-28 ENCOUNTER — OFFICE VISIT (OUTPATIENT)
Dept: PAIN MEDICINE | Facility: CLINIC | Age: 69
End: 2024-03-28
Payer: COMMERCIAL

## 2024-03-28 VITALS
HEIGHT: 64 IN | DIASTOLIC BLOOD PRESSURE: 75 MMHG | WEIGHT: 169 LBS | BODY MASS INDEX: 28.85 KG/M2 | HEART RATE: 109 BPM | SYSTOLIC BLOOD PRESSURE: 150 MMHG

## 2024-03-28 DIAGNOSIS — M47.816 LUMBAR SPONDYLOSIS: ICD-10-CM

## 2024-03-28 DIAGNOSIS — M54.16 LUMBAR RADICULOPATHY: Primary | ICD-10-CM

## 2024-03-28 DIAGNOSIS — G89.4 CHRONIC PAIN SYNDROME: ICD-10-CM

## 2024-03-28 DIAGNOSIS — M48.062 SPINAL STENOSIS OF LUMBAR REGION WITH NEUROGENIC CLAUDICATION: ICD-10-CM

## 2024-03-28 PROCEDURE — 99214 OFFICE O/P EST MOD 30 MIN: CPT | Performed by: ANESTHESIOLOGY

## 2024-03-28 PROCEDURE — G2211 COMPLEX E/M VISIT ADD ON: HCPCS | Performed by: ANESTHESIOLOGY

## 2024-03-28 NOTE — PROGRESS NOTES
Assessment  1. Lumbar radiculopathy    2. Spinal stenosis of lumbar region with neurogenic claudication    3. Lumbar spondylosis    4. Chronic pain syndrome        Plan  68-year-old female with a history of lumbar spondylosis, stenosis, and lumbar radiculopathy returning for follow-up.  The patient continues to have lumbosacral back pain that radiates into bilateral lower extremities with associated subjective weakness.  The patient had an L5-S1 LESI which gave her greater than 50% of relief that lasted for 3 to 4 weeks.  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  The patient is currently taking gabapentin 600 mg in the morning and the afternoon and 900 mg at bedtime.  She does take tizanidine 4 mg as needed as well as meloxicam as needed with minimal relief.  Tylenol does not provide relief.  Physical therapy was ineffective.    1.  I will schedule the patient for L4-5 LESI  2.  Baseline urine drug screen was obtained to determine appropriateness for opioid therapy  3.  Patient will continue with gabapentin, tizanidine, and meloxicam as prescribed  4.  Patient will continue with HEP  5.  I will follow-up with the patient in 4 weeks      A urine drug screen was collected at today's office visit as part of our medication management protocol. The point of care testing results were appropriate for what was being prescribed. The specimen will be sent for confirmatory testing. The drug screen is medically necessary because the patient is either dependent on opioid medication or is being considered for opioid medication therapy and the results could impact ongoing or future treatment. The drug screen is to evaluate for the presences or absence of prescribed, non-prescribed, and/or illicit drugs/substances.    [place] Prescription Drug Monitoring Program report was reviewed and was appropriate     Complete  risks and benefits including bleeding, infection, tissue reaction, nerve injury and allergic reaction were discussed. The approach was demonstrated using models and literature was provided. Verbal and written consent was obtained.    My impressions and treatment recommendations were discussed in detail with the patient who verbalized understanding and had no further questions.  Discharge instructions were provided. I personally saw and examined the patient and I agree with the above discussed plan of care.    No orders of the defined types were placed in this encounter.    No orders of the defined types were placed in this encounter.      History of Present Illness    Jenifer Smith is a 68 y.o. female with a history of lumbar spondylosis, stenosis, and lumbar radiculopathy returning for follow-up.  The patient continues to have lumbosacral back pain that radiates into bilateral lower extremities with associated subjective weakness.  She denies any bladder or bowel incontinence or saddle anesthesia.  The patient had an L5-S1 LESI which gave her greater than 50% of relief that lasted for 3 to 4 weeks.  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  The patient is currently taking gabapentin 600 mg in the morning and the afternoon and 900 mg at bedtime.  She does take tizanidine 4 mg as needed as well as meloxicam as needed with minimal relief.  Tylenol does not provide relief.  Physical therapy was ineffective.  The patient rates her pain moderate to severe and the pain is worse in the evening and at night.  The pain is constant and described as aching and sharp.  The pain is exacerbated with bending, exercise, and lifting.  Pain is alleviated with injections and relaxation.    Other than as stated above, the patient denies any interval changes in medications, medical condition, mental condition, symptoms, or  allergies since the last office visit.    I have personally reviewed and/or updated the patient's past medical history, past surgical history, family history, social history, current medications, allergies, and vital signs today.     Review of Systems   Constitutional:  Negative for fever and unexpected weight change.   HENT:  Negative for trouble swallowing.    Eyes:  Negative for visual disturbance.   Respiratory:  Negative for shortness of breath and wheezing.    Cardiovascular:  Negative for chest pain and palpitations.   Gastrointestinal:  Negative for constipation, diarrhea, nausea and vomiting.   Endocrine: Negative for cold intolerance, heat intolerance and polydipsia.   Genitourinary:  Negative for difficulty urinating and frequency.   Musculoskeletal:  Positive for joint swelling. Negative for arthralgias, gait problem and myalgias.   Skin:  Negative for rash.   Neurological:  Positive for dizziness. Negative for seizures, syncope, weakness and headaches.   Hematological:  Does not bruise/bleed easily.   Psychiatric/Behavioral:  Negative for dysphoric mood.    All other systems reviewed and are negative.      Patient Active Problem List   Diagnosis    Benign essential hypertension    Diabetic peripheral neuropathy (HCC)    DM type 2 without retinopathy (HCC)    Hyperlipidemia    Chronic bilateral low back pain with left-sided sciatica    History of right breast biopsy    Breast pain, right    Visit for screening mammogram    Breast cyst, right    Breast mass, right    Carpal tunnel syndrome, bilateral    Depression, recurrent (HCC)    Type 2 diabetes mellitus with diabetic polyneuropathy, without long-term current use of insulin (HCC)    Overweight (BMI 25.0-29.9)    History of hysterectomy    Chest pain on exertion    Smoking    JOANNA (obstructive sleep apnea)    Microscopic hematuria    Spinal stenosis of lumbar region    Lumbar spondylosis    Lumbar radiculopathy    Myofascial pain       Past Medical  "History:   Diagnosis Date    Arthritis     Bump     bumped head yesterday at work \"saw stars\" no LOC, cat scan done was normal    Circulation problem     Diabetes mellitus (HCC)     blood sugar 125 on admission    Encounter for screening colonoscopy     1 st one    Headache     Hyperlipidemia     Hypertension     Positive fecal occult blood test 08/30/2018    Added automatically from request for surgery [id number]    Post concussion syndrome 09/14/2018    Syncope        Past Surgical History:   Procedure Laterality Date    BREAST BIOPSY Right 02/18/2022    COLONOSCOPY N/A 9/26/2018    Procedure: COLONOSCOPY;  Surgeon: Joie Morejon MD;  Location: [facility];  Service: Gastroenterology    HYSTERECTOMY  2017    INCISIONAL BREAST BIOPSY      last assessed [id number]    NH COLONOSCOPY FLX DX W/COLLJ SPEC WHEN PFRMD N/A 7/5/2018    Procedure: COLONOSCOPY;  Surgeon: Joie Morejon MD;  Location:  GI LAB;  Service: Gastroenterology    US GUIDED BREAST BIOPSY RIGHT COMPLETE Right 2/18/2022       Family History   Problem Relation Age of Onset    Breast cancer Cousin 50    Alcohol abuse Mother     Alcohol abuse Father     Breast cancer Daughter 38    No Known Problems Sister     No Known Problems Maternal Grandmother     No Known Problems Maternal Grandfather     No Known Problems Paternal Grandmother     No Known Problems Paternal Grandfather     No Known Problems Son     No Known Problems Son     No Known Problems Daughter     No Known Problems Daughter     No Known Problems Daughter     Breast cancer Sister 66    No Known Problems Maternal Aunt     No Known Problems Maternal Aunt     No Known Problems Maternal Aunt     No Known Problems Maternal Aunt     No Known Problems Maternal Aunt     No Known Problems Maternal Aunt     No Known Problems Maternal Aunt     No Known Problems Maternal Aunt     No Known Problems Maternal Aunt     No Known Problems Maternal Aunt     No Known Problems Maternal Aunt     No Known Problems " Maternal Aunt     No Known Problems Paternal Aunt     No Known Problems Paternal Aunt     Dementia Paternal Aunt     No Known Problems Paternal Aunt     No Known Problems Paternal Aunt     No Known Problems Paternal Aunt     No Known Problems Paternal Aunt     No Known Problems Paternal Aunt     Colon cancer Neg Hx     Endometrial cancer Neg Hx     Ovarian cancer Neg Hx        Social History     Occupational History    Occupation: [employer]   Tobacco Use    Smoking status: Every Day     Current packs/day: 0.50     Average packs/day: 0.5 packs/day for 20.0 years (10.0 ttl pk-yrs)     Types: Cigarettes    Smokeless tobacco: Never    Tobacco comments:     smokes less than 1pk/day   Vaping Use    Vaping status: Never Used   Substance and Sexual Activity    Alcohol use: Never    Drug use: Never    Sexual activity: Not Currently       Current Outpatient Medications on File Prior to Visit   Medication Sig    acetaminophen (TYLENOL) 325 mg tablet Take 650 mg by mouth every 6 (six) hours    atorvastatin (LIPITOR) 20 mg tablet Take 1 tablet (20 mg total) by mouth daily    Diclofenac Sodium (VOLTAREN) 1 % APPLY 2 GRAMS TOPICALLY THREE TIMES A DAY    Elastic Bandages & Supports (GNP Diabetic Socks Unisex XL) MISC Use 1 each if needed (1 pair of socks for diabetic neuropathy)    ferrous sulfate 324 (65 Fe) mg Take 1 tablet (324 mg total) by mouth every other day    gabapentin (NEURONTIN) 300 mg capsule Take 2 capsules (600 mg total) by mouth 2 (two) times a day    gabapentin (NEURONTIN) 300 mg capsule Take 3 capsules (900 mg total) by mouth daily at bedtime    glucose blood (Accu-Chek Guide) test strip Use 1 each in the morning Use as instructed    Lancets Misc. (Accu-Chek Softclix Lancet Dev) KIT Use in the morning    lisinopril-hydrochlorothiazide (PRINZIDE,ZESTORETIC) 20-12.5 MG per tablet Take 1 tablet by mouth daily    metFORMIN (GLUCOPHAGE) 1000 MG tablet Take 1 tablet (1,000 mg total) by mouth 2 (two) times a day  "with meals    tiZANidine (ZANAFLEX) 4 mg tablet Take 1 tablet (4 mg total) by mouth daily at bedtime as needed for muscle spasms    vitamin B-12 (CYANOCOBALAMIN) 500 MCG TABS Take 1 tablet (500 mcg total) by mouth daily    Accu-Chek Softclix Lancets lancets Use 1 (one) time for 1 dose Use as instructed    meloxicam (Mobic) 7.5 mg tablet Take 1 tablet (7.5 mg total) by mouth daily as needed for moderate pain for up to 10 days     No current facility-administered medications on file prior to visit.       No Known Allergies      UDS taken:03/28/2024    Physical Exam    /75   Pulse (!) 109   Ht 5' 4\" (1.626 m)   Wt 76.7 kg (169 lb)   BMI 29.01 kg/m²     Constitutional: normal, well developed, well nourished, alert, in no distress and non-toxic and no overt pain behavior.  Eyes: anicteric  HEENT: grossly intact  Neck: supple, symmetric, trachea midline and no masses   Pulmonary:even and unlabored  Cardiovascular:No edema or pitting edema present  Skin:Normal without rashes or lesions and well hydrated  Psychiatric:Mood and affect appropriate  Neurologic:Cranial Nerves II-XII grossly intact  Musculoskeletal:normal gait.  Bilateral lumbar paraspinals tender to palpation and ropey texture.  Bilateral lower extremity strength 5 out of 5 in all muscle groups.  Sensation intact to light touch in L3-S1 dermatomes bilaterally.  Negative seated straight leg raise bilaterally.    Imaging  Narrative & Impression   MRI LUMBAR SPINE WITHOUT CONTRAST     INDICATION: M54.16: Radiculopathy, lumbar region  M51.36: Other intervertebral disc degeneration, lumbar region.     COMPARISON:  None.     TECHNIQUE:  Multiplanar, multisequence imaging of the lumbar spine was performed. .        IMAGE QUALITY:  Diagnostic     FINDINGS:     VERTEBRAL BODIES:  There are 5 lumbar type vertebral bodies. There is trace anterolisthesis of L2-L3 and L4-L5. There is scoliosis. Normal marrow signal is identified within the visualized bony " structures.  No discrete marrow lesion.     SACRUM:  Normal signal within the sacrum. No evidence of insufficiency or stress fracture.     DISTAL CORD AND CONUS:  Normal size and signal within the distal cord and conus.     PARASPINAL SOFT TISSUES:  Paraspinal soft tissues are unremarkable.     LOWER THORACIC DISC SPACES:  Normal disc height and signal.  No disc herniation, canal stenosis or foraminal narrowing.     LUMBAR DISC SPACES:     L1-L2:  Normal.     L2-L3: There is a bulging annulus. There is facet arthrosis. There is mild mass effect on the thecal sac. There is mild foraminal narrowing.     L3-L4: There is a bulging annulus, asymmetric to the left with annular fissure. There is facet arthrosis. There is mild to moderate mass effect on the thecal sac. There is moderate left foraminal narrowing with contact of the exiting nerve root.     L4-L5: There is a bulging annulus. There is facet arthrosis. There is mild mass effect on the thecal sac. There is mild to moderate left and mild right foraminal narrowing.     L5-S1: There is a bulging annulus, asymmetric to the right. There is facet arthrosis. There is mild mass effect on thecal sac. There is moderate right and mild left foraminal narrowing.     OTHER FINDINGS:  None.     IMPRESSION:     Degenerative changes of the lumbar spine, as described above.     Multifactorial disease results in moderate left foraminal narrowing at L3-L4 with contact of the exiting L3 nerve root.     Moderate right foraminal narrowing with contact of the exiting right L5 nerve root is also present at L5-S1.

## 2024-03-30 LAB
6MAM UR QL CFM: NEGATIVE NG/ML
7AMINOCLONAZEPAM UR QL CFM: NEGATIVE NG/ML
A-OH ALPRAZ UR QL CFM: NEGATIVE NG/ML
AMPHET UR QL CFM: NEGATIVE NG/ML
AMPHET UR QL CFM: NEGATIVE NG/ML
BUPRENORPHINE UR QL CFM: NEGATIVE NG/ML
BUTALBITAL UR QL CFM: NEGATIVE NG/ML
BZE UR QL CFM: NEGATIVE NG/ML
CODEINE UR QL CFM: NEGATIVE NG/ML
DESIPRAMINE UR QL CFM: NEGATIVE NG/ML
DESIPRAMINE UR QL CFM: NEGATIVE NG/ML
EDDP UR QL CFM: NEGATIVE NG/ML
ETHYL GLUCURONIDE UR QL CFM: NEGATIVE NG/ML
ETHYL SULFATE UR QL SCN: NEGATIVE NG/ML
EUTYLONE UR QL: NEGATIVE NG/ML
FENTANYL UR QL CFM: NEGATIVE NG/ML
GLIADIN IGG SER IA-ACNC: NEGATIVE NG/ML
GLUCOSE 30M P 50 G LAC PO SERPL-MCNC: NEGATIVE NG/ML
HYDROCODONE UR QL CFM: NEGATIVE NG/ML
HYDROCODONE UR QL CFM: NEGATIVE NG/ML
HYDROMORPHONE UR QL CFM: NEGATIVE NG/ML
IMIPRAMINE UR QL CFM: NEGATIVE NG/ML
LORAZEPAM UR QL CFM: NEGATIVE NG/ML
MDMA UR QL CFM: NEGATIVE NG/ML
ME-PHENIDATE UR QL CFM: NEGATIVE NG/ML
MEPERIDINE UR QL CFM: NEGATIVE NG/ML
METHADONE UR QL CFM: NEGATIVE NG/ML
METHAMPHET UR QL CFM: NEGATIVE NG/ML
MORPHINE UR QL CFM: NEGATIVE NG/ML
MORPHINE UR QL CFM: NEGATIVE NG/ML
NORBUPRENORPHINE UR QL CFM: NEGATIVE NG/ML
NORDIAZEPAM UR QL CFM: NEGATIVE NG/ML
NORFENTANYL UR QL CFM: NEGATIVE NG/ML
NORHYDROCODONE UR QL CFM: NEGATIVE NG/ML
NORHYDROCODONE UR QL CFM: NEGATIVE NG/ML
NORMEPERIDINE UR QL CFM: NEGATIVE NG/ML
NOROXYCODONE UR QL CFM: NEGATIVE NG/ML
OLANZAPINE QUANTIFICATION: NEGATIVE NG/ML
OPC-3373 QUANTIFICATION: NEGATIVE
OXAZEPAM UR QL CFM: NEGATIVE NG/ML
OXYCODONE UR QL CFM: NEGATIVE NG/ML
OXYMORPHONE UR QL CFM: NEGATIVE NG/ML
OXYMORPHONE UR QL CFM: NEGATIVE NG/ML
PARA-FLUOROFENTANYL QUANTIFICATION: NORMAL NG/ML
PCP UR QL CFM: NEGATIVE NG/ML
PHENOBARB UR QL CFM: NEGATIVE NG/ML
RESULT ALL_PRESCRIBED MEDS AND SPECIAL INSTRUCTIONS: NORMAL
SECOBARBITAL UR QL CFM: NEGATIVE NG/ML
SL AMB 4-ANPP QUANTIFICATION: NORMAL NG/ML
SL AMB 5F-ADB-M7 METABOLITE QUANTIFICATION: NEGATIVE NG/ML
SL AMB 7-OH-MITRAGYNINE (KRATOM ALKALOID) QUANTIFICATION: NEGATIVE NG/ML
SL AMB AB-FUBINACA-M3 METABOLITE QUANTIFICATION: NEGATIVE NG/ML
SL AMB ACETYL FENTANYL QUANTIFICATION: NORMAL NG/ML
SL AMB ACETYL NORFENTANYL QUANTIFICATION: NORMAL NG/ML
SL AMB ACRYL FENTANYL QUANTIFICATION: NORMAL NG/ML
SL AMB CARFENTANIL QUANTIFICATION: NORMAL NG/ML
SL AMB CLOZAPINE QUANTIFICATION: NEGATIVE NG/ML
SL AMB CTHC (MARIJUANA METABOLITE) QUANTIFICATION: NEGATIVE NG/ML
SL AMB DEXTROMETHORPHAN QUANTIFICATION: NEGATIVE NG/ML
SL AMB DEXTRORPHAN (DEXTROMETHORPHAN METABOLITE) QUANT: NEGATIVE NG/ML
SL AMB DEXTRORPHAN (DEXTROMETHORPHAN METABOLITE) QUANT: NEGATIVE NG/ML
SL AMB HALOPERIDOL  QUANTIFICATION: NEGATIVE NG/ML
SL AMB HALOPERIDOL METABOLITE QUANTIFICATION: NEGATIVE NG/ML
SL AMB HYDROXYRISPERIDONE QUANTIFICATION: NEGATIVE NG/ML
SL AMB JWH018 METABOLITE QUANTIFICATION: NEGATIVE NG/ML
SL AMB JWH073 METABOLITE QUANTIFICATION: NEGATIVE NG/ML
SL AMB MDMB-FUBINACA-M1 METABOLITE QUANTIFICATION: NEGATIVE NG/ML
SL AMB METHYLONE QUANTIFICATION: NEGATIVE NG/ML
SL AMB N-DESMETHYL-TRAMADOL QUANTIFICATION: NEGATIVE NG/ML
SL AMB N-DESMETHYLCLOZAPINE QUANTIFICATION: NEGATIVE NG/ML
SL AMB NORQUETIAPINE QUANTIFICATION: NEGATIVE NG/ML
SL AMB PHENTERMINE QUANTIFICATION: NEGATIVE NG/ML
SL AMB QUETIAPINE QUANTIFICATION: NEGATIVE NG/ML
SL AMB RCS4 METABOLITE QUANTIFICATION: NEGATIVE NG/ML
SL AMB RISPERIDONE QUANTIFICATION: NEGATIVE NG/ML
SL AMB RITALINIC ACID QUANTIFICATION: NEGATIVE NG/ML
SPECIMEN DRAWN SERPL: NEGATIVE NG/ML
TAPENTADOL UR QL CFM: NEGATIVE NG/ML
TEMAZEPAM UR QL CFM: NEGATIVE NG/ML
TEMAZEPAM UR QL CFM: NEGATIVE NG/ML
TRAMADOL UR QL CFM: NEGATIVE NG/ML
URATE/CREAT 24H UR: NEGATIVE NG/ML

## 2024-04-01 ENCOUNTER — HOSPITAL ENCOUNTER (OUTPATIENT)
Dept: RADIOLOGY | Facility: CLINIC | Age: 69
Discharge: HOME | End: 2024-04-01
Payer: COMMERCIAL

## 2024-04-01 VITALS
DIASTOLIC BLOOD PRESSURE: 84 MMHG | RESPIRATION RATE: 20 BRPM | TEMPERATURE: 98 F | HEART RATE: 96 BPM | SYSTOLIC BLOOD PRESSURE: 175 MMHG | OXYGEN SATURATION: 99 %

## 2024-04-01 DIAGNOSIS — M54.16 LUMBAR RADICULOPATHY: ICD-10-CM

## 2024-04-01 PROCEDURE — 62323 NJX INTERLAMINAR LMBR/SAC: CPT | Performed by: ANESTHESIOLOGY

## 2024-04-01 RX ORDER — METHYLPREDNISOLONE ACETATE 80 MG/ML
80 INJECTION, SUSPENSION INTRA-ARTICULAR; INTRALESIONAL; INTRAMUSCULAR; PARENTERAL; SOFT TISSUE ONCE
Status: COMPLETED | OUTPATIENT
Start: 2024-04-01 | End: 2024-04-01

## 2024-04-01 RX ADMIN — METHYLPREDNISOLONE ACETATE 80 MG: 80 INJECTION, SUSPENSION INTRA-ARTICULAR; INTRALESIONAL; INTRAMUSCULAR; SOFT TISSUE at 15:52

## 2024-04-01 RX ADMIN — IOHEXOL 1 ML: 300 INJECTION, SOLUTION INTRAVENOUS at 15:51

## 2024-04-01 NOTE — DISCHARGE INSTRUCTIONS
Epidural Steroid Injection   WHAT YOU NEED TO KNOW:   An epidural steroid injection (RORO) is a procedure to inject steroid medicine into the epidural space. The epidural space is between your spinal cord and vertebrae. Steroids reduce inflammation and fluid buildup in your spine that may be causing pain. You may be given pain medicine along with the steroids.          ACTIVITY  Do not drive or operate machinery today.  No strenuous activity today - bending, lifting, etc.  You may resume normal activites starting tomorrow - start slowly and as tolerated.  You may shower today, but no tub baths or hot tubs.  You may have numbness for several hours from the local anesthetic. Please use caution and common sense, especially with weight-bearing activities.    CARE OF THE INJECTION SITE  If you have soreness or pain, apply ice to the area today (20 minutes on/20 minutes off).  Starting tomorrow, you may use warm, moist heat or ice if needed.  You may have an increase or change in your discomfort for 36-48 hours after your treatment.  Apply ice and continue with any pain medication you have been prescribed.  Notify the [facility] if you have any of the following: redness, drainage, swelling, headache, stiff neck or fever above 100°F.    SPECIAL INSTRUCTIONS  Our office will contact you in approximately 7 days for a progress report.    MEDICATIONS  Continue to take all routine medications.  Our office may have instructed you to hold some medications.    As no general anesthesia was used in today's procedure, you should not experience any side effects related to anesthesia.     If you are diabetic, the steroids used in today's injection may temporarily increase your blood sugar levels after the first few days after your injection. Please keep a close eye on your sugars and alert the doctor who manages your diabetes if your sugars are significantly high from your baseline or you are symptomatic.     If you have a  problem specifically related to your procedure, please call our office at [phone].  Problems not related to your procedure should be directed to your primary care physician.

## 2024-04-01 NOTE — H&P
"History of Present Illness: The patient is a 68 y.o. female who presents with complaints of low back and leg pain.     Past Medical History:   Diagnosis Date    Arthritis     Bump     bumped head yesterday at work \"saw stars\" no LOC, cat scan done was normal    Circulation problem     Diabetes mellitus (HCC)     blood sugar 125 on admission    Encounter for screening colonoscopy     1 st one    Headache     Hyperlipidemia     Hypertension     Positive fecal occult blood test 08/30/2018    Added automatically from request for surgery [id number]    Post concussion syndrome 09/14/2018    Syncope        Past Surgical History:   Procedure Laterality Date    BREAST BIOPSY Right 02/18/2022    COLONOSCOPY N/A 9/26/2018    Procedure: COLONOSCOPY;  Surgeon: Joie Morejon MD;  Location: [facility];  Service: Gastroenterology    HYSTERECTOMY  2017    INCISIONAL BREAST BIOPSY      last assessed 17Aug2017    DE COLONOSCOPY FLX DX W/COLLJ SPEC WHEN PFRMD N/A 7/5/2018    Procedure: COLONOSCOPY;  Surgeon: Joie Morejon MD;  Location:  GI LAB;  Service: Gastroenterology    US GUIDED BREAST BIOPSY RIGHT COMPLETE Right 2/18/2022         Current Outpatient Medications:     Accu-Chek Softclix Lancets lancets, Use 1 (one) time for 1 dose Use as instructed, Disp: 200 each, Rfl: 2    atorvastatin (LIPITOR) 20 mg tablet, Take 1 tablet (20 mg total) by mouth daily, Disp: 90 tablet, Rfl: 2    Diclofenac Sodium (VOLTAREN) 1 %, APPLY 2 GRAMS TOPICALLY THREE TIMES A DAY, Disp: 100 g, Rfl: 2    Elastic Bandages & Supports (GNP Diabetic Socks Unisex XL) MISC, Use 1 each if needed (1 pair of socks for diabetic neuropathy), Disp: 2 each, Rfl: 0    ferrous sulfate 324 (65 Fe) mg, Take 1 tablet (324 mg total) by mouth every other day, Disp: 90 tablet, Rfl: 1    gabapentin (NEURONTIN) 300 mg capsule, Take 2 capsules (600 mg total) by mouth 2 (two) times a day, Disp: 540 capsule, Rfl: 0    gabapentin (NEURONTIN) 300 mg capsule, Take 3 capsules (900 " mg total) by mouth daily at bedtime, Disp: 60 capsule, Rfl: 5    glucose blood (Accu-Chek Guide) test strip, Use 1 each in the morning Use as instructed, Disp: 200 each, Rfl: 2    Lancets Misc. (Accu-Chek Softclix Lancet Dev) KIT, Use in the morning, Disp: 1 kit, Rfl: 0    lisinopril-hydrochlorothiazide (PRINZIDE,ZESTORETIC) 20-12.5 MG per tablet, Take 1 tablet by mouth daily, Disp: 90 tablet, Rfl: 2    meloxicam (Mobic) 7.5 mg tablet, Take 1 tablet (7.5 mg total) by mouth daily as needed for moderate pain for up to 10 days, Disp: 10 tablet, Rfl: 0    metFORMIN (GLUCOPHAGE) 1000 MG tablet, Take 1 tablet (1,000 mg total) by mouth 2 (two) times a day with meals, Disp: 180 tablet, Rfl: 3    tiZANidine (ZANAFLEX) 4 mg tablet, Take 1 tablet (4 mg total) by mouth daily at bedtime as needed for muscle spasms, Disp: 30 tablet, Rfl: 0    vitamin B-12 (CYANOCOBALAMIN) 500 MCG TABS, Take 1 tablet (500 mcg total) by mouth daily, Disp: 30 tablet, Rfl: 0    No Known Allergies    Physical Exam:   Vitals:    04/01/24 1535   BP: 147/86   Pulse: 96   Resp: 18   Temp: 98 °F (36.7 °C)   SpO2: 100%     General: Awake, Alert, Oriented x 3, Mood and affect appropriate  Respiratory: Respirations even and unlabored  Cardiovascular: Peripheral pulses intact; no edema  Musculoskeletal Exam: Antalgic gait    ASA Score: 3         Assessment:   1. Lumbar radiculopathy        Plan: L4-5 LESI

## 2024-04-08 ENCOUNTER — TELEPHONE (OUTPATIENT)
Dept: PAIN MEDICINE | Facility: CLINIC | Age: 69
End: 2024-04-08

## 2024-05-10 ENCOUNTER — TELEPHONE (OUTPATIENT)
Dept: INTERNAL MEDICINE CLINIC | Facility: CLINIC | Age: 69
End: 2024-05-10

## 2024-05-10 ENCOUNTER — OFFICE VISIT (OUTPATIENT)
Dept: INTERNAL MEDICINE CLINIC | Facility: CLINIC | Age: 69
End: 2024-05-10

## 2024-05-10 VITALS
HEIGHT: 64 IN | BODY MASS INDEX: 28.51 KG/M2 | WEIGHT: 167 LBS | SYSTOLIC BLOOD PRESSURE: 137 MMHG | HEART RATE: 114 BPM | DIASTOLIC BLOOD PRESSURE: 74 MMHG | TEMPERATURE: 98.1 F

## 2024-05-10 DIAGNOSIS — F33.9 DEPRESSION, RECURRENT (HCC): ICD-10-CM

## 2024-05-10 DIAGNOSIS — E78.2 MIXED HYPERLIPIDEMIA: ICD-10-CM

## 2024-05-10 DIAGNOSIS — G47.33 OSA (OBSTRUCTIVE SLEEP APNEA): ICD-10-CM

## 2024-05-10 DIAGNOSIS — M54.16 LUMBAR RADICULOPATHY: ICD-10-CM

## 2024-05-10 DIAGNOSIS — I10 BENIGN ESSENTIAL HYPERTENSION: ICD-10-CM

## 2024-05-10 DIAGNOSIS — E11.42 TYPE 2 DIABETES MELLITUS WITH DIABETIC POLYNEUROPATHY, WITHOUT LONG-TERM CURRENT USE OF INSULIN (HCC): Primary | ICD-10-CM

## 2024-05-10 PROCEDURE — G2211 COMPLEX E/M VISIT ADD ON: HCPCS | Performed by: INTERNAL MEDICINE

## 2024-05-10 PROCEDURE — 99213 OFFICE O/P EST LOW 20 MIN: CPT | Performed by: INTERNAL MEDICINE

## 2024-05-10 RX ORDER — MENTHOL TOPICAL ANALGESIC 210 MG/1
1 PATCH TOPICAL DAILY PRN
Qty: 30 PATCH | Refills: 0 | Status: SHIPPED | OUTPATIENT
Start: 2024-05-10

## 2024-05-10 RX ORDER — TIZANIDINE 4 MG/1
4 TABLET ORAL
Qty: 15 TABLET | Refills: 0 | Status: SHIPPED | OUTPATIENT
Start: 2024-05-10 | End: 2024-06-09

## 2024-05-10 NOTE — PROGRESS NOTES
[facility]  INTERNAL MEDICINE RESIDENCY    Clinic Visit Note  Jenifer Smith 68 y.o. female   MRN: [id number]    Assessment and Plan      Diagnoses and all orders for this visit:    Type 2 diabetes mellitus with diabetic polyneuropathy, without long-term current use of insulin (HCC)  -     Ambulatory Referral to Podiatry; Future  -     CBC and differential; Future  -     Basic metabolic panel; Future  -     TSH, 3rd generation with Free T4 reflex; Future  -     Lipid panel; Future  -     Albumin / creatinine urine ratio; Future  Lumbar radiculopathy  -     Menthol, Topical Analgesic, (Icy Hot Advanced Relief) 7.5 % PTCH; Apply 1 patch topically daily as needed (shoulder pain)  -     tiZANidine (ZANAFLEX) 4 mg tablet; Take 1 tablet (4 mg total) by mouth daily at bedtime as needed for muscle spasms          Lumbar spondylosis and chronic low back pain: Based on recent MRI imaging findings and patient's reported history, she has had chronic low back pain.  -Was recently seen by orthopedics for this  -Provided with a referral to physical therapy previously  -Following with orthopedics  -Given referral to physical therap  -Gabapentin with following schedule: 600 mg in morning, 600 mg in afternoon, 900 mg at bedtime  -Recently received L4-L5 LESI     Iron deficiency anemia:  -Based on recent iron panel with TIBC around 367 and low iron levels  -Last CBC with hemoglobin at 11.5 (from March 2024)  -Currently on supplements with ferrous sulfate  -Prior colonoscopy from September 2022 noted to have normal colon  -EGD from October 2023 with moderate edematous and erythematous mucosa in the antrum of the stomach with otherwise normal esophagus and duodenum. She was subsequently recommended for CT scan with contrast which showed no suspicious findings identified.  -Following with GI  -UA indicative of microscopic hematuria and was subsequently seen by interventional radiology at which point, a cystoscopy  was ordered.  She underwent cystoscopy with urology in December 2023 that was unremarkable.  She was advised by urology to follow-up with them if she develops gross hematuria.     Diabetes Mellitus type 2: Patient with history of diabetes mellitus type 2. Previous HbA1c was 6.9 from March 2024, will do a follow up A1c at next visit.   -On metformin 1000 mg b.i.d.  -Jardiance discontinued previously  -On gabapentin dosage as mentioned above  -On atorvastatin 20 mg daily  -Discussed diet and lifestyle modifications with patient  -Last diabetic eye exam in 12/2023 as per patient  -Foot exam done in March 2024  -Urine albumin creatinine ratio ordered today     CKD stage II: Patient with history chronic kidney disease stage 2 likely secondary to history of diabetes and hypertension.  - Avoid nephrotoxic medications including NSAIDs  - Discussed optimization of BP to prevent further progression of kidney disease  - Monitor sCr at future visits     Hypertension:  Systolic today at 137 mmHg   -On Prinzide 20-12.5 mg daily  -Discussed at home BP monitoring with patient  -Patient advised to monitor her blood pressure 3 times per week, once early morning and once in mid afternoon     Health Maintenance  -Screening mammogram in May 2023 that showed no mammographic evidence of malignancy with follow-up recommended at 1 year interval, repeat mammogram ordered previously  -Had a sleep study at home performed in April 2023 that was inconclusive and patient was recommended for a lab sleep study  -Last screening colonoscopy in February 2022, normal with adequate bowel prep. Recommended for repeat in 10 years.   -Patient with history of total hysterectomy including removal of cervical cuff, no indication for Pap smears going forward  -DEXA ordered previously    Plan  -Patient to follow up with our clinic in 2 months for DM2,patient advised about upcoming changes to PCP status (new PCP Dr. Robles)  -Patient provided with central  scheduling number and she is to call them to schedule DEXA, mammogram  -Provided with a referral to podiatry      BMI Counseling: Body mass index is 28.67 kg/m². The BMI is above normal. Nutrition recommendations include reducing portion sizes and 3-5 servings of fruits/vegetables daily. Exercise recommendations include moderate aerobic physical activity for 150 minutes/week and exercising 3-5 times per week.      Subjective     History of Present Illness:    Patient is a 68-year-old female with history of hypertension, hyperlipidemia, diabetes mellitus type 2, chronic bilateral low back pain.  She reports feeling well at today's clinic visit.  Reporting of mild right-sided shoulder pain because of her shoulder issues.  The pain gets worse with movement of the right shoulder and arm.  She states that she has been following with orthopedics and pain management and to an extent, she has had improvement in her pain.  She is requesting a short course refill for her tizanidine and is going to reach out to her pain management clinic to see if they can schedule an appointment for soon.  In terms of her diabetes, she recently had her A1c done in late March results of which were discussed with the patient.  She also brought her blood glucose log but showed fasting blood sugars are 90-1 20 with occasional readings around 130-140.  She reports her compliance with her antidiabetic regimen.  We discussed need for routine labs and she is agreeable with this. Patient provided with central scheduling number and she is to call them to schedule DEXA, mammogram. Provided with a referral to podiatry I advised the patient about upcoming changes in her PCP and she will be following with a new PCP, Dr. Robles.  Patient is to follow-up with her clinic in 3 months.    Review of Systems   Constitutional:  Negative for chills and fever.   Respiratory:  Negative for cough, chest tightness and shortness of breath.    Cardiovascular:  Negative  for chest pain and palpitations.   Gastrointestinal:  Negative for abdominal pain, nausea and vomiting.   Neurological:  Negative for dizziness and light-headedness.         Current Outpatient Medications:     atorvastatin (LIPITOR) 20 mg tablet, Take 1 tablet (20 mg total) by mouth daily, Disp: 90 tablet, Rfl: 2    Diclofenac Sodium (VOLTAREN) 1 %, APPLY 2 GRAMS TOPICALLY THREE TIMES A DAY, Disp: 100 g, Rfl: 2    ferrous sulfate 324 (65 Fe) mg, Take 1 tablet (324 mg total) by mouth every other day, Disp: 90 tablet, Rfl: 1    gabapentin (NEURONTIN) 300 mg capsule, Take 2 capsules (600 mg total) by mouth 2 (two) times a day, Disp: 540 capsule, Rfl: 0    gabapentin (NEURONTIN) 300 mg capsule, Take 3 capsules (900 mg total) by mouth daily at bedtime, Disp: 60 capsule, Rfl: 5    glucose blood (Accu-Chek Guide) test strip, Use 1 each in the morning Use as instructed, Disp: 200 each, Rfl: 2    lisinopril-hydrochlorothiazide (PRINZIDE,ZESTORETIC) 20-12.5 MG per tablet, Take 1 tablet by mouth daily, Disp: 90 tablet, Rfl: 2    Menthol, Topical Analgesic, (Icy Hot Advanced Relief) 7.5 % PTCH, Apply 1 patch topically daily as needed (shoulder pain), Disp: 30 patch, Rfl: 0    metFORMIN (GLUCOPHAGE) 1000 MG tablet, Take 1 tablet (1,000 mg total) by mouth 2 (two) times a day with meals, Disp: 180 tablet, Rfl: 3    tiZANidine (ZANAFLEX) 4 mg tablet, Take 1 tablet (4 mg total) by mouth daily at bedtime as needed for muscle spasms, Disp: 15 tablet, Rfl: 0    Accu-Chek Softclix Lancets lancets, Use 1 (one) time for 1 dose Use as instructed, Disp: 200 each, Rfl: 2    Elastic Bandages & Supports (GNP Diabetic Socks Unisex XL) MISC, Use 1 each if needed (1 pair of socks for diabetic neuropathy), Disp: 2 each, Rfl: 0    Lancets Misc. (Accu-Chek Softclix Lancet Dev) KIT, Use in the morning, Disp: 1 kit, Rfl: 0    meloxicam (Mobic) 7.5 mg tablet, Take 1 tablet (7.5 mg total) by mouth daily as needed for moderate pain for up to 10 days,  "Disp: 10 tablet, Rfl: 0    vitamin B-12 (CYANOCOBALAMIN) 500 MCG TABS, Take 1 tablet (500 mcg total) by mouth daily, Disp: 30 tablet, Rfl: 0  No Known Allergies  Past Medical History:   Diagnosis Date    Arthritis     Bump     bumped head yesterday at work \"saw stars\" no LOC, cat scan done was normal    Circulation problem     Diabetes mellitus (HCC)     blood sugar 125 on admission    Encounter for screening colonoscopy     1 st one    Headache     Hyperlipidemia     Hypertension     Positive fecal occult blood test 08/30/2018    Added automatically from request for surgery [id number]    Post concussion syndrome 09/14/2018    Syncope      Past Surgical History:   Procedure Laterality Date    BREAST BIOPSY Right 02/18/2022    COLONOSCOPY N/A 9/26/2018    Procedure: COLONOSCOPY;  Surgeon: Joie Morejon MD;  Location: [facility];  Service: Gastroenterology    HYSTERECTOMY  2017    INCISIONAL BREAST BIOPSY      last assessed 17Aug2017    [place] COLONOSCOPY FLX DX W/COLLJ SPEC WHEN PFRMD N/A 7/5/2018    Procedure: COLONOSCOPY;  Surgeon: Joie Morejon MD;  Location:  GI LAB;  Service: Gastroenterology    US GUIDED BREAST BIOPSY RIGHT COMPLETE Right 2/18/2022     Family History   Problem Relation Age of Onset    Breast cancer Cousin 50    Alcohol abuse Mother     Alcohol abuse Father     Breast cancer Daughter 38    No Known Problems Sister     No Known Problems Maternal Grandmother     No Known Problems Maternal Grandfather     No Known Problems Paternal Grandmother     No Known Problems Paternal Grandfather     No Known Problems Son     No Known Problems Son     No Known Problems Daughter     No Known Problems Daughter     No Known Problems Daughter     Breast cancer Sister 66    No Known Problems Maternal Aunt     No Known Problems Maternal Aunt     No Known Problems Maternal Aunt     No Known Problems Maternal Aunt     No Known Problems Maternal Aunt     No Known Problems Maternal Aunt     No Known Problems Maternal " "Aunt     No Known Problems Maternal Aunt     No Known Problems Maternal Aunt     No Known Problems Maternal Aunt     No Known Problems Maternal Aunt     No Known Problems Maternal Aunt     No Known Problems Paternal Aunt     No Known Problems Paternal Aunt     Dementia Paternal Aunt     No Known Problems Paternal Aunt     No Known Problems Paternal Aunt     No Known Problems Paternal Aunt     No Known Problems Paternal Aunt     No Known Problems Paternal Aunt     Colon cancer Neg Hx     Endometrial cancer Neg Hx     Ovarian cancer Neg Hx      Social History     Substance and Sexual Activity   Alcohol Use Never       Social History     Substance and Sexual Activity   Drug Use Never     Social History     Tobacco Use   Smoking Status Every Day    Current packs/day: 0.50    Average packs/day: 0.5 packs/day for 20.0 years (10.0 ttl pk-yrs)    Types: Cigarettes   Smokeless Tobacco Never   Tobacco Comments    smokes less than 1pk/day       Objective     Vitals:    05/10/24 0935   BP: 137/74   BP Location: Right arm   Patient Position: Sitting   Cuff Size: Large   Pulse: (!) 114   Temp: 98.1 °F (36.7 °C)   TempSrc: Temporal   Weight: 75.8 kg (167 lb)   Height: 5' 4\" (1.626 m)       Physical Exam  Vitals reviewed.   Constitutional:       Comments: Seen sitting up in chair   HENT:      Head: Normocephalic.   Cardiovascular:      Rate and Rhythm: Normal rate and regular rhythm.      Pulses: Normal pulses.      Heart sounds: Normal heart sounds.   Pulmonary:      Effort: Pulmonary effort is normal.      Breath sounds: Normal breath sounds.   Abdominal:      General: Bowel sounds are normal.      Palpations: Abdomen is soft.   Skin:     General: Skin is warm and dry.      Capillary Refill: Capillary refill takes less than 2 seconds.   Neurological:      Mental Status: She is alert and oriented to person, place, and time.           Care Time Delivered:   I have spent 31 minutes with patient today in which greater than 50% of " this time was spent in counseling/coordination of care.      Morris Landrum MD  Internal Medicine Residency PGY-3  [facility], [place]      ==  PLEASE NOTE:  This encounter was completed utilizing the Dragon [employer] One Voice Recognition Software. Grammatical errors, random word insertions, pronoun errors and incomplete sentences are occasional consequences of the system due to software limitations, ambient noise and hardware issues.These may be missed by proof reading prior to affixing electronic signature. Any questions or concerns about the content, text or information contained within the body of this dictation should be directly addressed to the physician for clarification. Please do not hesitate to call me directly if you have any any questions or concerns.

## 2024-05-10 NOTE — TELEPHONE ENCOUNTER
Received call from Cuca with patient's [facility] insurance. Calling about some testing that needs to be order s/p an injury.     Left message with office callback number.

## 2024-05-10 NOTE — PATIENT INSTRUCTIONS
Thank you for visiting our clinic! It was nice seeing you!    Today, we discussed-  - Diet and lifestyle modifications and strategies for weight loss  - Maintaining a blood glucose log and measuring your blood sugars at least once daily  - Please follow up on your lab work, we will give you a call with results once lab results are available  - Please follow up with us in 3 months to review your diabetes with your PCP

## 2024-05-13 ENCOUNTER — OFFICE VISIT (OUTPATIENT)
Dept: PODIATRY | Facility: CLINIC | Age: 69
End: 2024-05-13
Payer: COMMERCIAL

## 2024-05-13 VITALS
WEIGHT: 168.8 LBS | BODY MASS INDEX: 28.82 KG/M2 | HEIGHT: 64 IN | HEART RATE: 89 BPM | DIASTOLIC BLOOD PRESSURE: 76 MMHG | SYSTOLIC BLOOD PRESSURE: 118 MMHG

## 2024-05-13 DIAGNOSIS — M20.12 VALGUS DEFORMITY OF BOTH GREAT TOES: ICD-10-CM

## 2024-05-13 DIAGNOSIS — M20.11 VALGUS DEFORMITY OF BOTH GREAT TOES: ICD-10-CM

## 2024-05-13 DIAGNOSIS — M79.671 BILATERAL FOOT PAIN: ICD-10-CM

## 2024-05-13 DIAGNOSIS — E11.42 TYPE 2 DIABETES MELLITUS WITH DIABETIC POLYNEUROPATHY, WITHOUT LONG-TERM CURRENT USE OF INSULIN (HCC): ICD-10-CM

## 2024-05-13 DIAGNOSIS — L84 CALLUS OF FOOT: ICD-10-CM

## 2024-05-13 DIAGNOSIS — M79.672 BILATERAL FOOT PAIN: ICD-10-CM

## 2024-05-13 PROCEDURE — 99202 OFFICE O/P NEW SF 15 MIN: CPT | Performed by: PODIATRIST

## 2024-05-13 NOTE — PROGRESS NOTES
"[facility] Podiatry - Clinic Visit  Jenifer Smith 68 y.o. female MRN: [id number]  Encounter: [id number]    ASSESSMENT:       Diagnoses and all orders for this visit:    Type 2 diabetes mellitus with diabetic polyneuropathy, without long-term current use of insulin ([facility])  -     Ambulatory Referral to Podiatry  -     Diabetic Shoe  -     Diabetic Shoe Inserts    Valgus deformity of both great toes  -     Diabetic Shoe  -     Diabetic Shoe Inserts    Callus of foot    Bilateral foot pain  -     Diabetic Shoe  -     Diabetic Shoe Inserts       PLAN:    Patient was examined, evaluated, and treated with all questions and concerns addressed  Reviewed recent labwork. Last HbA1c was 6.9% on 3/14/2024 down from 7.8% on 11/24/2024.   Discussed with patient the etiology of bunion deformity and conservative options including using Voltaren gel to aid in symptom relief as well as use of supportive shoe gear with wide toebox.  Script given to patient for diabetic shoes and inserts as well as a list of  locations.   Encouraged tight glycemic control, proper diet, and adequate exercise.  Encouraged daily at home foot checks.  RTC in 3-4 months. [facility] risk group 0.    - Dr. Guerra  was present for entirety of patient encounter.      SUBJECTIVE:    History of Present Illness     Jenifer Smith is a 68 y.o. female who presents for diabetic foot and bilateral foot pain. Last HbA1c was 6.9% on 3/14/2024. Patient states she is on her feet a lot and has noticed significant pain to both feet as well as \"bump pain\" about the big toes. She reports wearing slips ons most days. She denies any trauma or injury. She reports she trims her own nails or gets them done at the nail salon. Patient denies any additional pedal complaints at this time.     Patient denies N/V/F/chills/SOB/CP.     Review of Systems   Constitutional: Negative.    HENT: Negative.    Eyes: Negative.    Respiratory: Negative.    Cardiovascular: Negative.  " "  Gastrointestinal: Negative.    Musculoskeletal: Bilateral foot pain  Skin: Negative   Neurological: Negative         Historical Information   Past Medical History:   Diagnosis Date    Arthritis     Bump     bumped head yesterday at work \"saw stars\" no LOC, cat scan done was normal    Circulation problem     Diabetes mellitus (HCC)     blood sugar 125 on admission    Encounter for screening colonoscopy     1 st one    Headache     Hyperlipidemia     Hypertension     Positive fecal occult blood test 08/30/2018    Added automatically from request for surgery [id number]    Post concussion syndrome 09/14/2018    Syncope      Past Surgical History:   Procedure Laterality Date    BREAST BIOPSY Right 02/18/2022    COLONOSCOPY N/A 9/26/2018    Procedure: COLONOSCOPY;  Surgeon: Joie Morejon MD;  Location: [facility];  Service: Gastroenterology    HYSTERECTOMY  2017    INCISIONAL BREAST BIOPSY      last assessed 17Aug2017    [place] COLONOSCOPY FLX DX W/COLLJ SPEC WHEN PFRMD N/A 7/5/2018    Procedure: COLONOSCOPY;  Surgeon: Joie Morejon MD;  Location:  GI LAB;  Service: Gastroenterology    US GUIDED BREAST BIOPSY RIGHT COMPLETE Right 2/18/2022     Social History   Social History     Substance and Sexual Activity   Alcohol Use Never     Social History     Substance and Sexual Activity   Drug Use Never     Social History     Tobacco Use   Smoking Status Every Day    Current packs/day: 0.50    Average packs/day: 0.5 packs/day for 20.0 years (10.0 ttl pk-yrs)    Types: Cigarettes   Smokeless Tobacco Never   Tobacco Comments    smokes less than 1pk/day     Family History:   Family History   Problem Relation Age of Onset    Breast cancer Cousin 50    Alcohol abuse Mother     Alcohol abuse Father     Breast cancer Daughter 38    No Known Problems Sister     No Known Problems Maternal Grandmother     No Known Problems Maternal Grandfather     No Known Problems Paternal Grandmother     No Known Problems Paternal Grandfather     No " "Known Problems Son     No Known Problems Son     No Known Problems Daughter     No Known Problems Daughter     No Known Problems Daughter     Breast cancer Sister 66    No Known Problems Maternal Aunt     No Known Problems Maternal Aunt     No Known Problems Maternal Aunt     No Known Problems Maternal Aunt     No Known Problems Maternal Aunt     No Known Problems Maternal Aunt     No Known Problems Maternal Aunt     No Known Problems Maternal Aunt     No Known Problems Maternal Aunt     No Known Problems Maternal Aunt     No Known Problems Maternal Aunt     No Known Problems Maternal Aunt     No Known Problems Paternal Aunt     No Known Problems Paternal Aunt     Dementia Paternal Aunt     No Known Problems Paternal Aunt     No Known Problems Paternal Aunt     No Known Problems Paternal Aunt     No Known Problems Paternal Aunt     No Known Problems Paternal Aunt     Colon cancer Neg Hx     Endometrial cancer Neg Hx     Ovarian cancer Neg Hx        Meds/Allergies   (Not in a hospital admission)    No Known Allergies      OBJECTIVE:    Current Vitals:   Blood Pressure: 118/76 (05/13/24 1025)  Pulse: 89 (05/13/24 1025)  Height: 5' 4\" (162.6 cm) (05/13/24 1025)  Weight - Scale: 76.6 kg (168 lb 12.8 oz) (05/13/24 1025)    /76   Pulse 89   Ht 5' 4\" (1.626 m)   Wt 76.6 kg (168 lb 12.8 oz)   BMI 28.97 kg/m²       Lower Extremity Exam:    Physical Exam:    Musculoskeletal:  MMT is 5/5 to all compartments B/L, Hallux ROM is < 65 degrees B/L, Ankle dorsiflexion < 10 degrees from neutral with leg extended B/L, no pain or guarding during passive joint ROM. Active ROM to the digits intact. No gross deformities noted. Negative single/double heel raise. Negative pain with palpation of posterior tibial tendon.     Biomechanical Exam of LE:  Hip ROM WNL Bilateral, external and internal rotation about equal at 45° b/l  Knee ROM WNL Bilateral, no hyperextension noted b/l, no genu varum/valgum noted b/l  Ankle dorsiflexion " "with knee extended is limited and unable to get pass vertical on right and limited and unable to get pass vertical on left  Ankle dorsiflexion with knee flexed is limited and unable to get pass vertical on right and limited and unable to get pass vertical on left  Malleolar positioning is WNL b/l  STJ ROM WNL b/l, heel inversion is approximately 20° on right and 20° on left; heel eversion is approximately 10° on right and 10° on left; neutral calcaneal stance position is 0°   1st ray ROM WNL b/l, able to dorsiflex/plantarflex b/l  Tibial varum is 0° b/l, Resting calcaneal stance position is vertical and approximately 1° on right and vertical and approximately 1° on left  Gait analysis: WNL  Gross deformity noted: Bilateral pes planus      Cardiovascular:  Right DP pulse 2/4, Right PT pulse 2/4, Left DP pulse 2/4, Left PT pulse 2/4, +1/4 edema B/L, CFT< 3 sec to digits. Pedal hair is Present. Skin temperature warm to warm B/L. No calf tenderness.     Dermatological:  No open Lesions. Skin of the LE is normal temperature, texture, turgor. Nails are of normal length and thickness. Bilateral IPJ pinch calluses.        Neurological:  AAOx3. Gross sensation is intact. Monofilament sensation is Intact. Protective sensation is Intact.             Imaging: I have personally reviewed pertinent films in PACS  EKG, Pathology, and Other Studies: I have personally reviewed pertinent reports.      ** Please Note: Portions of the record may have been created with voice recognition software. Occasional wrong word or \"sound a like\" substitutions may have occurred due to the inherent limitations of voice recognition software. Read the chart carefully and recognize, using context, where substitutions have occurred. **    "

## 2024-05-15 NOTE — TELEPHONE ENCOUNTER
Received call from Omar with [employer] insurance. Patient recently had a fracture to her lumbar vertebrae. Insurance requesting order for DEXA scan. Patient is due for routine bone density exam. Callback number is .     Attempted to reach Omar. Patient is scheduled for DXA scan on 10/2/24. Voice message left with Omar.

## 2024-05-30 DIAGNOSIS — M54.16 LUMBAR RADICULOPATHY: ICD-10-CM

## 2024-05-30 RX ORDER — TIZANIDINE 4 MG/1
4 TABLET ORAL
Qty: 15 TABLET | Refills: 0 | Status: SHIPPED | OUTPATIENT
Start: 2024-05-30 | End: 2024-06-29

## 2024-05-30 NOTE — TELEPHONE ENCOUNTER
Reason for call:   [x] Refill   [] Prior Auth  [] Other:     Office:   [x] PCP/Provider -   [] Specialty/Provider -     Medication:  tiZANidine (ZANAFLEX) 4 mg tablet    Dose/Frequency: 4 mg, Oral, Daily at bedtime PRN     Quantity: 15    Pharmacy: [facility] [place], [place] [address]     Does the patient have enough for 3 days?   [] Yes   [x] No - Send as HP to POD

## 2024-06-06 DIAGNOSIS — M54.16 LUMBAR RADICULOPATHY: ICD-10-CM

## 2024-06-06 RX ORDER — GABAPENTIN 300 MG/1
900 CAPSULE ORAL
Qty: 60 CAPSULE | Refills: 5 | Status: SHIPPED | OUTPATIENT
Start: 2024-06-06

## 2024-06-17 DIAGNOSIS — M54.16 LUMBAR RADICULOPATHY: ICD-10-CM

## 2024-06-17 DIAGNOSIS — E11.42 CONTROLLED TYPE 2 DIABETES MELLITUS WITH DIABETIC POLYNEUROPATHY, WITHOUT LONG-TERM CURRENT USE OF INSULIN (HCC): ICD-10-CM

## 2024-06-17 RX ORDER — TIZANIDINE 4 MG/1
4 TABLET ORAL
Qty: 15 TABLET | Refills: 0 | Status: SHIPPED | OUTPATIENT
Start: 2024-06-17 | End: 2024-06-26 | Stop reason: SDUPTHER

## 2024-06-17 NOTE — TELEPHONE ENCOUNTER
Medication Refill Request     Name Zanaflex    Dose/Frequency 4mg PRN   Quantity   Verified pharmacy   [x]  Verified ordering Provider   [x]  Does patient have enough for the next 3 days? Yes [] No [x]     Medication Refill Request     Name metformin  Dose/Frequency 1000mg BID   Quantity 90  Verified pharmacy   [x]  Verified ordering Provider   [x]  Does patient have enough for the next 3 days? Yes [] No [x]

## 2024-06-19 DIAGNOSIS — E11.42 TYPE 2 DIABETES MELLITUS WITH DIABETIC POLYNEUROPATHY, WITHOUT LONG-TERM CURRENT USE OF INSULIN (HCC): ICD-10-CM

## 2024-06-19 DIAGNOSIS — M54.42 CHRONIC BILATERAL LOW BACK PAIN WITH LEFT-SIDED SCIATICA: ICD-10-CM

## 2024-06-19 DIAGNOSIS — G89.29 CHRONIC BILATERAL LOW BACK PAIN WITH LEFT-SIDED SCIATICA: ICD-10-CM

## 2024-06-19 NOTE — TELEPHONE ENCOUNTER
Patient called in to request a new prescription for gabapentin (NEURONTIN) 300 mg capsule. Patient explained that she takes 7 capsules in total. She takes to in the morning, 2 in the evening, and 3 at night. She said that she is all out of her med and the pharmacy will not refill the med because it only says 3 capsules at bed time.     Please advise.

## 2024-06-20 ENCOUNTER — TELEPHONE (OUTPATIENT)
Dept: OTHER | Facility: OTHER | Age: 69
End: 2024-06-20

## 2024-06-20 ENCOUNTER — NURSE TRIAGE (OUTPATIENT)
Dept: OTHER | Facility: OTHER | Age: 69
End: 2024-06-20

## 2024-06-20 DIAGNOSIS — G89.29 CHRONIC BILATERAL LOW BACK PAIN WITH LEFT-SIDED SCIATICA: ICD-10-CM

## 2024-06-20 DIAGNOSIS — M54.42 CHRONIC BILATERAL LOW BACK PAIN WITH LEFT-SIDED SCIATICA: ICD-10-CM

## 2024-06-20 DIAGNOSIS — E11.42 TYPE 2 DIABETES MELLITUS WITH DIABETIC POLYNEUROPATHY, WITHOUT LONG-TERM CURRENT USE OF INSULIN (HCC): ICD-10-CM

## 2024-06-20 DIAGNOSIS — M54.16 LUMBAR RADICULOPATHY: Primary | ICD-10-CM

## 2024-06-20 RX ORDER — GABAPENTIN 300 MG/1
600 CAPSULE ORAL 2 TIMES DAILY
Qty: 360 CAPSULE | Refills: 1 | Status: SHIPPED | OUTPATIENT
Start: 2024-06-20

## 2024-06-20 RX ORDER — GABAPENTIN 300 MG/1
600 CAPSULE ORAL 2 TIMES DAILY
Qty: 540 CAPSULE | Refills: 0 | Status: SHIPPED | OUTPATIENT
Start: 2024-06-20 | End: 2024-06-20

## 2024-06-20 RX ORDER — GABAPENTIN 300 MG/1
600 CAPSULE ORAL 2 TIMES DAILY
Qty: 540 CAPSULE | Refills: 0 | OUTPATIENT
Start: 2024-06-20

## 2024-06-20 RX ORDER — GABAPENTIN 300 MG/1
900 CAPSULE ORAL
Qty: 270 CAPSULE | Refills: 1 | Status: SHIPPED | OUTPATIENT
Start: 2024-06-20

## 2024-06-20 NOTE — TELEPHONE ENCOUNTER
Spoke with patient's pharmacy [facility] at . Spoke with pharmacist regarding patient's gabapentin scripts. Pharmacist requesting new scripts for gabapentin to be sent and discontinued other gabapentin scripts. Quantity updated for both scripts per pharmacist recommendation.     Scripts ordered and sent to pharmacy for fill.    Spoke with patient on the phone at . Introduced self and role. Patient updated nurse spoke with pharmacy and addressed script issues. New scripts sent for gabapentin today and pharmacy will reach out when ready for . All questions/concerns answered at this time.

## 2024-06-20 NOTE — TELEPHONE ENCOUNTER
"Reason for Disposition   [1] Caller has URGENT medicine question about med that PCP or specialist prescribed AND [2] triager unable to answer question    Answer Assessment - Initial Assessment Questions  1. NAME of MEDICATION: \"What medicine are you calling about?\"      Gabapentin    2. QUESTION: \"What is your question?\" (e.g., medication refill, side effect)     Insurance won't cover both prescriptions; insurance is asking to combine two prescriptions.      3. PRESCRIBING HCP: \"Who prescribed it?\" Reason: if prescribed by specialist, call should be referred to that group.      Landrum    Protocols used: Medication Question Call-ADULT-AH    "

## 2024-06-20 NOTE — TELEPHONE ENCOUNTER
Pharmacist called, requesting a callback from on call provider, regarding patient's recent scripts. Provider paged via Epic Chat.

## 2024-06-20 NOTE — TELEPHONE ENCOUNTER
Patient requesting refill of her gabapentin. Patient takes 600 mg in morning, 600 mg in afternoon and 900 mg at bedtime per last physician progress note on 5/10/24.     Script sent for refill.

## 2024-06-25 ENCOUNTER — TELEPHONE (OUTPATIENT)
Dept: INTERNAL MEDICINE CLINIC | Facility: CLINIC | Age: 69
End: 2024-06-25

## 2024-06-25 NOTE — TELEPHONE ENCOUNTER
Contacted patient's [facility] pharmacy at . Spoke with pharmacy tech. Patient's gabapentin prescriptions processed. Patient picked up medication.     Verbal order completed for One Touch Verio Test Strips refill.

## 2024-06-25 NOTE — TELEPHONE ENCOUNTER
Patient's Gabapentin prescriptions updated and patient picked up medication.     No other concerns at this time

## 2024-06-26 ENCOUNTER — OFFICE VISIT (OUTPATIENT)
Dept: OBGYN CLINIC | Facility: HOSPITAL | Age: 69
End: 2024-06-26
Payer: COMMERCIAL

## 2024-06-26 ENCOUNTER — OFFICE VISIT (OUTPATIENT)
Dept: INTERNAL MEDICINE CLINIC | Facility: CLINIC | Age: 69
End: 2024-06-26

## 2024-06-26 VITALS
BODY MASS INDEX: 28.83 KG/M2 | DIASTOLIC BLOOD PRESSURE: 68 MMHG | HEIGHT: 64 IN | WEIGHT: 168.87 LBS | HEART RATE: 121 BPM | SYSTOLIC BLOOD PRESSURE: 105 MMHG

## 2024-06-26 VITALS
WEIGHT: 169.8 LBS | BODY MASS INDEX: 29.15 KG/M2 | TEMPERATURE: 97.8 F | DIASTOLIC BLOOD PRESSURE: 83 MMHG | OXYGEN SATURATION: 98 % | HEART RATE: 90 BPM | SYSTOLIC BLOOD PRESSURE: 122 MMHG

## 2024-06-26 DIAGNOSIS — R51.9 TEMPORAL HEADACHE: ICD-10-CM

## 2024-06-26 DIAGNOSIS — M54.16 LUMBAR RADICULOPATHY: ICD-10-CM

## 2024-06-26 DIAGNOSIS — M79.18 MYOFASCIAL PAIN: Primary | ICD-10-CM

## 2024-06-26 DIAGNOSIS — M62.89 MUSCLE TIGHTNESS: ICD-10-CM

## 2024-06-26 DIAGNOSIS — E11.42 TYPE 2 DIABETES MELLITUS WITH DIABETIC POLYNEUROPATHY, WITHOUT LONG-TERM CURRENT USE OF INSULIN (HCC): ICD-10-CM

## 2024-06-26 DIAGNOSIS — R06.83 SNORING: ICD-10-CM

## 2024-06-26 DIAGNOSIS — D64.9 ANEMIA, UNSPECIFIED TYPE: ICD-10-CM

## 2024-06-26 DIAGNOSIS — E11.42 CONTROLLED TYPE 2 DIABETES MELLITUS WITH DIABETIC POLYNEUROPATHY, WITHOUT LONG-TERM CURRENT USE OF INSULIN (HCC): ICD-10-CM

## 2024-06-26 DIAGNOSIS — M25.511 RIGHT SHOULDER PAIN, UNSPECIFIED CHRONICITY: Primary | ICD-10-CM

## 2024-06-26 DIAGNOSIS — R07.9 CHEST PAIN ON EXERTION: ICD-10-CM

## 2024-06-26 DIAGNOSIS — F41.9 ANXIETY: ICD-10-CM

## 2024-06-26 PROCEDURE — 99212 OFFICE O/P EST SF 10 MIN: CPT | Performed by: ORTHOPAEDIC SURGERY

## 2024-06-26 PROCEDURE — 99214 OFFICE O/P EST MOD 30 MIN: CPT | Performed by: INTERNAL MEDICINE

## 2024-06-26 PROCEDURE — G2211 COMPLEX E/M VISIT ADD ON: HCPCS | Performed by: INTERNAL MEDICINE

## 2024-06-26 RX ORDER — ASPIRIN 81 MG/1
81 TABLET ORAL DAILY
Qty: 90 TABLET | Refills: 3 | Status: SHIPPED | OUTPATIENT
Start: 2024-06-26

## 2024-06-26 RX ORDER — ATORVASTATIN CALCIUM 20 MG/1
20 TABLET, FILM COATED ORAL DAILY
Qty: 90 TABLET | Refills: 2 | Status: SHIPPED | OUTPATIENT
Start: 2024-06-26 | End: 2025-03-23

## 2024-06-26 RX ORDER — TIZANIDINE 4 MG/1
4 TABLET ORAL
Qty: 15 TABLET | Refills: 1 | Status: SHIPPED | OUTPATIENT
Start: 2024-06-26 | End: 2024-07-26

## 2024-06-26 RX ORDER — FERROUS SULFATE 324(65)MG
324 TABLET, DELAYED RELEASE (ENTERIC COATED) ORAL EVERY OTHER DAY
Qty: 90 TABLET | Refills: 1 | Status: SHIPPED | OUTPATIENT
Start: 2024-06-26

## 2024-06-26 NOTE — PROGRESS NOTES
[facility]  INTERNAL MEDICINE OFFICE VISIT     PATIENT INFORMATION     Jenifer Smith   68 y.o. female   MRN: [id number]    ASSESSMENT/PLAN     Problem List Items Addressed This Visit       Type 2 diabetes mellitus with diabetic polyneuropathy, without long-term current use of insulin (HCC)    Relevant Medications    atorvastatin (LIPITOR) 20 mg tablet    metFORMIN (GLUCOPHAGE) 1000 MG tablet    Chest pain on exertion    Relevant Medications    aspirin (Aspirin 81) 81 mg EC tablet    Lumbar radiculopathy    Relevant Medications    tiZANidine (ZANAFLEX) 4 mg tablet    Myofascial pain - Primary    Relevant Orders    CK     Other Visit Diagnoses       Anemia, unspecified type        Relevant Medications    ferrous sulfate 324 (65 Fe) mg    Controlled type 2 diabetes mellitus with diabetic polyneuropathy, without long-term current use of insulin (HCC)        Relevant Medications    metFORMIN (GLUCOPHAGE) 1000 MG tablet    Muscle tightness        Relevant Orders    CK    Sedimentation rate, automated    Temporal headache        Relevant Orders    Sedimentation rate, automated    Anxiety        Relevant Orders    Ambulatory referral to Psych Services    Snoring        Relevant Orders    Ambulatory Referral to Sleep Medicine            Chest pain  Myofascial pain  R chest, reproducible and more likely right shoulder pain, ongoing for 3 weeks  Does not appear to be exertional, patient states stress related,, 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 Snoring, Nocturnal hypoxia  4 . HTN  Not on CPAP but likely JOANNA, continues to smoke  BP improvement on recheck    -Inconclusive sleep  study, Sleep Medicine referral  -Discussed sleep hygiene and to reduce screen time  -Continue current dose of Lisinopril-HCTZ 10-12.5mg  -Discussed smoking cessation    5. Temporal headache  Tension headache vs GCA  No CN deficits, no migraine like triggers    -Tylenol PRN  -Will check ESR  -Discussed sleep hygiene    6. T2DM with neuropathy  A1C improved from 7.8 to 6.9  Pending alb/cr ratio    -Continue metformin 1000 mg BID  -Discussed lifestyle modifications and exercise  -Continue gabapentin dose    7. Fatigue  8. Iron deficiency anemia  Last Hgb 11.5 however previously near 9    -Was not taking iron supplement, refilled  -Psych referral  -TSH pending    9. HLD    -Repeat lipid panel pending  -Continue statin  -Discussed lifestyle modifications      Schedule a follow-up appointment in 1 month for annual.      HEALTH MAINTENANCE     Immunization History   Administered Date(s) Administered    COVID-19 PFIZER VACCINE 0.3 ML IM 04/18/2021, 05/16/2021     CHIEF COMPLAINT     Chief Complaint   Patient presents with    Headache     Everyday, 2 weeks    Chest Pain     Comes and goes and going on for 3 weeks      HISTORY OF PRESENT ILLNESS     Jenifer is a 68 yr old F with PMH T2DM, lumbar radiculopathy s/p steroid injection, chronic pain who presents with headache and chest pain.  Patient reports for the past 3 weeks having ongoing right-sided chest pain which occasionally radiates down the right arm.  Able to walk and do activity without getting short of breath or having to stop from chest pain.  No associated nausea or sweats or fever or chills.  Comes and goes throughout the day without any inciting trigger.  Does admit to having significant stress in her life and does not exercise.  She is the primary caretaker of her brother who is paralyzed and has a son at home as well.  Also admits to some chronic in her temporal region bilaterally, no light or sound trigger.  No numbness or tingling aside from her diabetic  neuropathy.  No sudden weakness or vision changes.  Patient continues to smoke but down to about 6 to 8 cigarettes/day.  Admits to excessive screen time with iPad and phone especially at bedtime and admits to trouble sleeping.  For pain she feels the gabapentin and the tizanidine have been helping her.  Good compliance rates.  Blood pressure medication and metformin.      REVIEW OF SYSTEMS     Review of Systems   All other systems reviewed and are negative.    OBJECTIVE     Vitals:    06/26/24 1050 06/26/24 1151   BP: 142/82 122/83   BP Location: Left arm Left arm   Patient Position: Sitting    Cuff Size: Standard    Pulse: (!) 108 90   Temp: 97.8 °F (36.6 °C)    TempSrc: Temporal    SpO2: 98%    Weight: 77 kg (169 lb 12.8 oz)      Physical Exam  Vitals reviewed.   Constitutional:       General: She is not in acute distress.  HENT:      Head: Normocephalic and atraumatic.   Cardiovascular:      Rate and Rhythm: Normal rate and regular rhythm.   Pulmonary:      Effort: Pulmonary effort is normal.      Breath sounds: Normal breath sounds.   Abdominal:      Palpations: Abdomen is soft.      Tenderness: There is no abdominal tenderness.   Musculoskeletal:         General: Tenderness (temporal region of head, R chest wall) present. Normal range of motion.   Neurological:      Mental Status: She is alert and oriented to person, place, and time.      Cranial Nerves: No cranial nerve deficit.       CURRENT MEDICATIONS     Current Outpatient Medications:     aspirin (Aspirin 81) 81 mg EC tablet, Take 1 tablet (81 mg total) by mouth daily, Disp: 90 tablet, Rfl: 3    atorvastatin (LIPITOR) 20 mg tablet, Take 1 tablet (20 mg total) by mouth daily, Disp: 90 tablet, Rfl: 2    ferrous sulfate 324 (65 Fe) mg, Take 1 tablet (324 mg total) by mouth every other day, Disp: 90 tablet, Rfl: 1    metFORMIN (GLUCOPHAGE) 1000 MG tablet, Take 1 tablet (1,000 mg total) by mouth 2 (two) times a day with meals, Disp: 180 tablet, Rfl: 1     "tiZANidine (ZANAFLEX) 4 mg tablet, Take 1 tablet (4 mg total) by mouth daily at bedtime as needed for muscle spasms, Disp: 15 tablet, Rfl: 1    Accu-Chek Softclix Lancets lancets, Use 1 (one) time for 1 dose Use as instructed, Disp: 200 each, Rfl: 2    Diclofenac Sodium (VOLTAREN) 1 %, APPLY 2 GRAMS TOPICALLY THREE TIMES A DAY, Disp: 100 g, Rfl: 2    Elastic Bandages & Supports (GNP Diabetic Socks Unisex XL) MISC, Use 1 each if needed (1 pair of socks for diabetic neuropathy), Disp: 2 each, Rfl: 0    gabapentin (NEURONTIN) 300 mg capsule, Take 3 capsules (900 mg total) by mouth daily at bedtime, Disp: 270 capsule, Rfl: 1    gabapentin (NEURONTIN) 300 mg capsule, Take 2 capsules (600 mg total) by mouth 2 (two) times a day, Disp: 360 capsule, Rfl: 1    glucose blood (Accu-Chek Guide) test strip, Use 1 each in the morning Use as instructed, Disp: 200 each, Rfl: 2    Lancets Misc. (Accu-Chek Softclix Lancet Dev) KIT, Use in the morning, Disp: 1 kit, Rfl: 0    lisinopril-hydrochlorothiazide (PRINZIDE,ZESTORETIC) 20-12.5 MG per tablet, Take 1 tablet by mouth daily, Disp: 90 tablet, Rfl: 2    Menthol, Topical Analgesic, (Icy Hot Advanced Relief) 7.5 % PTCH, Apply 1 patch topically daily as needed (shoulder pain), Disp: 30 patch, Rfl: 0    vitamin B-12 (CYANOCOBALAMIN) 500 MCG TABS, Take 1 tablet (500 mcg total) by mouth daily, Disp: 30 tablet, Rfl: 0    PAST MEDICAL & SURGICAL HISTORY     Past Medical History:   Diagnosis Date    Arthritis     Bump     bumped head yesterday at work \"saw stars\" no LOC, cat scan done was normal    Circulation problem     Diabetes mellitus (HCC)     blood sugar 125 on admission    Encounter for screening colonoscopy     1 st one    Headache     Hyperlipidemia     Hypertension     Positive fecal occult blood test 08/30/2018    Added automatically from request for surgery [id number]    Post concussion syndrome 09/14/2018    Syncope      Past Surgical History:   Procedure Laterality Date    BREAST " BIOPSY Right 02/18/2022    COLONOSCOPY N/A 9/26/2018    Procedure: COLONOSCOPY;  Surgeon: Joie Morejon MD;  Location: [facility] GI LAB;  Service: Gastroenterology    HYSTERECTOMY  2017    INCISIONAL BREAST BIOPSY      last assessed 17Aug2017    AR COLONOSCOPY FLX DX W/COLLJ SPEC WHEN PFRMD N/A 7/5/2018    Procedure: COLONOSCOPY;  Surgeon: Joie Morejon MD;  Location:  GI LAB;  Service: Gastroenterology    US GUIDED BREAST BIOPSY RIGHT COMPLETE Right 2/18/2022     SOCIAL & FAMILY HISTORY     Social History     Socioeconomic History    Marital status:      Spouse name: Not on file    Number of children: 6    Years of education: Not on file    Highest education level: Not on file   Occupational History    Occupation: [employer]   Tobacco Use    Smoking status: Every Day     Current packs/day: 0.50     Average packs/day: 0.5 packs/day for 20.0 years (10.0 ttl pk-yrs)     Types: Cigarettes    Smokeless tobacco: Never    Tobacco comments:     smokes less than 1pk/day   Vaping Use    Vaping status: Never Used   Substance and Sexual Activity    Alcohol use: Never    Drug use: Never    Sexual activity: Not Currently   Other Topics Concern    Not on file   Social History Narrative    Daily caffeine consumption, 6-8 servings a day     Social Determinants of Health     Financial Resource Strain: Low Risk  (1/15/2024)    Overall Financial Resource Strain (CARDIA)     Difficulty of Paying Living Expenses: Not hard at all   Food Insecurity: No Food Insecurity (1/15/2024)    Hunger Vital Sign     Worried About Running Out of Food in the Last Year: Never true     Ran Out of Food in the Last Year: Never true   Transportation Needs: No Transportation Needs (1/15/2024)    PRAPARE - Transportation     Lack of Transportation (Medical): No     Lack of Transportation (Non-Medical): No   Physical Activity: Inactive (2/10/2021)    Exercise Vital Sign     Days of Exercise per Week: 0 days     Minutes of Exercise per Session: 0  min   Stress: No Stress Concern Present (2/10/2021)    [employer] - Occupational Stress Questionnaire     Feeling of Stress : Not at all   Social Connections: Socially Isolated (2/10/2021)    Social Connection and Isolation Panel [NHANES]     Frequency of Communication with Friends and Family: Once a week     Frequency of Social Gatherings with Friends and Family: Once a week     Attends [employer] Services: Never     Active Member of Clubs or Organizations: No     Attends Club or Organization Meetings: Never     Marital Status:    Intimate Partner Violence: Not At Risk (2/10/2021)    Humiliation, Afraid, Rape, and Kick questionnaire     Fear of Current or Ex-Partner: No     Emotionally Abused: No     Physically Abused: No     Sexually Abused: No   Housing Stability: High Risk (3/14/2024)    Housing Stability Vital Sign     Unable to Pay for Housing in the Last Year: Yes     Number of Times Moved in the Last Year: 1     Homeless in the Last Year: Yes     Social History     Substance and Sexual Activity   Alcohol Use Never       Social History     Substance and Sexual Activity   Drug Use Never     Social History     Tobacco Use   Smoking Status Every Day    Current packs/day: 0.50    Average packs/day: 0.5 packs/day for 20.0 years (10.0 ttl pk-yrs)    Types: Cigarettes   Smokeless Tobacco Never   Tobacco Comments    smokes less than 1pk/day     Family History   Problem Relation Age of Onset    Breast cancer Cousin 50    Alcohol abuse Mother     Alcohol abuse Father     Breast cancer Daughter 38    No Known Problems Sister     No Known Problems Maternal Grandmother     No Known Problems Maternal Grandfather     No Known Problems Paternal Grandmother     No Known Problems Paternal Grandfather     No Known Problems Son     No Known Problems Son     No Known Problems Daughter     No Known Problems Daughter     No Known Problems Daughter     Breast cancer Sister 66    No Known Problems  Maternal Aunt     No Known Problems Maternal Aunt     No Known Problems Maternal Aunt     No Known Problems Maternal Aunt     No Known Problems Maternal Aunt     No Known Problems Maternal Aunt     No Known Problems Maternal Aunt     No Known Problems Maternal Aunt     No Known Problems Maternal Aunt     No Known Problems Maternal Aunt     No Known Problems Maternal Aunt     No Known Problems Maternal Aunt     No Known Problems Paternal Aunt     No Known Problems Paternal Aunt     Dementia Paternal Aunt     No Known Problems Paternal Aunt     No Known Problems Paternal Aunt     No Known Problems Paternal Aunt     No Known Problems Paternal Aunt     No Known Problems Paternal Aunt     Colon cancer Neg Hx     Endometrial cancer Neg Hx     Ovarian cancer Neg Hx                ==  Mikki Johnson MD  PGY-2  [facility] Internal Medicine [facility]    [address], [address]  [place] [address]  Office: [phone]  Fax: [phone]

## 2024-06-26 NOTE — PROGRESS NOTES
Assessment & Plan/Medical Decision Makin y.o. female with Back Pain, Right Radicular Leg Pain, and Ambulatory Dysfunction and imaging findings most notable for lumbar spondylosis         The clinical, physical and imaging findings were reviewed with the patient.  Jenifer  has a constellation of findings consistent with Lumbar Radiculopathy, Lumbar Facet/Arthrosis Pain, and Ambulatory Dysfunction in the setting of lumbar degenerative disease.  Fortunately patient remains neurologically intact and functional. Physical exam showing mild lower extremity weakness, decreased lumbar ROM. Also with right shoulder tenderness.     We discussed the treatment options including physical therapy, at home exercises, activity modifications, chiropractic medicine, oral medications, interventional spine procedures.  Did again briefly discuss role of surgical intervention, however patient wishes to continue with conservative treatment at this time.    Referral placed for physical therapy to work on right shoulder ROM, strengthening, and stretching exercises.     She also continue with home exercise program for lower back.  Has been maintaining with muscle relaxers to help with sleep at night and will continue with medication as needed.  She will follow-up with pain management as needed but notes that she does not feel as though a repeat injection is necessary at this time.    Patient instructed to return to office/ER sooner if symptoms are not improving, getting worse, or new worrisome/neurologic symptoms arise. Patient should follow up after continued conservative treatment/repeat injection if pain does not improve.     Subjective:      Chief Complaint: Back Pain    HPI:  Jenifer Smith is a 68 y.o. female presenting for initial visit with chief complaint of back pain. Ongoing back pain for about 1.5 years that has been worsening over the past 6 months or so. Pain is across low back and radiates into lateral aspect of  right > left lower extremity with numbness/tingling and cramping. Pain worse with prolonged sitting, standing and walking. Only able to walk a few blocks until worsening low back and right leg pain and needing to stop and rest. Also notes her back and legs feel tired and heavy at times. Reports she feels like her knees are going to give out on her at times as well. She does note leaning forward when she walks. Back pain has become more debilitating and interferes with daily functioning. Denies any dorian trauma. Denies fever or chills, no night sweats. Denies any bladder or bowel changes.      Also with ongoing right shoulder pain, worse with movement. Denies significant neck pain. Intermittent arm pain with numbness/tingling in the morning. Back and legs are most bothersome. Denies dorian upper extremity weakness.    PMH T2DM (HbA1c 7.8 on 11/24/2023).    Conservative therapy includes the following:   Medications: tylenol as needed    Injections: denies recent     Physical Therapy: has done PT in the past  Chiropractic Medicine: has not attempted  Accupunture/Massage Therapy: has not attempted   These therapeutic modalities were ineffective at providing sustained pain relief/functional improvement.     Nicotine dependent: smokes about 8 cigarettes per day  Occupation: worked as CNA  Living situation: Lives with family   ADLs: patient is able to perform     Update on 12/26/2023  Jenifer is here for follow up, MRI lumbar spine review. She remains unchanged since initial visit. Continues with low back, gluteal and right > left lower extremity pain. Pain radiates into lateral right lower extremity to her foot with numbness/tingling. Pain worse with prolonged sitting, standing and walking. Only able to walk a few blocks before needing to stop due to worsening back and right lower extremity pain. Also notes her back and legs get tired with ambulation. Subjective lower extremity weakness. Patient has upcoming appointment  with spine & pain management on 1/18/2024. Takes tylenol with mild relief. Also on 600mg gabapentin tid without improvement.     Update on 3/26/2024:  Jenifer is here for follow up. She underwent L5-S1 ILESI by Dr. Bansal on 1/22/2024. She reports significant improvement in back and right lower extremity pain after injection, noting about 90% pain improvement x3 weeks. Notes feeling more steady on her feet and was able to walk farther than before, about 10 minutes without needing to stop. However, she has to help with care-giving for her brother, noting her symptoms were recently re-aggravated. Currently, she reports return of significant low back, right gluteal and right lateral right lower extremity pain. Also with numbness/tingling into lateral right foot. She has difficulty caring for her brother and performing daily activities due to pain and symptoms. She has been doing at-home exercises and stretches with some benefit, noting the strength in her right leg feels somewhat improved. Has been taking zanaflex and gabapentin with some relief. Tylenol without relief.    Update 6/26/24  Patient is here for follow-up.  She notes she is doing well.  Her back pain has decreased.  Her mobility has increased.  She continues with taking muscle relaxers at night that help with her pain.  She had an injection and it helped.  She states she is not interested in surgery.    Objective:     Family History   Problem Relation Age of Onset    Breast cancer Cousin 50    Alcohol abuse Mother     Alcohol abuse Father     Breast cancer Daughter 38    No Known Problems Sister     No Known Problems Maternal Grandmother     No Known Problems Maternal Grandfather     No Known Problems Paternal Grandmother     No Known Problems Paternal Grandfather     No Known Problems Son     No Known Problems Son     No Known Problems Daughter     No Known Problems Daughter     No Known Problems Daughter     Breast cancer Sister 66    No Known Problems  "Maternal Aunt     No Known Problems Maternal Aunt     No Known Problems Maternal Aunt     No Known Problems Maternal Aunt     No Known Problems Maternal Aunt     No Known Problems Maternal Aunt     No Known Problems Maternal Aunt     No Known Problems Maternal Aunt     No Known Problems Maternal Aunt     No Known Problems Maternal Aunt     No Known Problems Maternal Aunt     No Known Problems Maternal Aunt     No Known Problems Paternal Aunt     No Known Problems Paternal Aunt     Dementia Paternal Aunt     No Known Problems Paternal Aunt     No Known Problems Paternal Aunt     No Known Problems Paternal Aunt     No Known Problems Paternal Aunt     No Known Problems Paternal Aunt     Colon cancer Neg Hx     Endometrial cancer Neg Hx     Ovarian cancer Neg Hx        Past Medical History:   Diagnosis Date    Arthritis     Bump     bumped head yesterday at work \"saw stars\" no LOC, cat scan done was normal    Circulation problem     Diabetes mellitus (HCC)     blood sugar 125 on admission    Encounter for screening colonoscopy     1 st one    Headache     Hyperlipidemia     Hypertension     Positive fecal occult blood test 08/30/2018    Added automatically from request for surgery [id number]    Post concussion syndrome 09/14/2018    Syncope        Current Outpatient Medications   Medication Sig Dispense Refill    Accu-Chek Softclix Lancets lancets Use 1 (one) time for 1 dose Use as instructed 200 each 2    atorvastatin (LIPITOR) 20 mg tablet Take 1 tablet (20 mg total) by mouth daily 90 tablet 2    Diclofenac Sodium (VOLTAREN) 1 % APPLY 2 GRAMS TOPICALLY THREE TIMES A  g 2    Elastic Bandages & Supports (GNP Diabetic Socks Unisex XL) MISC Use 1 each if needed (1 pair of socks for diabetic neuropathy) 2 each 0    ferrous sulfate 324 (65 Fe) mg Take 1 tablet (324 mg total) by mouth every other day 90 tablet 1    gabapentin (NEURONTIN) 300 mg capsule Take 3 capsules (900 mg total) by mouth daily at bedtime 270 capsule 1 "    gabapentin (NEURONTIN) 300 mg capsule Take 2 capsules (600 mg total) by mouth 2 (two) times a day 360 capsule 1    glucose blood (Accu-Chek Guide) test strip Use 1 each in the morning Use as instructed 200 each 2    Lancets Misc. (Accu-Chek Softclix Lancet Dev) KIT Use in the morning 1 kit 0    lisinopril-hydrochlorothiazide (PRINZIDE,ZESTORETIC) 20-12.5 MG per tablet Take 1 tablet by mouth daily 90 tablet 2    meloxicam (Mobic) 7.5 mg tablet Take 1 tablet (7.5 mg total) by mouth daily as needed for moderate pain for up to 10 days 10 tablet 0    Menthol, Topical Analgesic, (Icy Hot Advanced Relief) 7.5 % PTCH Apply 1 patch topically daily as needed (shoulder pain) 30 patch 0    metFORMIN (GLUCOPHAGE) 1000 MG tablet Take 1 tablet (1,000 mg total) by mouth 2 (two) times a day with meals 180 tablet 1    tiZANidine (ZANAFLEX) 4 mg tablet Take 1 tablet (4 mg total) by mouth daily at bedtime as needed for muscle spasms 15 tablet 0    vitamin B-12 (CYANOCOBALAMIN) 500 MCG TABS Take 1 tablet (500 mcg total) by mouth daily 30 tablet 0     No current facility-administered medications for this visit.       Past Surgical History:   Procedure Laterality Date    BREAST BIOPSY Right 02/18/2022    COLONOSCOPY N/A 9/26/2018    Procedure: COLONOSCOPY;  Surgeon: Joie Morejon MD;  Location: [facility];  Service: Gastroenterology    HYSTERECTOMY  2017    INCISIONAL BREAST BIOPSY      last assessed 17Aug2017    NM COLONOSCOPY FLX DX W/COLLJ SPEC WHEN PFRMD N/A 7/5/2018    Procedure: COLONOSCOPY;  Surgeon: Joie Morejon MD;  Location:  GI LAB;  Service: Gastroenterology    US GUIDED BREAST BIOPSY RIGHT COMPLETE Right 2/18/2022       Social History     Socioeconomic History    Marital status:      Spouse name: Not on file    Number of children: 6    Years of education: Not on file    Highest education level: Not on file   Occupational History    Occupation: [employer]   Tobacco Use    Smoking status: Every Day      Current packs/day: 0.50     Average packs/day: 0.5 packs/day for 20.0 years (10.0 ttl pk-yrs)     Types: Cigarettes    Smokeless tobacco: Never    Tobacco comments:     smokes less than 1pk/day   Vaping Use    Vaping status: Never Used   Substance and Sexual Activity    Alcohol use: Never    Drug use: Never    Sexual activity: Not Currently   Other Topics Concern    Not on file   Social History Narrative    Daily caffeine consumption, 6-8 servings a day     Social Determinants of Health     Financial Resource Strain: Low Risk  (1/15/2024)    Overall Financial Resource Strain (CARDIA)     Difficulty of Paying Living Expenses: Not hard at all   Food Insecurity: No Food Insecurity (1/15/2024)    Hunger Vital Sign     Worried About Running Out of Food in the Last Year: Never true     Ran Out of Food in the Last Year: Never true   Transportation Needs: No Transportation Needs (1/15/2024)    PRAPARE - Transportation     Lack of Transportation (Medical): No     Lack of Transportation (Non-Medical): No   Physical Activity: Inactive (2/10/2021)    Exercise Vital Sign     Days of Exercise per Week: 0 days     Minutes of Exercise per Session: 0 min   Stress: No Stress Concern Present (2/10/2021)    [employer] - Occupational Stress Questionnaire     Feeling of Stress : Not at all   Social Connections: Socially Isolated (2/10/2021)    Social Connection and Isolation Panel [NHANES]     Frequency of Communication with Friends and Family: Once a week     Frequency of Social Gatherings with Friends and Family: Once a week     Attends Zoroastrian Services: Never     Active Member of Clubs or Organizations: No     Attends Club or Organization Meetings: Never     Marital Status:    Intimate Partner Violence: Not At Risk (2/10/2021)    Humiliation, Afraid, Rape, and Kick questionnaire     Fear of Current or Ex-Partner: No     Emotionally Abused: No     Physically Abused: No     Sexually Abused: No  "  Housing Stability: High Risk (3/14/2024)    Housing Stability Vital Sign     Unable to Pay for Housing in the Last Year: Yes     Number of Times Moved in the Last Year: 1     Homeless in the Last Year: Yes       No Known Allergies    Review of Systems  General- denies fever/chills  HEENT- denies hearing loss or sore throat  Eyes- denies eye pain or visual disturbances, denies red eyes  Respiratory- denies cough or SOB  Cardio- denies chest pain or palpitations  GI- denies abdominal pain  Endocrine- denies urinary frequency  Urinary- denies pain with urination  Musculoskeletal- Negative except noted above  Skin- denies rashes or wounds  Neurological- denies dizziness or headache  Psychiatric- denies anxiety or difficulty concentrating    Physical Exam  /68   Pulse (!) 121   Ht 5' 4\" (1.626 m)   Wt 76.6 kg (168 lb 14 oz)   BMI 28.99 kg/m²     General/Constitutional: No apparent distress: well-nourished and well developed.  Lymphatic: No appreciable lymphadenopathy  Respiratory: Non-labored breathing  Vascular: No edema, swelling or tenderness, except as noted in detailed exam.  Integumentary: No impressive skin lesions present, except as noted in detailed exam.  Psych: Normal mood and affect, oriented to person, place and time.  MSK: normal other than stated in HPI and exam  Gait & balance: no evidence of myelopathic gait, ambulates Independently     Lumbar spine range of motion:  -Forward flexion to 90  -Extension to neutral  -Lateral bend 25 right, 25 left  -Rotation 25 right, 25 left  There tenderness with palpation along lumbar paraspinal musculature, no midline tenderness     Neurologic:  Upper Extremity Motor Function    Right  Left    Deltoid  5/5  5/5    Bicep  5/5  5/5    Wrist extension  5/5  5/5    Tricep  5/5  5/5    Finger flexion/  5/5  5/5    Hand intrinsic  5/5  5/5      Lower Extremity Motor Function    Right  Left    Iliopsoas  5/5  5/5    Quadriceps 5/5 5/5   Tibialis anterior  5/5  " "5/5    EHL  5/5  5/5    Gastroc. muscle  5/5  5/5    Heel rise  4/5  4/5    Toe rise  4+/5  4+/5      Sensory: light touch is intact to bilateral upper and lower extremities     Reflexes:    Right Left   Patellar 1+ 1+   Achilles 1+ 1+   Babinski neg neg     Other tests:  Straight Leg Raise: negative  Kong SI: negative  JHONATAN SI: negative  Greater troch: no tenderness   Internal/external hip ROM: intact, no pain   Flexion/extension knee ROM: intact, no pain   Vascular: WWP extremities, 2+DP bilateral    Tandem gait: equivocal  Shoulder: pain with right shoulder ROM    Diagnostic Tests   IMAGING: I have personally reviewed the images and these are my findings:  Lumbar Spine X-rays from 12/11/2023: multi level lumbar spondylosis with loss of disc height, osteophyte formation and facet hypertrophy, no apparent spondylolisthesis, no appreciated lytic/blastic lesions, no obvious instability    MRI lumbar spine from 12/16/2023: multi level lumbar disc degeneration with disc desiccation, loss of disc height, facet and ligamentum hypertrophy, L2-3 moderate stenosis, L3-4 moderate stenosis with left-sided severe lateral recess stenosis, L4-5 bilateral lateral recess stenosis    Electronic Medical Records were reviewed including office notes, imaging studies    Procedures, if performed today     None performed       Portions of the record may have been created with voice recognition software.  Occasional wrong word or \"sound a like\" substitutions may have occurred due to the inherent limitations of voice recognition software.  Read the chart carefully and recognize, using context, where substitutions have occurred.      "

## 2024-06-28 ENCOUNTER — APPOINTMENT (OUTPATIENT)
Dept: LAB | Facility: CLINIC | Age: 69
End: 2024-06-28
Payer: COMMERCIAL

## 2024-06-28 DIAGNOSIS — R51.9 TEMPORAL HEADACHE: ICD-10-CM

## 2024-06-28 DIAGNOSIS — M62.89 MUSCLE TIGHTNESS: ICD-10-CM

## 2024-06-28 DIAGNOSIS — M79.18 MYOFASCIAL PAIN: ICD-10-CM

## 2024-06-28 DIAGNOSIS — E11.42 TYPE 2 DIABETES MELLITUS WITH DIABETIC POLYNEUROPATHY, WITHOUT LONG-TERM CURRENT USE OF INSULIN (HCC): ICD-10-CM

## 2024-06-28 LAB
ANION GAP SERPL CALCULATED.3IONS-SCNC: 8 MMOL/L (ref 4–13)
BASOPHILS # BLD AUTO: 0.03 THOUSANDS/ÂΜL (ref 0–0.1)
BASOPHILS NFR BLD AUTO: 0 % (ref 0–1)
BUN SERPL-MCNC: 15 MG/DL (ref 5–25)
CALCIUM SERPL-MCNC: 10 MG/DL (ref 8.4–10.2)
CHLORIDE SERPL-SCNC: 107 MMOL/L (ref 96–108)
CHOLEST SERPL-MCNC: 136 MG/DL
CK SERPL-CCNC: 61 U/L (ref 26–192)
CO2 SERPL-SCNC: 28 MMOL/L (ref 21–32)
CREAT SERPL-MCNC: 0.69 MG/DL (ref 0.6–1.3)
CREAT UR-MCNC: 151.4 MG/DL
EOSINOPHIL # BLD AUTO: 0.16 THOUSAND/ÂΜL (ref 0–0.61)
EOSINOPHIL NFR BLD AUTO: 2 % (ref 0–6)
ERYTHROCYTE [DISTWIDTH] IN BLOOD BY AUTOMATED COUNT: 16.8 % (ref 11.6–15.1)
ERYTHROCYTE [SEDIMENTATION RATE] IN BLOOD: 45 MM/HOUR (ref 0–29)
GFR SERPL CREATININE-BSD FRML MDRD: 90 ML/MIN/1.73SQ M
GLUCOSE P FAST SERPL-MCNC: 105 MG/DL (ref 65–99)
HCT VFR BLD AUTO: 34.4 % (ref 34.8–46.1)
HDLC SERPL-MCNC: 52 MG/DL
HGB BLD-MCNC: 10.7 G/DL (ref 11.5–15.4)
IMM GRANULOCYTES # BLD AUTO: 0.02 THOUSAND/UL (ref 0–0.2)
IMM GRANULOCYTES NFR BLD AUTO: 0 % (ref 0–2)
LDLC SERPL CALC-MCNC: 73 MG/DL (ref 0–100)
LYMPHOCYTES # BLD AUTO: 1.92 THOUSANDS/ÂΜL (ref 0.6–4.47)
LYMPHOCYTES NFR BLD AUTO: 26 % (ref 14–44)
MCH RBC QN AUTO: 27.4 PG (ref 26.8–34.3)
MCHC RBC AUTO-ENTMCNC: 31.1 G/DL (ref 31.4–37.4)
MCV RBC AUTO: 88 FL (ref 82–98)
MICROALBUMIN UR-MCNC: 13 MG/L
MICROALBUMIN/CREAT 24H UR: 9 MG/G CREATININE (ref 0–30)
MONOCYTES # BLD AUTO: 0.77 THOUSAND/ÂΜL (ref 0.17–1.22)
MONOCYTES NFR BLD AUTO: 11 % (ref 4–12)
NEUTROPHILS # BLD AUTO: 4.43 THOUSANDS/ÂΜL (ref 1.85–7.62)
NEUTS SEG NFR BLD AUTO: 61 % (ref 43–75)
NONHDLC SERPL-MCNC: 84 MG/DL
NRBC BLD AUTO-RTO: 0 /100 WBCS
PLATELET # BLD AUTO: 312 THOUSANDS/UL (ref 149–390)
PMV BLD AUTO: 10.3 FL (ref 8.9–12.7)
POTASSIUM SERPL-SCNC: 4.4 MMOL/L (ref 3.5–5.3)
RBC # BLD AUTO: 3.9 MILLION/UL (ref 3.81–5.12)
SODIUM SERPL-SCNC: 143 MMOL/L (ref 135–147)
TRIGL SERPL-MCNC: 56 MG/DL
TSH SERPL DL<=0.05 MIU/L-ACNC: 1.13 UIU/ML (ref 0.45–4.5)
WBC # BLD AUTO: 7.33 THOUSAND/UL (ref 4.31–10.16)

## 2024-06-28 PROCEDURE — 85025 COMPLETE CBC W/AUTO DIFF WBC: CPT

## 2024-06-28 PROCEDURE — 36415 COLL VENOUS BLD VENIPUNCTURE: CPT

## 2024-06-28 PROCEDURE — 82570 ASSAY OF URINE CREATININE: CPT

## 2024-06-28 PROCEDURE — 80048 BASIC METABOLIC PNL TOTAL CA: CPT

## 2024-06-28 PROCEDURE — 80061 LIPID PANEL: CPT

## 2024-06-28 PROCEDURE — 84443 ASSAY THYROID STIM HORMONE: CPT

## 2024-06-28 PROCEDURE — 85652 RBC SED RATE AUTOMATED: CPT

## 2024-06-28 PROCEDURE — 82550 ASSAY OF CK (CPK): CPT

## 2024-06-28 PROCEDURE — 82043 UR ALBUMIN QUANTITATIVE: CPT

## 2024-06-29 DIAGNOSIS — R06.83 SNORING: ICD-10-CM

## 2024-06-29 DIAGNOSIS — G47.30 SLEEP APNEA, UNSPECIFIED TYPE: Primary | ICD-10-CM

## 2024-07-11 ENCOUNTER — TELEPHONE (OUTPATIENT)
Dept: PSYCHIATRY | Facility: CLINIC | Age: 69
End: 2024-07-11

## 2024-07-11 NOTE — TELEPHONE ENCOUNTER
Contacted patient in regards to Routine Referral in attempts to verify patient's needs of services and add patient to proper wait list. spoke with patient whom stated she will speak with her provider as she was not aware a referral was sent to us for services. Pt will consult with her provider and will call back if need to make an appointment. Pt is aware referral is good for a year. Closing referral.

## 2024-07-18 ENCOUNTER — EVALUATION (OUTPATIENT)
Dept: PHYSICAL THERAPY | Facility: REHABILITATION | Age: 69
End: 2024-07-18
Payer: COMMERCIAL

## 2024-07-18 DIAGNOSIS — M25.511 RIGHT SHOULDER PAIN, UNSPECIFIED CHRONICITY: ICD-10-CM

## 2024-07-18 PROCEDURE — 97161 PT EVAL LOW COMPLEX 20 MIN: CPT | Performed by: PHYSICAL THERAPIST

## 2024-07-18 PROCEDURE — 97110 THERAPEUTIC EXERCISES: CPT | Performed by: PHYSICAL THERAPIST

## 2024-07-18 NOTE — PROGRESS NOTES
PT Evaluation     Today's date: 2024  Patient name: Jenifer Smith  : 1955  MRN: [id number]  Referring provider: Guanako Agosto MD  Dx:   Encounter Diagnosis     ICD-10-CM    1. Right shoulder pain, unspecified chronicity  M25.511 Ambulatory Referral to Physical Therapy          Start Time: 1715  Stop Time: 181  Total time in clinic (min): 60 minutes    Assessment  Impairments: abnormal gait, abnormal or restricted ROM, impaired physical strength and lacks appropriate home exercise program    Assessment details: Patient is a 68 y.o. female with c/o of acute on chronic R shoulder pain and chronic LBP. Patient's symptoms limit her with reaching overhead, lying on her R side, lifting, bending, and walking. On exam patient presents with decreased and painful R shoulder resisted strength testing, decreased lumbar mobility with possible extension bias. Patient's hx and exam is suggestive of chronic RTC/subacromial pathology and chronic lumbar radiculopathy vs lumbar pain with strength deficits. Patient will benefit from skilled PT to address the above impairments to improve her tolerance with reaching, bending, lifting, and walking.   Understanding of Dx/Px/POC: good     Prognosis: good    Goals  Within 6 weeks,   1. Patient will report <3/10 pain with reaching overhead to cabinet with her R shoulder.   2. Patient will be able to demonstrate R shoulder AROM flexion >150.   3. Patient will be able to demonstrate R shoulder IR behind back at least T10.   4. Patient will be able to demonstrate R shoulder abduction strength >=4-/5.   5. Patient will be able to demonstrate R shoulder ER strength >=4-/5.   6. Patient will be able to demonstrate R shoulder IR strength >=4-/5.   7. Patient will be able to ambulate for >=5 minutes without rest.   8. Patient will be able to demonstrate negative R slump test.     Within 12 weeks or by discharge,  1. Patient will be able to lift 10# with her R shoulder with <3/10  pain.   2. Patient will be able to demonstrate R shoulder AROM flexion >155*.   3. Patient will be able to demonstrate R shoulder IR behind back at least T9.   4. Patient will be able to demonstrate R shoulder abduction strength >=4/5.   5. Patient will be able to demonstrate R shoulder ER strength >=4/5.   6. Patient will be able to demonstrate R shoulder IR strength >=4/5.   7. Patient will be able to complete 6MWT and improve >200ft (IE: TBD)      Plan  Patient would benefit from: skilled physical therapy  Planned modality interventions: cryotherapy and thermotherapy: hydrocollator packs    Planned therapy interventions: abdominal trunk stabilization, joint mobilization, manual therapy, activity modification, balance, balance/weight bearing training, neuromuscular re-education, body mechanics training, postural training, patient education, therapeutic activities, therapeutic exercise, functional ROM exercises, strengthening, stretching, transfer training, gait training and home exercise program    Frequency: 1-2x/week.  Plan of Care beginning date: 7/18/2024  Plan of Care expiration date: 10/10/2024  Treatment plan discussed with: patient      Subjective Evaluation    History of Present Illness  Mechanism of injury: Patient c/o R shoulder pain that began that has worsened over the past month. Reports chronic hx of R shoulder pain after 2 falls onto her R side.     Aggravating factors: lying on R side, reaching overhead, lifting  Ease factors: heating pad, tylenol    Lumbar: midline lumbar to her hips  Aggravating factors: lifting (laundry, stool, etc), sitting without support, walking too far (>5 minutes), bending  Ease factors: injections, muscle relaxers, sitting with support    PMHx: peripheral neuropathy from DM  Patient Goals  Patient goals for therapy: decreased pain  Patient goal: to reach up with less pain,  Pain  Pain scale: 9/10 R shoulder, 10/10 lumbar.    Social Support  Lives with: brother who is  paralyzed.    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)  Hand dominance: right        Objective     Cervical/Thoracic Screen   Cervical range of motion within normal limits    Active Range of Motion   Left Shoulder   Flexion: 165 degrees   External rotation 0°: 80 degrees   Internal rotation BTB: T8     Right Shoulder   Flexion: 150 degrees   External rotation 0°: 70 degrees   Internal rotation BTB: T11     Passive Range of Motion   Left Shoulder   Flexion: 175 degrees   External rotation 0°: 90 degrees     Right Shoulder   Flexion: 170 degrees   External rotation 0°: 80 degrees   Left Hip   Flexion: WFL  External rotation (90/90): WFL  Internal rotation (90/90): WFL    Right Hip   Flexion: 90 degrees with pain  Abduction: 20 degrees with pain  Mechanical Assessment    Cervical      Thoracic      Lumbar    Lying extension: sustained positions  Pain intensity: better  Pain level: decreased    Strength/Myotome Testing     Left Shoulder     Planes of Motion   Abduction: 4-   External rotation at 0°: 4-   Internal rotation at 0°: 4-     Right Shoulder     Planes of Motion   Abduction: 3+ (pain)   External rotation at 0°: 4-   Internal rotation at 0°: 3+ (pain)     Tests     Lumbar     Left   Negative passive SLR and slump test.     Right   Positive passive SLR.   Negative slump test.     Ambulation     Comments   Gait: slow keyur, antalgic, decreased RLE weight bearing             POC expires Unit limit Auth Expiration date PT/OT/ST + Visit Limit?   10/10/24  12/31/24                              Visit/Unit Tracking  AUTH Status:  Date 7/18             N/A Used 1              Remaining                 Diagnosis: R RTC pathology + lumbar pain with possible posterior derangement + strength deficits   Precautions: DM, HTN, diabetic neuropathy   Insurance: [employer] Medicare    7/18          Vitals 134/54    FOTO      Patient Ed           Smoking cessation Messaged her PCP *hip strength  *?ext lumbar  *6MWT         Lumbar  roll With sitting                                                                 Neuro Re-Ed           Extension principle Standing lumbar extension  10x 5x/day                                                                            Ther Ex           Aerobic conditioning           RTC Shoulder ER  [place] TB  3 x 8-10    Shoulder IR  [place] TB  3 x 8-10                                                                            Ther Activity                                 Manual                                                                  Modalities

## 2024-07-18 NOTE — PROGRESS NOTES
PT Evaluation     Today's date: 2024  Patient name: Jenifer Smith  : 1955  MRN: [id number]  Referring provider: Guanako Agosto MD  Dx: No diagnosis found.               Assessment  Impairments: abnormal gait, abnormal or restricted ROM, impaired physical strength and lacks appropriate home exercise program    Assessment details: Patient is a 68 y.o. female with c/o of *** . Patient's symptoms limit ***. On exam patient presents with ***. Patient's hx and exam is suggestive of ***. Patient will benefit from skilled PT to address the above impairments to improve *** and progress back to prior level of function.   Understanding of Dx/Px/POC: good     Prognosis: good    Goals  Within *** weeks,   1. Patient will demonstrate ***  ***. Patient will be able to ambulate for >=*** minutes without rest.   ***. Patient will be able to stand for >= *** minutes.   ***. Patient will be able to ***  ***. Patient will be able to ***    Within *** weeks or by discharge,   1. Patient will demonstrate ***  ***. Patient will be able to ambulate for >=*** minutes without rest.   ***. Patient will be able to stand for >= *** minutes.   ***. Patient will be able to ***  ***. Patient will be able to ***    Plan  Patient would benefit from: skilled physical therapy  Planned modality interventions: cryotherapy and thermotherapy: hydrocollator packs    Planned therapy interventions: abdominal trunk stabilization, joint mobilization, manual therapy, activity modification, balance, balance/weight bearing training, neuromuscular re-education, body mechanics training, postural training, patient education, therapeutic activities, therapeutic exercise, functional ROM exercises, strengthening, stretching, transfer training, gait training and home exercise program    Frequency: 1-2x/week.  Treatment plan discussed with: patient    Subjective    Objective           POC expires Unit limit Auth Expiration date PT/OT/ST + Visit Limit?    *** *** *** ***                             Visit/Unit Tracking  AUTH Status:  Date              *** Used               Remaining                 Diagnosis: ***   Precautions: ***   Insurance: ***    ***          Vitals ***  HR  SpO2    FOTO      Patient Ed                                                                                        Neuro Re-Ed                                                                                        Ther Ex           Aerobic conditioning                                                                                        Ther Activity                                 Manual                                                                  Modalities

## 2024-07-22 ENCOUNTER — OFFICE VISIT (OUTPATIENT)
Dept: PHYSICAL THERAPY | Facility: REHABILITATION | Age: 69
End: 2024-07-22
Payer: COMMERCIAL

## 2024-07-22 DIAGNOSIS — M25.511 RIGHT SHOULDER PAIN, UNSPECIFIED CHRONICITY: Primary | ICD-10-CM

## 2024-07-22 PROCEDURE — 97110 THERAPEUTIC EXERCISES: CPT | Performed by: PHYSICAL THERAPIST

## 2024-07-22 PROCEDURE — 97750 PHYSICAL PERFORMANCE TEST: CPT | Performed by: PHYSICAL THERAPIST

## 2024-07-22 NOTE — PROGRESS NOTES
Daily Note     Today's date: 2024  Patient name: Jenifer Smith  : 1955  MRN: [id number]  Referring provider: Guanako Agosto MD  Dx:   Encounter Diagnosis     ICD-10-CM    1. Right shoulder pain, unspecified chronicity  M25.511           Start Time: 1500  Stop Time: 1545  Total time in clinic (min): 45 minutes    Subjective: Forgot to get a band last session but still tried to move her arms in the right motion.       Objective: See treatment diary below  Objective     Strength/Myotome Testing     Left Hip   Planes of Motion   Abduction: 3  External rotation: 3    Right Hip   Planes of Motion   Abduction: 3  External rotation: 3    Functional Assessment        Comments  6MWT: 700ft (24)      Assessment: Assessed patient's hip strength which was significantly decreased. Also completed 6MWT which was significantly below her age related norm. Added hip strengthening which was challenging for her strength to complete. Reviewed RTC strengthening which patient was able to complete with higher band resistance. Patient demonstrated fatigue post treatment, exhibited good technique with therapeutic exercises, and would benefit from continued PT to improve her tolerance with ambulation, gait speed, and lifting tolerance.       Plan: Continue per plan of care.      POC expires Unit limit Auth Expiration date PT/OT/ST + Visit Limit?   10/10/24  12/31/24                              Visit/Unit Tracking  AUTH Status:  Date             N/A Used 1 2             Remaining                 Diagnosis: R RTC pathology + lumbar pain with possible posterior derangement + strength deficits   Precautions: DM, HTN, diabetic neuropathy   Insurance: [employer] Medicare             Vitals 134/54    FOTO      Patient Ed           Smoking cessation Messaged her PCP          Lumbar roll With sitting                                                                 Neuro Re-Ed           Extension principle Standing  lumbar extension  10x 5x/day Standing lumbar  10x                                                                           Ther Ex           Aerobic conditioning           RTC Shoulder ER  [place] TB  3 x 8-10    Shoulder IR  [place] TB  3 x 8-10 Shoulder ER  [place] TB  2 x 10    Shoulder IR  [place] TB  2 x 10         Bridges  3 x 10         Clams  2 x 10 b/l         Squat                                            Ther Activity                                 Manual                                                                  Modalities

## 2024-07-25 ENCOUNTER — OFFICE VISIT (OUTPATIENT)
Dept: PHYSICAL THERAPY | Facility: REHABILITATION | Age: 69
End: 2024-07-25
Payer: COMMERCIAL

## 2024-07-25 DIAGNOSIS — M25.511 RIGHT SHOULDER PAIN, UNSPECIFIED CHRONICITY: Primary | ICD-10-CM

## 2024-07-25 PROCEDURE — 97110 THERAPEUTIC EXERCISES: CPT | Performed by: PHYSICAL THERAPIST

## 2024-07-25 NOTE — PROGRESS NOTES
Daily Note     Today's date: 2024  Patient name: Jenifer Smith  : 1955  MRN: [id number]  Referring provider: Guanako Agosto MD  Dx:   Encounter Diagnosis     ICD-10-CM    1. Right shoulder pain, unspecified chronicity  M25.511             Start Time: 1720  Stop Time: 1800  Total time in clinic (min): 40 minutes    Subjective: Been doing her exercises, seeing some improvement. The exercises are a good challenging level.       Objective: See treatment diary below      Assessment: Patient still challenged with current HEP level of hip strength. Added sit to stands which fatigued her but she was able to complete with good form. Patient not yet for progressions with RTC strengthening yet. Will plan on progressing to to global shoulder strengthening (biceps, triceps) next session. Patient demonstrated fatigue post treatment, exhibited good technique with therapeutic exercises, and would benefit from continued PT to improve her tolerance with ambulation, gait speed, and lifting tolerance.       Plan: Continue per plan of care.      POC expires Unit limit Auth Expiration date PT/OT/ST + Visit Limit?   10/10/24  12/31/24                              Visit/Unit Tracking  AUTH Status:  Date            N/A Used 1 2 3            Remaining                 Diagnosis: R RTC pathology + lumbar pain with possible posterior derangement + strength deficits   Precautions: DM, HTN, diabetic neuropathy   Insurance: [employer] Medicare            Vitals 134/54    FOTO      Patient Ed           Smoking cessation Messaged her PCP          Lumbar roll With sitting                                                                 Neuro Re-Ed           Extension principle Standing lumbar extension  10x 5x/day Standing lumbar  10x Standing lumbar  3 x 10 between sit to stand sets                                                                          Ther Ex           Aerobic conditioning           RTC  Shoulder ER  [place] TB  3 x 8-10    Shoulder IR  [place] TB  3 x 8-10 Shoulder ER  [place] TB  2 x 10    Shoulder IR  [place] TB  2 x 10 Shoulder ER  [place] TB  2 x 10    Shoulder IR  [place] TB  2 x 10        Bridges  3 x 10 3 x 10        Clams  2 x 10 b/l 3 x 10 b/l        Squat   Sit to stands  3 x 10        Side steps                                 Ther Activity                                 Manual                                                                  Modalities

## 2024-07-29 ENCOUNTER — OFFICE VISIT (OUTPATIENT)
Dept: INTERNAL MEDICINE CLINIC | Facility: CLINIC | Age: 69
End: 2024-07-29

## 2024-07-29 ENCOUNTER — OFFICE VISIT (OUTPATIENT)
Dept: PHYSICAL THERAPY | Facility: REHABILITATION | Age: 69
End: 2024-07-29
Payer: COMMERCIAL

## 2024-07-29 VITALS
BODY MASS INDEX: 29.53 KG/M2 | HEART RATE: 88 BPM | DIASTOLIC BLOOD PRESSURE: 70 MMHG | SYSTOLIC BLOOD PRESSURE: 123 MMHG | HEIGHT: 64 IN | WEIGHT: 173 LBS | TEMPERATURE: 98.1 F

## 2024-07-29 DIAGNOSIS — M54.16 LUMBAR RADICULOPATHY: Primary | ICD-10-CM

## 2024-07-29 DIAGNOSIS — M25.511 RIGHT SHOULDER PAIN, UNSPECIFIED CHRONICITY: Primary | ICD-10-CM

## 2024-07-29 DIAGNOSIS — Z20.822 EXPOSURE TO COVID-19 VIRUS: ICD-10-CM

## 2024-07-29 LAB
SARS-COV-2 AG UPPER RESP QL IA: NEGATIVE
VALID CONTROL: NORMAL

## 2024-07-29 PROCEDURE — 3074F SYST BP LT 130 MM HG: CPT | Performed by: PHYSICIAN ASSISTANT

## 2024-07-29 PROCEDURE — 1159F MED LIST DOCD IN RCRD: CPT | Performed by: PHYSICIAN ASSISTANT

## 2024-07-29 PROCEDURE — G2211 COMPLEX E/M VISIT ADD ON: HCPCS | Performed by: PHYSICIAN ASSISTANT

## 2024-07-29 PROCEDURE — 99214 OFFICE O/P EST MOD 30 MIN: CPT | Performed by: PHYSICIAN ASSISTANT

## 2024-07-29 PROCEDURE — 87811 SARS-COV-2 COVID19 W/OPTIC: CPT | Performed by: PHYSICIAN ASSISTANT

## 2024-07-29 PROCEDURE — 1160F RVW MEDS BY RX/DR IN RCRD: CPT | Performed by: PHYSICIAN ASSISTANT

## 2024-07-29 PROCEDURE — 97110 THERAPEUTIC EXERCISES: CPT | Performed by: PHYSICAL THERAPIST

## 2024-07-29 PROCEDURE — 3078F DIAST BP <80 MM HG: CPT | Performed by: PHYSICIAN ASSISTANT

## 2024-07-29 RX ORDER — TIZANIDINE 4 MG/1
4 TABLET ORAL
Qty: 30 TABLET | Refills: 1 | Status: SHIPPED | OUTPATIENT
Start: 2024-07-29

## 2024-07-29 NOTE — PROGRESS NOTES
Daily Note     Today's date: 2024  Patient name: Jenifer Smith  : 1955  MRN: [id number]  Referring provider: Guanako Agosto MD  Dx:   Encounter Diagnosis     ICD-10-CM    1. Right shoulder pain, unspecified chronicity  M25.511             Start Time: 1250  Stop Time: 1335  Total time in clinic (min): 45 minutes    Subjective: Arriving late to PT due to the bus being late. Having stress pain -- her grandson  over the weekend in the [place] where she is from. A lot of family deaths recently.       Objective: See treatment diary below      Assessment: Terminated PT session early. Patient visibly unwell due to recent death of her grandson. Recommend patient go home and rest and will continue PT next session when she is emotionally and mentally in a better state. Patient demonstrated fatigue post treatment, exhibited good technique with therapeutic exercises, and would benefit from continued PT to improve her tolerance with ambulation, gait speed, and lifting tolerance.       Plan: Continue per plan of care.      POC expires Unit limit Auth Expiration date PT/OT/ST + Visit Limit?   10/10/24  12/31/24                              Visit/Unit Tracking  AUTH Status:  Date           N/A Used 1 2 3 4           Remaining                 Diagnosis: R RTC pathology + lumbar pain with possible posterior derangement + strength deficits   Precautions: DM, HTN, diabetic neuropathy   Insurance: [employer] Medicare           Vitals 134/54    FOTO      Patient Ed           Smoking cessation Messaged her PCP          Lumbar roll With sitting                                                                 Neuro Re-Ed           Extension principle Standing lumbar extension  10x 5x/day Standing lumbar  10x Standing lumbar  3 x 10 between sit to stand sets                                                                          Ther Ex           Aerobic conditioning           RTC  Shoulder ER  [place] TB  3 x 8-10    Shoulder IR  [place] TB  3 x 8-10 Shoulder ER  [place] TB  2 x 10    Shoulder IR  [place] TB  2 x 10 Shoulder ER  [place] TB  2 x 10    Shoulder IR  [place] TB  2 x 10        Bridges  3 x 10 3 x 10 3 x 10       Clams  2 x 10 b/l 3 x 10 b/l 2 x 10 ea       Sidelying hip abduction           Squat   Sit to stands  3 x 10        Side steps                                 Ther Activity                                 Manual                                                                  Modalities

## 2024-07-29 NOTE — PROGRESS NOTES
Ambulatory Visit  Name: Jenifer Smith      : 1955      MRN: [id number]  Encounter Provider: Mary Hamilton PA-C  Encounter Date: 2024   Encounter department: [facility]    Assessment & Plan   1. Lumbar radiculopathy  Assessment & Plan:  Improved. Continue with physical therapy. Continue warm/cool compresses throughout the day. Use good body mechanics. Continue tizanidine as needed at bedtime. Continue diclofenac gel. Follow up at next scheduled visit .   Orders:  -     tiZANidine (ZANAFLEX) 4 mg tablet; Take 1 tablet (4 mg total) by mouth daily at bedtime as needed for muscle spasms  2. Exposure to COVID-19 virus  Assessment & Plan:  Rapid COVID antigen today during visit negative. Continue to monitor for any symptoms.   Orders:  -     POCT Rapid Covid Ag       History of Present Illness     Patient is a 68 year old female presenting for a same day appointment. She is concerned about COVID as she states she had an exposure. She was with an elder neighbor last Thursday and she was not feeling well. The friend went to the ED over the weekend and was told she has COVID. The patient denies any symptoms at this time. Denies fever or chills. Denies fatigue or body aches from baseline. Denies congestion and sore throat. Denies cough or difficulty breathing.   She does have chronic back pain. Reports bilateral mid to lower back pain. Pain is constant, but worse with activity and movement. Radiates down right leg. Denies saddle anesthesia. Denies fecal/urinary incontinence. She does admit to numbness and tingling in the right leg. Denies weakness.   She has been taking tizanidine as needed at bed time which she states really helps. She is requesting a refill.         Review of Systems   Constitutional:  Negative for appetite change, chills, fatigue and fever.   HENT:  Negative for congestion, ear discharge, ear pain, postnasal drip, rhinorrhea, sinus pressure, sinus  "pain, sneezing and sore throat.    Respiratory:  Negative for cough, chest tightness, shortness of breath and wheezing.    Cardiovascular:  Negative for chest pain, palpitations and leg swelling.   Gastrointestinal:  Negative for abdominal pain, diarrhea, nausea and vomiting.   Musculoskeletal:  Positive for back pain.   Skin:  Negative for rash.   Neurological:  Negative for dizziness, weakness, light-headedness, numbness and headaches.   Hematological:  Negative for adenopathy.     Past Medical History   Past Medical History:   Diagnosis Date    Arthritis     Bump     bumped head yesterday at work \"saw stars\" no LOC, cat scan done was normal    Circulation problem     Diabetes mellitus (HCC)     blood sugar 125 on admission    Encounter for screening colonoscopy     1 st one    Headache     Hyperlipidemia     Hypertension     Positive fecal occult blood test 08/30/2018    Added automatically from request for surgery [id number]    Post concussion syndrome 09/14/2018    Syncope      Past Surgical History:   Procedure Laterality Date    BREAST BIOPSY Right 02/18/2022    COLONOSCOPY N/A 9/26/2018    Procedure: COLONOSCOPY;  Surgeon: Joie Morejon MD;  Location: [facility];  Service: Gastroenterology    HYSTERECTOMY  2017    INCISIONAL BREAST BIOPSY      last assessed [id number]    AR COLONOSCOPY FLX DX W/COLLJ SPEC WHEN PFRMD N/A 7/5/2018    Procedure: COLONOSCOPY;  Surgeon: Joie Morejon MD;  Location:  GI LAB;  Service: Gastroenterology    US GUIDED BREAST BIOPSY RIGHT COMPLETE Right 2/18/2022     Family History   Problem Relation Age of Onset    Breast cancer Cousin 50    Alcohol abuse Mother     Alcohol abuse Father     Breast cancer Daughter 38    No Known Problems Sister     No Known Problems Maternal Grandmother     No Known Problems Maternal Grandfather     No Known Problems Paternal Grandmother     No Known Problems Paternal Grandfather     No Known Problems Son     No Known Problems Son     No Known " Problems Daughter     No Known Problems Daughter     No Known Problems Daughter     Breast cancer Sister 66    No Known Problems Maternal Aunt     No Known Problems Maternal Aunt     No Known Problems Maternal Aunt     No Known Problems Maternal Aunt     No Known Problems Maternal Aunt     No Known Problems Maternal Aunt     No Known Problems Maternal Aunt     No Known Problems Maternal Aunt     No Known Problems Maternal Aunt     No Known Problems Maternal Aunt     No Known Problems Maternal Aunt     No Known Problems Maternal Aunt     No Known Problems Paternal Aunt     No Known Problems Paternal Aunt     Dementia Paternal Aunt     No Known Problems Paternal Aunt     No Known Problems Paternal Aunt     No Known Problems Paternal Aunt     No Known Problems Paternal Aunt     No Known Problems Paternal Aunt     Colon cancer Neg Hx     Endometrial cancer Neg Hx     Ovarian cancer Neg Hx      Current Outpatient Medications on File Prior to Visit   Medication Sig Dispense Refill    Accu-Chek Softclix Lancets lancets Use 1 (one) time for 1 dose Use as instructed 200 each 2    aspirin (Aspirin 81) 81 mg EC tablet Take 1 tablet (81 mg total) by mouth daily 90 tablet 3    atorvastatin (LIPITOR) 20 mg tablet Take 1 tablet (20 mg total) by mouth daily 90 tablet 2    Diclofenac Sodium (VOLTAREN) 1 % APPLY 2 GRAMS TOPICALLY THREE TIMES A  g 2    Elastic Bandages & Supports (GNP Diabetic Socks Unisex XL) MISC Use 1 each if needed (1 pair of socks for diabetic neuropathy) 2 each 0    ferrous sulfate 324 (65 Fe) mg Take 1 tablet (324 mg total) by mouth every other day 90 tablet 1    gabapentin (NEURONTIN) 300 mg capsule Take 3 capsules (900 mg total) by mouth daily at bedtime 270 capsule 1    gabapentin (NEURONTIN) 300 mg capsule Take 2 capsules (600 mg total) by mouth 2 (two) times a day 360 capsule 1    glucose blood (Accu-Chek Guide) test strip Use 1 each in the morning Use as instructed 200 each 2    Lancets Misc.  (Accu-Chek Softclix Lancet Dev) KIT Use in the morning 1 kit 0    lisinopril-hydrochlorothiazide (PRINZIDE,ZESTORETIC) 20-12.5 MG per tablet Take 1 tablet by mouth daily 90 tablet 2    Menthol, Topical Analgesic, (Icy Hot Advanced Relief) 7.5 % PTCH Apply 1 patch topically daily as needed (shoulder pain) 30 patch 0    metFORMIN (GLUCOPHAGE) 1000 MG tablet Take 1 tablet (1,000 mg total) by mouth 2 (two) times a day with meals 180 tablet 1    vitamin B-12 (CYANOCOBALAMIN) 500 MCG TABS Take 1 tablet (500 mcg total) by mouth daily 30 tablet 0    [DISCONTINUED] tiZANidine (ZANAFLEX) 4 mg tablet Take 1 tablet (4 mg total) by mouth daily at bedtime as needed for muscle spasms 15 tablet 1     No current facility-administered medications on file prior to visit.   No Known Allergies   Current Outpatient Medications on File Prior to Visit   Medication Sig Dispense Refill    Accu-Chek Softclix Lancets lancets Use 1 (one) time for 1 dose Use as instructed 200 each 2    aspirin (Aspirin 81) 81 mg EC tablet Take 1 tablet (81 mg total) by mouth daily 90 tablet 3    atorvastatin (LIPITOR) 20 mg tablet Take 1 tablet (20 mg total) by mouth daily 90 tablet 2    Diclofenac Sodium (VOLTAREN) 1 % APPLY 2 GRAMS TOPICALLY THREE TIMES A  g 2    Elastic Bandages & Supports (GNP Diabetic Socks Unisex XL) MISC Use 1 each if needed (1 pair of socks for diabetic neuropathy) 2 each 0    ferrous sulfate 324 (65 Fe) mg Take 1 tablet (324 mg total) by mouth every other day 90 tablet 1    gabapentin (NEURONTIN) 300 mg capsule Take 3 capsules (900 mg total) by mouth daily at bedtime 270 capsule 1    gabapentin (NEURONTIN) 300 mg capsule Take 2 capsules (600 mg total) by mouth 2 (two) times a day 360 capsule 1    glucose blood (Accu-Chek Guide) test strip Use 1 each in the morning Use as instructed 200 each 2    Lancets Misc. (Accu-Chek Softclix Lancet Dev) KIT Use in the morning 1 kit 0    lisinopril-hydrochlorothiazide (PRINZIDE,ZESTORETIC)  "20-12.5 MG per tablet Take 1 tablet by mouth daily 90 tablet 2    Menthol, Topical Analgesic, (Icy Hot Advanced Relief) 7.5 % PTCH Apply 1 patch topically daily as needed (shoulder pain) 30 patch 0    metFORMIN (GLUCOPHAGE) 1000 MG tablet Take 1 tablet (1,000 mg total) by mouth 2 (two) times a day with meals 180 tablet 1    vitamin B-12 (CYANOCOBALAMIN) 500 MCG TABS Take 1 tablet (500 mcg total) by mouth daily 30 tablet 0    [DISCONTINUED] tiZANidine (ZANAFLEX) 4 mg tablet Take 1 tablet (4 mg total) by mouth daily at bedtime as needed for muscle spasms 15 tablet 1     No current facility-administered medications on file prior to visit.      Social History     Tobacco Use    Smoking status: Every Day     Current packs/day: 0.50     Average packs/day: 0.5 packs/day for 20.0 years (10.0 ttl pk-yrs)     Types: Cigarettes    Smokeless tobacco: Never    Tobacco comments:     smokes less than 1pk/day   Vaping Use    Vaping status: Never Used   Substance and Sexual Activity    Alcohol use: Never    Drug use: Never    Sexual activity: Not Currently     Objective     /70 (BP Location: Left arm, Patient Position: Sitting, Cuff Size: Large)   Pulse 88   Temp 98.1 °F (36.7 °C) (Temporal)   Ht 5' 4\" (1.626 m)   Wt 78.5 kg (173 lb)   BMI 29.70 kg/m²     Physical Exam  Vitals and nursing note reviewed.   Constitutional:       General: She is awake. She is not in acute distress.     Appearance: Normal appearance. She is well-developed, well-groomed and overweight. She is not ill-appearing.   HENT:      Head: Normocephalic and atraumatic.   Eyes:      General: No scleral icterus.     Conjunctiva/sclera: Conjunctivae normal.   Cardiovascular:      Rate and Rhythm: Normal rate and regular rhythm.      Pulses: Normal pulses.           Radial pulses are 2+ on the right side and 2+ on the left side.      Heart sounds: Normal heart sounds. No murmur heard.  Pulmonary:      Effort: Pulmonary effort is normal. No respiratory " distress.      Breath sounds: Normal breath sounds and air entry. No decreased air movement. No decreased breath sounds, wheezing, rhonchi or rales.   Musculoskeletal:      Thoracic back: Normal.      Lumbar back: Tenderness (paraspinals) present. No swelling, edema, deformity, signs of trauma, lacerations, spasms or bony tenderness. Normal range of motion. Negative right straight leg raise test and negative left straight leg raise test. No scoliosis.      Right lower leg: No edema.      Left lower leg: No edema.   Skin:     General: Skin is warm.      Coloration: Skin is not jaundiced.      Findings: No rash.   Neurological:      General: No focal deficit present.      Mental Status: She is alert and oriented to person, place, and time. Mental status is at baseline.      Motor: Motor function is intact.      Coordination: Coordination is intact.      Gait: Gait is intact.   Psychiatric:         Attention and Perception: Attention normal.         Mood and Affect: Mood and affect normal.         Speech: Speech normal.         Behavior: Behavior normal. Behavior is cooperative.       Administrative Statements   I have spent a total time of 20 minutes in caring for this patient on the day of the visit/encounter including Diagnostic results, Prognosis, Risks and benefits of tx options, Instructions for management, Patient and family education, Importance of tx compliance, Risk factor reductions, Impressions, Counseling / Coordination of care, Reviewing / ordering tests, medicine, procedures  , and Obtaining or reviewing history  .

## 2024-07-29 NOTE — ASSESSMENT & PLAN NOTE
Improved. Continue with physical therapy. Continue warm/cool compresses throughout the day. Use good body mechanics. Continue tizanidine as needed at bedtime. Continue diclofenac gel. Follow up at next scheduled visit 8/21.

## 2024-07-31 ENCOUNTER — OFFICE VISIT (OUTPATIENT)
Dept: PHYSICAL THERAPY | Facility: REHABILITATION | Age: 69
End: 2024-07-31
Payer: COMMERCIAL

## 2024-07-31 DIAGNOSIS — M25.511 RIGHT SHOULDER PAIN, UNSPECIFIED CHRONICITY: Primary | ICD-10-CM

## 2024-07-31 DIAGNOSIS — F17.200 SMOKING: Primary | ICD-10-CM

## 2024-07-31 PROCEDURE — 97110 THERAPEUTIC EXERCISES: CPT

## 2024-07-31 NOTE — PROGRESS NOTES
Daily Note     Today's date: 2024  Patient name: Jenifer Smith  : 1955  MRN: [id number]  Referring provider: Guanako Agosto MD  Dx:   Encounter Diagnosis     ICD-10-CM    1. Right shoulder pain, unspecified chronicity  M25.511             Start Time: 1115  Stop Time: 1200  Total time in clinic (min): 45 minutes    Subjective: Is practicing her exercises at home, prefers the standing ones.     Objective: See treatment diary below      Assessment: Fatigued more when doing the mat exercises properly but was able to complete. Appears pleased with therapy, feels its helping.     Patient demonstrated fatigue post treatment, exhibited good technique with therapeutic exercises, and would benefit from continued PT to improve her tolerance with ambulation, gait speed, and lifting tolerance.       Plan: Continue per plan of care.      POC expires Unit limit Auth Expiration date PT/OT/ST + Visit Limit?   10/10/24  12/31/24                              Visit/Unit Tracking  AUTH Status:  Date          N/A Used 1 2 3 4 5          Remaining                 Diagnosis: R RTC pathology + lumbar pain with possible posterior derangement + strength deficits   Precautions: DM, HTN, diabetic neuropathy   Insurance: [employer] Medicare          Vitals 134/54     FOTO     Patient Ed           Smoking cessation Messaged her PCP          Lumbar roll With sitting                                                                 Neuro Re-Ed           Extension principle Standing lumbar extension  10x 5x/day Standing lumbar  10x Standing lumbar  3 x 10 between sit to stand sets                                                                          Ther Ex           Aerobic conditioning     L1 3'3'      RTC Shoulder ER  [place] TB  3 x 8-10    Shoulder IR  [place] TB  3 x 8-10 Shoulder ER  [place] TB  2 x 10    Shoulder IR  [place] TB  2 x 10 Shoulder ER  [place] TB  2 x 10    Shoulder  IR  [place] TB  2 x 10  Shoulder ER  [place] TB  2 x 10    Shoulder IR  [place] TB  2 x 10         Bridges  3 x 10 3 x 10 3 x 10 2x10      Clams  2 x 10 b/l 3 x 10 b/l 2 x 10 ea 2x8 w/ VC & TC to improve form      Sidelying hip abduction     R 10x      Squat   Sit to stands  3 x 10  2x10      Side steps                                 Ther Activity                                 Manual                                                                  Modalities

## 2024-08-01 ENCOUNTER — PATIENT OUTREACH (OUTPATIENT)
Dept: OTHER | Facility: CLINIC | Age: 69
End: 2024-08-01

## 2024-08-01 NOTE — PROGRESS NOTES
Daily Note     Today's date: 2024  Patient name: Jenifer Smith  : 1955  MRN: [id number]  Referring provider: Guanako Agosto MD  Dx:   No diagnosis found.                   Subjective: Is practicing her exercises at home, prefers the standing ones.     Objective: See treatment diary below      Assessment: Fatigued more when doing the mat exercises properly but was able to complete. Appears pleased with therapy, feels its helping.     Patient demonstrated fatigue post treatment, exhibited good technique with therapeutic exercises, and would benefit from continued PT to improve her tolerance with ambulation, gait speed, and lifting tolerance.       Plan: Continue per plan of care.      POC expires Unit limit Auth Expiration date PT/OT/ST + Visit Limit?   10/10/24  12/31/24                              Visit/Unit Tracking  AUTH Status:  Date          N/A Used 1 2 3 4 5          Remaining                 Diagnosis: R RTC pathology + lumbar pain with possible posterior derangement + strength deficits   Precautions: DM, HTN, diabetic neuropathy   Insurance: [employer] Medicare          Vitals 134/54     FOTO     Patient Ed           Smoking cessation Messaged her PCP          Lumbar roll With sitting                                                                 Neuro Re-Ed           Extension principle Standing lumbar extension  10x 5x/day Standing lumbar  10x Standing lumbar  3 x 10 between sit to stand sets                                                                          Ther Ex           Aerobic conditioning     L1 3'3'      RTC Shoulder ER  [place] TB  3 x 8-10    Shoulder IR  [place] TB  3 x 8-10 Shoulder ER  [place] TB  2 x 10    Shoulder IR  [place] TB  2 x 10 Shoulder ER  [place] TB  2 x 10    Shoulder IR  [place] TB  2 x 10  Shoulder ER  [place] TB  2 x 10    Shoulder IR  [place] TB  2 x 10         Bridges  3 x 10 3 x 10 3 x 10 2x10      Clams  2 x 10  b/l 3 x 10 b/l 2 x 10 ea 2x8 w/ VC & TC to improve form      Sidelying hip abduction     R 10x      Squat   Sit to stands  3 x 10  2x10      Side steps                                 Ther Activity                                 Manual                                                                  Modalities

## 2024-08-05 ENCOUNTER — APPOINTMENT (OUTPATIENT)
Dept: PHYSICAL THERAPY | Facility: REHABILITATION | Age: 69
End: 2024-08-05
Payer: COMMERCIAL

## 2024-08-05 NOTE — PROGRESS NOTES
Daily Note     Today's date: 2024  Patient name: Jenifer Smith  : 1955  MRN: [id number]  Referring provider: Guanako Agosto MD  Dx:   Encounter Diagnosis     ICD-10-CM    1. Right shoulder pain, unspecified chronicity  M25.511           Start Time: 1230  Stop Time: 1315  Total time in clinic (min): 45 minutes    Subjective: Tired from walking.      Objective: See treatment diary below      Assessment: Fatigued with progressions with higher band resistance for RTC strengthening. Patient benefits from continued cuing and encouragement to progress her exercises. Patient demonstrated fatigue post treatment, exhibited good technique with therapeutic exercises, and would benefit from continued PT to improve her tolerance with ambulation, gait speed, and lifting tolerance.       Plan: Continue per plan of care.      POC expires Unit limit Auth Expiration date PT/OT/ST + Visit Limit?   10/10/24  12/31/24                              Visit/Unit Tracking  AUTH Status:  Date  8/        N/A Used 1 2 3 4 5 6         Remaining                 Diagnosis: R RTC pathology + lumbar pain with possible posterior derangement + strength deficits   Precautions: DM, HTN, diabetic neuropathy   Insurance: [employer] Medicare     8     Vitals 134/54     FOTO     Patient Ed           Smoking cessation Messaged her PCP          Lumbar roll With sitting                                                                 Neuro Re-Ed           Extension principle Standing lumbar extension  10x 5x/day Standing lumbar  10x Standing lumbar  3 x 10 between sit to stand sets                                                                          Ther Ex           Aerobic conditioning     L1 3'3' UBE L 3.5  FWD 3'  BWD 3'     RTC Shoulder ER  [place] TB  3 x 8-10    Shoulder IR  [place] TB  3 x 8-10 Shoulder ER  [place] TB  2 x 10    Shoulder IR  [place] TB  2 x 10 Shoulder ER  [place] TB  2 x  10    Shoulder IR  [place] TB  2 x 10  Shoulder ER  [place] TB  2 x 10    Shoulder IR  [place] TB  2 x 10    Shoulder ER  [place] [facility]  2 x 10 b/l    Shoulder IR  [place] TB  10x b/l     Bridges  3 x 10 3 x 10 3 x 10 2x10 3 x 12     Clams  2 x 10 b/l 3 x 10 b/l 2 x 10 ea 2x8 w/ VC & TC to improve form 2 x 10 b/l     Sidelying hip abduction     R 10x 2 x 6 b/l     Squat   Sit to stands  3 x 10  2x10      Side steps                                 Ther Activity                                 Manual                                                                  Modalities

## 2024-08-06 ENCOUNTER — OFFICE VISIT (OUTPATIENT)
Dept: PHYSICAL THERAPY | Facility: REHABILITATION | Age: 69
End: 2024-08-06
Payer: COMMERCIAL

## 2024-08-06 DIAGNOSIS — M25.511 RIGHT SHOULDER PAIN, UNSPECIFIED CHRONICITY: Primary | ICD-10-CM

## 2024-08-06 PROCEDURE — 97110 THERAPEUTIC EXERCISES: CPT | Performed by: PHYSICAL THERAPIST

## 2024-08-07 ENCOUNTER — APPOINTMENT (OUTPATIENT)
Dept: PHYSICAL THERAPY | Facility: REHABILITATION | Age: 69
End: 2024-08-07
Payer: COMMERCIAL

## 2024-08-08 ENCOUNTER — OFFICE VISIT (OUTPATIENT)
Dept: PHYSICAL THERAPY | Facility: REHABILITATION | Age: 69
End: 2024-08-08
Payer: COMMERCIAL

## 2024-08-08 DIAGNOSIS — G89.29 CHRONIC LOW BACK PAIN WITHOUT SCIATICA, UNSPECIFIED BACK PAIN LATERALITY: ICD-10-CM

## 2024-08-08 DIAGNOSIS — M25.511 RIGHT SHOULDER PAIN, UNSPECIFIED CHRONICITY: Primary | ICD-10-CM

## 2024-08-08 DIAGNOSIS — M54.50 CHRONIC LOW BACK PAIN WITHOUT SCIATICA, UNSPECIFIED BACK PAIN LATERALITY: ICD-10-CM

## 2024-08-08 PROCEDURE — 97110 THERAPEUTIC EXERCISES: CPT | Performed by: PHYSICAL THERAPIST

## 2024-08-08 NOTE — PROGRESS NOTES
Daily Note     Today's date: 2024  Patient name: Jenifer Smith  : 1955  MRN: [id number]  Referring provider: Guanako Agosto MD  Dx:   Encounter Diagnosis     ICD-10-CM    1. Right shoulder pain, unspecified chronicity  M25.511       2. Chronic low back pain without sciatica, unspecified back pain laterality  M54.50     G89.29           Start Time: 1145  Stop Time: 1225  Total time in clinic (min): 40 minutes    Subjective: Feeling good today. Shoulders are feeling better overall.       Objective: See treatment diary below      Assessment: Patient demonstrating improvement in sx with shoulder mobility. Progressed to overhead strengthening which was a good challenge for her and patient will benefit from continuing to practice at home for proper form. Added side steps for hip strengthening which is more challenging on her R hip. Patient benefits from continued cuing and encouragement to progress her exercises. Patient demonstrated fatigue post treatment, exhibited good technique with therapeutic exercises, and would benefit from continued PT to improve her tolerance with ambulation, gait speed, and lifting tolerance.       Plan: Continue per plan of care.      POC expires Unit limit Auth Expiration date PT/OT/ST + Visit Limit?   10/10/24  12/31/24                              Visit/Unit Tracking  AUTH Status:  Date        N/A Used 1 2 3 4 5 6 7        Remaining                 Diagnosis: R RTC pathology + lumbar pain with possible posterior derangement + strength deficits   Precautions: DM, HTN, diabetic neuropathy   Insurance: [employer] Medicare     8/6 8/8    Vitals 134/54     FOTO  *biceps, shoulder press   Patient Ed           Smoking cessation Messaged her PCP          Lumbar roll With sitting                                                                 Neuro Re-Ed           Extension principle Standing lumbar extension  10x 5x/day Standing  lumbar  10x Standing lumbar  3 x 10 between sit to stand sets                                                                          Ther Ex           Aerobic conditioning     L1 3'3' UBE L 3.5  FWD 3'  BWD 3' UBE L 5  FWD 3'  BWD 3'    RTC Shoulder ER  [place] TB  3 x 8-10    Shoulder IR  [place] TB  3 x 8-10 Shoulder ER  [place] TB  2 x 10    Shoulder IR  [place] TB  2 x 10 Shoulder ER  [place] TB  2 x 10    Shoulder IR  [place] TB  2 x 10  Shoulder ER  [place] TB  2 x 10    Shoulder IR  [place] TB  2 x 10    Shoulder ER  [place] TB  2 x 10 b/l    Shoulder IR  [place] TB  10x b/l Shoulder ER  [place] TB  2 x 10 b/l    Shoulder IR  [place] TB  2 x 10 b/l    Wall lift off       2 x 10  One arm at a time    Bridges  3 x 10 3 x 10 3 x 10 2x10 3 x 12     Clams  2 x 10 b/l 3 x 10 b/l 2 x 10 ea 2x8 w/ VC & TC to improve form 2 x 10 b/l 3 x 12 b/l    Sidelying hip abduction     R 10x 2 x 6 b/l     Squat   Sit to stands  3 x 10  2x10      Side steps       [place] TB  3 laps x 12 ft                          Ther Activity                                 Manual                                                                  Modalities

## 2024-08-12 ENCOUNTER — OFFICE VISIT (OUTPATIENT)
Dept: PHYSICAL THERAPY | Facility: REHABILITATION | Age: 69
End: 2024-08-12
Payer: COMMERCIAL

## 2024-08-12 DIAGNOSIS — G89.29 CHRONIC LOW BACK PAIN WITHOUT SCIATICA, UNSPECIFIED BACK PAIN LATERALITY: ICD-10-CM

## 2024-08-12 DIAGNOSIS — M54.50 CHRONIC LOW BACK PAIN WITHOUT SCIATICA, UNSPECIFIED BACK PAIN LATERALITY: ICD-10-CM

## 2024-08-12 DIAGNOSIS — M25.511 RIGHT SHOULDER PAIN, UNSPECIFIED CHRONICITY: Primary | ICD-10-CM

## 2024-08-12 PROCEDURE — 97110 THERAPEUTIC EXERCISES: CPT | Performed by: PHYSICAL THERAPIST

## 2024-08-12 NOTE — PROGRESS NOTES
Daily Note     Today's date: 2024  Patient name: Jenifer Smith  : 1955  MRN: [id number]  Referring provider: Guanako Agosto MD  Dx:   Encounter Diagnosis     ICD-10-CM    1. Right shoulder pain, unspecified chronicity  M25.511       2. Chronic low back pain without sciatica, unspecified back pain laterality  M54.50     G89.29             Start Time: 1150  Stop Time: 1235  Total time in clinic (min): 45 minutes    Subjective: Shoulders feeling good.       Objective: See treatment diary below      Assessment: Patient demonstrating good improvement in her ability to complete overhead strength training exercises. Added bicep curls which challenged her muscle endurance with her shoulders. Plan on spending more time focused on her lumbar and hip strengthening next session as her shoulders have shown good improvement. Patient demonstrated fatigue post treatment, exhibited good technique with therapeutic exercises, and would benefit from continued PT to improve her tolerance with ambulation, gait speed, and lifting tolerance.       Plan: Continue per plan of care.      POC expires Unit limit Auth Expiration date PT/OT/ST + Visit Limit?   10/10/24  12/31/24                              Visit/Unit Tracking  AUTH Status:  Date       N/A Used 1 2 3 4 5 6 7 8       Remaining                 Diagnosis: R RTC pathology + lumbar pain with possible posterior derangement + strength deficits   Precautions: DM, HTN, diabetic neuropathy   Insurance: [employer] Medicare          Vitals  FOTO   *focus on lumbar/hip strength     Patient Ed          Smoking cessation          Lumbar roll                                                            Neuro Re-Ed          Extension principle                                                                      Ther Ex          Aerobic conditioning L1 3'3' UBE L 3.5  FWD 3'  BWD 3' UBE L5  FWD 3'  BWD 3' UBE L5  FWD 3'  BWD 3'       RTC Shoulder ER  [place] TB  2 x 10    Shoulder IR  [place] TB  2 x 10    Shoulder ER  [place] TB  2 x 10 b/l    Shoulder IR  [place] TB  10x b/l Shoulder ER  [place] TB  2 x 10 b/l    Shoulder IR  [place] TB  2 x 10 b/l Shoulder ER  [place] TB  2 x 10 b/l    Shoulder IR  [place] TB  2 x 10 b/l      Wall lift off   2 x 10  One arm at a time Unilateral  10x    2 x 10 b/l      Biceps    6# 10x  8# 2 x 10      Shoulder press    5# 10x      Bridges 2x10 3 x 12        Clams 2x8 w/ VC & TC to improve form 2 x 10 b/l 3 x 12 b/l       Sidelying hip abduction R 10x 2 x 6 b/l        Squat 2x10   Sit to stand  2 x 10      Side steps   [place] TB  3 laps x 12 ft [place] TB  3 laps x 12 ft      Prone hip ext     **               Ther Activity                              Manual                                                            Modalities

## 2024-08-14 ENCOUNTER — OFFICE VISIT (OUTPATIENT)
Dept: PHYSICAL THERAPY | Facility: REHABILITATION | Age: 69
End: 2024-08-14
Payer: COMMERCIAL

## 2024-08-14 DIAGNOSIS — M25.511 RIGHT SHOULDER PAIN, UNSPECIFIED CHRONICITY: ICD-10-CM

## 2024-08-14 DIAGNOSIS — G89.29 CHRONIC LOW BACK PAIN WITHOUT SCIATICA, UNSPECIFIED BACK PAIN LATERALITY: Primary | ICD-10-CM

## 2024-08-14 DIAGNOSIS — M54.50 CHRONIC LOW BACK PAIN WITHOUT SCIATICA, UNSPECIFIED BACK PAIN LATERALITY: Primary | ICD-10-CM

## 2024-08-14 PROCEDURE — 97110 THERAPEUTIC EXERCISES: CPT

## 2024-08-14 NOTE — PROGRESS NOTES
Daily Note     Today's date: 2024  Patient name: Jenifer Smith  : 1955  MRN: [id number]  Referring provider: Guanako Agosto MD  Dx:   Encounter Diagnosis     ICD-10-CM    1. Chronic low back pain without sciatica, unspecified back pain laterality  M54.50     G89.29       2. Right shoulder pain, unspecified chronicity  M25.511               Start Time: 1255  Stop Time: 1335  Total time in clinic (min): 40 minutes    Subjective: Is pleased with her shoulders.       Objective: See treatment diary below      Assessment: Is challenged with addition of lumbar/hip exercises and benefits from encouragement but overall did very well.  Patient demonstrated fatigue post treatment, exhibited good technique with therapeutic exercises, and would benefit from continued PT to improve her tolerance with ambulation, gait speed, and lifting tolerance.       Plan: Continue per plan of care.      POC expires Unit limit Auth Expiration date PT/OT/ST + Visit Limit?   10/10/24  12/31/24                              Visit/Unit Tracking  AUTH Status:  Date 7/18 7/22 7/25 7/29 7/31 8/6 8/8 8/12 08/14     N/A Used 1 2 3 4 5 6 7 8 9      Remaining                 Diagnosis: R RTC pathology + lumbar pain with possible posterior derangement + strength deficits   Precautions: DM, HTN, diabetic neuropathy   Insurance: t Medicare         Vitals  FOTO   *focus on lumbar/hip strength     Patient Ed          Smoking cessation          Lumbar roll                                                            Neuro Re-Ed          Extension principle                                                                      Ther Ex          Aerobic conditioning L1 3'3' UBE L 3.5  FWD 3'  BWD 3' UBE L5  FWD 3'  BWD 3' UBE L5  FWD 3'  BWD 3' UBE L5 3'3'     RTC Shoulder ER  [place] TB  2 x 10    Shoulder IR  [place] TB  2 x 10    Shoulder ER  [place] TB  2 x 10 b/l    Shoulder IR  [place] TB  10x b/l Shoulder ER  [place] TB  2 x  10 b/l    Shoulder IR  [place] TB  2 x 10 b/l Shoulder ER  [place] TB  2 x 10 b/l    Shoulder IR  [place] TB  2 x 10 b/l      Wall lift off   2 x 10  One arm at a time Unilateral  10x    2 x 10 b/l      Biceps    6# 10x  8# 2 x 10      Shoulder press    5# 10x      Bridges 2x10 3 x 12        Clams 2x8 w/ VC & TC to improve form 2 x 10 b/l 3 x 12 b/l  2x12 b/l     Sidelying hip abduction R 10x 2 x 6 b/l        Squat 2x10   Sit to stand  2 x 10 Sit to stand 10# 10x  5# 10x     Side steps   [place] TB  3 laps x 12 ft [place] TB  3 laps x 12 ft [place] TB  4 laps x 12 ft     Prone hip ext     2x10     steamboats     [place] 10x hip extension     Ther Activity                              Manual                                                            Modalities

## 2024-08-19 ENCOUNTER — EVALUATION (OUTPATIENT)
Dept: PHYSICAL THERAPY | Facility: REHABILITATION | Age: 69
End: 2024-08-19
Payer: COMMERCIAL

## 2024-08-19 ENCOUNTER — OFFICE VISIT (OUTPATIENT)
Dept: PODIATRY | Facility: CLINIC | Age: 69
End: 2024-08-19
Payer: COMMERCIAL

## 2024-08-19 VITALS
SYSTOLIC BLOOD PRESSURE: 121 MMHG | BODY MASS INDEX: 29.43 KG/M2 | DIASTOLIC BLOOD PRESSURE: 75 MMHG | HEIGHT: 64 IN | WEIGHT: 172.4 LBS | HEART RATE: 106 BPM

## 2024-08-19 DIAGNOSIS — M20.11 VALGUS DEFORMITY OF BOTH GREAT TOES: ICD-10-CM

## 2024-08-19 DIAGNOSIS — M79.671 BILATERAL FOOT PAIN: ICD-10-CM

## 2024-08-19 DIAGNOSIS — M79.672 BILATERAL FOOT PAIN: ICD-10-CM

## 2024-08-19 DIAGNOSIS — M20.12 VALGUS DEFORMITY OF BOTH GREAT TOES: ICD-10-CM

## 2024-08-19 DIAGNOSIS — G89.29 CHRONIC LOW BACK PAIN WITHOUT SCIATICA, UNSPECIFIED BACK PAIN LATERALITY: Primary | ICD-10-CM

## 2024-08-19 DIAGNOSIS — E11.42 TYPE 2 DIABETES MELLITUS WITH DIABETIC POLYNEUROPATHY, WITHOUT LONG-TERM CURRENT USE OF INSULIN (HCC): Primary | ICD-10-CM

## 2024-08-19 DIAGNOSIS — M54.50 CHRONIC LOW BACK PAIN WITHOUT SCIATICA, UNSPECIFIED BACK PAIN LATERALITY: Primary | ICD-10-CM

## 2024-08-19 DIAGNOSIS — M25.511 RIGHT SHOULDER PAIN, UNSPECIFIED CHRONICITY: ICD-10-CM

## 2024-08-19 PROCEDURE — 97164 PT RE-EVAL EST PLAN CARE: CPT | Performed by: PHYSICAL THERAPIST

## 2024-08-19 PROCEDURE — 11719 TRIM NAIL(S) ANY NUMBER: CPT | Performed by: PODIATRIST

## 2024-08-19 PROCEDURE — 99213 OFFICE O/P EST LOW 20 MIN: CPT | Performed by: PODIATRIST

## 2024-08-19 NOTE — PROGRESS NOTES
PT Re-Evaluation     Today's date: 2024  Patient name: Jenifer Smith  : 1955  MRN: [id number]  Referring provider: Guanako Agosto MD  Dx:   Encounter Diagnosis     ICD-10-CM    1. Chronic low back pain without sciatica, unspecified back pain laterality  M54.50     G89.29       2. Right shoulder pain, unspecified chronicity  M25.511             Start Time: 1110  Stop Time: 1145  Total time in clinic (min): 35 minutes    Assessment  Impairments: abnormal gait, abnormal or restricted ROM, impaired physical strength and lacks appropriate home exercise program    Assessment details: Patient is 68 y.o. female who has attended PT over the past 4 weeks for her complaints of acute on chronic R shoulder pain and chronic LBP. Subjectively, patient reports good improvement in her shoulder mobility and overhead strength and her low back pain. She is able to walk longer distances without pain although she reports she knows she is not 100% back to full function yet. Objectively, patient presents improvement in all her shoulder mobility, strength, and negative sciatic nerve tension tests. Her 6MWT improved by over 300ft. Patient has met almost all of her PT goals. Patient would benefit from continuing skilled PT to progress towards her goals. However, given her multiple personal stressors at the moment, mutual decision was made to hold PT for the next two months while she settles these issues. Recommend continuing HEP and will follow up in 2 months to assess her status.  Understanding of Dx/Px/POC: good     Prognosis: good    Goals  Within 6 weeks,   1. Patient will report <3/10 pain with reaching overhead to cabinet with her R shoulder. - Met  2. Patient will be able to demonstrate R shoulder AROM flexion >150. - Met  3. Patient will be able to demonstrate R shoulder IR behind back at least T10. - Met  4. Patient will be able to demonstrate R shoulder abduction strength >=4-/5. - Progressing  5. Patient will be  able to demonstrate R shoulder ER strength >=4-/5. - Met  6. Patient will be able to demonstrate R shoulder IR strength >=4-/5. - Met  7. Patient will be able to ambulate for >=5 minutes without rest. - Met  8. Patient will be able to demonstrate negative R slump test. - Met    Within 12 weeks or by discharge,  1. Patient will be able to lift 10# with her R shoulder with <3/10 pain. - Met  2. Patient will be able to demonstrate R shoulder AROM flexion >155*. - Met  3. Patient will be able to demonstrate R shoulder IR behind back at least T9. - Progressing  4. Patient will be able to demonstrate R shoulder abduction strength >=4/5. - Progressing  5. Patient will be able to demonstrate R shoulder ER strength >=4/5. - Progressing  6. Patient will be able to demonstrate R shoulder IR strength >=4/5. - Met  7. Patient will be able to complete 6MWT and improve >200ft (IE: 700ft) - Met    Plan  Patient would benefit from: skilled physical therapy  Planned modality interventions: cryotherapy and thermotherapy: hydrocollator packs    Planned therapy interventions: abdominal trunk stabilization, joint mobilization, manual therapy, activity modification, balance, balance/weight bearing training, neuromuscular re-education, body mechanics training, postural training, patient education, therapeutic activities, therapeutic exercise, functional ROM exercises, strengthening, stretching, transfer training, gait training and home exercise program    Frequency: 1-2x/week.  Plan of Care beginning date: 8/19/2024  Plan of Care expiration date: 10/14/2024  Treatment plan discussed with: patient      Subjective Evaluation    History of Present Illness  Mechanism of injury: Interval History (8/19/24): Patient reports she has more shoulder mobility overhead. She has less pain in her shoulder now. She only has pain in her back. She can move her legs better.   She is thinking of looking for a job for part time now that she is feeling better.    She has a lot of stress and reports crying a lot at home -- managing her brother and her grandson who passed away.    Initial Evaluation:  Patient c/o R shoulder pain that began that has worsened over the past month. Reports chronic hx of R shoulder pain after 2 falls onto her R side.     Aggravating factors: lying on R side, reaching overhead, lifting  Ease factors: heating pad, tylenol    Lumbar: midline lumbar to her hips  Aggravating factors: lifting (laundry, stool, etc), sitting without support, walking too far (>5 minutes), bending  Ease factors: injections, muscle relaxers, sitting with support    PMHx: peripheral neuropathy from DM  Patient Goals  Patient goals for therapy: decreased pain  Patient goal: to reach up with less pain  Pain  Pain scale: 9/10 R shoulder, 10/10 lumbar.    Social Support  Lives with: brother who is paralyzed.    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)  Hand dominance: right        Objective     Cervical/Thoracic Screen   Cervical range of motion within normal limits    Active Range of Motion   Left Shoulder   Flexion: 165 degrees   External rotation 0°: 85 degrees   Internal rotation BTB: T8     Right Shoulder   Flexion: 165 degrees   External rotation 0°: 85 degrees   Internal rotation BTB: T10     Passive Range of Motion   Left Hip   Flexion: WFL  External rotation (90/90): WFL  Internal rotation (90/90): WFL    Right Hip   Flexion: WFL  External rotation (90/90): WFL  Internal rotation (90/90): WFL    Strength/Myotome Testing     Left Shoulder     Planes of Motion   Abduction: 4+   External rotation at 0°: 4+   Internal rotation at 0°: 4+     Right Shoulder     Planes of Motion   Abduction: 3+ (pain)   External rotation at 0°: 4-   Internal rotation at 0°: 4     Tests     Lumbar     Left   Negative passive SLR and slump test.     Right   Negative passive SLR and slump test.     Ambulation     Comments   Gait: normal gait pattern    Functional  Assessment        Comments  6MWT:   - 700ft 7/22/24  - 1084 8/19/24             POC expires Unit limit Auth Expiration date PT/OT/ST + Visit Limit?   10/14/24  12/31/24                              Visit/Unit Tracking  AUTH Status:  Date 7/18 7/22 7/25 7/29 7/31 8/6 8/8 8/12 8/14 8/19    N/A Used 1 2 3 4 5 6 7 8 9 10     Remaining           RE      Diagnosis: R RTC pathology + lumbar pain with possible posterior derangement + strength deficits   Precautions: DM, HTN, diabetic neuropathy   Insurance: [employer] Medicare    7/31 8/6 8/8 8/12 8/14 8/19    Vitals  FOTO    Re-Eval *shoulder abduction   Patient Ed          Smoking cessation          Lumbar roll          Daily walking program                                                  Neuro Re-Ed          Extension principle                                                                      Ther Ex          Aerobic conditioning L1 3'3' UBE L 3.5  FWD 3'  BWD 3' UBE L5  FWD 3'  BWD 3' UBE L5  FWD 3'  BWD 3' UBE L5 3'3'     RTC Shoulder ER  [place] TB  2 x 10    Shoulder IR  [place] TB  2 x 10    Shoulder ER  [place] TB  2 x 10 b/l    Shoulder IR  [place] TB  10x b/l Shoulder ER  [place] TB  2 x 10 b/l    Shoulder IR  [place] TB  2 x 10 b/l Shoulder ER  [place] TB  2 x 10 b/l    Shoulder IR  [place] TB  2 x 10 b/l      Wall lift off   2 x 10  One arm at a time Unilateral  10x    2 x 10 b/l      Biceps    6# 10x  8# 2 x 10      Shoulder press    5# 10x      Bridges 2x10 3 x 12        Clams 2x8 w/ VC & TC to improve form 2 x 10 b/l 3 x 12 b/l  2x12 b/l     Sidelying hip abduction R 10x 2 x 6 b/l        Squat 2x10   Sit to stand  2 x 10 Sit to stand 10# 10x  5# 10x     Side steps   [place] TB  3 laps x 12 ft [place] TB  3 laps x 12 ft [place] TB  4 laps x 12 ft     Prone hip ext     2x10     steamboats     [place] 10x hip extension     Ther Activity                              Manual                                                            Modalities

## 2024-08-19 NOTE — PROGRESS NOTES
Name: Jenifer Smith      : 1955      MRN: [id number]  Encounter Provider: Artem Guerra DPM  Encounter Date: 2024   Encounter department: [facility] PODIATRY [facility]    Assessment & Plan     1. Type 2 diabetes mellitus with diabetic polyneuropathy, without long-term current use of insulin (HCC)  -     Nail removal  2. Valgus deformity of both great toes  3. Bilateral foot pain    Patient is requesting nail care to be completed.    Nails are elongated not dystrophic.    Nails 1 through 10 are trimmed today.    Return for evaluation and follow-up for routine footcare with Dr. Paredes.    Nail removal    Date/Time: 2024 10:00 AM    Performed by: Artem Guerra DPM  Authorized by: Artem Guerra DPM    Nails trimmed:     Number of nails trimmed:  10        Return in about 3 months (around 2024) for Reggie .    Subjective     Patient returns for follow-up on diabetic footcare in addition to some pain at the level of the great toe bunions.  She has yet to obtain diabetic shoes.  She states that she has tried to call  multiple occasions however she has not gotten through to them.  She does have numbness burning tingling to the feet.  She denies any new acute changes denies any open ulcerations or lesions.        Constitutional:  Negative for chills and fever.   Respiratory:  Negative for chest tightness and shortness of breath.    Gastrointestinal:  Negative for nausea and vomiting.     Current Outpatient Medications on File Prior to Visit   Medication Sig   • aspirin (Aspirin 81) 81 mg EC tablet Take 1 tablet (81 mg total) by mouth daily   • atorvastatin (LIPITOR) 20 mg tablet Take 1 tablet (20 mg total) by mouth daily   • Diclofenac Sodium (VOLTAREN) 1 % APPLY 2 GRAMS TOPICALLY THREE TIMES A DAY   • Elastic Bandages & Supports (GNP Diabetic Socks Unisex XL) MISC Use 1 each if needed (1 pair of socks for diabetic neuropathy)   • ferrous sulfate 324  "(65 Fe) mg Take 1 tablet (324 mg total) by mouth every other day   • gabapentin (NEURONTIN) 300 mg capsule Take 3 capsules (900 mg total) by mouth daily at bedtime   • gabapentin (NEURONTIN) 300 mg capsule Take 2 capsules (600 mg total) by mouth 2 (two) times a day   • glucose blood (Accu-Chek Guide) test strip Use 1 each in the morning Use as instructed   • Lancets Misc. (Accu-Chek Softclix Lancet Dev) KIT Use in the morning   • lisinopril-hydrochlorothiazide (PRINZIDE,ZESTORETIC) 20-12.5 MG per tablet Take 1 tablet by mouth daily   • Menthol, Topical Analgesic, (Icy Hot Advanced Relief) 7.5 % PTCH Apply 1 patch topically daily as needed (shoulder pain)   • metFORMIN (GLUCOPHAGE) 1000 MG tablet Take 1 tablet (1,000 mg total) by mouth 2 (two) times a day with meals   • tiZANidine (ZANAFLEX) 4 mg tablet Take 1 tablet (4 mg total) by mouth daily at bedtime as needed for muscle spasms   • Accu-Chek Softclix Lancets lancets Use 1 (one) time for 1 dose Use as instructed   • vitamin B-12 (CYANOCOBALAMIN) 500 MCG TABS Take 1 tablet (500 mcg total) by mouth daily       Objective     /75   Pulse (!) 106   Ht 5' 4\" (1.626 m)   Wt 78.2 kg (172 lb 6.4 oz)   BMI 29.59 kg/m²     There is some elongation of the nails 1 through 10 without significant signs of infection noted currently.  There is lateral deviation of the hallux with prominent medial eminence noted bilaterally.  No open lesions or ulcerations noted at this time.  The patient does relate some numbness and burning tingling to the toes.  "

## 2024-08-21 ENCOUNTER — TELEPHONE (OUTPATIENT)
Dept: INTERNAL MEDICINE CLINIC | Facility: CLINIC | Age: 69
End: 2024-08-21

## 2024-08-21 ENCOUNTER — APPOINTMENT (OUTPATIENT)
Dept: PHYSICAL THERAPY | Facility: REHABILITATION | Age: 69
End: 2024-08-21
Payer: COMMERCIAL

## 2024-09-16 DIAGNOSIS — I10 BENIGN ESSENTIAL HYPERTENSION: ICD-10-CM

## 2024-09-17 RX ORDER — LISINOPRIL AND HYDROCHLOROTHIAZIDE 12.5; 2 MG/1; MG/1
1 TABLET ORAL DAILY
Qty: 90 TABLET | Refills: 0 | Status: SHIPPED | OUTPATIENT
Start: 2024-09-17

## 2024-09-18 DIAGNOSIS — E11.65 TYPE 2 DIABETES MELLITUS WITH HYPERGLYCEMIA, WITHOUT LONG-TERM CURRENT USE OF INSULIN (HCC): ICD-10-CM

## 2024-09-19 DIAGNOSIS — E11.65 TYPE 2 DIABETES MELLITUS WITH HYPERGLYCEMIA, WITHOUT LONG-TERM CURRENT USE OF INSULIN (HCC): ICD-10-CM

## 2024-09-19 RX ORDER — BLOOD SUGAR DIAGNOSTIC
1 STRIP MISCELLANEOUS DAILY
Qty: 200 EACH | Refills: 2 | Status: SHIPPED | OUTPATIENT
Start: 2024-09-19

## 2024-09-19 NOTE — TELEPHONE ENCOUNTER
Received call from patient's pharmacy. Patient does not use the Accu-Chek test strips. Pharmacy requesting new script for One Touch Verio Test strips.     Script sent to pharmacy for fill.

## 2024-10-02 ENCOUNTER — HOSPITAL ENCOUNTER (OUTPATIENT)
Dept: BONE DENSITY | Facility: CLINIC | Age: 69
Discharge: HOME | End: 2024-10-02
Payer: COMMERCIAL

## 2024-10-02 ENCOUNTER — HOSPITAL ENCOUNTER (OUTPATIENT)
Dept: MAMMOGRAPHY | Facility: CLINIC | Age: 69
Discharge: HOME | End: 2024-10-02
Payer: COMMERCIAL

## 2024-10-02 VITALS — BODY MASS INDEX: 29.37 KG/M2 | HEIGHT: 64 IN | WEIGHT: 172 LBS

## 2024-10-02 DIAGNOSIS — Z91.89 AT RISK OF FRACTURE DUE TO OSTEOPOROSIS: ICD-10-CM

## 2024-10-02 DIAGNOSIS — Z91.89 AT HIGH RISK FOR FRACTURE: ICD-10-CM

## 2024-10-02 DIAGNOSIS — M81.0 AT RISK OF FRACTURE DUE TO OSTEOPOROSIS: ICD-10-CM

## 2024-10-02 DIAGNOSIS — Z12.31 SCREENING MAMMOGRAM, ENCOUNTER FOR: ICD-10-CM

## 2024-10-02 PROCEDURE — 77080 DXA BONE DENSITY AXIAL: CPT

## 2024-10-02 PROCEDURE — 77063 BREAST TOMOSYNTHESIS BI: CPT

## 2024-10-02 PROCEDURE — 77067 SCR MAMMO BI INCL CAD: CPT

## 2024-10-08 ENCOUNTER — OFFICE VISIT (OUTPATIENT)
Dept: INTERNAL MEDICINE CLINIC | Facility: CLINIC | Age: 69
End: 2024-10-08

## 2024-10-08 VITALS
SYSTOLIC BLOOD PRESSURE: 135 MMHG | BODY MASS INDEX: 28.85 KG/M2 | HEART RATE: 87 BPM | TEMPERATURE: 97.5 F | HEIGHT: 64 IN | DIASTOLIC BLOOD PRESSURE: 78 MMHG | WEIGHT: 169 LBS

## 2024-10-08 DIAGNOSIS — E11.42 TYPE 2 DIABETES MELLITUS WITH DIABETIC POLYNEUROPATHY, WITHOUT LONG-TERM CURRENT USE OF INSULIN (HCC): ICD-10-CM

## 2024-10-08 DIAGNOSIS — M54.16 LUMBAR RADICULOPATHY: ICD-10-CM

## 2024-10-08 DIAGNOSIS — I10 BENIGN ESSENTIAL HYPERTENSION: ICD-10-CM

## 2024-10-08 DIAGNOSIS — E11.65 TYPE 2 DIABETES MELLITUS WITH HYPERGLYCEMIA, WITHOUT LONG-TERM CURRENT USE OF INSULIN (HCC): Primary | ICD-10-CM

## 2024-10-08 DIAGNOSIS — E11.319 DIABETIC RETINOPATHY ASSOCIATED WITH TYPE 2 DIABETES MELLITUS, MACULAR EDEMA PRESENCE UNSPECIFIED, UNSPECIFIED LATERALITY, UNSPECIFIED RETINOPATHY SEVERITY (HCC): ICD-10-CM

## 2024-10-08 DIAGNOSIS — M19.90 ARTHRITIS: ICD-10-CM

## 2024-10-08 DIAGNOSIS — E11.42 CONTROLLED TYPE 2 DIABETES MELLITUS WITH DIABETIC POLYNEUROPATHY, WITHOUT LONG-TERM CURRENT USE OF INSULIN (HCC): ICD-10-CM

## 2024-10-08 DIAGNOSIS — D64.9 ANEMIA, UNSPECIFIED TYPE: ICD-10-CM

## 2024-10-08 LAB
LEFT EYE DIABETIC RETINOPATHY: ABNORMAL
LEFT EYE IMAGE QUALITY: ABNORMAL
LEFT EYE MACULAR EDEMA: ABNORMAL
LEFT EYE OTHER RETINOPATHY: ABNORMAL
RIGHT EYE DIABETIC RETINOPATHY: ABNORMAL
RIGHT EYE IMAGE QUALITY: ABNORMAL
RIGHT EYE MACULAR EDEMA: ABNORMAL
RIGHT EYE OTHER RETINOPATHY: ABNORMAL
SEVERITY (EYE EXAM): ABNORMAL
SL AMB POCT HEMOGLOBIN AIC: 6.6 (ref ?–6.5)

## 2024-10-08 PROCEDURE — 99213 OFFICE O/P EST LOW 20 MIN: CPT | Performed by: STUDENT IN AN ORGANIZED HEALTH CARE EDUCATION/TRAINING PROGRAM

## 2024-10-08 PROCEDURE — 83036 HEMOGLOBIN GLYCOSYLATED A1C: CPT | Performed by: STUDENT IN AN ORGANIZED HEALTH CARE EDUCATION/TRAINING PROGRAM

## 2024-10-08 RX ORDER — LISINOPRIL AND HYDROCHLOROTHIAZIDE 12.5; 2 MG/1; MG/1
1 TABLET ORAL DAILY
Qty: 90 TABLET | Refills: 0 | Status: SHIPPED | OUTPATIENT
Start: 2024-10-08 | End: 2025-01-06

## 2024-10-08 RX ORDER — GABAPENTIN 300 MG/1
600 CAPSULE ORAL 2 TIMES DAILY
Qty: 360 CAPSULE | Refills: 1 | Status: SHIPPED | OUTPATIENT
Start: 2024-10-08

## 2024-10-08 RX ORDER — FERROUS SULFATE 324(65)MG
324 TABLET, DELAYED RELEASE (ENTERIC COATED) ORAL EVERY OTHER DAY
Qty: 90 TABLET | Refills: 1 | Status: SHIPPED | OUTPATIENT
Start: 2024-10-08

## 2024-10-08 RX ORDER — LISINOPRIL AND HYDROCHLOROTHIAZIDE 12.5; 2 MG/1; MG/1
2 TABLET ORAL DAILY
Qty: 180 TABLET | Refills: 0 | Status: SHIPPED | OUTPATIENT
Start: 2024-10-08 | End: 2024-10-08

## 2024-10-08 RX ORDER — ATORVASTATIN CALCIUM 20 MG/1
20 TABLET, FILM COATED ORAL DAILY
Qty: 90 TABLET | Refills: 2 | Status: SHIPPED | OUTPATIENT
Start: 2024-10-08 | End: 2025-07-05

## 2024-10-08 RX ORDER — GABAPENTIN 300 MG/1
900 CAPSULE ORAL
Qty: 270 CAPSULE | Refills: 1 | Status: SHIPPED | OUTPATIENT
Start: 2024-10-08

## 2024-10-08 NOTE — PROGRESS NOTES
[facility]  INTERNAL MEDICINE OFFICE VISIT     PATIENT INFORMATION     Jenifer Smith   69 y.o. female   MRN: [id number]    ASSESSMENT/PLAN     Problem List Items Addressed This Visit       Benign essential hypertension    Relevant Medications    lisinopril-hydrochlorothiazide (PRINZIDE,ZESTORETIC) 20-12.5 MG per tablet    Type 2 diabetes mellitus with diabetic polyneuropathy, without long-term current use of insulin (HCC)    Relevant Medications    metFORMIN (GLUCOPHAGE) 1000 MG tablet    atorvastatin (LIPITOR) 20 mg tablet    Other Relevant Orders    Ambulatory Referral to Podiatry    Lumbar radiculopathy    Relevant Medications    tiZANidine (ZANAFLEX) 4 mg tablet    gabapentin (NEURONTIN) 300 mg capsule    gabapentin (NEURONTIN) 300 mg capsule    Other Relevant Orders    Ambulatory Referral to Physical Therapy     Other Visit Diagnoses       Type 2 diabetes mellitus with hyperglycemia, without long-term current use of insulin (HCC)    -  Primary    Relevant Medications    metFORMIN (GLUCOPHAGE) 1000 MG tablet    Other Relevant Orders    POCT hemoglobin A1c (Completed)    IRIS Diabetic eye exam (Completed)    Ambulatory Referral to Ophthalmology    Controlled type 2 diabetes mellitus with diabetic polyneuropathy, without long-term current use of insulin (HCC)        Relevant Medications    metFORMIN (GLUCOPHAGE) 1000 MG tablet    Arthritis        Relevant Medications    Diclofenac Sodium (VOLTAREN) 1 %    Anemia, unspecified type        Relevant Medications    ferrous sulfate 324 (65 Fe) mg    Diabetic retinopathy associated with type 2 diabetes mellitus, macular edema presence unspecified, unspecified laterality, unspecified retinopathy severity (HCC)        Relevant Medications    metFORMIN (GLUCOPHAGE) 1000 MG tablet    Other Relevant Orders    Ambulatory Referral to Ophthalmology          T2DM follow up  Diabetic neuropathy  Improved from A1C 6.9 to 6.6  ACR 9, GFR  appears to be 70-90    PLAN:  -Continue current dose of metformin  -Completed IRIS eye exam, referral to ophthalmology  -Continue gabapentin  -Podiatry referral for continued follow-up    3. HTN  Improved on recheck, patient continues to smoke  Did not want further evaluation for sleep study    -Continue current dose of lisinopril hydrochlorothiazide 20-12.5 mg  -Discussed ambulatory blood pressure monitoring, renal protective effects of medication    4.  Lumbar radiculopathy    -Continue current gabapentin and tizanidine which seems to help her  -Continue Voltaren gel as needed      I have recommended that this patient have vaccinations and discussed smoking cessation but she declines at this time. I have discussed the risks and benefits of this procedure with her. The patient verbalizes understanding.     Schedule a follow-up appointment in 3 months for annual wellness.      HEALTH MAINTENANCE     Immunization History   Administered Date(s) Administered    COVID-19 PFIZER VACCINE 0.3 ML IM 04/18/2021, 05/16/2021     CHIEF COMPLAINT     Chief Complaint   Patient presents with    Follow-up     diabetes      HISTORY OF PRESENT ILLNESS     Jenifer 68 F with PMH T2DM, lumbar radiculopathy s/p steroid injection, chronic pain , CKD, iron deficiency anemia, current smoker, intraductal papilloma who presents for diabetes follow-up.    Patient checks her sugars in the mornings and patient is compliant on her metformin 1000 mg twice daily. No noted episodes of nausea, vomiting or abdominal pain or diarrhea.  On AM glucose checks, normal range without episodes of hypoglycemia.  Patient has improved on her diet with homemade food and reduction in carb intake.  Patient currently smokes half a pack of cigarettes per day.  States that the patch does not help and declines cessation. Does not want further BP medications. Noted to have sleep related hypoxia but does not want further sleep evaluation.  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  Last mammogram and DEXA screening were normal.  Up-to-date on colonoscopy 2022.  For her pain, patient takes Gabapentin 600 mg AM, 600mg afternoon, 900 mg evening.  Acknowledges that this contributes to overall fatigue and sleepiness throughout the day, but requires this for her pain control.    REVIEW OF SYSTEMS     Review of Systems   Constitutional:  Negative for chills and fever.   Eyes:  Negative for visual disturbance.   Respiratory:  Negative for shortness of breath.    Cardiovascular:  Negative for chest pain.   Gastrointestinal:  Negative for abdominal pain, nausea and vomiting.   Genitourinary:  Negative for dysuria.   Musculoskeletal:  Positive for arthralgias and back pain.   Neurological:  Negative for headaches.     OBJECTIVE     Vitals:    10/08/24 1346 10/08/24 1449   BP: 146/71 135/78   BP Location: Right arm Right arm   Patient Position: Sitting    Cuff Size: Large    Pulse: 102 87   Temp: 97.5 °F (36.4 °C)    TempSrc: Temporal    Weight: 76.7 kg (169 lb)    Height: 5' 4\" (1.626 m)      Physical Exam  Vitals reviewed.   Constitutional:       General: She is not in acute distress.  Eyes:      Conjunctiva/sclera: Conjunctivae normal.   Cardiovascular:      Rate and Rhythm: Normal rate and regular rhythm.      Pulses: Normal pulses.      Heart sounds: Normal heart sounds.   Pulmonary:      Effort: Pulmonary effort is normal.      Breath sounds: Normal breath sounds.   Abdominal:      Palpations: Abdomen is soft.      Tenderness: There is no abdominal tenderness.   Musculoskeletal:         General: Normal range of motion.      Right lower leg: No edema.      Left lower leg: No edema.   Neurological:      Mental Status: She is alert and oriented to person, place, and time.       CURRENT MEDICATIONS     Current Outpatient Medications:     atorvastatin (LIPITOR) 20 mg tablet, Take 1 tablet (20 mg total) by mouth daily, Disp: 90 " tablet, Rfl: 2    Diclofenac Sodium (VOLTAREN) 1 %, APPLY 2 GRAMS TOPICALLY THREE TIMES A DAY, Disp: 100 g, Rfl: 2    ferrous sulfate 324 (65 Fe) mg, Take 1 tablet (324 mg total) by mouth every other day, Disp: 90 tablet, Rfl: 1    gabapentin (NEURONTIN) 300 mg capsule, Take 3 capsules (900 mg total) by mouth daily at bedtime, Disp: 270 capsule, Rfl: 1    gabapentin (NEURONTIN) 300 mg capsule, Take 2 capsules (600 mg total) by mouth 2 (two) times a day, Disp: 360 capsule, Rfl: 1    glucose blood test strip, One Touch Verio brand. Use 1 each in the morning., Disp: 100 strip, Rfl: 3    lisinopril-hydrochlorothiazide (PRINZIDE,ZESTORETIC) 20-12.5 MG per tablet, Take 1 tablet by mouth daily, Disp: 90 tablet, Rfl: 0    metFORMIN (GLUCOPHAGE) 1000 MG tablet, Take 1 tablet (1,000 mg total) by mouth 2 (two) times a day with meals, Disp: 180 tablet, Rfl: 1    tiZANidine (ZANAFLEX) 4 mg tablet, Take 1 tablet (4 mg total) by mouth daily at bedtime as needed for muscle spasms, Disp: 90 tablet, Rfl: 1    Accu-Chek Softclix Lancets lancets, Use 1 (one) time for 1 dose Use as instructed, Disp: 200 each, Rfl: 2    aspirin (Aspirin 81) 81 mg EC tablet, Take 1 tablet (81 mg total) by mouth daily, Disp: 90 tablet, Rfl: 3    Elastic Bandages & Supports (GNP Diabetic Socks Unisex XL) MISC, Use 1 each if needed (1 pair of socks for diabetic neuropathy), Disp: 2 each, Rfl: 0    glucose blood (Accu-Chek Guide) test strip, Use 1 each in the morning Use as instructed, Disp: 200 each, Rfl: 2    Lancets Misc. (Accu-Chek Softclix Lancet Dev) KIT, Use in the morning, Disp: 1 kit, Rfl: 0    Menthol, Topical Analgesic, (Icy Hot Advanced Relief) 7.5 % PTCH, Apply 1 patch topically daily as needed (shoulder pain), Disp: 30 patch, Rfl: 0    vitamin B-12 (CYANOCOBALAMIN) 500 MCG TABS, Take 1 tablet (500 mcg total) by mouth daily, Disp: 30 tablet, Rfl: 0    PAST MEDICAL & SURGICAL HISTORY     Past Medical History:   Diagnosis Date    Arthritis     Bump  "    bumped head yesterday at work \"saw stars\" no LOC, cat scan done was normal    Circulation problem     Diabetes mellitus (HCC)     blood sugar 125 on admission    Encounter for screening colonoscopy     1 st one    Headache     Hyperlipidemia     Hypertension     Positive fecal occult blood test 08/30/2018    Added automatically from request for surgery [id number]    Post concussion syndrome 09/14/2018    Syncope      Past Surgical History:   Procedure Laterality Date    BREAST BIOPSY Right 02/18/2022    COLONOSCOPY N/A 9/26/2018    Procedure: COLONOSCOPY;  Surgeon: Joie Morejon MD;  Location: [facility];  Service: Gastroenterology    HYSTERECTOMY  2017    INCISIONAL BREAST BIOPSY      last assessed 17Aug2017    GA COLONOSCOPY FLX DX W/COLLJ SPEC WHEN PFRMD N/A 7/5/2018    Procedure: COLONOSCOPY;  Surgeon: Joie Morejon MD;  Location:  GI LAB;  Service: Gastroenterology    US GUIDED BREAST BIOPSY RIGHT COMPLETE Right 2/18/2022     SOCIAL & FAMILY HISTORY     Social History     Socioeconomic History    Marital status:      Spouse name: Not on file    Number of children: 6    Years of education: Not on file    Highest education level: Not on file   Occupational History    Occupation: [employer]   Tobacco Use    Smoking status: Every Day     Current packs/day: 0.50     Average packs/day: 0.5 packs/day for 20.0 years (10.0 ttl pk-yrs)     Types: Cigarettes    Smokeless tobacco: Never    Tobacco comments:     smokes less than 1pk/day   Vaping Use    Vaping status: Never Used   Substance and Sexual Activity    Alcohol use: Never    Drug use: Never    Sexual activity: Not Currently   Other Topics Concern    Not on file   Social History Narrative    Daily caffeine consumption, 6-8 servings a day     Social Determinants of Health     Financial Resource Strain: Low Risk  (1/15/2024)    Overall Financial Resource Strain (CARDIA)     Difficulty of Paying Living Expenses: Not hard at all   Food Insecurity: " No Food Insecurity (1/15/2024)    Hunger Vital Sign     Worried About Running Out of Food in the Last Year: Never true     Ran Out of Food in the Last Year: Never true   Transportation Needs: No Transportation Needs (1/15/2024)    PRAPARE - Transportation     Lack of Transportation (Medical): No     Lack of Transportation (Non-Medical): No   Physical Activity: Inactive (2/10/2021)    Exercise Vital Sign     Days of Exercise per Week: 0 days     Minutes of Exercise per Session: 0 min   Stress: No Stress Concern Present (2/10/2021)    [employer] [place] [employer] - Occupational Stress Questionnaire     Feeling of Stress : Not at all   Social Connections: Socially Isolated (2/10/2021)    Social Connection and Isolation Panel [NHANES]     Frequency of Communication with Friends and Family: Once a week     Frequency of Social Gatherings with Friends and Family: Once a week     Attends [employer] Services: Never     Active Member of Clubs or Organizations: No     Attends Club or Organization Meetings: Never     Marital Status:    Intimate Partner Violence: Not At Risk (2/10/2021)    Humiliation, Afraid, Rape, and Kick questionnaire     Fear of Current or Ex-Partner: No     Emotionally Abused: No     Physically Abused: No     Sexually Abused: No   Housing Stability: High Risk (3/14/2024)    Housing Stability Vital Sign     Unable to Pay for Housing in the Last Year: Yes     Number of Times Moved in the Last Year: 1     Homeless in the Last Year: Yes     Social History     Substance and Sexual Activity   Alcohol Use Never       Social History     Substance and Sexual Activity   Drug Use Never     Social History     Tobacco Use   Smoking Status Every Day    Current packs/day: 0.50    Average packs/day: 0.5 packs/day for 20.0 years (10.0 ttl pk-yrs)    Types: Cigarettes   Smokeless Tobacco Never   Tobacco Comments    smokes less than 1pk/day     Family History   Problem Relation Age of Onset    Breast  cancer Cousin 50    Alcohol abuse Mother     Alcohol abuse Father     Breast cancer Daughter 38    No Known Problems Sister     No Known Problems Maternal Grandmother     No Known Problems Maternal Grandfather     No Known Problems Paternal Grandmother     No Known Problems Paternal Grandfather     No Known Problems Son     No Known Problems Son     No Known Problems Daughter     No Known Problems Daughter     No Known Problems Daughter     Breast cancer Sister 66    No Known Problems Maternal Aunt     No Known Problems Maternal Aunt     No Known Problems Maternal Aunt     No Known Problems Maternal Aunt     No Known Problems Maternal Aunt     No Known Problems Maternal Aunt     No Known Problems Maternal Aunt     No Known Problems Maternal Aunt     No Known Problems Maternal Aunt     No Known Problems Maternal Aunt     No Known Problems Maternal Aunt     No Known Problems Maternal Aunt     No Known Problems Paternal Aunt     No Known Problems Paternal Aunt     Dementia Paternal Aunt     No Known Problems Paternal Aunt     No Known Problems Paternal Aunt     No Known Problems Paternal Aunt     No Known Problems Paternal Aunt     No Known Problems Paternal Aunt     Colon cancer Neg Hx     Endometrial cancer Neg Hx     Ovarian cancer Neg Hx                ==  Mikki Johnson MD  PGY-3  [facility] Internal Medicine Residency    [address], [address]  [place] [address]  Office: [phone]  Fax: [phone]

## 2024-10-16 DIAGNOSIS — E11.65 TYPE 2 DIABETES MELLITUS WITH HYPERGLYCEMIA, WITHOUT LONG-TERM CURRENT USE OF INSULIN (HCC): ICD-10-CM

## 2024-10-17 RX ORDER — BLOOD SUGAR DIAGNOSTIC
1 STRIP MISCELLANEOUS DAILY
Qty: 200 EACH | Refills: 2 | Status: SHIPPED | OUTPATIENT
Start: 2024-10-17

## 2024-10-22 DIAGNOSIS — E11.65 TYPE 2 DIABETES MELLITUS WITH HYPERGLYCEMIA, WITHOUT LONG-TERM CURRENT USE OF INSULIN (HCC): ICD-10-CM

## 2024-10-22 RX ORDER — LANCETS
EACH MISCELLANEOUS ONCE
Qty: 200 EACH | Refills: 2 | Status: SHIPPED | OUTPATIENT
Start: 2024-10-22 | End: 2024-10-22

## 2024-10-22 NOTE — TELEPHONE ENCOUNTER
Received call from patient. Introduced self and role. Patient requesting a refill of her lancets.     Patient also requesting to schedule a PT appointment. Patient updated she does have an active PT referral. Patient aware she would contact the facility of her preference to schedule directly with them dates/times of PT.     Mailed copy of PT locations to patient's home address. Address confirmed with patient.     All questions/concerns answered at this time.

## 2024-10-24 ENCOUNTER — TELEPHONE (OUTPATIENT)
Dept: INTERNAL MEDICINE CLINIC | Facility: CLINIC | Age: 69
End: 2024-10-24

## 2024-10-24 NOTE — TELEPHONE ENCOUNTER
Received call from patient. Patient requesting to go to [facility] for Outpatient PT.     Contacted [facility]. Spoke with Elvira from PT department. Fax number provided for new referral, [phone].     Contact number for them is .     Spoke with patient on the phone. Introduced self and role. Patient updated nurse spoke with [facility]. Faxing over referral today. Patient provided direct contact number for their PT department.     Patient updated her lancets were sent to [facility] pharmacy on 10/22/24.     All questions/concerns answered at this time.

## 2024-11-06 ENCOUNTER — OFFICE VISIT (OUTPATIENT)
Dept: PODIATRY | Facility: CLINIC | Age: 69
End: 2024-11-06
Payer: COMMERCIAL

## 2024-11-06 VITALS
HEART RATE: 88 BPM | DIASTOLIC BLOOD PRESSURE: 69 MMHG | BODY MASS INDEX: 28.56 KG/M2 | WEIGHT: 167.3 LBS | HEIGHT: 64 IN | SYSTOLIC BLOOD PRESSURE: 129 MMHG

## 2024-11-06 DIAGNOSIS — M20.11 VALGUS DEFORMITY OF BOTH GREAT TOES: Primary | ICD-10-CM

## 2024-11-06 DIAGNOSIS — B35.1 ONYCHOMYCOSIS: ICD-10-CM

## 2024-11-06 DIAGNOSIS — M20.12 VALGUS DEFORMITY OF BOTH GREAT TOES: Primary | ICD-10-CM

## 2024-11-06 DIAGNOSIS — M79.671 BILATERAL FOOT PAIN: ICD-10-CM

## 2024-11-06 DIAGNOSIS — M79.672 BILATERAL FOOT PAIN: ICD-10-CM

## 2024-11-06 DIAGNOSIS — E11.42 TYPE 2 DIABETES MELLITUS WITH DIABETIC POLYNEUROPATHY, WITHOUT LONG-TERM CURRENT USE OF INSULIN (HCC): ICD-10-CM

## 2024-11-06 PROCEDURE — RECHECK: Performed by: PODIATRIST

## 2024-11-06 PROCEDURE — 11721 DEBRIDE NAIL 6 OR MORE: CPT | Performed by: PODIATRIST

## 2024-11-06 NOTE — ASSESSMENT & PLAN NOTE
Lab Results   Component Value Date    HGBA1C 6.6 (A) 10/08/2024       Orders:    Ambulatory Referral to Podiatry    Diabetic Shoe    Diabetic Shoe Inserts  Recommended Lycra type of diabetic shoes to the patient because of her peripheral vascular and neuro diseases along with diabetes and her hallux valgus deformity.  The shoes will allow for stretch to reduce pressure in these areas reducing the risk of ulcer formation.

## 2024-11-06 NOTE — PROGRESS NOTES
Ambulatory Visit  Name: Jenifer Smith      : 1955      MRN: [id number]  Encounter Provider: Kong Aguayo DPM  Encounter Date: 2024   Encounter department: [facility] PODIATRY [place]    Assessment & Plan  Type 2 diabetes mellitus with diabetic polyneuropathy, without long-term current use of insulin ([facility])    Lab Results   Component Value Date    HGBA1C 6.6 (A) 10/08/2024       Orders:    Ambulatory Referral to Podiatry    Diabetic Shoe    Diabetic Shoe Inserts  Recommended Lycra type of diabetic shoes to the patient because of her peripheral vascular and neuro diseases along with diabetes and her hallux valgus deformity.  The shoes will allow for stretch to reduce pressure in these areas reducing the risk of ulcer formation.  Valgus deformity of both great toes    Orders:    Diabetic Shoe    Diabetic Shoe Inserts    Bilateral foot pain    Orders:    Diabetic Shoe    Diabetic Shoe Inserts    Onychomycosis       Debride mycotic nails and thin the nail plates x 10 with the use of a nail nipper manually and an electric Dremel bur was used to reduce the thickness of the nail beds and smoothed the distal aspect of the nails.     Discussed proper shoe gear, daily inspections of feet, and general foot health with patient. Patient has Q8  findings and is recommended for at risk foot care every 9-10 weeks.    Patients most recent complete clinical foot exam was on: 10/8/2024      Return in about 10 weeks (around 1/15/2025).     History of Present Illness     Jenifer Smith is a 69 y.o. female who presents with chief complaint of painful thick nails on both feet and painful bunions also on both feet especially with her shoes on.  She is a diabetic with peripheral vascular disease and peripheral neuropathy.  Patient presents for at-risk foot care.  Patient has no acute concerns today.  Patient has significant lower extremity risk due to neuropathy, edema, and trophic skin changes to the lower  extremity.     History obtained from : patient  Review of Systems  Medical History Reviewed by provider this encounter:       Current Outpatient Medications on File Prior to Visit   Medication Sig Dispense Refill    aspirin (Aspirin 81) 81 mg EC tablet Take 1 tablet (81 mg total) by mouth daily 90 tablet 3    atorvastatin (LIPITOR) 20 mg tablet Take 1 tablet (20 mg total) by mouth daily 90 tablet 2    Diclofenac Sodium (VOLTAREN) 1 % APPLY 2 GRAMS TOPICALLY THREE TIMES A  g 2    Elastic Bandages & Supports (GNP Diabetic Socks Unisex XL) MISC Use 1 each if needed (1 pair of socks for diabetic neuropathy) 2 each 0    ferrous sulfate 324 (65 Fe) mg Take 1 tablet (324 mg total) by mouth every other day 90 tablet 1    gabapentin (NEURONTIN) 300 mg capsule Take 3 capsules (900 mg total) by mouth daily at bedtime 270 capsule 1    gabapentin (NEURONTIN) 300 mg capsule Take 2 capsules (600 mg total) by mouth 2 (two) times a day 360 capsule 1    glucose blood (Accu-Chek Guide) test strip Use 1 each in the morning Use as instructed 200 each 2    glucose blood test strip One Touch Verio brand. Use 1 each in the morning. 100 strip 3    Lancets Misc. (Accu-Chek Softclix Lancet Dev) KIT Use in the morning 1 kit 0    lisinopril-hydrochlorothiazide (PRINZIDE,ZESTORETIC) 20-12.5 MG per tablet Take 1 tablet by mouth daily 90 tablet 0    Menthol, Topical Analgesic, (Icy Hot Advanced Relief) 7.5 % PTCH Apply 1 patch topically daily as needed (shoulder pain) 30 patch 0    metFORMIN (GLUCOPHAGE) 1000 MG tablet Take 1 tablet (1,000 mg total) by mouth 2 (two) times a day with meals 180 tablet 1    tiZANidine (ZANAFLEX) 4 mg tablet Take 1 tablet (4 mg total) by mouth daily at bedtime as needed for muscle spasms 90 tablet 1    Accu-Chek Softclix Lancets lancets Use 1 (one) time for 1 dose Use as instructed 200 each 2    vitamin B-12 (CYANOCOBALAMIN) 500 MCG TABS Take 1 tablet (500 mcg total) by mouth daily 30 tablet 0     No current  "facility-administered medications on file prior to visit.      Social History     Tobacco Use    Smoking status: Every Day     Current packs/day: 0.50     Average packs/day: 0.5 packs/day for 20.0 years (10.0 ttl pk-yrs)     Types: Cigarettes    Smokeless tobacco: Never    Tobacco comments:     smokes less than 1pk/day   Vaping Use    Vaping status: Never Used   Substance and Sexual Activity    Alcohol use: Never    Drug use: Never    Sexual activity: Not Currently         Objective     /69 (BP Location: Right arm, Patient Position: Sitting, Cuff Size: Adult)   Pulse 88   Ht 5' 4\" (1.626 m) Comment: verbal  Wt 75.9 kg (167 lb 4.8 oz)   BMI 28.72 kg/m²     Physical Exam  Vascular status is a faint 1/4 DP 0/4 PT negative digital hair normal distal cooling immediate capillary refill bilaterally.    Derm nails are brittle elongated hypertrophic white-black discoloration with subungual debris x 10.  There is an increased thickness and the nails are approximately 2 mm.  The skin has lost its turgor and it is thin in appearance and is shiny.  Hyperpigmentation is noted at the MPJ of the right and left first rays.    Ortho hallux valgus deformity is noted on both both feet with medial deviation of the metatarsal and lateral deviation of the proximal phalanx.  Pes planus is also noted bilaterally.  Hammertoes are seen on the fourth and fifth digits bilaterally.    Neuro 0.5 monofilament test was performed and it was decreased to absent at most of the sites.  The sites that were tested were the plantar aspect of the hallux, plantar aspect of the third and fourth toes, submet 3, and the plantar aspect of the arch.  Light touch is also diminished bilaterally.    Administrative Statements   I have spent a total time of 15 minutes in caring for this patient on the day of the visit/encounter including Risks and benefits of tx options, Instructions for management, Patient and family education, Importance of tx compliance, " Counseling / Coordination of care, Documenting in the medical record, and Reviewing / ordering tests, medicine, procedures  .

## 2024-11-08 ENCOUNTER — TELEPHONE (OUTPATIENT)
Dept: INTERNAL MEDICINE CLINIC | Facility: CLINIC | Age: 69
End: 2024-11-08

## 2024-11-08 NOTE — TELEPHONE ENCOUNTER
Folder Color: Red     Name of Form: [facility]     Form to be filled out by: Alex     Form to be Faxed: [phone]    Patient made aware of 10 day business policy.

## 2024-11-20 ENCOUNTER — TELEPHONE (OUTPATIENT)
Dept: INTERNAL MEDICINE CLINIC | Facility: CLINIC | Age: 69
End: 2024-11-20

## 2024-11-20 NOTE — TELEPHONE ENCOUNTER
Spoke with patient on the phone. Introduced self and role. Patient stated she wants to make an appointment with her physician. Patient would not discuss her reasoning for appointment or concern. Patient wants to speak with physician.     Clinical coordinator updated and will follow up with patient.

## 2024-11-20 NOTE — TELEPHONE ENCOUNTER
IRA as you are seeing the patient on 11/22/24    I spoke to patient and advised that she has an appt on 1/29 for her Annual Physical and asked if her concern is something she wanted to address at that time. She stated she wants to be sooner as she is concerned for a clot as her ankles are turning black and she is having a lot of pain. I asked how long this has been going on and she said the pain is ongoing but the color change has been about two weeks. She stated she has a varicose vein in that area and is not sure if that is the cause. She denies any redness, swelling or fevers at this time. She also advised that she has been having some sharp pains in her head that she is concerned about. She denies any blurring vision or numbness or tingling in her arms. I strongly advised her to go to the ER to have this evaluated since she has a concern for a clot and she immediately said she will wait and see her doctor first and if he advises she go then she will go. I offered her an appt tomorrow 11/21/24 but she refused stating that she doesn't have anyone to watch her brother but she can come on Friday 11/22/24. Appointment was made and I did make her aware that if she has any changes in the area then she is to report to the ED immediately. Patient verbalized understanding.

## 2024-11-22 ENCOUNTER — OFFICE VISIT (OUTPATIENT)
Dept: INTERNAL MEDICINE CLINIC | Facility: CLINIC | Age: 69
End: 2024-11-22

## 2024-11-22 VITALS
WEIGHT: 168 LBS | HEART RATE: 118 BPM | HEIGHT: 64 IN | BODY MASS INDEX: 28.68 KG/M2 | DIASTOLIC BLOOD PRESSURE: 72 MMHG | SYSTOLIC BLOOD PRESSURE: 126 MMHG | TEMPERATURE: 97.8 F

## 2024-11-22 DIAGNOSIS — M79.672 BILATERAL FOOT PAIN: Primary | ICD-10-CM

## 2024-11-22 DIAGNOSIS — M79.671 BILATERAL FOOT PAIN: Primary | ICD-10-CM

## 2024-11-22 NOTE — PATIENT INSTRUCTIONS
I have ordered xrays of both feet, as well as an ultrasound of your right lower extremity. Please have them done as able.  Follow up in 6 weeks.

## 2024-11-22 NOTE — PROGRESS NOTES
[facility]  INTERNAL MEDICINE ACUTE VISIT     PATIENT INFORMATION     Jenifer Smith   69 y.o. female   MRN: [id number]    ASSESSMENT/PLAN     Diagnoses and all orders for this visit:    Bilateral foot pain  -     XR ankle 3+ vw left; Future  -     XR ankle 3+ vw right; Future  -     VAS VENOUS DUPLEX -LOWER LIMB UNILATERAL; Future      Bilateral Foot Pain  -Patient presenting with 2 week history of pain and darkening of the skin of her feet around the ankles.   -First noticed darkening of the skin of the right foot as well as a bit of swelling, but now has pain and swelling of both feet as well as darkening of skin.  -Taking PRN tylenol for pain.  -Does have history of diabetes, last A1C 6.6, only on Metformin.  -On exam, swelling with some pain on ROM noted of feet. Mild darkening of skin noted.    Plan:  -At this point, consider osteoarthritis in the setting of heavy use as a cause of symptoms vs other etiology - PVD vs diabetic foot complications vs fracture vs diabetes.  -Will order Xray ankles bilaterally.  -Will order Vas US - patient very concerned about DVT.  -Continue OTC pain medication - patient not interested in stronger pain meds.  -Offered ortho referral - patient deferred at this time.  -Follow up in 6 weeks.      CHIEF COMPLAINT     Chief Complaint   Patient presents with    Follow-up     Feet turning black- 2 weeks ago     HISTORY OF PRESENT ILLNESS     Patient is a 70 y/o female with PMH including T2DM, JOANNA, HTN, chronic back pain who presents with a 2 week history of pain and darkening of the skin of her feet around the ankles. Patient notes that first noticed darkening of the skin of the right foot as well as a bit of swelling, but now has pain and swelling of both feet. Also notices dark, velvety skin. Patient has been taking tylenol for the pain. Patient notes pain is somewhat worse in the morning, doesn't feel like elevating the legs helps much. Patient  "is constant, left worse than right. Patient is very concerned about a blood clot in her legs. Notes her aunt recently passed away after a blood clot. Patient is busy throughout the day, sometimes working as a caretaker. She is working with physical therapy currently.     REVIEW OF SYSTEMS     Review of Systems   Constitutional:  Negative for chills and fever.   HENT:  Negative for ear pain and sore throat.    Eyes:  Negative for pain and visual disturbance.   Respiratory:  Negative for cough and shortness of breath.    Cardiovascular:  Negative for chest pain and palpitations.   Gastrointestinal:  Negative for abdominal pain, constipation, diarrhea, nausea and vomiting.   Genitourinary:  Negative for dysuria and hematuria.   Musculoskeletal:  Positive for arthralgias. Negative for back pain.        Pain in ankles.   Skin:  Negative for color change and rash.   Neurological:  Negative for seizures and syncope.   Psychiatric/Behavioral:  The patient is not nervous/anxious.    All other systems reviewed and are negative.    OBJECTIVE     Vitals:    11/22/24 1051   BP: 126/72   BP Location: Left arm   Patient Position: Sitting   Cuff Size: Large   Pulse: (!) 118   Temp: 97.8 °F (36.6 °C)   TempSrc: Temporal   Weight: 76.2 kg (168 lb)   Height: 5' 4\" (1.626 m)     Physical Exam  Vitals and nursing note reviewed.   Constitutional:       General: She is not in acute distress.     Appearance: She is well-developed.   HENT:      Head: Normocephalic and atraumatic.      Right Ear: External ear normal.      Left Ear: External ear normal.      Nose: Nose normal.      Mouth/Throat:      Mouth: Mucous membranes are moist.   Eyes:      Conjunctiva/sclera: Conjunctivae normal.   Cardiovascular:      Rate and Rhythm: Normal rate and regular rhythm.      Heart sounds: No murmur heard.  Pulmonary:      Effort: Pulmonary effort is normal. No respiratory distress.      Breath sounds: Normal breath sounds.   Abdominal:      Palpations: " Abdomen is soft.      Tenderness: There is no abdominal tenderness.   Musculoskeletal:         General: No swelling.      Cervical back: Neck supple.      Comments: Patient's feet have some darkening of the skin B/L over ankles. Mild swelling of right foot noted. Some tenderness with ROM of feet. Multiple varicose veins noted.   Skin:     General: Skin is warm and dry.      Capillary Refill: Capillary refill takes less than 2 seconds.   Neurological:      General: No focal deficit present.      Mental Status: She is alert and oriented to person, place, and time.   Psychiatric:         Mood and Affect: Mood normal.       CURRENT MEDICATIONS     Current Outpatient Medications:     Accu-Chek Softclix Lancets lancets, Use 1 (one) time for 1 dose Use as instructed, Disp: 200 each, Rfl: 2    aspirin (Aspirin 81) 81 mg EC tablet, Take 1 tablet (81 mg total) by mouth daily, Disp: 90 tablet, Rfl: 3    atorvastatin (LIPITOR) 20 mg tablet, Take 1 tablet (20 mg total) by mouth daily, Disp: 90 tablet, Rfl: 2    Diclofenac Sodium (VOLTAREN) 1 %, APPLY 2 GRAMS TOPICALLY THREE TIMES A DAY, Disp: 100 g, Rfl: 2    Elastic Bandages & Supports (GNP Diabetic Socks Unisex XL) MISC, Use 1 each if needed (1 pair of socks for diabetic neuropathy), Disp: 2 each, Rfl: 0    ferrous sulfate 324 (65 Fe) mg, Take 1 tablet (324 mg total) by mouth every other day, Disp: 90 tablet, Rfl: 1    gabapentin (NEURONTIN) 300 mg capsule, Take 3 capsules (900 mg total) by mouth daily at bedtime, Disp: 270 capsule, Rfl: 1    gabapentin (NEURONTIN) 300 mg capsule, Take 2 capsules (600 mg total) by mouth 2 (two) times a day, Disp: 360 capsule, Rfl: 1    glucose blood (Accu-Chek Guide) test strip, Use 1 each in the morning Use as instructed, Disp: 200 each, Rfl: 2    glucose blood test strip, One Touch Verio brand. Use 1 each in the morning., Disp: 100 strip, Rfl: 3    Lancets Misc. (Accu-Chek Softclix Lancet Dev) KIT, Use in the morning, Disp: 1 kit, Rfl: 0     "lisinopril-hydrochlorothiazide (PRINZIDE,ZESTORETIC) 20-12.5 MG per tablet, Take 1 tablet by mouth daily, Disp: 90 tablet, Rfl: 0    Menthol, Topical Analgesic, (Icy Hot Advanced Relief) 7.5 % PTCH, Apply 1 patch topically daily as needed (shoulder pain), Disp: 30 patch, Rfl: 0    metFORMIN (GLUCOPHAGE) 1000 MG tablet, Take 1 tablet (1,000 mg total) by mouth 2 (two) times a day with meals, Disp: 180 tablet, Rfl: 1    tiZANidine (ZANAFLEX) 4 mg tablet, Take 1 tablet (4 mg total) by mouth daily at bedtime as needed for muscle spasms, Disp: 90 tablet, Rfl: 1    vitamin B-12 (CYANOCOBALAMIN) 500 MCG TABS, Take 1 tablet (500 mcg total) by mouth daily, Disp: 30 tablet, Rfl: 0    PAST MEDICAL & SURGICAL HISTORY     Past Medical History:   Diagnosis Date    Arthritis     Bump     bumped head yesterday at work \"saw stars\" no LOC, cat scan done was normal    Circulation problem     Diabetes mellitus (HCC)     blood sugar 125 on admission    Encounter for screening colonoscopy     1 st one    Headache     Hyperlipidemia     Hypertension     Positive fecal occult blood test 08/30/2018    Added automatically from request for surgery [id number]    Post concussion syndrome 09/14/2018    Syncope      Past Surgical History:   Procedure Laterality Date    BREAST BIOPSY Right 02/18/2022    COLONOSCOPY N/A 9/26/2018    Procedure: COLONOSCOPY;  Surgeon: Joie Morejon MD;  Location: [facility] GI [facility];  Service: Gastroenterology    HYSTERECTOMY  2017    INCISIONAL BREAST BIOPSY      last assessed 17Aug2017    OH COLONOSCOPY FLX DX W/COLLJ SPEC WHEN PFRMD N/A 7/5/2018    Procedure: COLONOSCOPY;  Surgeon: Joie Morejon MD;  Location:  GI LAB;  Service: Gastroenterology    US GUIDED BREAST BIOPSY RIGHT COMPLETE Right 2/18/2022     SOCIAL & FAMILY HISTORY     Social History     Socioeconomic History    Marital status:      Spouse name: Not on file    Number of children: 6    Years of education: Not on file    Highest education level: Not " on file   Occupational History    Occupation: [employer]   Tobacco Use    Smoking status: Every Day     Current packs/day: 0.50     Average packs/day: 0.5 packs/day for 20.0 years (10.0 ttl pk-yrs)     Types: Cigarettes    Smokeless tobacco: Never    Tobacco comments:     smokes less than 1pk/day   Vaping Use    Vaping status: Never Used   Substance and Sexual Activity    Alcohol use: Never    Drug use: Never    Sexual activity: Not Currently   Other Topics Concern    Not on file   Social History Narrative    Daily caffeine consumption, 6-8 servings a day     Social Drivers of Health     Financial Resource Strain: Low Risk  (1/15/2024)    Overall Financial Resource Strain (CARDIA)     Difficulty of Paying Living Expenses: Not hard at all   Food Insecurity: No Food Insecurity (1/15/2024)    Nursing - Inadequate Food Risk Classification     Worried About Running Out of Food in the Last Year: Never true     Ran Out of Food in the Last Year: Never true     Ran Out of Food in the Last Year: Not on file   Transportation Needs: No Transportation Needs (1/15/2024)    PRAPARE - Transportation     Lack of Transportation (Medical): No     Lack of Transportation (Non-Medical): No   Physical Activity: Inactive (2/10/2021)    Exercise Vital Sign     Days of Exercise per Week: 0 days     Minutes of Exercise per Session: 0 min   Stress: No Stress Concern Present (2/10/2021)    [employer] [place] [employer] - Occupational Stress Questionnaire     Feeling of Stress : Not at all   Social Connections: Socially Isolated (2/10/2021)    Social Connection and Isolation Panel [NHANES]     Frequency of Communication with Friends and Family: Once a week     Frequency of Social Gatherings with Friends and Family: Once a week     Attends Restoration Services: Never     Active Member of Clubs or Organizations: No     Attends Club or Organization Meetings: Never     Marital Status:    Intimate Partner Violence: Not At Risk  (2/10/2021)    Humiliation, Afraid, Rape, and Kick questionnaire     Fear of Current or Ex-Partner: No     Emotionally Abused: No     Physically Abused: No     Sexually Abused: No   Housing Stability: High Risk (3/14/2024)    Housing Stability Vital Sign     Unable to Pay for Housing in the Last Year: Yes     Number of Times Moved in the Last Year: 1     Homeless in the Last Year: Yes     Social History     Substance and Sexual Activity   Alcohol Use Never       Social History     Substance and Sexual Activity   Drug Use Never     Social History     Tobacco Use   Smoking Status Every Day    Current packs/day: 0.50    Average packs/day: 0.5 packs/day for 20.0 years (10.0 ttl pk-yrs)    Types: Cigarettes   Smokeless Tobacco Never   Tobacco Comments    smokes less than 1pk/day     Family History   Problem Relation Age of Onset    Breast cancer Cousin 50    Alcohol abuse Mother     Alcohol abuse Father     Breast cancer Daughter 38    No Known Problems Sister     No Known Problems Maternal Grandmother     No Known Problems Maternal Grandfather     No Known Problems Paternal Grandmother     No Known Problems Paternal Grandfather     No Known Problems Son     No Known Problems Son     No Known Problems Daughter     No Known Problems Daughter     No Known Problems Daughter     Breast cancer Sister 66    No Known Problems Maternal Aunt     No Known Problems Maternal Aunt     No Known Problems Maternal Aunt     No Known Problems Maternal Aunt     No Known Problems Maternal Aunt     No Known Problems Maternal Aunt     No Known Problems Maternal Aunt     No Known Problems Maternal Aunt     No Known Problems Maternal Aunt     No Known Problems Maternal Aunt     No Known Problems Maternal Aunt     No Known Problems Maternal Aunt     No Known Problems Paternal Aunt     No Known Problems Paternal Aunt     Dementia Paternal Aunt     No Known Problems Paternal Aunt     No Known Problems Paternal Aunt     No Known Problems Paternal  Aunt     No Known Problems Paternal Aunt     No Known Problems Paternal Aunt     Colon cancer Neg Hx     Endometrial cancer Neg Hx     Ovarian cancer Neg Hx              ==  Alex Yang MD PGY3  [facility]    [address], [address]  [place] [address]  Office: [phone]  Fax: [phone]

## 2024-12-02 ENCOUNTER — OFFICE VISIT (OUTPATIENT)
Dept: INTERNAL MEDICINE CLINIC | Facility: CLINIC | Age: 69
End: 2024-12-02

## 2024-12-02 VITALS
SYSTOLIC BLOOD PRESSURE: 125 MMHG | TEMPERATURE: 97.6 F | HEIGHT: 64 IN | DIASTOLIC BLOOD PRESSURE: 67 MMHG | WEIGHT: 170 LBS | BODY MASS INDEX: 29.02 KG/M2 | HEART RATE: 102 BPM

## 2024-12-02 DIAGNOSIS — I10 BENIGN ESSENTIAL HYPERTENSION: ICD-10-CM

## 2024-12-02 DIAGNOSIS — F43.9 STRESS AT HOME: ICD-10-CM

## 2024-12-02 DIAGNOSIS — R39.9 URINARY SYMPTOM OR SIGN: Primary | ICD-10-CM

## 2024-12-02 DIAGNOSIS — R45.7 CAREGIVER STRESS SYNDROME: ICD-10-CM

## 2024-12-02 PROBLEM — F43.89 CAREGIVER STRESS SYNDROME: Status: ACTIVE | Noted: 2024-12-02

## 2024-12-02 LAB
SL AMB  POCT GLUCOSE, UA: NEGATIVE
SL AMB LEUKOCYTE ESTERASE,UA: NEGATIVE
SL AMB POCT BILIRUBIN,UA: NEGATIVE
SL AMB POCT BLOOD,UA: NEGATIVE
SL AMB POCT CLARITY,UA: CLEAR
SL AMB POCT COLOR,UA: YELLOW
SL AMB POCT KETONES,UA: NORMAL
SL AMB POCT NITRITE,UA: NEGATIVE
SL AMB POCT PH,UA: 5.5
SL AMB POCT SPECIFIC GRAVITY,UA: 1.02
SL AMB POCT URINE PROTEIN: NEGATIVE
SL AMB POCT UROBILINOGEN: NEGATIVE

## 2024-12-02 PROCEDURE — G2211 COMPLEX E/M VISIT ADD ON: HCPCS | Performed by: FAMILY MEDICINE

## 2024-12-02 PROCEDURE — 99214 OFFICE O/P EST MOD 30 MIN: CPT | Performed by: FAMILY MEDICINE

## 2024-12-02 PROCEDURE — 81003 URINALYSIS AUTO W/O SCOPE: CPT | Performed by: FAMILY MEDICINE

## 2024-12-02 RX ORDER — CEPHALEXIN 500 MG/1
500 CAPSULE ORAL 2 TIMES DAILY
Qty: 10 CAPSULE | Refills: 0 | Status: SHIPPED | OUTPATIENT
Start: 2024-12-02 | End: 2024-12-07

## 2024-12-02 NOTE — PROGRESS NOTES
Name: Jenifer Smith      : 1955      MRN: [id number]  Encounter Provider: Mariella Feliciano MD  Encounter Date: 2024   Encounter department: [facility]    Assessment & Plan  Benign essential hypertension  BP well controlled continue current medications        Urinary symptom or sign  Patient has had 2 weeks of urinary frequency and dysuria , denies prior UTI , - f - c - n - v - d , - blood in urine - vaginal discharge , - incontinence of urine pain when urinating only , she is sole caregiver for her Brother CVA , Parkinson's , dementia she is admittedly overwhelmed and many days doesn't eat regularly or drink enough fluids , UA dip basically negative , will check UA and urine culture ,she is urged to drink more water , cranberry juice and take the time to eat meals even is small ones. 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 The BMI is above normal. Nutrition recommendations include decreasing portion sizes, encouraging healthy choices of fruits and vegetables, decreasing fast food intake, consuming healthier snacks, limiting drinks that contain sugar and reducing intake of saturated and trans fat. Exercise  "recommendations include exercising 3-5 times per week. Rationale for BMI follow-up plan is due to patient being overweight or obese.         History of Present Illness     HPI Pt here for ill visit , having burning with and frequent urination sx 2 weeks , she gets up to urinate usual once per night now 3-4 x - F - odor - blood - vaginal d/c she does have loose stools x 4 days , no new meds no new med  , fbs this am 89 ,  hgba1c 6.6 10/8/24 - hx UTI , she has no hx of urinary incontinence before this , now having trouble getting there in time , + + stress Brother hx polio , Stroke then Parkinson's she has been caring for him F.T x > 2 yrs it has   been so difficult he has been getting combative she is finally getting help ( 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   HENT:  Negative for ear pain and sore throat.    Eyes:  Negative for pain and visual disturbance.   Respiratory:  Negative for cough and shortness of breath.    Cardiovascular:  Negative for chest pain and palpitations.   Gastrointestinal:  Negative for abdominal pain, diarrhea, nausea and vomiting.   Genitourinary:  Positive for dysuria and frequency. Negative for difficulty urinating, hematuria, vaginal bleeding and vaginal discharge.   Musculoskeletal:  Negative for arthralgias and back pain.   Skin:  Negative for color change and rash.   Neurological:  Negative for seizures and syncope.   Psychiatric/Behavioral:          + caregiver stress    All other systems reviewed and are negative.    Past Medical History:   Diagnosis Date    Arthritis     Bump     bumped head yesterday at work \"saw stars\" no LOC, cat scan done was normal    Circulation problem     Diabetes mellitus (HCC)     blood sugar 125 on admission    Encounter for screening colonoscopy     1 st one    Headache     Hyperlipidemia     Hypertension     Positive fecal occult blood test 08/30/2018 "    Added automatically from request for surgery [id number]    Post concussion syndrome 09/14/2018    Syncope      Past Surgical History:   Procedure Laterality Date    BREAST BIOPSY Right 02/18/2022    COLONOSCOPY N/A 9/26/2018    Procedure: COLONOSCOPY;  Surgeon: Joie Morejon MD;  Location: [facility] GI LAB;  Service: Gastroenterology    HYSTERECTOMY  2017    INCISIONAL BREAST BIOPSY      last assessed [id number]    [place] COLONOSCOPY FLX DX W/COLLJ SPEC WHEN PFRMD N/A 7/5/2018    Procedure: COLONOSCOPY;  Surgeon: Joie Morejon MD;  Location:  GI LAB;  Service: Gastroenterology    US GUIDED BREAST BIOPSY RIGHT COMPLETE Right 2/18/2022     Family History   Problem Relation Age of Onset    Breast cancer Cousin 50    Alcohol abuse Mother     Alcohol abuse Father     Breast cancer Daughter 38    No Known Problems Sister     No Known Problems Maternal Grandmother     No Known Problems Maternal Grandfather     No Known Problems Paternal Grandmother     No Known Problems Paternal Grandfather     No Known Problems Son     No Known Problems Son     No Known Problems Daughter     No Known Problems Daughter     No Known Problems Daughter     Breast cancer Sister 66    No Known Problems Maternal Aunt     No Known Problems Maternal Aunt     No Known Problems Maternal Aunt     No Known Problems Maternal Aunt     No Known Problems Maternal Aunt     No Known Problems Maternal Aunt     No Known Problems Maternal Aunt     No Known Problems Maternal Aunt     No Known Problems Maternal Aunt     No Known Problems Maternal Aunt     No Known Problems Maternal Aunt     No Known Problems Maternal Aunt     No Known Problems Paternal Aunt     No Known Problems Paternal Aunt     Dementia Paternal Aunt     No Known Problems Paternal Aunt     No Known Problems Paternal Aunt     No Known Problems Paternal Aunt     No Known Problems Paternal Aunt     No Known Problems Paternal Aunt     Colon cancer Neg Hx     Endometrial cancer Neg Hx     Ovarian  cancer Neg Hx      Social History     Tobacco Use    Smoking status: Every Day     Current packs/day: 0.50     Average packs/day: 0.5 packs/day for 20.0 years (10.0 ttl pk-yrs)     Types: Cigarettes    Smokeless tobacco: Never    Tobacco comments:     smokes less than 1pk/day   Vaping Use    Vaping status: Never Used   Substance and Sexual Activity    Alcohol use: Never    Drug use: Never    Sexual activity: Not Currently     Current Outpatient Medications on File Prior to Visit   Medication Sig    Accu-Chek Softclix Lancets lancets Use 1 (one) time for 1 dose Use as instructed    aspirin (Aspirin 81) 81 mg EC tablet Take 1 tablet (81 mg total) by mouth daily    atorvastatin (LIPITOR) 20 mg tablet Take 1 tablet (20 mg total) by mouth daily    Diclofenac Sodium (VOLTAREN) 1 % APPLY 2 GRAMS TOPICALLY THREE TIMES A DAY    Elastic Bandages & Supports (GNP Diabetic Socks Unisex XL) MISC Use 1 each if needed (1 pair of socks for diabetic neuropathy)    ferrous sulfate 324 (65 Fe) mg Take 1 tablet (324 mg total) by mouth every other day    gabapentin (NEURONTIN) 300 mg capsule Take 3 capsules (900 mg total) by mouth daily at bedtime    gabapentin (NEURONTIN) 300 mg capsule Take 2 capsules (600 mg total) by mouth 2 (two) times a day    glucose blood (Accu-Chek Guide) test strip Use 1 each in the morning Use as instructed    glucose blood test strip One Touch Verio brand. Use 1 each in the morning.    Lancets Misc. (Accu-Chek Softclix Lancet Dev) KIT Use in the morning    lisinopril-hydrochlorothiazide (PRINZIDE,ZESTORETIC) 20-12.5 MG per tablet Take 1 tablet by mouth daily    Menthol, Topical Analgesic, (Icy Hot Advanced Relief) 7.5 % PTCH Apply 1 patch topically daily as needed (shoulder pain)    metFORMIN (GLUCOPHAGE) 1000 MG tablet Take 1 tablet (1,000 mg total) by mouth 2 (two) times a day with meals    tiZANidine (ZANAFLEX) 4 mg tablet Take 1 tablet (4 mg total) by mouth daily at bedtime as needed for muscle spasms     "vitamin B-12 (CYANOCOBALAMIN) 500 MCG TABS Take 1 tablet (500 mcg total) by mouth daily     No Known Allergies  Immunization History   Administered Date(s) Administered    COVID-19 PFIZER VACCINE 0.3 ML IM 04/18/2021, 05/16/2021     Objective   /67 (BP Location: Right arm, Patient Position: Sitting, Cuff Size: Large)   Pulse 102   Temp 97.6 °F (36.4 °C) (Temporal)   Ht 5' 4\" (1.626 m)   Wt 77.1 kg (170 lb)   BMI 29.18 kg/m²     Physical Exam  Vitals and nursing note reviewed.   Constitutional:       General: She is not in acute distress.     Appearance: She is well-developed.      Comments: Skin with good color turgor , well hydrated ,no distress noted     HENT:      Head: Normocephalic and atraumatic.      Right Ear: No decreased hearing noted. No middle ear effusion. There is no impacted cerumen.      Left Ear: No decreased hearing noted.  No middle ear effusion. There is no impacted cerumen.      Mouth/Throat:      Pharynx: Oropharynx is clear.   Eyes:      Conjunctiva/sclera: Conjunctivae normal.   Neck:      Thyroid: No thyromegaly.   Cardiovascular:      Rate and Rhythm: Normal rate and regular rhythm.      Heart sounds: Normal heart sounds. No murmur heard.  Pulmonary:      Effort: Pulmonary effort is normal. No respiratory distress.      Breath sounds: Normal breath sounds.   Abdominal:      Palpations: Abdomen is soft.      Tenderness: There is no abdominal tenderness. There is no right CVA tenderness, left CVA tenderness, guarding or rebound.   Musculoskeletal:         General: No swelling.      Cervical back: Neck supple.   Lymphadenopathy:      Cervical:      Right cervical: No superficial or posterior cervical adenopathy.     Left cervical: No superficial or posterior cervical adenopathy.   Skin:     General: Skin is warm and dry.      Capillary Refill: Capillary refill takes less than 2 seconds.   Neurological:      Mental Status: She is alert.      Comments: Non focal exam   Psychiatric:    "      Attention and Perception: Attention normal.         Mood and Affect: Mood normal.         Speech: Speech normal.         Behavior: Behavior normal.         Thought Content: Thought content normal.

## 2024-12-02 NOTE — ASSESSMENT & PLAN NOTE
Patient has had 2 weeks of urinary frequency and dysuria , denies prior UTI , - f - c - n - v - d , - blood in urine - vaginal discharge , - incontinence of urine pain when urinating only , she is sole caregiver for her Brother DAVID , Parkinson's , dementia she is admittedly overwhelmed and many days doesn't eat regularly or drink enough fluids , UA dip basically negative , will check UA and urine culture ,she is urged to drink more water , cranberry juice and take the time to eat meals even is small ones. Start keflex 500 mg po bid x 5 days , if sx persist will need further work up , US bladder   Orders:    POCT urine dip auto non-scope    cephalexin (KEFLEX) 500 mg capsule; Take 1 capsule (500 mg total) by mouth 2 (two) times a day for 5 days    Urine culture; Future    Urinalysis with microscopic; Future

## 2024-12-03 DIAGNOSIS — I10 BENIGN ESSENTIAL HYPERTENSION: ICD-10-CM

## 2024-12-05 RX ORDER — LISINOPRIL AND HYDROCHLOROTHIAZIDE 12.5; 2 MG/1; MG/1
1 TABLET ORAL DAILY
Qty: 90 TABLET | Refills: 1 | Status: SHIPPED | OUTPATIENT
Start: 2024-12-05

## 2024-12-11 ENCOUNTER — HOSPITAL ENCOUNTER (OUTPATIENT)
Dept: NON INVASIVE DIAGNOSTICS | Facility: HOSPITAL | Age: 69
Discharge: HOME | End: 2024-12-11
Payer: COMMERCIAL

## 2024-12-11 ENCOUNTER — TELEPHONE (OUTPATIENT)
Dept: OTHER | Facility: HOSPITAL | Age: 69
End: 2024-12-11

## 2024-12-11 ENCOUNTER — OFFICE VISIT (OUTPATIENT)
Dept: INTERNAL MEDICINE CLINIC | Facility: CLINIC | Age: 69
End: 2024-12-11

## 2024-12-11 VITALS
SYSTOLIC BLOOD PRESSURE: 104 MMHG | TEMPERATURE: 97.9 F | BODY MASS INDEX: 29.35 KG/M2 | WEIGHT: 171 LBS | HEART RATE: 114 BPM | DIASTOLIC BLOOD PRESSURE: 65 MMHG

## 2024-12-11 DIAGNOSIS — M79.672 BILATERAL FOOT PAIN: ICD-10-CM

## 2024-12-11 DIAGNOSIS — F17.200 SMOKING: ICD-10-CM

## 2024-12-11 DIAGNOSIS — R45.7 CAREGIVER STRESS SYNDROME: ICD-10-CM

## 2024-12-11 DIAGNOSIS — I82.401 ACUTE DEEP VEIN THROMBOSIS (DVT) OF RIGHT LOWER EXTREMITY, UNSPECIFIED VEIN (HCC): Primary | ICD-10-CM

## 2024-12-11 DIAGNOSIS — M79.671 BILATERAL FOOT PAIN: ICD-10-CM

## 2024-12-11 PROCEDURE — G2211 COMPLEX E/M VISIT ADD ON: HCPCS | Performed by: INTERNAL MEDICINE

## 2024-12-11 PROCEDURE — 93971 EXTREMITY STUDY: CPT

## 2024-12-11 PROCEDURE — 99214 OFFICE O/P EST MOD 30 MIN: CPT | Performed by: INTERNAL MEDICINE

## 2024-12-11 PROCEDURE — 93971 EXTREMITY STUDY: CPT | Performed by: SURGERY

## 2024-12-11 NOTE — PATIENT INSTRUCTIONS
Please take eliquis 10 mg (two 5 mg pills) twice a day for seven days.   Then take eliquis 5 mg twice a day until you return to office.     Please see Dr. Hamm and Dr. Landon Nash on Wednesday, January 29.

## 2024-12-11 NOTE — TELEPHONE ENCOUNTER
Notified by technician that patient had a subacute vs chronic DVT. Recommended she present to ED for further evaluation. Patient would prefer to follow up in clinic - notified her to call clinic and notified clinic to schedule with her.

## 2024-12-12 ENCOUNTER — TELEPHONE (OUTPATIENT)
Dept: INTERNAL MEDICINE CLINIC | Facility: CLINIC | Age: 69
End: 2024-12-12

## 2024-12-12 NOTE — TELEPHONE ENCOUNTER
Called patient back and reviewed what was needed. Per patient, Dr. Singh told her to take 2 tablets of eliquis 5mg in morning and 2 tablets of eliquis 5mg in PM for 7 days, then 1 tablet in AM and 1 tablet in Pm thereafter. I reviewed office notes and verbally spoke to Dr. Singh regarding this and she is correct. I verbally spoke to patient and advised her of note and she understood and had no further questions.

## 2024-12-12 NOTE — TELEPHONE ENCOUNTER
Patient called and is confused about how to take her medication. She said something about taking twice but she needs clarification    Please call patient

## 2024-12-12 NOTE — PROGRESS NOTES
Name: Jenifer Smith      : 1955      MRN: [id number]  Encounter Provider: Odalis Nash DO  Encounter Date: 2024   Encounter department: [facility] [place]  :  Assessment & Plan  Acute deep vein thrombosis (DVT) of right lower extremity, unspecified vein (HCC)  Patient was seen in the office on 24. Patient was concerned that she had bloodclot in right leg where symptoms began two weeks prior to visit. Patient reports discolorations of skin, tenderness, and unilateral swelling. VAS venous duplex was ordered. Patient completed duplex on 24. She was found to have evidence of subacute vs. Chronic thrombosis in short segment of one of the paired posterior tibial veins. Patient remains symptomatic with pain the right LE. Unknown at this point if provoked or unprovoked DVT. Patient reports no travel on plans or in long car rides. Patient reports going to laundromat and sitting in car for up to 6 hours. She is unsure if she sat for extended period of time prior to symptoms of RLE. No known family history of hypercoagulable disorders, however she reports her aunt passed away from an unspecified blood clot.   Patient is leaving for [place] next week and will not be back until the end of January. Patient is unable to delay her travel as she is bringing her brother to a facility.   Patient has long standing history of smoking, however did not meet guidelines for lung cancer screening.   Recent mammogram was normal. Prior CT C/A/P 10/2023: impression no suspicious findings identified.  Patient does report significant weight loss in the last 2 years due to stress from being a caregiver.  According to chart in the past year patient's weight has been stable today is 171.    Plan:   Start eliquis 10 mg BID for seven days, followed by eliquis 5 mg BID for minimum of three months  - Patient will require further workup for possible causes of DVT including  malignancy  - May consider repeat CT C/A/P  - Patient scheduled for follow up visit on January 29 when she returns from her travels    Orders:    apixaban (ELIQUIS) 5 mg; Take 1 tablet (5 mg total) by mouth 2 (two) times a day Please fill this script. Thank you    Caregiver stress syndrome  Patient is significant stress and lack of time for personal care due to being a caregiver for her brother.  Her brother is post CVA with Parkinson's and dementia.  Patient will be bringing her brother to the [place] to be closer with more family as well as getting him into a facility.  Patient will be leaving next week and will not return into the last week of January.  Patient is scheduled to follow-up with us in the last week.  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       Smoking  Patient smokes 8 cigarettes/day.  Patient was informed that we would like to consider repeating CT C/A/P in the future to rule out malignancy.  Patient was encouraged to stop smoking.  Patient reports that she has been trying to quit smoking but she is under a lot of stress.  Patient will reconsider assistance with cessation of smoking when she returns from her visit to the [place].              History of Present Illness          Luz Marina Smith is a 69-year-old female With a past medical history of type 2 diabetes mellitus, JOANNA, hypertension, chronic back pain who presented to the office after receiving a phone call that she had a subacute DVT of the right leg.  Patient did not want to go to the hospital for management.  Patient presented to the office on 11/22/2024 for concern of discoloration around her ankles.  Patient was found to have darkening of the skin of the right foot as well as some swelling.  Patient was concerned that she had a blood clot.  Patient notes that her aunt passed away from a blood clot and she was very concerned.  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  Patient is currently under a lot of stress as she is bringing her brother to the [place] next week in order to bring him closer to family and get him into a facility for care.  Patient was informed that she does have a blood clot of the right lower extremity and that we will need to do further workup to discuss if blood clot was provoked or unprovoked.  Due to patient being unable to change her travels, close follow-up with the patient will be ensured.  Patient has a scheduled appointment on January 29 with Dr. Hamm and Dr. Lnadon Nash to discuss further management and workup for possible hypercoagulable states.           Patient reports that she has not had any travel.  Patient reports that her symptoms started 2 weeks prior to her visit on 11/22.  Patient states that she does a lot of work around her house and taking care of her brother.  Patient does report sitting extended periods of time at the laundromat.  Patient states that she sits up to 6 hours in her car.  Patient is on sure if she did laundry around the time of initial symptoms.  Patient continues to have reported pain in her right lower calf.  Patient was extensively educated on the importance of taking Eliquis.  Patient was educated on how to take a loading dose of Eliquis.  Patient was sent to the pharmacy immediately after appointment to  Eliquis.  Spoke with pharmacy to ensure that patient was able to afford medication.  Patient reports that she has had some weight loss over the last 2 years that she is concerned about.  Patient's weight has been stable over the last year.  Patient denies any symptoms of fever, chills, night sweats, visual disturbances, shortness of breath, headache, chest pain, palpitations, abdominal pain, diarrhea, constipation, blood in the urine blood in the stool.  Patient is up-to-date on her mammogram  screening.  In the past patient had a CT chest abdomen pelvis with contrast due to concern of significant weight loss.  Patient had no findings of malignancy at that time.  Patient did not qualify for a low-dose CT scan of the chest, patient does have a extensive smoking history but does not meet guideline requirements for screening.  Patient was informed and educated that we were doing an extensive physical exam today to look for any signs of possible malignancy.  Patient expressed understanding and is aware that we would like close follow-up with her.      Review of Systems   Constitutional:  Negative for chills, fatigue, fever and unexpected weight change.   Respiratory:  Negative for cough, shortness of breath and wheezing.    Cardiovascular:  Negative for chest pain and palpitations.   Gastrointestinal:  Negative for abdominal distention, anal bleeding, blood in stool, constipation, diarrhea, nausea and vomiting.   Genitourinary:  Negative for dysuria, hematuria and vaginal bleeding.   Musculoskeletal:         Patient reports pain in the right lower extremity medial and posterior lower leg.   Hematological:  Negative for adenopathy.       Objective   /65 (BP Location: Right arm, Patient Position: Sitting, Cuff Size: Large)   Pulse (!) 114   Temp 97.9 °F (36.6 °C) (Temporal)   Wt 77.6 kg (171 lb)   BMI 29.35 kg/m²      Physical Exam  Vitals reviewed. Exam conducted with a chaperone present.   Constitutional:       General: She is not in acute distress.     Appearance: Normal appearance. She is not ill-appearing or diaphoretic.   HENT:      Head: Normocephalic and atraumatic.      Comments: Patient has no lymphadenopathy in the head and neck region including the submandibular posterior pre and postauricular, anterior cervical chain, supra and subclavicular nodes.      Mouth/Throat:      Mouth: Mucous membranes are moist.   Cardiovascular:      Rate and Rhythm: Normal rate and regular rhythm.       Pulses: Normal pulses.      Heart sounds: Normal heart sounds.   Pulmonary:      Effort: Pulmonary effort is normal.      Breath sounds: Normal breath sounds.   Chest:      Chest wall: No mass, deformity, swelling or tenderness.   Abdominal:      General: Abdomen is flat. Bowel sounds are normal.      Palpations: Abdomen is soft. There is no mass.      Hernia: No hernia is present.   Lymphadenopathy:      Upper Body:      Right upper body: No supraclavicular, axillary or pectoral adenopathy.      Left upper body: No supraclavicular, axillary or pectoral adenopathy.   Skin:     General: Skin is warm.      Capillary Refill: Capillary refill takes less than 2 seconds.      Comments: Patient has varicose veins bilaterally on both lower extremities.  Patient has warm extremities with capillary refill less than 2 seconds.  Patient has tenderness to palpation of the right calf.  Discoloration of the right ankle is less apparent on this visit.   Neurological:      Mental Status: She is alert and oriented to person, place, and time. Mental status is at baseline.      Motor: No weakness.   Psychiatric:         Mood and Affect: Mood normal.

## 2024-12-12 NOTE — ASSESSMENT & PLAN NOTE
Patient smokes 8 cigarettes/day.  Patient was informed that we would like to consider repeating CT C/A/P in the future to rule out malignancy.  Patient was encouraged to stop smoking.  Patient reports that she has been trying to quit smoking but she is under a lot of stress.  Patient will reconsider assistance with cessation of smoking when she returns from her visit to the [place].

## 2024-12-12 NOTE — ASSESSMENT & PLAN NOTE
Patient is significant stress and lack of time for personal care due to being a caregiver for her brother.  Her brother is post CVA with Parkinson's and dementia.  Patient will be bringing her brother to the [place] to be closer with more family as well as getting him into a facility.  Patient will be leaving next week and will not return into the last week of January.  Patient is scheduled to follow-up with us in the last week.  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

## 2024-12-16 ENCOUNTER — HOSPITAL ENCOUNTER (OUTPATIENT)
Dept: RADIOLOGY | Facility: HOSPITAL | Age: 69
Discharge: HOME | End: 2024-12-16
Payer: COMMERCIAL

## 2024-12-16 DIAGNOSIS — M79.671 BILATERAL FOOT PAIN: ICD-10-CM

## 2024-12-16 DIAGNOSIS — M79.672 BILATERAL FOOT PAIN: ICD-10-CM

## 2024-12-16 PROCEDURE — 73610 X-RAY EXAM OF ANKLE: CPT

## 2024-12-19 ENCOUNTER — TELEPHONE (OUTPATIENT)
Dept: INTERNAL MEDICINE CLINIC | Facility: CLINIC | Age: 69
End: 2024-12-19

## 2024-12-19 NOTE — TELEPHONE ENCOUNTER
Folder Color: Red     Name of Form: [facility]     Form to be filled out by: Alex     Form to be Faxed: [phone]     Patient made aware of 10 day business policy.

## 2025-01-23 DIAGNOSIS — E11.65 TYPE 2 DIABETES MELLITUS WITH HYPERGLYCEMIA, WITHOUT LONG-TERM CURRENT USE OF INSULIN (HCC): ICD-10-CM

## 2025-01-23 NOTE — TELEPHONE ENCOUNTER
Patient went away for a month and she has the wrong needles. She said she needs the needles for the one touch.  She hasn't checked her sugars in a month

## 2025-01-24 RX ORDER — LANCING DEVICE/LANCETS
KIT MISCELLANEOUS DAILY
Qty: 1 KIT | Refills: 0 | Status: SHIPPED | OUTPATIENT
Start: 2025-01-24

## 2025-01-27 DIAGNOSIS — E11.65 TYPE 2 DIABETES MELLITUS WITH HYPERGLYCEMIA, WITHOUT LONG-TERM CURRENT USE OF INSULIN (HCC): ICD-10-CM

## 2025-01-27 RX ORDER — BLOOD-GLUCOSE METER
KIT MISCELLANEOUS
Qty: 1 KIT | Refills: 0 | Status: SHIPPED | OUTPATIENT
Start: 2025-01-27

## 2025-01-27 RX ORDER — LANCETS 33 GAUGE
EACH MISCELLANEOUS
Qty: 300 EACH | Refills: 3 | Status: SHIPPED | OUTPATIENT
Start: 2025-01-27

## 2025-01-27 NOTE — TELEPHONE ENCOUNTER
Patient is calling back stating she is worried because she hasn't been able to check her sugars and she really needs the prescription for the one touch needles/strips.She said it still wasn't sent and she needs them asap.     She does not want the lancet kit, she wants one touch needles and strips.

## 2025-01-27 NOTE — TELEPHONE ENCOUNTER
Please review. Can you send scripts for one touch needles and one touch test strips to her preferred pharmacy.

## 2025-01-29 ENCOUNTER — TELEPHONE (OUTPATIENT)
Dept: INTERNAL MEDICINE CLINIC | Facility: CLINIC | Age: 70
End: 2025-01-29

## 2025-01-29 NOTE — TELEPHONE ENCOUNTER
Patient came into the office for a scheduled appointment with Dr. Johnson for her Annual Physical. She came out to the nurses station asking some questions about her MyChart. She was then asking when the doctor was coming in, I advised her that he was running behind. She asked what doctor and I advised her she was seeing Dr. Johnson. She said she doesn't want to see him and her appt was supposed to be with Dr. Nash and Dr. Hamm. I looked on her appt schedule and do see that she did have an appt with Dr. Nash that was cancelled and rescheduled with Dr. Johnson as he is her PCP. She said it should not have been cancelled. I advised her that I am unsure of why it was cancelled but I could get her in this afternoon with Dr. Nash and Dr. Hamm will be here also. She said she cannot come back due to transportation. I took her back into the room to discuss this with her and she stated that she is frustrated that she has to see different doctors all the time. I advised her this is a residency program and she said she was aware of that its just frustrating. I advised that if she wants to continue to see Dr. Nash that is fine but I made her aware that she is limiting the days since she also only wants to be seen on a Wednesday when Dr. Hamm is here. She understood and said she didn't want to wait until Dr. Nash was here again which is the end of March and is willing to see Dr. Landrum in February. I scheduled her. She was not seen today.

## 2025-02-13 DIAGNOSIS — E11.42 CONTROLLED TYPE 2 DIABETES MELLITUS WITH DIABETIC POLYNEUROPATHY, WITHOUT LONG-TERM CURRENT USE OF INSULIN (HCC): ICD-10-CM

## 2025-02-13 NOTE — TELEPHONE ENCOUNTER
Received call from patient requesting a refill of her Metformin.     Script sent to patient's preferred pharmacy for refill.

## 2025-02-19 ENCOUNTER — APPOINTMENT (OUTPATIENT)
Dept: LAB | Facility: CLINIC | Age: 70
End: 2025-02-19
Payer: COMMERCIAL

## 2025-02-19 ENCOUNTER — OFFICE VISIT (OUTPATIENT)
Dept: INTERNAL MEDICINE CLINIC | Facility: CLINIC | Age: 70
End: 2025-02-19

## 2025-02-19 ENCOUNTER — HOSPITAL ENCOUNTER (OUTPATIENT)
Dept: RADIOLOGY | Facility: HOSPITAL | Age: 70
Discharge: HOME | End: 2025-02-19
Payer: COMMERCIAL

## 2025-02-19 VITALS
TEMPERATURE: 98 F | BODY MASS INDEX: 27.2 KG/M2 | OXYGEN SATURATION: 98 % | DIASTOLIC BLOOD PRESSURE: 60 MMHG | HEART RATE: 103 BPM | WEIGHT: 166 LBS | SYSTOLIC BLOOD PRESSURE: 111 MMHG

## 2025-02-19 DIAGNOSIS — I73.9 PERIPHERAL ARTERY DISEASE (HCC): ICD-10-CM

## 2025-02-19 DIAGNOSIS — R39.9 URINARY SYMPTOM OR SIGN: ICD-10-CM

## 2025-02-19 DIAGNOSIS — I73.9 PERIPHERAL ARTERY DISEASE (HCC): Primary | ICD-10-CM

## 2025-02-19 DIAGNOSIS — R07.9 CHEST PAIN, UNSPECIFIED TYPE: ICD-10-CM

## 2025-02-19 DIAGNOSIS — M54.16 LUMBAR RADICULOPATHY: ICD-10-CM

## 2025-02-19 DIAGNOSIS — E11.42 CONTROLLED TYPE 2 DIABETES MELLITUS WITH DIABETIC POLYNEUROPATHY, WITHOUT LONG-TERM CURRENT USE OF INSULIN (HCC): ICD-10-CM

## 2025-02-19 DIAGNOSIS — I82.401 ACUTE DEEP VEIN THROMBOSIS (DVT) OF RIGHT LOWER EXTREMITY, UNSPECIFIED VEIN (HCC): ICD-10-CM

## 2025-02-19 LAB
ALBUMIN SERPL BCG-MCNC: 4.4 G/DL (ref 3.5–5)
ALP SERPL-CCNC: 67 U/L (ref 34–104)
ALT SERPL W P-5'-P-CCNC: 13 U/L (ref 7–52)
ANION GAP SERPL CALCULATED.3IONS-SCNC: 9 MMOL/L (ref 4–13)
AST SERPL W P-5'-P-CCNC: 12 U/L (ref 13–39)
BACTERIA UR QL AUTO: NORMAL /HPF
BASOPHILS # BLD AUTO: 0.02 THOUSANDS/ΜL (ref 0–0.1)
BASOPHILS NFR BLD AUTO: 0 % (ref 0–1)
BILIRUB SERPL-MCNC: 0.34 MG/DL (ref 0.2–1)
BILIRUB UR QL STRIP: NEGATIVE
BUN SERPL-MCNC: 21 MG/DL (ref 5–25)
CALCIUM SERPL-MCNC: 10.2 MG/DL (ref 8.4–10.2)
CHLORIDE SERPL-SCNC: 105 MMOL/L (ref 96–108)
CLARITY UR: CLEAR
CO2 SERPL-SCNC: 26 MMOL/L (ref 21–32)
COLOR UR: NORMAL
CREAT SERPL-MCNC: 0.75 MG/DL (ref 0.6–1.3)
CRP SERPL QL: 4.7 MG/L
EOSINOPHIL # BLD AUTO: 0.19 THOUSAND/ΜL (ref 0–0.61)
EOSINOPHIL NFR BLD AUTO: 2 % (ref 0–6)
ERYTHROCYTE [DISTWIDTH] IN BLOOD BY AUTOMATED COUNT: 17.4 % (ref 11.6–15.1)
ERYTHROCYTE [SEDIMENTATION RATE] IN BLOOD: 71 MM/HOUR (ref 0–29)
GFR SERPL CREATININE-BSD FRML MDRD: 82 ML/MIN/1.73SQ M
GLUCOSE SERPL-MCNC: 89 MG/DL (ref 65–140)
GLUCOSE UR STRIP-MCNC: NEGATIVE MG/DL
HCT VFR BLD AUTO: 23.9 % (ref 34.8–46.1)
HGB BLD-MCNC: 7.1 G/DL (ref 11.5–15.4)
HGB UR QL STRIP.AUTO: NEGATIVE
IMM GRANULOCYTES # BLD AUTO: 0.02 THOUSAND/UL (ref 0–0.2)
IMM GRANULOCYTES NFR BLD AUTO: 0 % (ref 0–2)
KETONES UR STRIP-MCNC: NEGATIVE MG/DL
LEUKOCYTE ESTERASE UR QL STRIP: NEGATIVE
LYMPHOCYTES # BLD AUTO: 1.91 THOUSANDS/ΜL (ref 0.6–4.47)
LYMPHOCYTES NFR BLD AUTO: 24 % (ref 14–44)
MCH RBC QN AUTO: 23.4 PG (ref 26.8–34.3)
MCHC RBC AUTO-ENTMCNC: 29.7 G/DL (ref 31.4–37.4)
MCV RBC AUTO: 79 FL (ref 82–98)
MONOCYTES # BLD AUTO: 1.03 THOUSAND/ΜL (ref 0.17–1.22)
MONOCYTES NFR BLD AUTO: 13 % (ref 4–12)
NEUTROPHILS # BLD AUTO: 4.74 THOUSANDS/ΜL (ref 1.85–7.62)
NEUTS SEG NFR BLD AUTO: 61 % (ref 43–75)
NITRITE UR QL STRIP: NEGATIVE
NON-SQ EPI CELLS URNS QL MICRO: NORMAL /HPF
NRBC BLD AUTO-RTO: 0 /100 WBCS
PH UR STRIP.AUTO: 5.5 [PH]
PLATELET # BLD AUTO: 417 THOUSANDS/UL (ref 149–390)
PMV BLD AUTO: 9.2 FL (ref 8.9–12.7)
POTASSIUM SERPL-SCNC: 4.4 MMOL/L (ref 3.5–5.3)
PROT SERPL-MCNC: 7.2 G/DL (ref 6.4–8.4)
PROT UR STRIP-MCNC: NEGATIVE MG/DL
RBC # BLD AUTO: 3.04 MILLION/UL (ref 3.81–5.12)
RBC #/AREA URNS AUTO: NORMAL /HPF
SODIUM SERPL-SCNC: 140 MMOL/L (ref 135–147)
SP GR UR STRIP.AUTO: 1.02 (ref 1–1.03)
UROBILINOGEN UR STRIP-ACNC: <2 MG/DL
WBC # BLD AUTO: 7.91 THOUSAND/UL (ref 4.31–10.16)
WBC #/AREA URNS AUTO: NORMAL /HPF

## 2025-02-19 PROCEDURE — 71275 CT ANGIOGRAPHY CHEST: CPT

## 2025-02-19 PROCEDURE — 85652 RBC SED RATE AUTOMATED: CPT

## 2025-02-19 PROCEDURE — 80053 COMPREHEN METABOLIC PANEL: CPT

## 2025-02-19 PROCEDURE — 99215 OFFICE O/P EST HI 40 MIN: CPT | Performed by: INTERNAL MEDICINE

## 2025-02-19 PROCEDURE — 81001 URINALYSIS AUTO W/SCOPE: CPT

## 2025-02-19 PROCEDURE — G2211 COMPLEX E/M VISIT ADD ON: HCPCS | Performed by: INTERNAL MEDICINE

## 2025-02-19 PROCEDURE — 36415 COLL VENOUS BLD VENIPUNCTURE: CPT

## 2025-02-19 PROCEDURE — 86140 C-REACTIVE PROTEIN: CPT

## 2025-02-19 PROCEDURE — 87086 URINE CULTURE/COLONY COUNT: CPT

## 2025-02-19 PROCEDURE — 85025 COMPLETE CBC W/AUTO DIFF WBC: CPT

## 2025-02-19 RX ADMIN — IOHEXOL 75 ML: 350 INJECTION, SOLUTION INTRAVENOUS at 13:33

## 2025-02-19 NOTE — PROGRESS NOTES
Name: Jenifer Smith      : 1955      MRN: [id number]  Encounter Provider: Tanner Beaulieu MD  Encounter Date: 2025   Encounter department: [facility] [place]  :  Assessment & Plan  Acute deep vein thrombosis (DVT) of right lower extremity, unspecified vein (HCC)  Patient w/ recently diagnosed DVT in paired tibial veins of R foot. Patient started on Eliquis then and states that she has not missed a dose. Patient today complaining of pain in b/l legs which is worse with walking, however R>L. Also has pain in R foot and big toe. Patient also has a history of lumbar radiculopathy, diabetic neuropathy, and possible new PAD. Physical exam remarkable for b/l straight leg test positive, increased swelling and varicosities on RLE, diffuse tenderness to palpation of b/l LE R>L, normal strength, coloration, and temperature b/l, decreased DP and PD pulses b/l, decreased pinprick sensation b/l feet R worse than L, absent vibration sensation R foot, normal left foot.    Difficult to determine the cause of patient's persistent leg pain as she has multiple chronic and multiple subacute reasons for this leg pain. The fact that it is significantly worse on one side leads me to believe that this may be more likely due to DVT or PAD than diabetic neuropathy or b/l lumbar radiculopathy. However, there is evidence of unilateral neuropathy in decreased pin-prick sensation, burning character, and absent vibration sensation R foot, which could also be due to PAD    Plan  - c/w Eliquis   - will order repeat LE venous and arterial duplex to assess for presence of DVT or new PAD  - CTA chest PE study ordered due to history of DVT and new chest pain as below  - patient w/ previously elevated ESR and CRP; will re-check due to concern for possible undiagnosed malignancy iso unprovoked DVT  - will follow-up w/ patient in 1 week to go over results of testing and re-assess  Orders:     VAS VENOUS DUPLEX  - LOWER LIMB BILATERAL; Future    CTA chest pe study; Future    C-reactive protein; Future    Sedimentation rate, automated; Future    Peripheral artery disease (HCC)  Patient found to have severe PAD of R LE on screening test. Today coming in w/ R leg and foot pain, as above. Physical exam remarkable for diminished pulses b/l PD and DP.     Plan  - will get repeat imaging   - c/w Eliquis for now  - consider vascular surgery consult - discussed with patient, possible need for stenting and she is very hesitant  Orders:    VAS ARTERIAL DUPLEX- LOWER LIMB BILATERAL; Future    CBC and differential; Future    Comprehensive metabolic panel; Future    Controlled type 2 diabetes mellitus with diabetic polyneuropathy, without long-term current use of insulin ([facility])    Lab Results   Component Value Date    HGBA1C 6.2 01/29/2025     Plan  - diabetic foot exam today  - c/w metformin       Lumbar radiculopathy  Patient w/ b/l LE pain. Worse w/ walking. On scheduled tylenol and gabapentin. Recently had spinal injection w/ pain medicine which helped, however pain has recurred. Straight leg test positive on exam b/l. Leg pain multifactorial as above.    Plan  - c/w scheduled tylenol and gabapentin  - continue to follow w/ pain medicine  - rest of plan as above       Chest pain, unspecified type  Patient complaining of new chest pain which started about a month ago. The pain comes randomly, lasts for 2 minutes, and is not associated with exertion. Denies SOB. She is currently on Eliquis for new DVT. Extensive smoking history. POC EKG w/ new possible Q wave in lead III, not present in previous EKG or contiguous leads.     Plan  - CTA chest PE study ordered  - ECHO ordered to assess for any wall motion abnormality  - rest of plan as above      Orders:    POCT ECG    CTA chest pe study; Future    Echo complete w/ contrast if indicated; Future           History of Present Illness   Patient is a 68 y/o F w/ PMH extensive tobacco use,  lumbar radiculopathy, DM2 c/b neuropathy and retinopathy, recently diagnosed DVT, and possible PAD observed in screening doppler who is coming in for follow-up on DVT. Recently diagnosed w/ blood clot in R paired tibial veins 12/24. Started on Eliquis. Patient states that she has been complaint w/ the Eliquis and has not missed a dose. Still complaining of pain in R leg, foot, and big toe. She describes the pain as burning and is worse at night. Denies bloody nose, easy bruising, blood in stool, melena, SOB, or CP.     Also complaining of pain in back radiating down b/l legs past the knees. She has a history of lumbar radiculopathy and is on scheduled tylenol and gabapentin for the pain. She follows w/ pain medicine and had spinal injection in 10/24 which helped, however pain came back after 3 months. She does not believe that the gabapentin is helping. It is impacting her ability to sleep. The pain is worse w/ walking.    Patient has a history of diabetic neuropathy and states that she has chronic tingling in her b/l hands and feet.     She also recently had a screening vascular US done which showed that she has severe PAD in R lower leg.     Patient is also complaining of new onset chest pain. She states that the pain started about a month ago. The pain comes on randomly and will last about 2 minutes before going away. She denies any association with exertion and it has happened before while she is sitting on the couch. Denies any radiation, diaphoresis, or shortness of breath.     Patient's shoes and socks removed.    Right Foot/Ankle   Right Foot Inspection  Skin Exam: skin normal. Skin not intact, no dry skin, no warmth, no callus, no erythema, no maceration, no abnormal color, no pre-ulcer, no ulcer and no callus.     Toe Exam: ROM and strength within normal limits and tenderness. No swelling    Sensory   Vibration: absent  Monofilament testing: diminished    Vascular  The right DP pulse is 1+. The right PT  pulse is 1+.     Left Foot/Ankle  Left Foot Inspection  Skin Exam: skin normal. Skin not intact, no dry skin, no warmth, no erythema, no maceration, normal color, no pre-ulcer, no ulcer and no callus.     Toe Exam: ROM and strength within normal limits. No swelling.     Sensory   Vibration: diminished  Monofilament testing: diminished    Vascular  The left DP pulse is 1+. The left PT pulse is 1+.     Assign Risk Category  No deformity present  Loss of protective sensation  Weak pulses  Risk: 2       Review of Systems   Constitutional:  Positive for fatigue. Negative for chills and fever.   Respiratory:  Negative for chest tightness and shortness of breath.    Cardiovascular:  Positive for chest pain and leg swelling.   Gastrointestinal:  Negative for blood in stool.   Musculoskeletal:  Positive for back pain and gait problem.   Skin:  Negative for rash.   Neurological:  Negative for light-headedness and headaches.       Objective   /60 (BP Location: Left arm, Patient Position: Sitting, Cuff Size: Standard)   Pulse 103   Temp 98 °F (36.7 °C) (Temporal)   Wt 75.3 kg (166 lb)   SpO2 98%   BMI 27.20 kg/m²      Physical Exam  Constitutional:       Appearance: Normal appearance.   HENT:      Head: Normocephalic and atraumatic.      Mouth/Throat:      Mouth: Mucous membranes are moist.   Eyes:      Extraocular Movements: Extraocular movements intact.      Pupils: Pupils are equal, round, and reactive to light.   Cardiovascular:      Rate and Rhythm: Normal rate and regular rhythm.      Pulses: Pulses are weak.           Dorsalis pedis pulses are 1+ on the right side and 1+ on the left side.        Posterior tibial pulses are 1+ on the right side and 1+ on the left side.      Heart sounds: Normal heart sounds. No murmur heard.     No friction rub. No gallop.   Pulmonary:      Effort: Pulmonary effort is normal.      Breath sounds: Normal breath sounds. No wheezing, rhonchi or rales.   Abdominal:      General:  Abdomen is flat. Bowel sounds are normal. There is no distension.      Palpations: Abdomen is soft. There is no mass.      Tenderness: There is no abdominal tenderness. There is no guarding.   Musculoskeletal:         General: Tenderness present.      Lumbar back: Positive right straight leg raise test and positive left straight leg raise test.      Right lower leg: No edema.      Left lower leg: No edema.      Right foot: Swelling and tenderness present. Abnormal pulse.      Left foot: Tenderness present. Abnormal pulse.      Comments: Increased varicosities on RLL; patient states that they are tender to touch   Feet:      Right foot:      Skin integrity: No ulcer, skin breakdown, erythema, warmth, callus or dry skin.      Left foot:      Skin integrity: No ulcer, skin breakdown, erythema, warmth, callus or dry skin.   Skin:     General: Skin is warm.      Capillary Refill: Capillary refill takes less than 2 seconds.   Neurological:      Mental Status: She is alert and oriented to person, place, and time.      Sensory: Sensory deficit present.      Gait: Gait abnormal.     Tanner Beaulieu PGY-1  Internal Medicine

## 2025-02-19 NOTE — ASSESSMENT & PLAN NOTE
Patient w/ b/l LE pain. Worse w/ walking. On scheduled tylenol and gabapentin. Recently had spinal injection w/ pain medicine which helped, however pain has recurred. Straight leg test positive on exam b/l. Leg pain multifactorial as above.    Plan  - c/w scheduled tylenol and gabapentin  - continue to follow w/ pain medicine  - rest of plan as above

## 2025-02-20 ENCOUNTER — RESULTS FOLLOW-UP (OUTPATIENT)
Dept: INTERNAL MEDICINE CLINIC | Facility: CLINIC | Age: 70
End: 2025-02-20

## 2025-02-20 ENCOUNTER — TELEPHONE (OUTPATIENT)
Dept: INTERNAL MEDICINE CLINIC | Facility: CLINIC | Age: 70
End: 2025-02-20

## 2025-02-20 DIAGNOSIS — D64.9 ANEMIA, UNSPECIFIED TYPE: Primary | ICD-10-CM

## 2025-02-20 LAB — BACTERIA UR CULT: NORMAL

## 2025-02-20 NOTE — TELEPHONE ENCOUNTER
Received call from patient. Introduced self and role. Patient requesting a referral be sent to [facility] for PT. Patient was receiving PT services with them and took a break when she went out of town.     Patient updated referral for PT evaluate and treat will be faxed to .     All questions/concerns answered at this time.     Faxed Face Sheet and PT referral to [facility] at .

## 2025-02-21 ENCOUNTER — RESULTS FOLLOW-UP (OUTPATIENT)
Dept: INTERNAL MEDICINE CLINIC | Facility: CLINIC | Age: 70
End: 2025-02-21

## 2025-02-24 ENCOUNTER — APPOINTMENT (OUTPATIENT)
Dept: LAB | Facility: CLINIC | Age: 70
DRG: 378 | End: 2025-02-24
Payer: COMMERCIAL

## 2025-02-24 DIAGNOSIS — D64.9 ANEMIA, UNSPECIFIED TYPE: ICD-10-CM

## 2025-02-24 LAB
ANISOCYTOSIS BLD QL SMEAR: PRESENT
EOSINOPHIL # BLD AUTO: 0.15 THOUSAND/UL (ref 0–0.61)
EOSINOPHIL NFR BLD MANUAL: 2 % (ref 0–6)
ERYTHROCYTE [DISTWIDTH] IN BLOOD BY AUTOMATED COUNT: 17.5 % (ref 11.6–15.1)
FERRITIN SERPL-MCNC: 4 NG/ML (ref 11–307)
HCT VFR BLD AUTO: 24.2 % (ref 34.8–46.1)
HGB BLD-MCNC: 6.9 G/DL (ref 11.5–15.4)
HYPERCHROMIA BLD QL SMEAR: PRESENT
IRON SATN MFR SERPL: 4 % (ref 15–50)
IRON SERPL-MCNC: 20 UG/DL (ref 50–212)
LDH SERPL-CCNC: 155 U/L (ref 140–271)
LYMPHOCYTES # BLD AUTO: 1.37 THOUSAND/UL (ref 0.6–4.47)
LYMPHOCYTES # BLD AUTO: 15 %
MCH RBC QN AUTO: 22.9 PG (ref 26.8–34.3)
MCHC RBC AUTO-ENTMCNC: 28.5 G/DL (ref 31.4–37.4)
MCV RBC AUTO: 80 FL (ref 82–98)
MONOCYTES # BLD AUTO: 0.99 THOUSAND/UL (ref 0–1.22)
MONOCYTES NFR BLD AUTO: 13 % (ref 4–12)
NEUTS SEG # BLD: 5.1 THOUSAND/UL (ref 1.81–6.82)
NEUTS SEG NFR BLD AUTO: 67 %
NRBC BLD AUTO-RTO: 0 /100 WBCS
PLATELET # BLD AUTO: 416 THOUSANDS/UL (ref 149–390)
PLATELET BLD QL SMEAR: ABNORMAL
PMV BLD AUTO: 10.1 FL (ref 8.9–12.7)
POIKILOCYTOSIS BLD QL SMEAR: PRESENT
POLYCHROMASIA BLD QL SMEAR: PRESENT
RBC # BLD AUTO: 3.01 MILLION/UL (ref 3.81–5.12)
RBC MORPH BLD: PRESENT
RETICS # AUTO: ABNORMAL 10*3/UL (ref 14097–95744)
RETICS # CALC: 2.94 % (ref 0.37–1.87)
TARGETS BLD QL SMEAR: PRESENT
TIBC SERPL-MCNC: 506.8 UG/DL (ref 250–450)
TOTAL CELLS COUNTED SPEC: 100
TRANSFERRIN SERPL-MCNC: 362 MG/DL (ref 203–362)
UIBC SERPL-MCNC: 487 UG/DL (ref 155–355)
VARIANT LYMPHS # BLD AUTO: 3 % (ref 0–0)
WBC # BLD AUTO: 7.61 THOUSAND/UL (ref 4.31–10.16)

## 2025-02-24 PROCEDURE — 83550 IRON BINDING TEST: CPT

## 2025-02-24 PROCEDURE — 83010 ASSAY OF HAPTOGLOBIN QUANT: CPT

## 2025-02-24 PROCEDURE — 83540 ASSAY OF IRON: CPT

## 2025-02-24 PROCEDURE — 84165 PROTEIN E-PHORESIS SERUM: CPT

## 2025-02-24 PROCEDURE — 36415 COLL VENOUS BLD VENIPUNCTURE: CPT

## 2025-02-24 PROCEDURE — 83615 LACTATE (LD) (LDH) ENZYME: CPT

## 2025-02-24 PROCEDURE — 85045 AUTOMATED RETICULOCYTE COUNT: CPT

## 2025-02-24 PROCEDURE — 82728 ASSAY OF FERRITIN: CPT

## 2025-02-24 PROCEDURE — 85007 BL SMEAR W/DIFF WBC COUNT: CPT

## 2025-02-25 ENCOUNTER — DOCUMENTATION (OUTPATIENT)
Dept: INTERNAL MEDICINE CLINIC | Facility: CLINIC | Age: 70
End: 2025-02-25

## 2025-02-25 LAB
ALBUMIN SERPL ELPH-MCNC: 4.12 G/DL (ref 3.2–5.1)
ALBUMIN SERPL ELPH-MCNC: 57.2 % (ref 48–70)
ALPHA1 GLOB SERPL ELPH-MCNC: 0.32 G/DL (ref 0.15–0.47)
ALPHA1 GLOB SERPL ELPH-MCNC: 4.4 % (ref 1.8–7)
ALPHA2 GLOB SERPL ELPH-MCNC: 0.87 G/DL (ref 0.42–1.04)
ALPHA2 GLOB SERPL ELPH-MCNC: 12.1 % (ref 5.9–14.9)
BETA GLOB ABNORMAL SERPL ELPH-MCNC: 0.5 G/DL (ref 0.31–0.57)
BETA1 GLOB SERPL ELPH-MCNC: 7 % (ref 4.7–7.7)
BETA2 GLOB SERPL ELPH-MCNC: 6 % (ref 3.1–7.9)
BETA2+GAMMA GLOB SERPL ELPH-MCNC: 0.43 G/DL (ref 0.2–0.58)
GAMMA GLOB ABNORMAL SERPL ELPH-MCNC: 0.96 G/DL (ref 0.4–1.66)
GAMMA GLOB SERPL ELPH-MCNC: 13.3 % (ref 6.9–22.3)
HAPTOGLOB SERPL-MCNC: 258 MG/DL (ref 37–355)
IGG/ALB SER: 1.34 {RATIO} (ref 1.1–1.8)
PROT PATTERN SERPL ELPH-IMP: NORMAL
PROT SERPL-MCNC: 7.2 G/DL (ref 6.4–8.2)

## 2025-02-25 PROCEDURE — 84165 PROTEIN E-PHORESIS SERUM: CPT | Performed by: PATHOLOGY

## 2025-02-25 NOTE — PROGRESS NOTES
Called patient on 2/24/2025 regarding status.  Follow-up CBC has shown significant decline with recent hemoglobin drop to 7.1 and CBC done on 2/24/2025 with a hemoglobin of 6.9.  MCV is decreased and RDW was increased.  Sed rate is 71 which may be related to the anemia.  CRP is 4.1.  Ferritin 4 and iron sat 4 consistent with iron deficiency anemia.  I have concerns that she may have occult GI bleeding induced by the Eliquis.  She was told to hold her Eliquis.  She states at this time she is tired but denies significant shortness of breath.-She is aware if she has marked worsening of symptoms she needs to go to ER.  Will attempt to change her follow-up clinic appointment from 3/5/2025 to 2/26/2025-she will require EDEL to check for rectal bleeding and may require hospitalization-currently arterial Doppler scheduled for 3/13/2025 and venous Doppler is scheduled for 3/13/2025--recent CTA of the chest for PE negative-- WTS  Assessment/Plan:    No problem-specific Assessment & Plan notes found for this encounter.             Subjective:      Patient ID: Jenifer Smith is a 69 y.o. female.    HPI    The following portions of the patient's history were reviewed and updated as appropriate: allergies, current medications, past family history, past medical history, past social history, past surgical history, and problem list.    Review of Systems      Objective:      There were no vitals taken for this visit.         Physical Exam

## 2025-02-26 ENCOUNTER — OFFICE VISIT (OUTPATIENT)
Dept: INTERNAL MEDICINE CLINIC | Facility: CLINIC | Age: 70
End: 2025-02-26

## 2025-02-26 ENCOUNTER — HOSPITAL ENCOUNTER (INPATIENT)
Facility: HOSPITAL | Age: 70
LOS: 3 days | Discharge: HOME | DRG: 378 | End: 2025-03-01
Attending: EMERGENCY MEDICINE | Admitting: INTERNAL MEDICINE
Payer: COMMERCIAL

## 2025-02-26 ENCOUNTER — APPOINTMENT (EMERGENCY)
Dept: RADIOLOGY | Facility: HOSPITAL | Age: 70
DRG: 378 | End: 2025-02-26
Payer: COMMERCIAL

## 2025-02-26 VITALS
DIASTOLIC BLOOD PRESSURE: 72 MMHG | BODY MASS INDEX: 27.3 KG/M2 | TEMPERATURE: 96.9 F | WEIGHT: 166.6 LBS | HEART RATE: 106 BPM | SYSTOLIC BLOOD PRESSURE: 124 MMHG

## 2025-02-26 DIAGNOSIS — K55.20 ANGIODYSPLASIA OF INTESTINAL TRACT: ICD-10-CM

## 2025-02-26 DIAGNOSIS — I73.9 PAD (PERIPHERAL ARTERY DISEASE) (HCC): ICD-10-CM

## 2025-02-26 DIAGNOSIS — D64.9 SYMPTOMATIC ANEMIA: Primary | ICD-10-CM

## 2025-02-26 DIAGNOSIS — I82.401 ACUTE DEEP VEIN THROMBOSIS (DVT) OF RIGHT LOWER EXTREMITY, UNSPECIFIED VEIN (HCC): ICD-10-CM

## 2025-02-26 DIAGNOSIS — D50.9 IRON DEFICIENCY ANEMIA, UNSPECIFIED IRON DEFICIENCY ANEMIA TYPE: Primary | ICD-10-CM

## 2025-02-26 DIAGNOSIS — R07.89 ATYPICAL CHEST PAIN: ICD-10-CM

## 2025-02-26 DIAGNOSIS — I73.9 PERIPHERAL ARTERY DISEASE (HCC): ICD-10-CM

## 2025-02-26 PROBLEM — I82.409 DVT (DEEP VENOUS THROMBOSIS) (HCC): Status: ACTIVE | Noted: 2025-02-26

## 2025-02-26 LAB
2HR DELTA HS TROPONIN: 0 NG/L
ABO GROUP BLD: NORMAL
ABO GROUP BLD: NORMAL
ALBUMIN SERPL BCG-MCNC: 4.1 G/DL (ref 3.5–5)
ALP SERPL-CCNC: 61 U/L (ref 34–104)
ALT SERPL W P-5'-P-CCNC: 9 U/L (ref 7–52)
ANION GAP SERPL CALCULATED.3IONS-SCNC: 9 MMOL/L (ref 4–13)
APTT PPP: 26 SECONDS (ref 23–34)
AST SERPL W P-5'-P-CCNC: 11 U/L (ref 13–39)
BASOPHILS # BLD AUTO: 0.02 THOUSANDS/ÂΜL (ref 0–0.1)
BASOPHILS NFR BLD AUTO: 0 % (ref 0–1)
BILIRUB SERPL-MCNC: 0.24 MG/DL (ref 0.2–1)
BILIRUB UR QL STRIP: NEGATIVE
BLD GP AB SCN SERPL QL: NEGATIVE
BNP SERPL-MCNC: 21 PG/ML (ref 0–100)
BUN SERPL-MCNC: 22 MG/DL (ref 5–25)
CALCIUM SERPL-MCNC: 9.6 MG/DL (ref 8.4–10.2)
CARDIAC TROPONIN I PNL SERPL HS: 3 NG/L (ref ?–50)
CARDIAC TROPONIN I PNL SERPL HS: 3 NG/L (ref ?–50)
CHLORIDE SERPL-SCNC: 104 MMOL/L (ref 96–108)
CLARITY UR: CLEAR
CO2 SERPL-SCNC: 26 MMOL/L (ref 21–32)
COLOR UR: ABNORMAL
CREAT SERPL-MCNC: 0.82 MG/DL (ref 0.6–1.3)
EOSINOPHIL # BLD AUTO: 0.09 THOUSAND/ÂΜL (ref 0–0.61)
EOSINOPHIL NFR BLD AUTO: 1 % (ref 0–6)
ERYTHROCYTE [DISTWIDTH] IN BLOOD BY AUTOMATED COUNT: 17.7 % (ref 11.6–15.1)
GFR SERPL CREATININE-BSD FRML MDRD: 73 ML/MIN/1.73SQ M
GLUCOSE SERPL-MCNC: 165 MG/DL (ref 65–140)
GLUCOSE UR STRIP-MCNC: ABNORMAL MG/DL
HCT VFR BLD AUTO: 22.2 % (ref 34.8–46.1)
HGB BLD-MCNC: 6.5 G/DL (ref 11.5–15.4)
HGB UR QL STRIP.AUTO: NEGATIVE
IMM GRANULOCYTES # BLD AUTO: 0.04 THOUSAND/UL (ref 0–0.2)
IMM GRANULOCYTES NFR BLD AUTO: 0 % (ref 0–2)
INR PPP: 1.08 (ref 0.85–1.19)
KETONES UR STRIP-MCNC: NEGATIVE MG/DL
LEUKOCYTE ESTERASE UR QL STRIP: NEGATIVE
LYMPHOCYTES # BLD AUTO: 0.98 THOUSANDS/ÂΜL (ref 0.6–4.47)
LYMPHOCYTES NFR BLD AUTO: 10 % (ref 14–44)
MCH RBC QN AUTO: 22.9 PG (ref 26.8–34.3)
MCHC RBC AUTO-ENTMCNC: 29.3 G/DL (ref 31.4–37.4)
MCV RBC AUTO: 78 FL (ref 82–98)
MONOCYTES # BLD AUTO: 1.09 THOUSAND/ÂΜL (ref 0.17–1.22)
MONOCYTES NFR BLD AUTO: 12 % (ref 4–12)
NEUTROPHILS # BLD AUTO: 7.21 THOUSANDS/ÂΜL (ref 1.85–7.62)
NEUTS SEG NFR BLD AUTO: 77 % (ref 43–75)
NITRITE UR QL STRIP: NEGATIVE
NRBC BLD AUTO-RTO: 0 /100 WBCS
PH UR STRIP.AUTO: 6.5 [PH]
PLATELET # BLD AUTO: 385 THOUSANDS/UL (ref 149–390)
PMV BLD AUTO: 9.5 FL (ref 8.9–12.7)
POTASSIUM SERPL-SCNC: 4 MMOL/L (ref 3.5–5.3)
PROT SERPL-MCNC: 7.2 G/DL (ref 6.4–8.4)
PROT UR STRIP-MCNC: NEGATIVE MG/DL
PROTHROMBIN TIME: 14.3 SECONDS (ref 12.3–15)
RBC # BLD AUTO: 2.84 MILLION/UL (ref 3.81–5.12)
RH BLD: POSITIVE
RH BLD: POSITIVE
SODIUM SERPL-SCNC: 139 MMOL/L (ref 135–147)
SP GR UR STRIP.AUTO: 1.02 (ref 1–1.03)
SPECIMEN EXPIRATION DATE: NORMAL
UROBILINOGEN UR STRIP-ACNC: 2 MG/DL
WBC # BLD AUTO: 9.43 THOUSAND/UL (ref 4.31–10.16)

## 2025-02-26 PROCEDURE — 85730 THROMBOPLASTIN TIME PARTIAL: CPT | Performed by: EMERGENCY MEDICINE

## 2025-02-26 PROCEDURE — 96360 HYDRATION IV INFUSION INIT: CPT

## 2025-02-26 PROCEDURE — 86901 BLOOD TYPING SEROLOGIC RH(D): CPT | Performed by: EMERGENCY MEDICINE

## 2025-02-26 PROCEDURE — 86850 RBC ANTIBODY SCREEN: CPT | Performed by: EMERGENCY MEDICINE

## 2025-02-26 PROCEDURE — 99223 1ST HOSP IP/OBS HIGH 75: CPT | Performed by: INTERNAL MEDICINE

## 2025-02-26 PROCEDURE — 85610 PROTHROMBIN TIME: CPT | Performed by: EMERGENCY MEDICINE

## 2025-02-26 PROCEDURE — P9016 RBC LEUKOCYTES REDUCED: HCPCS

## 2025-02-26 PROCEDURE — 93005 ELECTROCARDIOGRAM TRACING: CPT

## 2025-02-26 PROCEDURE — 86923 COMPATIBILITY TEST ELECTRIC: CPT

## 2025-02-26 PROCEDURE — 36415 COLL VENOUS BLD VENIPUNCTURE: CPT | Performed by: EMERGENCY MEDICINE

## 2025-02-26 PROCEDURE — 30233N1 TRANSFUSION OF NONAUTOLOGOUS RED BLOOD CELLS INTO PERIPHERAL VEIN, PERCUTANEOUS APPROACH: ICD-10-PCS | Performed by: INTERNAL MEDICINE

## 2025-02-26 PROCEDURE — 99284 EMERGENCY DEPT VISIT MOD MDM: CPT

## 2025-02-26 PROCEDURE — 80053 COMPREHEN METABOLIC PANEL: CPT | Performed by: EMERGENCY MEDICINE

## 2025-02-26 PROCEDURE — 86900 BLOOD TYPING SEROLOGIC ABO: CPT | Performed by: EMERGENCY MEDICINE

## 2025-02-26 PROCEDURE — 99215 OFFICE O/P EST HI 40 MIN: CPT | Performed by: INTERNAL MEDICINE

## 2025-02-26 PROCEDURE — NC001 PR NO CHARGE: Performed by: INTERNAL MEDICINE

## 2025-02-26 PROCEDURE — 99291 CRITICAL CARE FIRST HOUR: CPT | Performed by: EMERGENCY MEDICINE

## 2025-02-26 PROCEDURE — 83880 ASSAY OF NATRIURETIC PEPTIDE: CPT | Performed by: EMERGENCY MEDICINE

## 2025-02-26 PROCEDURE — 96361 HYDRATE IV INFUSION ADD-ON: CPT

## 2025-02-26 PROCEDURE — 84484 ASSAY OF TROPONIN QUANT: CPT | Performed by: EMERGENCY MEDICINE

## 2025-02-26 PROCEDURE — 71275 CT ANGIOGRAPHY CHEST: CPT

## 2025-02-26 PROCEDURE — 36430 TRANSFUSION BLD/BLD COMPNT: CPT

## 2025-02-26 PROCEDURE — G2211 COMPLEX E/M VISIT ADD ON: HCPCS | Performed by: INTERNAL MEDICINE

## 2025-02-26 PROCEDURE — 85025 COMPLETE CBC W/AUTO DIFF WBC: CPT | Performed by: EMERGENCY MEDICINE

## 2025-02-26 RX ADMIN — SODIUM CHLORIDE 1000 ML: 0.9 INJECTION, SOLUTION INTRAVENOUS at 20:49

## 2025-02-26 RX ADMIN — IOHEXOL 75 ML: 350 INJECTION, SOLUTION INTRAVENOUS at 21:41

## 2025-02-26 NOTE — Clinical Note
Case was discussed with KAYDEN and the patient's admission status was agreed to be Admission Status: inpatient status to the service of Dr. Liang.

## 2025-02-26 NOTE — PROGRESS NOTES
Name: Jenifer Smith      : 1955      MRN: [id number]  Encounter Provider: Zoie Woody MD  Encounter Date: 2025   Encounter department: [facility] [place]  :  Assessment & Plan  Iron deficiency anemia, unspecified iron deficiency anemia type  Patient presented with fatigue, dizziness, and positive orthostatic vitals. In the clinic, FOBT was positive for occult blood. Outpatient workup showed Hgb 6.9, MCV 80, RDW 17.5, ferritin 4, and iron saturation 4. Given the recent drop in Hgb and positive FOBT, there is concern for acute blood loss anemia secondary to GI bleed. It was advised that patient go to the hospital for further management. Eliquis was held.     Prior workup: colonoscopy in 2022 unremarkable. Endoscopy in 2023 showed chronic gastritis.     Orders:    Transfer to other facility    Acute deep vein thrombosis (DVT) of right lower extremity, unspecified vein (HCC)  Patient originally had RLE pain in 2024. Doppler done at that time showed DVT and patient was started on eliquis. She continued to have bilateral leg pain, right greater than left as well as right sided chest pain. CTA PE study was negative for PE. Patient's LE pain is likely multifactorial secondary to DVT and PAD. Eliquis was held given concern for acute blood loss anemia. Patient will need VAS venous duplex of the lower extremities to further evaluate.        Peripheral artery disease (HCC)  Patient continues to have bilateral leg pain, right greater than left. Patient will need VAS arterial duplex of lower extremities to further evaluate. She may need vascular surgery consult in the future.               History of Present Illness   This is a 69 year old female with PMH of DVT of RLE in 2024 on eliquis, diabetes c/b neuropathy and retinopathy, VALENTIN, and lumbar radiculopathy who presents to the clinic after outpatient CBC resulted Hgb 6.9, MCV 80, RDW 17.5, ferritin 4,  and iron saturation 4. She was told to hold the eliquis and come to the clinic. She stated that she has been experiencing fatigue and dizziness. She characterized the dizziness as the room spinning and worsens from sitting to standing. She does state that her stool is dark; of note, she takes oral iron supplements. She denies hematochezia, hematuria, hemoptysis, and hematemesis. She also continues to have bilateral LE pain, right greater than left. She states that she has lost 10 lbs since November 2024.       Review of Systems   Constitutional:  Positive for fatigue and unexpected weight change. Negative for chills and fever.   HENT:  Negative for ear pain and sore throat.    Eyes:  Negative for pain and visual disturbance.   Respiratory:  Negative for cough and shortness of breath.    Cardiovascular:  Negative for chest pain and palpitations.   Gastrointestinal:  Negative for abdominal pain, blood in stool, constipation, diarrhea, nausea and vomiting.   Genitourinary:  Negative for dysuria and hematuria.   Musculoskeletal:  Negative for arthralgias and back pain.   Skin:  Negative for color change and rash.   Neurological:  Positive for dizziness and light-headedness. Negative for syncope.   All other systems reviewed and are negative.      Objective   /72 (BP Location: Left arm, Patient Position: Sitting, Cuff Size: Large)   Pulse (!) 106   Temp (!) 96.9 °F (36.1 °C) (Temporal)   Wt 75.6 kg (166 lb 9.6 oz)   BMI 27.30 kg/m²      Physical Exam  Constitutional:       General: She is not in acute distress.  Cardiovascular:      Rate and Rhythm: Normal rate and regular rhythm.      Pulses: Normal pulses.      Heart sounds: S1 normal and S2 normal. No murmur heard.  Pulmonary:      Breath sounds: Normal breath sounds. No wheezing, rhonchi or rales.   Abdominal:      General: Bowel sounds are normal. There is no distension.      Palpations: Abdomen is soft.      Tenderness: There is no abdominal tenderness.       Comments: FOBT positive for occult blood   Musculoskeletal:      Right lower leg: No edema.      Left lower leg: No edema.   Skin:     General: Skin is warm and dry.   Neurological:      General: No focal deficit present.      Mental Status: She is alert and oriented to person, place, and time.

## 2025-02-27 ENCOUNTER — TELEPHONE (OUTPATIENT)
Dept: INTERNAL MEDICINE CLINIC | Facility: CLINIC | Age: 70
End: 2025-02-27

## 2025-02-27 ENCOUNTER — APPOINTMENT (INPATIENT)
Dept: NON INVASIVE DIAGNOSTICS | Facility: HOSPITAL | Age: 70
DRG: 378 | End: 2025-02-27
Payer: COMMERCIAL

## 2025-02-27 ENCOUNTER — TELEPHONE (OUTPATIENT)
Age: 70
End: 2025-02-27

## 2025-02-27 PROBLEM — I73.9 PAD (PERIPHERAL ARTERY DISEASE) (HCC): Status: ACTIVE | Noted: 2025-02-27

## 2025-02-27 PROBLEM — D64.9 SYMPTOMATIC ANEMIA: Status: ACTIVE | Noted: 2025-02-27

## 2025-02-27 PROBLEM — I82.401 DEEP VEIN THROMBOSIS (DVT) OF RIGHT LOWER EXTREMITY (HCC): Status: ACTIVE | Noted: 2025-02-26

## 2025-02-27 LAB
4HR DELTA HS TROPONIN: 0 NG/L
ABO GROUP BLD BPU: NORMAL
ALBUMIN SERPL BCG-MCNC: 3.8 G/DL (ref 3.5–5)
ALP SERPL-CCNC: 55 U/L (ref 34–104)
ALT SERPL W P-5'-P-CCNC: 14 U/L (ref 7–52)
ANION GAP SERPL CALCULATED.3IONS-SCNC: 2 MMOL/L (ref 4–13)
AST SERPL W P-5'-P-CCNC: 35 U/L (ref 13–39)
ATRIAL RATE: 113 BPM
BASOPHILS # BLD AUTO: 0.03 THOUSANDS/ÂΜL (ref 0–0.1)
BASOPHILS NFR BLD AUTO: 0 % (ref 0–1)
BILIRUB SERPL-MCNC: 0.41 MG/DL (ref 0.2–1)
BPU ID: NORMAL
BUN SERPL-MCNC: 17 MG/DL (ref 5–25)
CALCIUM SERPL-MCNC: 9.2 MG/DL (ref 8.4–10.2)
CARDIAC TROPONIN I PNL SERPL HS: 3 NG/L (ref ?–50)
CHLORIDE SERPL-SCNC: 109 MMOL/L (ref 96–108)
CO2 SERPL-SCNC: 28 MMOL/L (ref 21–32)
CREAT SERPL-MCNC: 0.67 MG/DL (ref 0.6–1.3)
CROSSMATCH: NORMAL
EOSINOPHIL # BLD AUTO: 0.12 THOUSAND/ÂΜL (ref 0–0.61)
EOSINOPHIL NFR BLD AUTO: 2 % (ref 0–6)
ERYTHROCYTE [DISTWIDTH] IN BLOOD BY AUTOMATED COUNT: 18.6 % (ref 11.6–15.1)
GFR SERPL CREATININE-BSD FRML MDRD: 90 ML/MIN/1.73SQ M
GLUCOSE SERPL-MCNC: 111 MG/DL (ref 65–140)
GLUCOSE SERPL-MCNC: 111 MG/DL (ref 65–140)
GLUCOSE SERPL-MCNC: 150 MG/DL (ref 65–140)
GLUCOSE SERPL-MCNC: 162 MG/DL (ref 65–140)
GLUCOSE SERPL-MCNC: 177 MG/DL (ref 65–140)
HCT VFR BLD AUTO: 24.2 % (ref 34.8–46.1)
HGB BLD-MCNC: 7.3 G/DL (ref 11.5–15.4)
IMM GRANULOCYTES # BLD AUTO: 0.03 THOUSAND/UL (ref 0–0.2)
IMM GRANULOCYTES NFR BLD AUTO: 0 % (ref 0–2)
LYMPHOCYTES # BLD AUTO: 2.06 THOUSANDS/ÂΜL (ref 0.6–4.47)
LYMPHOCYTES NFR BLD AUTO: 26 % (ref 14–44)
MCH RBC QN AUTO: 24.3 PG (ref 26.8–34.3)
MCHC RBC AUTO-ENTMCNC: 30.2 G/DL (ref 31.4–37.4)
MCV RBC AUTO: 81 FL (ref 82–98)
MONOCYTES # BLD AUTO: 0.96 THOUSAND/ÂΜL (ref 0.17–1.22)
MONOCYTES NFR BLD AUTO: 12 % (ref 4–12)
NEUTROPHILS # BLD AUTO: 4.69 THOUSANDS/ÂΜL (ref 1.85–7.62)
NEUTS SEG NFR BLD AUTO: 60 % (ref 43–75)
NRBC BLD AUTO-RTO: 0 /100 WBCS
P AXIS: 72 DEGREES
PLATELET # BLD AUTO: 339 THOUSANDS/UL (ref 149–390)
PMV BLD AUTO: 9.6 FL (ref 8.9–12.7)
POTASSIUM SERPL-SCNC: 5.2 MMOL/L (ref 3.5–5.3)
PR INTERVAL: 166 MS
PROT SERPL-MCNC: 6.6 G/DL (ref 6.4–8.4)
QRS AXIS: 64 DEGREES
QRSD INTERVAL: 70 MS
QT INTERVAL: 326 MS
QTC INTERVAL: 447 MS
RBC # BLD AUTO: 3 MILLION/UL (ref 3.81–5.12)
SODIUM SERPL-SCNC: 139 MMOL/L (ref 135–147)
T WAVE AXIS: 70 DEGREES
UNIT DISPENSE STATUS: NORMAL
UNIT PRODUCT CODE: NORMAL
UNIT PRODUCT VOLUME: 350 ML
UNIT RH: NORMAL
VENTRICULAR RATE: 113 BPM
WBC # BLD AUTO: 7.89 THOUSAND/UL (ref 4.31–10.16)

## 2025-02-27 PROCEDURE — 80053 COMPREHEN METABOLIC PANEL: CPT

## 2025-02-27 PROCEDURE — 99222 1ST HOSP IP/OBS MODERATE 55: CPT | Performed by: INTERNAL MEDICINE

## 2025-02-27 PROCEDURE — 85025 COMPLETE CBC W/AUTO DIFF WBC: CPT

## 2025-02-27 PROCEDURE — 82948 REAGENT STRIP/BLOOD GLUCOSE: CPT

## 2025-02-27 PROCEDURE — 99232 SBSQ HOSP IP/OBS MODERATE 35: CPT | Performed by: INTERNAL MEDICINE

## 2025-02-27 PROCEDURE — 93970 EXTREMITY STUDY: CPT | Performed by: SURGERY

## 2025-02-27 PROCEDURE — 93970 EXTREMITY STUDY: CPT

## 2025-02-27 PROCEDURE — 93010 ELECTROCARDIOGRAM REPORT: CPT | Performed by: INTERNAL MEDICINE

## 2025-02-27 PROCEDURE — 84484 ASSAY OF TROPONIN QUANT: CPT

## 2025-02-27 PROCEDURE — 36415 COLL VENOUS BLD VENIPUNCTURE: CPT

## 2025-02-27 RX ORDER — GABAPENTIN 300 MG/1
900 CAPSULE ORAL
Status: DISCONTINUED | OUTPATIENT
Start: 2025-02-27 | End: 2025-03-01 | Stop reason: HOSPADM

## 2025-02-27 RX ORDER — LISINOPRIL 20 MG/1
20 TABLET ORAL DAILY
Status: DISCONTINUED | OUTPATIENT
Start: 2025-02-27 | End: 2025-03-01 | Stop reason: HOSPADM

## 2025-02-27 RX ORDER — HYDROCHLOROTHIAZIDE 12.5 MG/1
12.5 TABLET ORAL DAILY
Status: DISCONTINUED | OUTPATIENT
Start: 2025-02-27 | End: 2025-03-01 | Stop reason: HOSPADM

## 2025-02-27 RX ORDER — FERROUS SULFATE 325(65) MG
325 TABLET ORAL
Status: DISCONTINUED | OUTPATIENT
Start: 2025-02-27 | End: 2025-03-01 | Stop reason: HOSPADM

## 2025-02-27 RX ORDER — ATORVASTATIN CALCIUM 40 MG/1
40 TABLET, FILM COATED ORAL DAILY
Status: DISCONTINUED | OUTPATIENT
Start: 2025-02-28 | End: 2025-03-01 | Stop reason: HOSPADM

## 2025-02-27 RX ORDER — INSULIN LISPRO 100 [IU]/ML
1-6 INJECTION, SOLUTION INTRAVENOUS; SUBCUTANEOUS
Status: DISCONTINUED | OUTPATIENT
Start: 2025-02-27 | End: 2025-02-27

## 2025-02-27 RX ORDER — ATORVASTATIN CALCIUM 20 MG/1
20 TABLET, FILM COATED ORAL DAILY
Status: DISCONTINUED | OUTPATIENT
Start: 2025-02-27 | End: 2025-02-27

## 2025-02-27 RX ORDER — ATORVASTATIN CALCIUM 40 MG/1
40 TABLET, FILM COATED ORAL DAILY
COMMUNITY

## 2025-02-27 RX ORDER — INSULIN LISPRO 100 [IU]/ML
1-6 INJECTION, SOLUTION INTRAVENOUS; SUBCUTANEOUS
Status: DISCONTINUED | OUTPATIENT
Start: 2025-02-27 | End: 2025-03-01 | Stop reason: HOSPADM

## 2025-02-27 RX ORDER — SODIUM CHLORIDE, SODIUM LACTATE, POTASSIUM CHLORIDE, CALCIUM CHLORIDE 600; 310; 30; 20 MG/100ML; MG/100ML; MG/100ML; MG/100ML
125 INJECTION, SOLUTION INTRAVENOUS CONTINUOUS
Status: CANCELLED | OUTPATIENT
Start: 2025-02-27

## 2025-02-27 RX ORDER — GABAPENTIN 300 MG/1
600 CAPSULE ORAL 2 TIMES DAILY
Status: DISCONTINUED | OUTPATIENT
Start: 2025-02-27 | End: 2025-03-01 | Stop reason: HOSPADM

## 2025-02-27 RX ORDER — MUSCLE RUB CREAM 100; 150 MG/G; MG/G
CREAM TOPICAL DAILY PRN
Status: DISCONTINUED | OUTPATIENT
Start: 2025-02-27 | End: 2025-03-01 | Stop reason: HOSPADM

## 2025-02-27 RX ORDER — PANTOPRAZOLE SODIUM 40 MG/10ML
40 INJECTION, POWDER, LYOPHILIZED, FOR SOLUTION INTRAVENOUS 2 TIMES DAILY
Status: DISCONTINUED | OUTPATIENT
Start: 2025-02-27 | End: 2025-02-28

## 2025-02-27 RX ORDER — ASPIRIN 81 MG/1
81 TABLET ORAL DAILY
Status: DISCONTINUED | OUTPATIENT
Start: 2025-02-27 | End: 2025-02-27

## 2025-02-27 RX ADMIN — FERROUS SULFATE TAB 325 MG (65 MG ELEMENTAL FE) 325 MG: 325 (65 FE) TAB at 10:45

## 2025-02-27 RX ADMIN — GABAPENTIN 900 MG: 300 CAPSULE ORAL at 01:16

## 2025-02-27 RX ADMIN — GABAPENTIN 600 MG: 300 CAPSULE ORAL at 10:44

## 2025-02-27 RX ADMIN — PANTOPRAZOLE SODIUM 40 MG: 40 INJECTION, POWDER, FOR SOLUTION INTRAVENOUS at 20:37

## 2025-02-27 RX ADMIN — TIZANIDINE 4 MG: 4 TABLET ORAL at 01:17

## 2025-02-27 RX ADMIN — INSULIN LISPRO 1 UNITS: 100 INJECTION, SOLUTION INTRAVENOUS; SUBCUTANEOUS at 16:50

## 2025-02-27 RX ADMIN — CYANOCOBALAMIN TAB 500 MCG 500 MCG: 500 TAB at 10:44

## 2025-02-27 RX ADMIN — ATORVASTATIN CALCIUM 20 MG: 20 TABLET, FILM COATED ORAL at 10:44

## 2025-02-27 RX ADMIN — LISINOPRIL 20 MG: 20 TABLET ORAL at 10:44

## 2025-02-27 RX ADMIN — HYDROCHLOROTHIAZIDE 12.5 MG: 12.5 TABLET ORAL at 10:44

## 2025-02-27 NOTE — ASSESSMENT & PLAN NOTE
History of hypertension, on lisinopril-hydrochlorothiazide (Prinzide) 20-12.5 mg PTA.  Despite her anemia, pressures were normal to elevated (122/74 in clinic, 117-151/56-77 in ED).  Noted positive orthostatics in clinic yesterday.    Plan  -Lisinopril 20 mg and hydrochlorothiazide 12.5 mg QD (Prinzide not on formulary)

## 2025-02-27 NOTE — CASE MANAGEMENT
Case Management Assessment & Discharge Planning Note    Patient name Jenifer Smith  Location ED 16/ED 16 MRN [id number]  : 1955 Date 2025       Current Admission Date: 2025  Current Admission Diagnosis:Symptomatic anemia   Patient Active Problem List    Diagnosis Date Noted Date Diagnosed    Symptomatic anemia 2025     PAD (peripheral artery disease) ([facility]) 2025     Deep vein thrombosis (DVT) of right lower extremity ([facility]) 2025     Urinary symptom or sign 2024     Stress at home 2024     Caregiver stress syndrome 2024     Exposure to COVID-19 virus 2024     Bilateral foot pain 2024     Valgus deformity of both great toes 2024     Chronic pain syndrome 2024     Spinal stenosis of lumbar region 2024     Lumbar spondylosis 2024     Lumbar radiculopathy 2024     Myofascial pain 2024     Microscopic hematuria 2024     JOANNA (obstructive sleep apnea)      Smoking 09/15/2022     Chest pain on exertion 2021     Overweight (BMI 25.0-29.9) 02/10/2021     History of hysterectomy 02/10/2021     Type 2 diabetes mellitus with diabetic polyneuropathy, without long-term current use of insulin ([facility]) 2021     Depression, recurrent ([facility]) 2018     Carpal tunnel syndrome, bilateral      Breast mass, right 2018     Breast cyst, right 2018     History of right breast biopsy 2018     Breast pain, right 2018     Visit for screening mammogram 2018     Chronic bilateral low back pain with left-sided sciatica 2018     DM type 2 without retinopathy ([facility]) 2017     Hyperlipidemia 2016     Benign essential hypertension 2013       LOS (days): 1  Geometric Mean LOS (GMLOS) (days): 2.8  Days to GMLOS:2.2     OBJECTIVE:    Risk of Unplanned Readmission Score: 11.1         Current admission status: Inpatient       Preferred Pharmacy:   [id number]  [place] - [address]  [address]  [place] [address]  Phone: [phone] Fax: [phone]    [facility] - [place], [place] - [address]  [address]  [address]  [facility] [address]  Phone: [phone] Fax: [phone]    [facility] [address] [place], [place] [address]  [facility] [address] [facility] [address]  Phone: [phone] Fax: [phone]    Primary Care Provider: Syed Landrum MD    Primary Insurance: [employer]  Secondary Insurance: [facility]    ASSESSMENT:  Active Health Care Proxies       Vijaya Arceo Health Care Representative - Daughter   Primary Phone: [phone] (Mobile)  Home Phone: [phone]                 Readmission Root Cause  30 Day Readmission: No    Patient Information  Admitted from:: Home  Mental Status: Alert  During Assessment patient was accompanied by: Not accompanied during assessment  Assessment information provided by:: Patient  Primary Caregiver: Self  Support Systems: Friends/neighbors, Family members  County of Residence: [place]  [facility] do you live in?: [place]  Home entry access options. Select all that apply.: Elevator  Type of Current Residence: Apartment  Floor Level: 5  Upon entering residence, is there a bedroom on the main floor (no further steps)?: Yes  Upon entering residence, is there a bathroom on the main floor (no further steps)?: Yes  Living Arrangements: Lives w/ Children, Lives w/ Family members  Is patient a ?: No    Activities of Daily Living Prior to Admission  Functional Status: Independent  Completes ADLs independently?: Yes  Ambulates independently?: Yes  Does patient use assisted devices?: Yes  Assisted Devices (DME) used: Straight Cane  Does patient currently own DME?: Yes  What DME does the patient currently own?: Straight Cane  Does patient have a history of Outpatient Therapy (PT/OT)?: Yes (OP PT/OT with [facility] current.)  Does the patient have a history of Short-Term Rehab?: No  Does  patient have a history of HHC?: No  Does patient currently have HHC?: No         Patient Information Continued  Income Source: Pension/skilled nursing  Does patient have prescription coverage?: Yes  Does patient receive dialysis treatments?: No  Does patient have a history of substance abuse?: No  Does patient have a history of Mental Health Diagnosis?: No         Means of Transportation  Means of Transport to Appts:: Family transport          DISCHARGE DETAILS:    Discharge planning discussed with:: Patient  Freedom of Choice: Yes  Comments - Freedom of Choice: Disculssed briefly. Pt states she has been getting PT/OT OP with [facility].  PT/OT pending.  Will discuss when recs available.  CM contacted family/caregiver?: No- see comments (Met with pt in ED, she is alert and oriented.)  Other Referral/Resources/Interventions Provided:  Referral Comments: Pt admitted with symtomatic amemia, PT/OT pending.  CM following for DC planning.     CM met with pt at bedside, introduced myself and explained my role. Pt states she lives with family in 5th floor apartment with elevator to enter.  DME includes a cane.  IADL prior to admit.  CM saw Pt in the ED.  Pending PT/OT recs.  Pt currently is getting OP PT/OT with [facility].  CM following for recs and DC pending.

## 2025-02-27 NOTE — UTILIZATION REVIEW
Initial Clinical Review    Admission: Date/Time/Statement:   Admission Orders (From admission, onward)       Ordered        02/26/25 2224  INPATIENT ADMISSION  Once                          Orders Placed This Encounter   Procedures    INPATIENT ADMISSION     Standing Status:   Standing     Number of Occurrences:   1     Level of Care:   Med Surg [16]     Estimated length of stay:   More than 2 Midnights     Certification:   I certify that inpatient services are medically necessary for this patient for a duration of greater than two midnights. See H&P and MD Progress Notes for additional information about the patient's course of treatment.     ED Arrival Information       Expected   2/26/2025     Arrival   2/26/2025 19:57    Acuity   Emergent              Means of arrival   Walk-In    Escorted by   Family Member    Service   [facility]    Admission type   Emergency              Arrival complaint   Iron deficiency anemia, unspecified iron deficiency anemia type             Chief Complaint   Patient presents with    Anemia     Pt was sent from her PCP for low hemoglobin. Pt reports blood clot in R leg since December. +CP/SOB        Initial Presentation: 69 y.o. female to directed ED by OP Provider due to fatigue, dizziness, + Orthostatics anemia: hgb of <7.0  w guaiac positive stool in setting of Eliquis use, 10 pounds unintentionally since NOV 2024 as walk in presents as Inpatient admission due to Symptomatic anemia  PMH DVT RLE on Eliquis, DM2, benign HTN, HLD, lumbar radiculopathy, smoking, VALENTIN  EXAM  tachycardia;  labs HGB 6.5, TX 1 u PRBC  Inpatient admission due to symptomatic anemia. Trend HGB goal HGB > 7, TX PRN, consult GI, I/O, monitor BM, hold PTA Eliquis & cont ASA 81 mg, cont iron supplements, B12, obtain stuart LE VAS, SSI hold PTA Metformin, cont lisinopril & HCTZ, atorvastatin, cont PTA pain regimen  Date: 2/27/2025   Day 2:   GI  Hold eilquis concern for GI bleed  Clear liquid diet today with  GoLytely colonoscopy prep  N.p.o. at midnight for EGD and colonoscopy tomorrow   IV PPI 40 mg twice daily  Monitor H&H, transfuse if less than 7  Date: 2/28/2025  Day 3: Has surpassed a 2nd midnight with active treatments and services.   improvement in her fatigue and dizziness on standing since yesterday. Patient has drank approximately 3 L of GoLytely bowel prep, and is amenable to drinking more at this time.  She reports bowel movements at this time all still dark and watery following the bowel prep.   Hgb 7.5 today, stable, blood sugars controlled, /75, Lower extremity duplex showing no evidence of DVT at this time, with interval resolution of right lower extremity DVT since last duplex.  Symptomatic anemia  EGD, C scope today. Hx of H pylori treated last time with Fe def, since that time, started on Eliquis for DVT and seems to have acute blood loss anemia as well as iron deficiency anemia. Otherwise normal last scope and mammogram. No menstrual bleeding.   Follow up with scope results and possible dc in next 24 hours if Hbg stable and no active bleed.   DVT RLE    Given concern for acute blood loss anemia and resolution of prior DVT, may consider that patient can discontinue Eliquis use following discharge   ED Treatment-Medication Administration from 02/26/2025 1732 to 02/27/2025 1332         Date/Time Order Dose Route Action     02/26/2025 2049 sodium chloride 0.9 % bolus 1,000 mL 1,000 mL Intravenous New Bag     02/26/2025 2141 iohexol (OMNIPAQUE) 350 MG/ML injection (SINGLE-DOSE) 85 mL 75 mL Intravenous Given     02/27/2025 0900 apixaban (ELIQUIS) tablet 5 mg -- Oral Held Dose     02/27/2025 1800 apixaban (ELIQUIS) tablet 5 mg -- Oral Held Dose     02/28/2025 0900 apixaban (ELIQUIS) tablet 5 mg -- Oral Held Dose     02/28/2025 1800 apixaban (ELIQUIS) tablet 5 mg -- Oral Held Dose     03/01/2025 0900 apixaban (ELIQUIS) tablet 5 mg -- Oral Held Dose     03/01/2025 1800 apixaban (ELIQUIS) tablet 5 mg --  Oral Held Dose     03/02/2025 0900 apixaban (ELIQUIS) tablet 5 mg -- Oral Held Dose     03/02/2025 1800 apixaban (ELIQUIS) tablet 5 mg -- Oral Held Dose     02/27/2025 0900 aspirin (ECOTRIN LOW STRENGTH) EC tablet 81 mg -- Oral Held Dose     02/27/2025 1044 atorvastatin (LIPITOR) tablet 20 mg 20 mg Oral Given     02/27/2025 1045 ferrous sulfate tablet 325 mg 325 mg Oral Given     02/27/2025 0116 gabapentin (NEURONTIN) capsule 900 mg 900 mg Oral Given     02/27/2025 1044 gabapentin (NEURONTIN) capsule 600 mg 600 mg Oral Given     02/27/2025 1044 lisinopril (ZESTRIL) tablet 20 mg 20 mg Oral Given     02/27/2025 1044 hydroCHLOROthiazide tablet 12.5 mg 12.5 mg Oral Given     02/27/2025 0117 tiZANidine (ZANAFLEX) tablet 4 mg 4 mg Oral Given     02/27/2025 1044 cyanocobalamin (VITAMIN B-12) tablet 500 mcg 500 mcg Oral Given            Scheduled Medications:  [Held by provider] apixaban, 5 mg, Oral, BID  atorvastatin, 40 mg, Oral, Daily  vitamin B-12, 500 mcg, Oral, Daily  ferrous sulfate, 325 mg, Oral, Daily With Breakfast  gabapentin, 600 mg, Oral, BID  gabapentin, 900 mg, Oral, HS  lisinopril, 20 mg, Oral, Daily   And  hydroCHLOROthiazide, 12.5 mg, Oral, Daily  insulin lispro, 1-6 Units, Subcutaneous, TID AC  pantoprazole, 40 mg, Intravenous, BID  polyethylene glycol, 4,000 mL, Oral, See Admin Instructions      Continuous IV Infusions:     PRN Meds:  Diclofenac Sodium, 2 g, Topical, Q6H PRN  menthol-methyl salicylate, , Apply externally, Daily PRN  tiZANidine, 4 mg, Oral, HS PRN      ED Triage Vitals   Temperature Pulse Respirations Blood Pressure SpO2 Pain Score   02/26/25 2008 02/26/25 2008 02/26/25 2008 02/26/25 2008 02/26/25 2008 02/27/25 2044   97.6 °F (36.4 °C) (!) 112 18 151/77 100 % No Pain     Weight (last 2 days)       Date/Time Weight    02/27/25 19:59:23 78 (172)            Vital Signs (last 3 days)       Date/Time Temp Pulse Resp BP MAP (mmHg) SpO2 O2 Device Patient Position - Orthostatic VS Luis Coma  Scale Score Pain    02/28/25 1231 96.2 °F (35.7 °C) 71 20 173/78 -- 100 % None (Room air) -- -- --    02/28/25 1140 -- -- -- -- -- -- -- -- -- No Pain    02/28/25 1139 -- -- -- -- -- -- -- -- -- No Pain    02/28/25 0900 -- -- -- -- -- -- None (Room air) -- 15 No Pain    02/28/25 07:07:48 97.9 °F (36.6 °C) 90 17 144/75 98 100 % -- -- -- --    02/27/25 2300 -- -- -- -- -- -- None (Room air) -- 15 --    02/27/25 21:35:11 -- 85 -- 154/74 101 98 % -- -- -- --    02/27/25 2044 -- -- -- -- -- -- None (Room air) -- -- No Pain    02/27/25 19:59:23 98.2 °F (36.8 °C) 66 18 150/83 105 99 % -- -- -- --    02/27/25 1652 -- 86 18 131/67 92 99 % None (Room air) Lying -- --    02/27/25 1045 -- 82 18 158/78 109 100 % None (Room air) -- -- --    02/27/25 1044 -- -- -- 158/78 -- -- -- -- -- --    02/27/25 0545 -- 86 19 117/62 82 99 % None (Room air) Lying -- --    02/27/25 0515 -- 88 21 125/59 85 100 % None (Room air) Lying -- --    02/27/25 0445 -- 88 21 123/59 85 99 % None (Room air) Lying -- --    02/27/25 0415 -- 88 22 129/60 86 99 % None (Room air) Lying -- --    02/27/25 0345 -- 90 21 126/63 89 99 % None (Room air) Lying -- --    02/27/25 0315 -- 94 22 117/56 79 97 % None (Room air) Lying -- --    02/27/25 0245 -- 96 20 139/65 94 97 % None (Room air) Lying -- --    02/27/25 0145 -- 96 20 148/70 100 100 % None (Room air) Lying -- --    02/27/25 0130 -- 102 14 148/70 100 97 % None (Room air) Lying -- --    02/27/25 0115 98.3 °F (36.8 °C) -- -- -- -- -- -- -- -- --    02/27/25 0045 -- 100 18 129/60 87 99 % None (Room air) Lying -- --    02/26/25 2345 99 °F (37.2 °C) 102 18 130/65 90 99 % None (Room air) Lying -- --    02/26/25 2323 -- -- -- -- -- -- None (Room air) -- -- --    02/26/25 2315 -- 100 19 118/62 83 100 % None (Room air) Lying -- --    02/26/25 2307 99.2 °F (37.3 °C) 105 18 118/62 -- 99 % None (Room air) -- -- --    02/26/25 2130 -- 104 20 122/59 81 99 % None (Room air) Lying -- --    02/26/25 2115 -- 106 19 122/59 81 99  % None (Room air) Lying -- --    02/26/25 2008 97.6 °F (36.4 °C) 112 18 151/77 77 100 % None (Room air) Sitting -- --              Pertinent Labs/Diagnostic Test Results:   Radiology:   VAS VENOUS DUPLEX - LOWER LIMB BILATERAL   Final Interpretation by Lina Rogers DO (02/27 1215)      CTA chest pe study   Final Interpretation by Harrison Lopez MD (02/26 2239)      Slightly limited evaluation as above.   No pulmonary embolus identified to the level of the proximal segmental branches.   No focal airspace consolidation or effusion.                  Workstation performed: [id number]         VAS ARTERIAL DUPLEX- LOWER LIMB BILATERAL    (Results Pending)     Cardiology:  ECG 12 lead   Final Result by Dwight Gaffney MD (02/27 2339)   Sinus tachycardia   Biatrial enlargement   Low voltage QRS   Abnormal ECG   When compared with ECG of 09-Jan-2021 09:10,   No significant change was found   Confirmed by Dwight Gaffney ([id number]) on 2/27/2025 11:39:15 PM        GI:  No orders to display           Results from last 7 days   Lab Units 02/28/25  0631 02/27/25  0555 02/26/25 2043 02/24/25  0852   WBC Thousand/uL 6.80 7.89 9.43 7.61   HEMOGLOBIN g/dL 7.5* 7.3* 6.5* 6.9*   HEMATOCRIT % 24.6* 24.2* 22.2* 24.2*   PLATELETS Thousands/uL 344 339 385 416*   TOTAL NEUT ABS Thousands/µL 3.61 4.69 7.21 5.10     Results from last 7 days   Lab Units 02/24/25  0852   RETIC CT ABS  88,500   RETIC CT PCT % 2.94*     Results from last 7 days   Lab Units 02/28/25  0631 02/27/25  0555 02/26/25 2043   SODIUM mmol/L 142 139 139   POTASSIUM mmol/L 4.2 5.2 4.0   CHLORIDE mmol/L 109* 109* 104   CO2 mmol/L 29 28 26   ANION GAP mmol/L 4 2* 9   BUN mg/dL 10 17 22   CREATININE mg/dL 0.68 0.67 0.82   EGFR ml/min/1.73sq m 90 90 73   CALCIUM mg/dL 9.1 9.2 9.6     Results from last 7 days   Lab Units 02/27/25  0555 02/26/25  2043 02/24/25  0852   AST U/L 35 11*  --    ALT U/L 14 9  --    ALK PHOS U/L 55 61  --    TOTAL PROTEIN g/dL 6.6 7.2  "7.2   ALBUMIN g/dL 3.8 4.1  --    TOTAL BILIRUBIN mg/dL 0.41 0.24  --      Results from last 7 days   Lab Units 02/28/25  1151 02/28/25  0921 02/27/25  2037 02/27/25  1649 02/27/25  1219 02/27/25  0557   POC GLUCOSE mg/dl 86 80 150* 162* 177* 111     Results from last 7 days   Lab Units 02/28/25  0631 02/27/25  0555 02/26/25  2043   GLUCOSE RANDOM mg/dL 87 111 165*             No results found for: \"BETA-HYDROXYBUTYRATE\"                   Results from last 7 days   Lab Units 02/27/25  0124 02/26/25  2253 02/26/25  2043   HS TNI 0HR ng/L  --   --  3   HS TNI 2HR ng/L  --  3  --    HSTNI D2 ng/L  --  0  --    HS TNI 4HR ng/L 3  --   --    HSTNI D4 ng/L 0  --   --          Results from last 7 days   Lab Units 02/26/25  2043   PROTIME seconds 14.3   INR  1.08   PTT seconds 26                         Results from last 7 days   Lab Units 02/26/25  2043   BNP pg/mL 21     Results from last 7 days   Lab Units 02/24/25  0852   FERRITIN ng/mL 4*   IRON SATURATION % 4*   IRON ug/dL 20*   TIBC ug/dL 506.8*     Results from last 7 days   Lab Units 02/24/25  0852   TRANSFERRIN mg/dL 362     Results from last 7 days   Lab Units 02/27/25  0555   UNIT PRODUCT CODE  [id number]   UNIT NUMBER  [id number]   UNITABO  O   UNITRH  POS   CROSSMATCH  Compatible   UNIT DISPENSE STATUS  Presumed Trans   UNIT PRODUCT VOL ml 350                         Results from last 7 days   Lab Units 02/26/25  2155   CLARITY UA  Clear   COLOR UA  Light Yellow   SPEC GRAV UA  1.023   PH UA  6.5   GLUCOSE UA mg/dl 70 (7/100%)*   KETONES UA mg/dl Negative   BLOOD UA  Negative   PROTEIN UA mg/dl Negative   NITRITE UA  Negative   BILIRUBIN UA  Negative   UROBILINOGEN UA (BE) mg/dl 2.0*   LEUKOCYTES UA  Negative                                     Results from last 7 days   Lab Units 02/24/25  0852   TOTAL COUNTED  100               Past Medical History:   Diagnosis Date    Arthritis     Bump     bumped head yesterday at work \"saw stars\" no LOC, cat scan done " was normal    Circulation problem     Diabetes mellitus (HCC)     blood sugar 125 on admission    Encounter for screening colonoscopy     1 st one    Headache     Hyperlipidemia     Hypertension     Positive fecal occult blood test 08/30/2018    Added automatically from request for surgery [id number]    Post concussion syndrome 09/14/2018    Syncope      Present on Admission:   Type 2 diabetes mellitus with diabetic polyneuropathy, without long-term current use of insulin (HCC)   Smoking   Hyperlipidemia   Benign essential hypertension   Lumbar radiculopathy      Admitting Diagnosis: Anemia [D64.9]  Atypical chest pain [R07.89]  Symptomatic anemia [D64.9]  Age/Sex: 69 y.o. female    Network Utilization Review Department  ATTENTION: Please call with any questions or concerns to [phone] and carefully listen to the prompts so that you are directed to the right person. All voicemails are confidential.   For Discharge needs, contact Care Management DC Support Team at [phone] opt. [phone]  Send all requests for admission clinical reviews, approved or denied determinations and any other requests to dedicated fax number below belonging to the campus where the patient is receiving treatment. List of dedicated fax numbers for the Facilities:  FACILITY NAME UR FAX NUMBER   ADMISSION DENIALS (Administrative/Medical Necessity) [phone]   DISCHARGE SUPPORT TEAM (NETWORK) [phone]   PARENT CHILD HEALTH (Maternity/NICU/Pediatrics) [phone]   [facility] [phone]   [facility] [phone]   [facility] [phone]   [facility] [phone]   [facility] [phone]   [facility] [phone]   [facility] [phone]   [facility] [phone]   [facility] [phone]    [facility] [phone]   [facility] [phone]   [facility] [phone]

## 2025-02-27 NOTE — ED NOTES
Pt reports nightshift RN checked glucose at 0600 and does not want it checked again.      Jazzmine Robles RN  02/27/25 9276

## 2025-02-27 NOTE — ASSESSMENT & PLAN NOTE
History of hyperlipidemia, last lipid panel on 6/8/2024: Cholesterol: 136, triglycerides: 56, HDL: 52, LDL: 73 (goal LDL <70, ASCVD risk: 40.5%) on atorvastatin 20 mg PTA.     Plan  -Increase atorvastatin to 40mg daily due to high risk of ASCVD

## 2025-02-27 NOTE — CONSULTS
Consultation - Gastroenterology   Name: Jenifer Smith 69 y.o. female I MRN: [id number]  Unit/Bed#: ED 16 I Date of Admission: 2/26/2025   Date of Service: 2/27/2025 I Hospital Day: 1   Inpatient consult to gastroenterology  Consult performed by: Wendy Landrum MD  Consult ordered by: Arpit Eid MD        Physician Requesting Evaluation: Shey Roberson DO   Reason for Evaluation / Principal Problem: Symptomatic anemia    Assessment & Plan  Symptomatic anemia  Patient is presenting with lightheadedness and concern for GI bleed.  Patient's hemoglobin was found to be 6.5 on admission compared to 7.1 on 2/19 versus a baseline of ten 1 year ago.  As the patient continues to have downtrending hemoglobin and concern for bleeding while being on anticoagulation due to history of DVT the patient would benefit from an EGD and a colonoscopy for further evaluation.    -Clear liquid diet today with GoLytely colonoscopy prep  -N.p.o. at midnight for EGD and colonoscopy tomorrow  -Please start IV PPI 40 mg twice daily  -Monitor H&H, transfuse if less than 7  Deep vein thrombosis (DVT) of right lower extremity (HCC)  -Hold Eliquis in the setting of concern of GI bleed      History of Present Illness   HPI:  Jenifer Smith is a 69 y.o. female who presents to the hospital with lightheadedness. Past medical history is notable for a recent DVT in her right lower extremity in December 2024 for which she is on Eliquis, history of H. pylori in 2023, diabetes with neuropathy and retinopathy, iron deficiency anemia, and lumbar radiculopathy. Patient had an outpatient visit where she was noted to have fatigue and lightheadedness with orthostatic vitals.  Was a concern that patient may be having an ongoing GI bleed hemoglobin on admission was found to be 6.5 with a baseline around 10 a year ago.  Patient has a history of H. pylori back in 2023 she received quadruple antibiotics and stool antigen check in November 2023 was negative.  Currently the patient continues to endorse weakness.  She states she had been noticing dark stools for the past week or so however she takes iron supplementation and her stools tend to be dark in color.  Denies any bright red blood per rectum.    Previous colonoscopy was in September 2022 which was completely normal:   All observed locations appeared normal, including the ileocecal valve, cecum, ascending colon, hepatic flexure, transverse colon, splenic flexure, descending colon, sigmoid colon, rectosigmoid and rectum.    Previous EGD in October 2023:  The esophagus appeared normal. Z-line is 41 cm from the incisors.  Moderate, localized edematous and erythematous mucosa in the antrum; performed cold forceps biopsy  The body of the stomach and incisura appeared normal. Performed random biopsy using biopsy forceps.  The duodenum appeared normal. The first and second parts were visualized.  Performed multiple forceps biopsies in the duodenal bulb and 2nd part of the duodenum    Review of Systems  Per HPI    Objective :  Temp:  [96.9 °F (36.1 °C)-99.2 °F (37.3 °C)] 98.3 °F (36.8 °C)  HR:  [] 86  BP: (117-151)/(56-77) 117/62  Resp:  [14-22] 19  SpO2:  [97 %-100 %] 99 %  O2 Device: None (Room air)    Physical Exam  Pulmonary:      Effort: Pulmonary effort is normal.   Abdominal:      General: Abdomen is flat.      Palpations: Abdomen is soft.   Skin:     General: Skin is warm and dry.   Neurological:      Mental Status: She is alert.   Psychiatric:         Mood and Affect: Mood normal.           Lab Results: I have reviewed the following results:

## 2025-02-27 NOTE — ASSESSMENT & PLAN NOTE
Patient is presenting with lightheadedness and concern for GI bleed.  Patient's hemoglobin was found to be 6.5 on admission compared to 7.1 on 2/19 versus a baseline of ten 1 year ago.  As the patient continues to have downtrending hemoglobin and concern for bleeding while being on anticoagulation due to history of DVT the patient would benefit from an EGD and a colonoscopy for further evaluation.    -Clear liquid diet today with GoLytely colonoscopy prep  -N.p.o. at midnight for EGD and colonoscopy tomorrow  -Please start IV PPI 40 mg twice daily  -Monitor H&H, transfuse if less than 7

## 2025-02-27 NOTE — ASSESSMENT & PLAN NOTE
Lab Results   Component Value Date    HGBA1C 6.2 01/29/2025       Recent Labs     02/27/25  0557   POCGLU 111       Blood Sugar Average: Last 72 hrs:  (P) 111  History of T2DM, last A1c on 1/29/2025: 6.2.  On metformin, 1000 mg BID PTA.    Plan  -SSI  -Currently n.p.o. for potential invasive GI evaluation  -Accu-Cheks Q6H while NPO  -Carb controlled diet with Accu-Cheks QID (with meals and before bed) once cleared to restart diet  -Goal glucose: 140-180  -Hypoglycemia protocol  -Hold PTA metformin

## 2025-02-27 NOTE — ASSESSMENT & PLAN NOTE
In December 2024 she was noted to have had ~three-four week history of right lower extremity leg pain and ~two week history of swelling.  B/l lower extremity duplex on 12/11 notable for subacute versus chronic DVT on one of the paired right posterior tibial veins.  Did not appear provoked though noted to have sat in the car for ~six hours (based on clinic note from 2/11/2025).  Given seven day course of Eliquis, 10 mg BID followed by 5 mg BID afterwards (for at least three months).  Was compliant with the medication and only stopped it day of admission 2/2 low hemoglobin/symptomatic anemia.    Plan  -Hold PTA Eliquis 2/2 symptomatic anemia  -B/l LE duplex ordered- negative for DVT

## 2025-02-27 NOTE — ASSESSMENT & PLAN NOTE
>>ASSESSMENT AND PLAN FOR TYPE 2 DIABETES MELLITUS WITH DIABETIC POLYNEUROPATHY, WITHOUT LONG-TERM CURRENT USE OF INSULIN (HCC) WRITTEN ON 2/27/2025  9:57 AM BY JIMI HAMMER MD    Lab Results   Component Value Date    HGBA1C 6.2 01/29/2025       Recent Labs     02/27/25  0557   POCGLU 111       Blood Sugar Average: Last 72 hrs:  (P) 111  History of T2DM, last A1c on 1/29/2025: 6.2.  On metformin, 1000 mg BID PTA.    Plan  -SSI  -Currently n.p.o. for potential invasive GI evaluation  -Accu-Cheks Q6H while NPO  -Carb controlled diet with Accu-Cheks QID (with meals and before bed) once cleared to restart diet  -Goal glucose: 140-180  -Hypoglycemia protocol  -Hold PTA metformin   RX refilled per Dr. Barker's protocol and eprescribed to preferred pharmacy.

## 2025-02-27 NOTE — H&P
INTERNAL MEDICINE RESIDENCY ADMISSION H&P     Name: Jenifer Smith   Age & Sex: 69 y.o. female   MRN: [id number]  Unit/Bed#: ED 16   Encounter: [id number]  Primary Care Provider: Syed Landrum MD    Code Status: No Order  Admission Status: INPATIENT   Disposition: Patient requires Med/Surg with Telemetry    Admit to team: SOD Team A    ASSESSMENT/PLAN     Principal Problem:    Symptomatic anemia  Active Problems:    Deep vein thrombosis (DVT) of right lower extremity (HCC)    Type 2 diabetes mellitus with diabetic polyneuropathy, without long-term current use of insulin (HCC)    Benign essential hypertension    Hyperlipidemia    Lumbar radiculopathy    Smoking      * Symptomatic anemia  Assessment & Plan  Prior to presentation in ED she was at outpatient clinic where she was noted to be fatigued, dizzy, positive orthostatics, positive FOBT.  Hemoglobin couple days prior to presentation in clinic 6.9, MCV: 80, low iron count consistent with VALENTIN.  No obvious source of bleeding though positive FOBT makes GI source possibility.  She denied any epistaxis, hematochezia, hemoptysis, BRBPR, significantly dark stools (though she mentioned that she takes iron supplementation PTA).  Notably tachycardic when she first presented to ED, repeat Hb: 6.5.  S/p 1 unit pRBC.  No obvious bleeding on CTA PE, abdomen not scanned.  Was noted to have lost around 10 pounds unintentionally since November 2024.  Of note, low hemoglobin and unintentional weight loss has not not been a new issue for her.  It appears that in 2023 she was having low hemoglobin levels and  ~20 lb weight loss for which she underwent EGD on 10/6 which did not show any obvious source of bleeding (moderate edematous and erythematous mucosa in gastric antrum, biopsies collected positive for H. pylori, confirmed eradication following quadruple therapy on stool antigen test on 11/27/2023) and CT abdomen and pelvis with contrast on 10/9 which was unremarkable.   "Additionally, VALENTIN is not new diagnosis for her (it is why she takes iron supplementation).  Last colonoscopy on 9/15/2022 unremarkable.  She does not have many CBCs in her chart which makes establishing a baseline for her very difficult.  No hemoglobin level prior to starting Eliquis, 10.7 on 6/28/2024.  Nevertheless she warrants further workup.      Plan  -Admit to SOD A, MedSurg with telemetry  -S/p 1U pRBC, trend hemoglobin  -Goal Hb >7, transfuse as indicated  -GI consulted  -I's and O's, monitor BMs and capture image in media tab  -Hold PTA Eliquis  -Will continue ASA, 81 mg at this time  -Continue iron supplementation, vitamin B12        Deep vein thrombosis (DVT) of right lower extremity (HCC)  Assessment & Plan  In December 2024 she was noted to have had ~three-four week history of right lower extremity leg pain and ~two week history of swelling.  B/l lower extremity duplex on 12/11 notable for subacute versus chronic DVT on one of the paired right posterior tibial veins.  Did not appear provoked though noted to have sat in the car for ~six hours (based on clinic note from 2/11/2025).  Given seven day course of Eliquis, 10 mg BID followed by 5 mg BID afterwards (for at least three months).  Was compliant with the medication and only stopped it day of admission 2/2 low hemoglobin/symptomatic anemia.    Plan  -Hold PTA Eliquis 2/2 symptomatic anemia  -B/l LE duplex ordered, F/U results    Type 2 diabetes mellitus with diabetic polyneuropathy, without long-term current use of insulin ([facility])  Assessment & Plan  Lab Results   Component Value Date    HGBA1C 6.2 01/29/2025       No results for input(s): \"POCGLU\" in the last 72 hours.    Blood Sugar Average: Last 72 hrs:    History of T2DM, last A1c on 1/29/2025: 6.2.  On metformin, 1000 mg BID PTA.    Plan  -SSI  -Currently n.p.o. for potential invasive GI evaluation  -Accu-Cheks Q6H while NPO  -Carb controlled diet with Accu-Cheks QID (with meals and before bed) " once cleared to restart diet  -Goal glucose: 140-180  -Hypoglycemia protocol  -Hold PTA metformin    Benign essential hypertension  Assessment & Plan  History of hypertension, on lisinopril-hydrochlorothiazide (Prinzide) 20-12.5 mg PTA.  Despite her anemia, pressures were normal to elevated (122/74 in clinic, 117-151/56-77 in ED).  Noted positive orthostatics in clinic yesterday.    Plan  -Lisinopril 20 mg and hydrochlorothiazide 12.5 mg QD (Prinzide not on formulary)    Hyperlipidemia  Assessment & Plan  History of hyperlipidemia, last lipid panel on 6/8/2024: Cholesterol: 136, triglycerides: 56, HDL: 52, LDL: 73 (goal LDL <70, ASCVD risk: 40.5%) on atorvastatin 20 mg PTA.     Plan  -Continue atorvastatin, 20 mg QD    Lumbar radiculopathy  Assessment & Plan  Noted longstanding history of back pain and radiculopathy.  Has tried physical therapy in the past.  Had intralaminar epidural steroid injection on 1/22/24 with notable improvement.  Follows with orthopedics, last seen on 6/26/2024.  Still having some back and leg (R>L) pain today with ambulatory dysfunction.  On gabapentin, tizanidine PTA.  Side note, she is also having shoulder pain for which she was taking IcyHot PTA    Plan  -Continue gabapentin 600 mg TID and 900 mg QHS  -Continue tizanidine, 4 mg QHS  -Voltaren gel PRN  -Bengay for shoulder pain PRN  -Consider PT/OT consult    Smoking  Assessment & Plan  History of TUD, has been smoking since the 90s.  Says she smokes around eight cigarettes a day.  Mild apical emphysematous changes and stable 2 mm anterolateral RUL nodule noted on imaging.    Plan  -Nicotine patch and/or gum if requested  -Tobacco cessation counseling        VTE Pharmacologic Prophylaxis: Reason for no pharmacologic prophylaxis bleeding  VTE Mechanical Prophylaxis: sequential compression device    CHIEF COMPLAINT     Chief Complaint   Patient presents with    Anemia     Pt was sent from her PCP for low hemoglobin. Pt reports blood clot  in R leg since December. +CP/SOB         HISTORY OF PRESENT ILLNESS     Jenifer Smith is a 69 y.o. female with a PMH of T2DM, history of DVT on Eliquis, PAD, lumbar radiculopathy, HTN, TUD, VALENTIN who presents to [facility] from [facility] after being noted to be fatigued, dizzy, with positive FOBT.  She had labs prior to clinic visit on  which showed H/H: 6.9/24.2, MCV: 80, PLT: 416, ferritin: 4, Fe saturation: 4%, TIBC: 507, Fe: 20.  As mentioned above she is on Eliquis following DVT of RLE in 2024.  Concern for GI bleed, Eliquis stopped and asked to come to ED for further evaluation.  There, initial vitals notable for BP: 151/77, tachycardia (112), RR: 18 with SpO2 of 100% on RA, temp: 97.6 °F.  Labs notable for H/H: 6.5/22.2, MCV: 78, PT/INR: 14.3/1.08, PTT: 26, negative troponin and BMP, no blood on UA.  CTA PE (in light of her tachycardia, history of DVT, and chest pain (which she has had for last few weeks) this image type was prioritized) unremarkable for anything acute.  Transfuse with 1 unit PRBC.  When speaking with her in ED, she was getting her blood transfusion.  Still having lightheadedness/dizziness, particularly when lifting herself up.  Also having bilateral leg pain and some shortness of breath.  States she has never noticed blood in her stool before.  Made mention of the fact that she takes iron supplementations but feels that her stool was never significantly dark.  She denied any previous bleeding episodes.  She also denied any fever, chills, nausea, vomiting, diarrhea, constipation.    REVIEW OF SYSTEMS     ROS as above    OBJECTIVE     Vitals:    25 0130 25 0145 25 0245 25 0315   BP: 148/70 148/70 139/65 117/56   BP Location: Right arm Right arm Right arm Right arm   Pulse: 102 96 96 94   Resp:  22   Temp:       TempSrc:       SpO2: 97% 100% 97% 97%        Temperature:   Temp (24hrs), Av.2 °F (36.8 °C), Min:96.9 °F (36.1 °C), Max:99.2 °F (37.3  "°C)    Temperature: 98.3 °F (36.8 °C)    Intake & Output:  I/O       None            Weights:        There is no height or weight on file to calculate BMI.  Weight (last 2 days)       None            Physical Exam  Vitals reviewed. Exam conducted with a chaperone present.   Constitutional:       General: She is not in acute distress.     Appearance: She is ill-appearing.   HENT:      Head: Normocephalic and atraumatic.   Eyes:      Extraocular Movements: Extraocular movements intact.   Cardiovascular:      Rate and Rhythm: Regular rhythm. Tachycardia present.      Heart sounds:      No friction rub. No gallop.   Pulmonary:      Effort: Pulmonary effort is normal. No respiratory distress.      Breath sounds: No wheezing, rhonchi or rales.   Abdominal:      General: Abdomen is protuberant. Bowel sounds are normal. There is distension.      Palpations: Abdomen is soft.      Tenderness: There is generalized abdominal tenderness.      Comments: EDEL negative for blood   Musculoskeletal:      Right lower leg: No edema.      Left lower leg: No edema.   Skin:     General: Skin is dry.      Capillary Refill: Capillary refill takes less than 2 seconds.   Neurological:      Mental Status: She is alert.   Psychiatric:         Behavior: Behavior normal.         PAST MEDICAL HISTORY     Past Medical History:   Diagnosis Date    Arthritis     Bump     bumped head yesterday at work \"saw stars\" no LOC, cat scan done was normal    Circulation problem     Diabetes mellitus (HCC)     blood sugar 125 on admission    Encounter for screening colonoscopy     1 st one    Headache     Hyperlipidemia     Hypertension     Positive fecal occult blood test 08/30/2018    Added automatically from request for surgery [id number]    Post concussion syndrome 09/14/2018    Syncope        PAST SURGICAL HISTORY     Past Surgical History:   Procedure Laterality Date    BREAST BIOPSY Right 02/18/2022    COLONOSCOPY N/A 9/26/2018    Procedure: COLONOSCOPY;  " Surgeon: Joie Morejon MD;  Location: [facility] GI LAB;  Service: Gastroenterology    HYSTERECTOMY  2017    INCISIONAL BREAST BIOPSY      last assessed [id number]    IA COLONOSCOPY FLX DX W/COLLJ SPEC WHEN PFRMD N/A 7/5/2018    Procedure: COLONOSCOPY;  Surgeon: Joie Morejon MD;  Location:  GI LAB;  Service: Gastroenterology    US GUIDED BREAST BIOPSY RIGHT COMPLETE Right 2/18/2022       SOCIAL & FAMILY HISTORY     Social History     Substance and Sexual Activity   Alcohol Use Never       Social History     Substance and Sexual Activity   Drug Use Never     Social History     Tobacco Use   Smoking Status Every Day    Current packs/day: 0.50    Average packs/day: 0.5 packs/day for 20.0 years (10.0 ttl pk-yrs)    Types: Cigarettes   Smokeless Tobacco Never   Tobacco Comments    smokes less than 1pk/day     Family History   Problem Relation Age of Onset    Breast cancer Cousin 50    Alcohol abuse Mother     Alcohol abuse Father     Breast cancer Daughter 38    No Known Problems Sister     No Known Problems Maternal Grandmother     No Known Problems Maternal Grandfather     No Known Problems Paternal Grandmother     No Known Problems Paternal Grandfather     No Known Problems Son     No Known Problems Son     No Known Problems Daughter     No Known Problems Daughter     No Known Problems Daughter     Breast cancer Sister 66    No Known Problems Maternal Aunt     No Known Problems Maternal Aunt     No Known Problems Maternal Aunt     No Known Problems Maternal Aunt     No Known Problems Maternal Aunt     No Known Problems Maternal Aunt     No Known Problems Maternal Aunt     No Known Problems Maternal Aunt     No Known Problems Maternal Aunt     No Known Problems Maternal Aunt     No Known Problems Maternal Aunt     No Known Problems Maternal Aunt     No Known Problems Paternal Aunt     No Known Problems Paternal Aunt     Dementia Paternal Aunt     No Known Problems Paternal Aunt     No Known Problems Paternal Aunt      No Known Problems Paternal Aunt     No Known Problems Paternal Aunt     No Known Problems Paternal Aunt     Colon cancer Neg Hx     Endometrial cancer Neg Hx     Ovarian cancer Neg Hx        LABORATORY DATA     Labs: I have personally reviewed pertinent reports.    Results from last 7 days   Lab Units 02/26/25 2043 02/24/25  0852   WBC Thousand/uL 9.43 7.61   HEMOGLOBIN g/dL 6.5* 6.9*   HEMATOCRIT % 22.2* 24.2*   PLATELETS Thousands/uL 385 416*   SEGS PCT % 77*  --    MONO PCT % 12 13*   EOS PCT % 1 2      Results from last 7 days   Lab Units 02/26/25 2043   POTASSIUM mmol/L 4.0   CHLORIDE mmol/L 104   CO2 mmol/L 26   BUN mg/dL 22   CREATININE mg/dL 0.82   CALCIUM mg/dL 9.6   ALK PHOS U/L 61   ALT U/L 9   AST U/L 11*              Results from last 7 days   Lab Units 02/26/25 2043   INR  1.08   PTT seconds 26             Micro:  Lab Results   Component Value Date    URINECX 30,000-39,000 cfu/ml 02/19/2025       IMAGING & DIAGNOSTIC TESTS     Imaging: I have personally reviewed pertinent reports.    No results found.    EKG, Pathology, and Other Studies: I have personally reviewed pertinent reports.     ALLERGIES   No Known Allergies    MEDICATIONS PRIOR TO ARRIVAL     Prior to Admission medications    Medication Sig Start Date End Date Taking? Authorizing Provider   Accu-Chek Softclix Lancets lancets Use 1 (one) time for 1 dose Use as instructed 10/22/24 10/22/24  Mikki Johnson MD   apixaban (ELIQUIS) 5 mg Take 1 tablet (5 mg total) by mouth 2 (two) times a day Please fill this script. Thank you 12/11/24 12/6/25  Odalis Nash, DO   aspirin (Aspirin 81) 81 mg EC tablet Take 1 tablet (81 mg total) by mouth daily 6/26/24   Mikki Johnson MD   atorvastatin (LIPITOR) 20 mg tablet Take 1 tablet (20 mg total) by mouth daily 10/8/24 7/5/25  Mikki Johnson MD   Blood Glucose Monitoring Suppl (OneTouch Verio Reflect) w/Device KIT Check blood sugars three times daily. Please substitute with appropriate  alternative as covered by patient's insurance. Dx: E11.65 1/27/25   Odalis Nash DO   Diclofenac Sodium (VOLTAREN) 1 % APPLY 2 GRAMS TOPICALLY THREE TIMES A DAY 10/8/24   Mikki Johnson MD   Elastic Bandages & Supports (GNP Diabetic Socks Unisex XL) MISC Use 1 each if needed (1 pair of socks for diabetic neuropathy) 4/7/22   Morris Landrum MD   ferrous sulfate 324 (65 Fe) mg Take 1 tablet (324 mg total) by mouth every other day 10/8/24   Mikki Johnson MD   gabapentin (NEURONTIN) 300 mg capsule Take 3 capsules (900 mg total) by mouth daily at bedtime 10/8/24   Mikki Johnson MD   gabapentin (NEURONTIN) 300 mg capsule Take 2 capsules (600 mg total) by mouth 2 (two) times a day 10/8/24   Mikki Johnson MD   glucose blood (Accu-Chek Guide) test strip Use 1 each in the morning Use as instructed 10/17/24   Elvira Delgado DO   glucose blood test strip One Touch Verio brand. Use 1 each in the morning. 1/27/25   Mikki Johnson MD   Lancets Misc. (Accu-Chek Softclix Lancet Dev) KIT Use in the morning 1/24/25   Mikki Johnson MD   lisinopril-hydrochlorothiazide (PRINZIDE,ZESTORETIC) 20-12.5 MG per tablet Take 1 tablet by mouth once daily 12/5/24   Mikki Johnson MD   Menthol, Topical Analgesic, (Icy Hot Advanced Relief) 7.5 % PTCH Apply 1 patch topically daily as needed (shoulder pain) 5/10/24   Morris Landrum MD   metFORMIN (GLUCOPHAGE) 1000 MG tablet Take 1 tablet (1,000 mg total) by mouth 2 (two) times a day with meals 2/13/25   Syed Landrum MD   OneTouch Delica Lancets 33G MISC Check blood sugars three times daily. Please substitute with appropriate alternative as covered by patient's insurance. Dx: E11.65 1/27/25   Odalis Nash DO   tiZANidine (ZANAFLEX) 4 mg tablet Take 1 tablet (4 mg total) by mouth daily at bedtime as needed for muscle spasms 10/8/24 4/6/25  Mikki Johnson MD   vitamin B-12 (CYANOCOBALAMIN) 500 MCG TABS Take 1 tablet (500 mcg total) by mouth daily 3/14/24 4/13/24  Morris  MD [place]       MEDICATIONS ADMINISTERED IN LAST 24 HOURS     Medication Administration - last 24 hours from 02/26/2025 0404 to 02/27/2025 0404         Date/Time Order Dose Route Action Action by     02/27/2025 0117 EST sodium chloride 0.9 % bolus 1,000 mL 0 mL Intravenous Stopped Tony Dinh RN     02/26/2025 2049 EST sodium chloride 0.9 % bolus 1,000 mL 1,000 mL Intravenous New Gianna Ashley Erazo RN     02/26/2025 2141 EST iohexol (OMNIPAQUE) 350 MG/ML injection (SINGLE-DOSE) 85 mL 75 mL Intravenous Given Lucien Adkins     03/02/2025 1800 EST apixaban (ELIQUIS) tablet 5 mg -- Oral Held Dose (none)     03/02/2025 0900 EST apixaban (ELIQUIS) tablet 5 mg -- Oral Held Dose (none)     03/01/2025 1800 EST apixaban (ELIQUIS) tablet 5 mg -- Oral Held Dose (none)     03/01/2025 0900 EST apixaban (ELIQUIS) tablet 5 mg -- Oral Held Dose (none)     02/28/2025 1800 EST apixaban (ELIQUIS) tablet 5 mg -- Oral Held Dose (none)     02/28/2025 0900 EST apixaban (ELIQUIS) tablet 5 mg -- Oral Held Dose (none)     02/27/2025 1800 EST apixaban (ELIQUIS) tablet 5 mg -- Oral Held Dose (none)     02/27/2025 0900 EST apixaban (ELIQUIS) tablet 5 mg -- Oral Held Dose (none)     02/27/2025 0008 EST apixaban (ELIQUIS) tablet 5 mg -- Oral Held by provider Arpit Eid MD     02/27/2025 0116 EST gabapentin (NEURONTIN) capsule 900 mg 900 mg Oral Given Tony Dinh RN     02/27/2025 0117 EST tiZANidine (ZANAFLEX) tablet 4 mg 4 mg Oral Given Tony Dinh RN            CURRENT MEDICATIONS     Current Facility-Administered Medications   Medication Dose Route Frequency Provider Last Rate    [Held by provider] apixaban  5 mg Oral BID Arpit Eid MD      aspirin  81 mg Oral Daily Arpit Eid MD      atorvastatin  20 mg Oral Daily Arpit Eid MD      vitamin B-12  500 mcg Oral Daily Arpit Eid MD      Diclofenac Sodium  2 g Topical Q6H PRN Arpit Eid MD      ferrous sulfate  325 mg Oral Daily With Breakfast  "Arpit Eid MD      gabapentin  600 mg Oral BID Arpit Eid MD      gabapentin  900 mg Oral HS Arpit Eid MD      lisinopril  20 mg Oral Daily Arpit Eid MD      And    hydroCHLOROthiazide  12.5 mg Oral Daily Arpit Eid MD      insulin lispro  1-6 Units Subcutaneous TID AC Joseph Shepard MD      menthol-methyl salicylate   Apply externally Daily PRN Arpit Eid MD      tiZANidine  4 mg Oral HS PRN Arpit Eid MD            Diclofenac Sodium, 2 g, Q6H PRN  menthol-methyl salicylate, , Daily PRN  tiZANidine, 4 mg, HS PRN      Portions of the record may have been created with voice recognition software.  Occasional wrong word or \"sound a like\" substitutions may have occurred due to the inherent limitations of voice recognition software.  Read the chart carefully and recognize, using context, where substitutions have occurred.    "

## 2025-02-27 NOTE — PROGRESS NOTES
INTERNAL MEDICINE RESIDENCY SENIOR ADMISSION NOTE     Name: Jenifer Smith   Age & Sex: 69 y.o. female   MRN: [id number]  Unit/Bed#: ED 16   Encounter: [id number]  Primary Care Provider: Syed Landrum MD    Admit to team: SOD Team A    Patient seen and examined. Reviewed H&P per Dr. Shepard . Agree with the assessment and plan with any exception/addition as noted below:    69-year-old female past medical history of DVT on Eliquis, HTN, HLD, chronic back pain, diabetes, tobacco abuse with 16 pack years presents to [facility] ED from PCPs office on 2/27 because outpatient CBC showed hemoglobin of 6.9, MCV 80, RDW 17.5, ferritin 4, and iron saturation 4. Given the recent drop in Hgb and positive FOBT in PCP office, there is concern for acute blood loss anemia secondary to GI bleed. It was advised that patient go to the hospital for further management. Eliquis was held.  Upon arrival, patient was consented and transfused 1 unit PRBC.    CTA PE in ED was negative for PE.  On physical exam, patient continues to endorse chronic LLE pain, dizziness when arising from bed, and intermittent SOB.  Denies abdominal pain, diarrhea, constipation, nausea, vomiting, hemoptysis/hematemesis, easy bruising, bright red blood in stool.  Patient does note, however, that since she has been taking iron supplements, her stool has been dark.  Patient will be admitted to [facility] internal medicine service for transfusion, hemoglobin monitoring, evaluation with GI for possible colonoscopy. She is Level 1 full code.    Plan:  Consult GI, appreciate input  NPO for possible intervention w GI  Follow-up bilateral venous duplex US  Continue holding home Eliquis  Monitor H&H, transfuse if Hb less than 7  Monitor orthostatics, fall precautions  Monitor bowel movements  Rest of care per H&P note      Active Problems:    Benign essential hypertension    Hyperlipidemia    Chronic bilateral low back pain with left-sided sciatica    Depression, recurrent  (HCC)    Type 2 diabetes mellitus with diabetic polyneuropathy, without long-term current use of insulin (HCC)    Smoking    DVT (deep venous thrombosis) (HCC)      Code Status: No Order  Admission Status: INPATIENT   Disposition: Patient requires Med/Surg with Telemetry

## 2025-02-27 NOTE — PROGRESS NOTES
Progress Note - Internal Medicine   Name: Jenifer Smith 69 y.o. female I MRN: [id number]  Unit/Bed#: ED 16 I Date of Admission: 2/26/2025   Date of Service: 2/27/2025 I Hospital Day: 1     Assessment & Plan  Symptomatic anemia  Prior to presentation in ED she was at outpatient clinic where she was noted to be fatigued, dizzy, positive orthostatics, positive FOBT.  Hemoglobin couple days prior to presentation in clinic 6.9, MCV: 80, low iron count consistent with VALENTIN.  No obvious source of bleeding though positive FOBT makes GI source possibility.  She denied any epistaxis, hematochezia, hemoptysis, BRBPR, significantly dark stools (though she mentioned that she takes iron supplementation PTA).  Notably tachycardic when she first presented to ED, repeat Hb: 6.5.  S/p 1 unit pRBC.  No obvious bleeding on CTA PE, abdomen not scanned.  Was noted to have lost around 10 pounds unintentionally since November 2024.  Of note, low hemoglobin and unintentional weight loss has not not been a new issue for her.  It appears that in 2023 she was having low hemoglobin levels and  ~20 lb weight loss for which she underwent EGD on 10/6 which did not show any obvious source of bleeding (moderate edematous and erythematous mucosa in gastric antrum, biopsies collected positive for H. pylori, confirmed eradication following quadruple therapy on stool antigen test on 11/27/2023) and CT abdomen and pelvis with contrast on 10/9 which was unremarkable.  Additionally, VALENTIN is not new diagnosis for her (it is why she takes iron supplementation).  Last colonoscopy on 9/15/2022 unremarkable.  She does not have many CBCs in her chart which makes establishing a baseline for her very difficult.  No hemoglobin level prior to starting Eliquis, 10.7 on 6/28/2024.  Nevertheless she warrants further workup.    Received 1 unit pRBCs- repeat Hgb 7.9      Plan  -C/w telemetry  -S/p 1U pRBC, trend hemoglobin  -Goal Hb >7, transfuse as indicated  -GI  consulted - plan for double scope tomorrow  -Clear liquid diet; NPO midnight  -I's and O's, monitor BMs and capture image in media tab  -Hold PTA Eliquis and ASA  -Continue iron supplementation, vitamin B12  - Consider venofer 400mg x3 days due to iron deficit  Benign essential hypertension  History of hypertension, on lisinopril-hydrochlorothiazide (Prinzide) 20-12.5 mg PTA.  Despite her anemia, pressures were normal to elevated (122/74 in clinic, 117-151/56-77 in ED).  Noted positive orthostatics in clinic yesterday.    Plan  -Lisinopril 20 mg and hydrochlorothiazide 12.5 mg QD (Prinzide not on formulary)  Hyperlipidemia  History of hyperlipidemia, last lipid panel on 6/8/2024: Cholesterol: 136, triglycerides: 56, HDL: 52, LDL: 73 (goal LDL <70, ASCVD risk: 40.5%) on atorvastatin 20 mg PTA.     Plan  -Increase atorvastatin to 40mg daily due to high risk of ASCVD  Type 2 diabetes mellitus with diabetic polyneuropathy, without long-term current use of insulin (HCC)  Lab Results   Component Value Date    HGBA1C 6.2 01/29/2025       Recent Labs     02/27/25  0557   POCGLU 111       Blood Sugar Average: Last 72 hrs:  (P) 111  History of T2DM, last A1c on 1/29/2025: 6.2.  On metformin, 1000 mg BID PTA.    Plan  -SSI  -Currently n.p.o. for potential invasive GI evaluation  -Accu-Cheks Q6H while NPO  -Carb controlled diet with Accu-Cheks QID (with meals and before bed) once cleared to restart diet  -Goal glucose: 140-180  -Hypoglycemia protocol  -Hold PTA metformin  Smoking  History of TUD, has been smoking since the 90s.  Says she smokes around eight cigarettes a day.  Mild apical emphysematous changes and stable 2 mm anterolateral RUL nodule noted on imaging.    Plan  -Nicotine patch and/or gum if requested  -Tobacco cessation counseling  Lumbar radiculopathy  Noted longstanding history of back pain and radiculopathy.  Has tried physical therapy in the past.  Had intralaminar epidural steroid injection on 1/22/24 with  notable improvement.  Follows with orthopedics, last seen on 6/26/2024.  Still having some back and leg (R>L) pain today with ambulatory dysfunction.  On gabapentin, tizanidine PTA.  Side note, she is also having shoulder pain for which she was taking IcyHot PTA    Plan  -Continue gabapentin 600 mg TID and 900 mg QHS  -Continue tizanidine, 4 mg QHS  -Voltaren gel PRN  -Bengay for shoulder pain PRN  -Consider PT/OT consult  Deep vein thrombosis (DVT) of right lower extremity (HCC)  In December 2024 she was noted to have had ~three-four week history of right lower extremity leg pain and ~two week history of swelling.  B/l lower extremity duplex on 12/11 notable for subacute versus chronic DVT on one of the paired right posterior tibial veins.  Did not appear provoked though noted to have sat in the car for ~six hours (based on clinic note from 2/11/2025).  Given seven day course of Eliquis, 10 mg BID followed by 5 mg BID afterwards (for at least three months).  Was compliant with the medication and only stopped it day of admission 2/2 low hemoglobin/symptomatic anemia.    Plan  -Hold PTA Eliquis 2/2 symptomatic anemia  -B/l LE duplex ordered- negative for DVT  PAD (peripheral artery disease) (HCC)  Patient complaining of leg pain that is worse w/ walking. Had screening test outpatient and was found to have severe PAD. Ordered repeat study outpatient and was scheduled for VAS US 3/13.    Plan  - will check VAS arterial doppler while inpatient    Disposition: Pending GI procedure tomorrow     Team: SOD TEAM A    Subjective   Patient seen and examined. No acute events overnight. Patient currently complaining of fatigue. She states that she has had this for the past year and it recently worsened last month. She is also complaining of pain in her back and legs. She states that she has had this for a while. Denies any chest pain, SOB, nausea, vomiting, fevers, or chills.    Objective :  Temp:  [97.6 °F (36.4 °C)-99.2 °F  (37.3 °C)] 98.3 °F (36.8 °C)  HR:  [] 82  BP: (117-158)/(56-78) 158/78  Resp:  [14-22] 18  SpO2:  [97 %-100 %] 100 %  O2 Device: None (Room air)    I/O       None          Weights:        There is no height or weight on file to calculate BMI.  Weight (last 2 days)       None            Physical Exam  HENT:      Head: Normocephalic and atraumatic.      Mouth/Throat:      Mouth: Mucous membranes are moist.   Eyes:      Extraocular Movements: Extraocular movements intact.      Pupils: Pupils are equal, round, and reactive to light.   Cardiovascular:      Rate and Rhythm: Normal rate and regular rhythm.      Pulses: Normal pulses.      Heart sounds: Normal heart sounds. No murmur heard.     No friction rub. No gallop.   Pulmonary:      Effort: Pulmonary effort is normal.      Breath sounds: Rales (inspiratory) present. No wheezing or rhonchi.   Abdominal:      General: Abdomen is flat. Bowel sounds are normal. There is no distension.      Palpations: Abdomen is soft.      Tenderness: There is abdominal tenderness (epigastric). There is no guarding.      Hernia: No hernia is present.   Skin:     General: Skin is warm.      Capillary Refill: Capillary refill takes less than 2 seconds.   Neurological:      General: No focal deficit present.      Mental Status: She is alert and oriented to person, place, and time. Mental status is at baseline.           Lab Results: I have reviewed the following results:  Recent Labs     02/26/25  2043 02/26/25  2253 02/27/25  0555   WBC 9.43  --  7.89   HGB 6.5*  --  7.3*   HCT 22.2*  --  24.2*     --  339   SODIUM 139  --  139   K 4.0  --  5.2     --  109*   CO2 26  --  28   BUN 22  --  17   CREATININE 0.82  --  0.67   GLUC 165*  --  111   AST 11*  --  35   ALT 9  --  14   ALB 4.1  --  3.8   TBILI 0.24  --  0.41   ALKPHOS 61  --  55   PTT 26  --   --    INR 1.08  --   --    HSTNI0 3  --   --    HSTNI2  --  3  --    BNP 21  --   --              Currently Ordered Meds:    Current Facility-Administered Medications:     [Held by provider] apixaban (ELIQUIS) tablet 5 mg, BID    [START ON 2/28/2025] atorvastatin (LIPITOR) tablet 40 mg, Daily    cyanocobalamin (VITAMIN B-12) tablet 500 mcg, Daily    Diclofenac Sodium (VOLTAREN) 1 % topical gel 2 g, Q6H PRN    ferrous sulfate tablet 325 mg, Daily With Breakfast    gabapentin (NEURONTIN) capsule 600 mg, BID    gabapentin (NEURONTIN) capsule 900 mg, HS    lisinopril (ZESTRIL) tablet 20 mg, Daily **AND** hydroCHLOROthiazide tablet 12.5 mg, Daily    insulin lispro (HumALOG/ADMELOG) 100 units/mL subcutaneous injection 1-6 Units, TID AC **AND** Fingerstick Glucose (POCT), Q6H    menthol-methyl salicylate (BENGAY) 10-15 % cream, Daily PRN    pantoprazole (PROTONIX) injection 40 mg, BID    polyethylene glycol (GOLYTELY) bowel prep 4,000 mL, See Admin Instructions    tiZANidine (ZANAFLEX) tablet 4 mg, HS PRN  VTE Pharmacologic Prophylaxis: VTE covered by:  [Held by provider] apixaban, Oral     VTE Mechanical Prophylaxis: sequential compression device    Administrative Statements     Portions of the record may have been created with voice recognition software.      Tanner Beaulieu PGY-1  Internal Medicine

## 2025-02-27 NOTE — ASSESSMENT & PLAN NOTE
Patient complaining of leg pain that is worse w/ walking. Had screening test outpatient and was found to have severe PAD. Ordered repeat study outpatient and was scheduled for VAS US 3/13.    Plan  - will check VAS arterial doppler while inpatient

## 2025-02-27 NOTE — ASSESSMENT & PLAN NOTE
Prior to presentation in ED she was at outpatient clinic where she was noted to be fatigued, dizzy, positive orthostatics, positive FOBT.  Hemoglobin couple days prior to presentation in clinic 6.9, MCV: 80, low iron count consistent with VALENTIN.  No obvious source of bleeding though positive FOBT makes GI source possibility.  She denied any epistaxis, hematochezia, hemoptysis, BRBPR, significantly dark stools (though she mentioned that she takes iron supplementation PTA).  Notably tachycardic when she first presented to ED, repeat Hb: 6.5.  S/p 1 unit pRBC.  No obvious bleeding on CTA PE, abdomen not scanned.  Was noted to have lost around 10 pounds unintentionally since November 2024.  Of note, low hemoglobin and unintentional weight loss has not not been a new issue for her.  It appears that in 2023 she was having low hemoglobin levels and  ~20 lb weight loss for which she underwent EGD on 10/6 which did not show any obvious source of bleeding (moderate edematous and erythematous mucosa in gastric antrum, biopsies collected positive for H. pylori, confirmed eradication following quadruple therapy on stool antigen test on 11/27/2023) and CT abdomen and pelvis with contrast on 10/9 which was unremarkable.  Additionally, VALENTIN is not new diagnosis for her (it is why she takes iron supplementation).  Last colonoscopy on 9/15/2022 unremarkable.  She does not have many CBCs in her chart which makes establishing a baseline for her very difficult.  No hemoglobin level prior to starting Eliquis, 10.7 on 6/28/2024.  Nevertheless she warrants further workup.    Received 1 unit pRBCs- repeat Hgb 7.9      Plan  -C/w telemetry  -S/p 1U pRBC, trend hemoglobin  -Goal Hb >7, transfuse as indicated  -GI consulted - plan for double scope tomorrow  -Clear liquid diet; NPO midnight  -I's and O's, monitor BMs and capture image in media tab  -Hold PTA Eliquis and ASA  -Continue iron supplementation, vitamin B12  - Consider venofer 400mg x3  days due to iron deficit

## 2025-02-27 NOTE — ASSESSMENT & PLAN NOTE
History of TUD, has been smoking since the 90s.  Says she smokes around eight cigarettes a day.  Mild apical emphysematous changes and stable 2 mm anterolateral RUL nodule noted on imaging.    Plan  -Nicotine patch and/or gum if requested  -Tobacco cessation counseling

## 2025-02-27 NOTE — TELEPHONE ENCOUNTER
Folder Color- Red    Name of Form- PT Plan of Care     Form to be filled out by- Dr. Johnson    Form to be Faxed [phone]    Patient made aware of 10 business day policy.

## 2025-02-27 NOTE — ED NOTES
Pt requesting to speak with doctor. Pt frustrated she isnt allowed to eat. RN brought coffee and juice for patient since she is a clear liquid diet. Admitting provider notified.      Jazzmine Robles RN  02/27/25 [id number]

## 2025-02-27 NOTE — ASSESSMENT & PLAN NOTE
Noted longstanding history of back pain and radiculopathy.  Has tried physical therapy in the past.  Had intralaminar epidural steroid injection on 1/22/24 with notable improvement.  Follows with orthopedics, last seen on 6/26/2024.  Still having some back and leg (R>L) pain today with ambulatory dysfunction.  On gabapentin, tizanidine PTA.  Side note, she is also having shoulder pain for which she was taking IcyHot PTA    Plan  -Continue gabapentin 600 mg TID and 900 mg QHS  -Continue tizanidine, 4 mg QHS  -Voltaren gel PRN  -Bengay for shoulder pain PRN  -Consider PT/OT consult

## 2025-02-27 NOTE — TELEPHONE ENCOUNTER
Caller: Patient    Doctor: Dr. Jones    Reason for call: lower back pain and stuart leg pain, would like to schedule a procedure    Patient been admitted since yesterday    Call back#:

## 2025-02-27 NOTE — ED PROVIDER NOTES
Time reflects when diagnosis was documented in both MDM as applicable and the Disposition within this note       Time User Action Codes Description Comment    2/26/2025 10:11 PM Sanya George [D64.9] Symptomatic anemia     2/26/2025 10:22 PM Sanya George [R07.89] Atypical chest pain           ED Disposition       ED Disposition   Admit    Condition   Stable    Date/Time   Wed Feb 26, 2025 10:21 PM    Comment   Case was discussed with SOD and the patient's admission status was agreed to be Admission Status: inpatient status to the service of Dr. Roberson.               Assessment & Plan       Medical Decision Making  The patient is comfortable appearing but persistently tachycardic on arrival. Physical examination is benign. (+) FOBT in the office and outpatient hgb of <7.0. Fatigue and dizziness likely due to anemia (possibly secondary to GI bleed?). Unclear etiology of chest pain. PE vs ACS vs pneumonia vs symptomatic anemia? Will check EKG, basic labs, troponin, type & screen, and CT PE protocol. Will continue to monitor in the ED. Disposition per workup and reassessment. The patient will likely require admission to [facility] for transfusion, overnight monitoring, and further workup.    Critical Care Time Statement: Upon my evaluation, this patient had a high probability of imminent or life-threatening deterioration due to symptomatic anemia and acute blood loss, which required my direct attention, intervention, and personal management.  I spent a total of 35 minutes directly providing critical care services, including interpretation of complex medical databases, evaluating for the presence of life-threatening injuries or illnesses, complex medical decision making (to support/prevent further life-threatening deterioration)., interpretation of hemodynamic data, and administration of blood products. This time is exclusive of procedures, teaching, treating other patients, family meetings, and any prior time  recorded by providers other than myself.       Amount and/or Complexity of Data Reviewed  Independent Historian: caregiver     Details: Daughter provided additional history.  Labs: ordered. Decision-making details documented in ED Course.  Radiology: ordered.  ECG/medicine tests: ordered and independent interpretation performed.    Risk  Prescription drug management.  Decision regarding hospitalization.        ED Course as of 02/27/25 0121 Wed Feb 26, 2025 2312 Hemoglobin(!): 6.5   2313 Creatinine: 0.82   2313 POCT INR: 1.08       Medications   apixaban (ELIQUIS) tablet 5 mg ( Oral Held Dose 3/2/25 1800)   aspirin (ECOTRIN LOW STRENGTH) EC tablet 81 mg (has no administration in time range)   atorvastatin (LIPITOR) tablet 20 mg (has no administration in time range)   Diclofenac Sodium (VOLTAREN) 1 % topical gel 2 g (has no administration in time range)   ferrous sulfate tablet 325 mg (has no administration in time range)   gabapentin (NEURONTIN) capsule 900 mg (900 mg Oral Given 2/27/25 0116)   gabapentin (NEURONTIN) capsule 600 mg (has no administration in time range)   lisinopril (ZESTRIL) tablet 20 mg (has no administration in time range)     And   hydroCHLOROthiazide tablet 12.5 mg (has no administration in time range)   menthol-methyl salicylate (BENGAY) 10-15 % cream (has no administration in time range)   tiZANidine (ZANAFLEX) tablet 4 mg (4 mg Oral Given 2/27/25 0117)   cyanocobalamin (VITAMIN B-12) tablet 500 mcg (has no administration in time range)   insulin lispro (HumALOG/ADMELOG) 100 units/mL subcutaneous injection 1-6 Units (has no administration in time range)   sodium chloride 0.9 % bolus 1,000 mL (0 mL Intravenous Stopped 2/27/25 0117)   iohexol (OMNIPAQUE) 350 MG/ML injection (SINGLE-DOSE) 85 mL (75 mL Intravenous Given 2/26/25 2141)       ED Risk Strat Scores   HEART Risk Score      Flowsheet Row Most Recent Value   Heart Score Risk Calculator    History 0 Filed at: 02/26/2025 2315   ECG 1  "Filed at: 02/26/2025 2315   Age 2 Filed at: 02/26/2025 2315   Risk Factors 2 Filed at: 02/26/2025 2315   Troponin 0 Filed at: 02/26/2025 2315   HEART Score 5 Filed at: 02/26/2025 2315          HEART Risk Score      Flowsheet Row Most Recent Value   Heart Score Risk Calculator    History 0 Filed at: 02/26/2025 2315   ECG 1 Filed at: 02/26/2025 2315   Age 2 Filed at: 02/26/2025 2315   Risk Factors 2 Filed at: 02/26/2025 2315   Troponin 0 Filed at: 02/26/2025 2315   HEART Score 5 Filed at: 02/26/2025 2315                              SBIRT 22yo+      Flowsheet Row Most Recent Value   Initial Alcohol Screen: US AUDIT-C     1. How often do you have a drink containing alcohol? 0 Filed at: 02/26/2025 2009   2. How many drinks containing alcohol do you have on a typical day you are drinking?  0 Filed at: 02/26/2025 2009   3a. Male UNDER 65: How often do you have five or more drinks on one occasion? 0 Filed at: 02/26/2025 2009   3b. FEMALE Any Age, or MALE 65+: How often do you have 4 or more drinks on one occassion? 0 Filed at: 02/26/2025 2009   Audit-C Score 0 Filed at: 02/26/2025 2009   OLIMPIA: How many times in the past year have you...    Used an illegal drug or used a prescription medication for non-medical reasons? Never Filed at: 02/26/2025 2009                            History of Present Illness       Chief Complaint   Patient presents with    Anemia     Pt was sent from her PCP for low hemoglobin. Pt reports blood clot in R leg since December. +CP/SOB        Past Medical History:   Diagnosis Date    Arthritis     Bump     bumped head yesterday at work \"saw stars\" no LOC, cat scan done was normal    Circulation problem     Diabetes mellitus (HCC)     blood sugar 125 on admission    Encounter for screening colonoscopy     1 st one    Headache     Hyperlipidemia     Hypertension     Positive fecal occult blood test 08/30/2018    Added automatically from request for surgery [id number]    Post concussion syndrome 09/14/2018 "    Syncope       Past Surgical History:   Procedure Laterality Date    BREAST BIOPSY Right 02/18/2022    COLONOSCOPY N/A 9/26/2018    Procedure: COLONOSCOPY;  Surgeon: Joie Morejon MD;  Location: [facility] GI LAB;  Service: Gastroenterology    HYSTERECTOMY  2017    INCISIONAL BREAST BIOPSY      last assessed 17Aug2017    CO COLONOSCOPY FLX DX W/COLLJ SPEC WHEN PFRMD N/A 7/5/2018    Procedure: COLONOSCOPY;  Surgeon: Joie Morejon MD;  Location:  GI LAB;  Service: Gastroenterology    US GUIDED BREAST BIOPSY RIGHT COMPLETE Right 2/18/2022      Family History   Problem Relation Age of Onset    Breast cancer Cousin 50    Alcohol abuse Mother     Alcohol abuse Father     Breast cancer Daughter 38    No Known Problems Sister     No Known Problems Maternal Grandmother     No Known Problems Maternal Grandfather     No Known Problems Paternal Grandmother     No Known Problems Paternal Grandfather     No Known Problems Son     No Known Problems Son     No Known Problems Daughter     No Known Problems Daughter     No Known Problems Daughter     Breast cancer Sister 66    No Known Problems Maternal Aunt     No Known Problems Maternal Aunt     No Known Problems Maternal Aunt     No Known Problems Maternal Aunt     No Known Problems Maternal Aunt     No Known Problems Maternal Aunt     No Known Problems Maternal Aunt     No Known Problems Maternal Aunt     No Known Problems Maternal Aunt     No Known Problems Maternal Aunt     No Known Problems Maternal Aunt     No Known Problems Maternal Aunt     No Known Problems Paternal Aunt     No Known Problems Paternal Aunt     Dementia Paternal Aunt     No Known Problems Paternal Aunt     No Known Problems Paternal Aunt     No Known Problems Paternal Aunt     No Known Problems Paternal Aunt     No Known Problems Paternal Aunt     Colon cancer Neg Hx     Endometrial cancer Neg Hx     Ovarian cancer Neg Hx       Social History     Tobacco Use    Smoking status: Every Day     Current  "packs/day: 0.50     Average packs/day: 0.5 packs/day for 20.0 years (10.0 ttl pk-yrs)     Types: Cigarettes    Smokeless tobacco: Never    Tobacco comments:     smokes less than 1pk/day   Vaping Use    Vaping status: Never Used   Substance Use Topics    Alcohol use: Never    Drug use: Never      E-Cigarette/Vaping    E-Cigarette Use Never User       E-Cigarette/Vaping Substances    Nicotine No     THC No     CBD No     Flavoring No     Other No     Unknown No       I have reviewed and agree with the history as documented.     70 yo female with a history of HTN, DM, hyperlipidemia, osteoarthritis, and RLE DVT on Eliquis sent to the ED from her PCP's office for evaluation of anemia and rectal bleeding. The patient reports progressively worsening lightheadedness and generalized malaise/fatigue x several days. She went to her doctor's office this afternoon and was referred to the ED because of (+) orthostatics and (+) FOBT. Recent outpatient hemoglobin noted to be 6.9. (+) \"Dark\" stools x several weeks but no bright red blood per rectum. (+) Secondary complaint of intermittent midsternal chest discomfort and mild shortness of breath x 1 month. (+) Baseline LE pain. No cough or hemoptysis. She denies fevers and chills. No recent illnesses. No other specific complaints.        Review of Systems   Constitutional:  Positive for fatigue. Negative for chills and fever.   HENT:  Negative for ear pain and sore throat.    Eyes:  Negative for pain and visual disturbance.   Respiratory:  Positive for shortness of breath. Negative for cough.    Cardiovascular:  Positive for chest pain. Negative for palpitations.   Gastrointestinal:  Negative for abdominal pain, anal bleeding, blood in stool, nausea and vomiting.   Genitourinary:  Negative for dysuria and hematuria.   Musculoskeletal:  Negative for arthralgias and back pain.   Skin:  Negative for color change and rash.   Neurological:  Positive for light-headedness. Negative for " seizures and syncope.   All other systems reviewed and are negative.          Objective       ED Triage Vitals   Temperature Pulse Blood Pressure Respirations SpO2 Patient Position - Orthostatic VS   02/26/25 2008 02/26/25 2008 02/26/25 2008 02/26/25 2008 02/26/25 2008 02/26/25 2008   97.6 °F (36.4 °C) (!) 112 151/77 18 100 % Sitting      Temp Source Heart Rate Source BP Location FiO2 (%) Pain Score    02/26/25 2008 02/26/25 2130 02/26/25 2008 -- --    Temporal Monitor Left arm        Vitals      Date and Time Temp Pulse SpO2 Resp BP Pain Score FACES Pain Rating User   02/27/25 0045 -- 100 99 % 18 129/60 -- -- PT   02/26/25 2345 99 °F (37.2 °C) 102 99 % 18 130/65 -- -- PT   02/26/25 2315 -- 100 100 % 19 118/62 -- -- PT   02/26/25 2307 99.2 °F (37.3 °C) 105 99 % 18 118/62 -- -- SM   02/26/25 2130 -- 104 99 % 20 122/59 -- --    02/26/25 2115 -- 106 99 % 19 122/59 -- --    02/26/25 2008 97.6 °F (36.4 °C) 112 100 % 18 151/77 -- --             Physical Exam  Vitals and nursing note reviewed.   Constitutional:       General: She is not in acute distress.     Appearance: She is well-developed.   HENT:      Head: Normocephalic and atraumatic.   Eyes:      Conjunctiva/sclera: Conjunctivae normal.   Cardiovascular:      Rate and Rhythm: Regular rhythm. Tachycardia present.      Heart sounds: No murmur heard.  Pulmonary:      Effort: Pulmonary effort is normal. No respiratory distress.      Breath sounds: Normal breath sounds.   Abdominal:      Palpations: Abdomen is soft.      Tenderness: There is no abdominal tenderness.   Musculoskeletal:         General: No swelling.      Cervical back: Neck supple.   Skin:     General: Skin is warm and dry.      Capillary Refill: Capillary refill takes less than 2 seconds.   Neurological:      Mental Status: She is alert.   Psychiatric:         Mood and Affect: Mood normal.         Results Reviewed       Procedure Component Value Units Date/Time    HS Troponin I 2hr [[id number]]   (Normal) Collected: 02/26/25 2253    Lab Status: Final result Specimen: Blood from Arm, Right Updated: 02/26/25 2339     hs TnI 2hr 3 ng/L      Delta 2hr hsTnI 0 ng/L     HS Troponin I 4hr [[id number]]     Lab Status: No result Specimen: Blood     UA w Reflex to Microscopic w Reflex to Culture [[id number]]  (Abnormal) Collected: 02/26/25 2155    Lab Status: Final result Specimen: Urine, Clean Catch Updated: 02/26/25 2208     Color, UA Light Yellow     Clarity, UA Clear     Specific Gravity, UA 1.023     pH, UA 6.5     Leukocytes, UA Negative     Nitrite, UA Negative     Protein, UA Negative mg/dl      Glucose, UA 70 (7/100%) mg/dl      Ketones, UA Negative mg/dl      Urobilinogen, UA 2.0 mg/dl      Bilirubin, UA Negative     Occult Blood, UA Negative    B-Type Natriuretic Peptide(BNP) [[id number]]  (Normal) Collected: 02/26/25 2043    Lab Status: Final result Specimen: Blood from Arm, Right Updated: 02/26/25 2129     BNP 21 pg/mL     HS Troponin 0hr (reflex protocol) [[id number]]  (Normal) Collected: 02/26/25 2043    Lab Status: Final result Specimen: Blood from Arm, Right Updated: 02/26/25 2120     hs TnI 0hr 3 ng/L     Comprehensive metabolic panel [[id number]]  (Abnormal) Collected: 02/26/25 2043    Lab Status: Final result Specimen: Blood from Arm, Right Updated: 02/26/25 2119     Sodium 139 mmol/L      Potassium 4.0 mmol/L      Chloride 104 mmol/L      CO2 26 mmol/L      ANION GAP 9 mmol/L      BUN 22 mg/dL      Creatinine 0.82 mg/dL      Glucose 165 mg/dL      Calcium 9.6 mg/dL      AST 11 U/L      ALT 9 U/L      Alkaline Phosphatase 61 U/L      Total Protein 7.2 g/dL      Albumin 4.1 g/dL      Total Bilirubin 0.24 mg/dL      eGFR 73 ml/min/1.73sq m     Narrative:      National Kid[employer] guidelines for Chronic Kidney Disease (CKD):     Stage 1 with normal or high GFR (GFR > 90 mL/min/1.73 square meters)    Stage 2 Mild CKD (GFR = 60-89 mL/min/1.73 square meters)    Stage 3A Moderate CKD (GFR = 45-59  mL/min/1.73 square meters)    Stage 3B Moderate CKD (GFR = 30-44 mL/min/1.73 square meters)    Stage 4 Severe CKD (GFR = 15-29 mL/min/1.73 square meters)    Stage 5 End Stage CKD (GFR <15 mL/min/1.73 square meters)  Note: GFR calculation is accurate only with a steady state creatinine    Protime-INR [[id number]]  (Normal) Collected: 02/26/25 2043    Lab Status: Final result Specimen: Blood from Arm, Right Updated: 02/26/25 2109     Protime 14.3 seconds      INR 1.08    Narrative:      INR Therapeutic Range    Indication                                             INR Range      Atrial Fibrillation                                               2.0-3.0  Hypercoagulable State                                    2.0.2.3  Left Ventricular Asist Device                            2.0-3.0  Mechanical Heart Valve                                  -    Aortic(with afib, MI, embolism, HF, LA enlargement,    and/or coagulopathy)                                     2.0-3.0 (2.5-3.5)     Mitral                                                             2.5-3.5  Prosthetic/Bioprosthetic Heart Valve               2.0-3.0  Venous thromboembolism (VTE: VT, PE        2.0-3.0    APTT [[id number]]  (Normal) Collected: 02/26/25 2043    Lab Status: Final result Specimen: Blood from Arm, Right Updated: 02/26/25 2109     PTT 26 seconds     CBC and differential [[id number]]  (Abnormal) Collected: 02/26/25 2043    Lab Status: Final result Specimen: Blood from Arm, Right Updated: 02/26/25 2059     WBC 9.43 Thousand/uL      RBC 2.84 Million/uL      Hemoglobin 6.5 g/dL      Hematocrit 22.2 %      MCV 78 fL      MCH 22.9 pg      MCHC 29.3 g/dL      RDW 17.7 %      MPV 9.5 fL      Platelets 385 Thousands/uL      nRBC 0 /100 WBCs      Segmented % 77 %      Immature Grans % 0 %      Lymphocytes % 10 %      Monocytes % 12 %      Eosinophils Relative 1 %      Basophils Relative 0 %      Absolute Neutrophils 7.21 Thousands/µL      Absolute Immature Grans  0.04 Thousand/uL      Absolute Lymphocytes 0.98 Thousands/µL      Absolute Monocytes 1.09 Thousand/µL      Eosinophils Absolute 0.09 Thousand/µL      Basophils Absolute 0.02 Thousands/µL             CTA chest pe study   Final Interpretation by Harrison Lopez MD (02/26 2239)      Slightly limited evaluation as above.   No pulmonary embolus identified to the level of the proximal segmental branches.   No focal airspace consolidation or effusion.                  Workstation performed: [id number]          VAS VENOUS DUPLEX - LOWER LIMB BILATERAL    (Results Pending)       ECG 12 Lead Documentation Only    Date/Time: 2/26/2025 9:30 PM    Performed by: Sanya George MD  Authorized by: Sanya George MD    Indications / Diagnosis:  Dizziness, chest pain  ECG reviewed by me, the ED Provider: yes    Patient location:  ED  Interpretation:     Interpretation: abnormal    Rate:     ECG rate:  113 bpm    ECG rate assessment: tachycardic    Rhythm:     Rhythm: sinus tachycardia    Ectopy:     Ectopy: none    QRS:     QRS axis:  Normal    QRS intervals:  Normal  Conduction:     Conduction: normal    ST segments:     ST segments:  Normal  T waves:     T waves: normal    Other findings:     Other findings: JESUS        ED Medication and Procedure Management   Prior to Admission Medications   Prescriptions Last Dose Informant Patient Reported? Taking?   Accu-Chek Softclix Lancets lancets   No No   Sig: Use 1 (one) time for 1 dose Use as instructed   Blood Glucose Monitoring Suppl (OneTouch Verio Reflect) w/Device KIT   No No   Sig: Check blood sugars three times daily. Please substitute with appropriate alternative as covered by patient's insurance. Dx: E11.65   Diclofenac Sodium (VOLTAREN) 1 %   No No   Sig: APPLY 2 GRAMS TOPICALLY THREE TIMES A DAY   Elastic Bandages & Supports (GNP Diabetic Socks Unisex XL) MISC  Self No No   Sig: Use 1 each if needed (1 pair of socks for diabetic neuropathy)   Lancets Misc. (Accu-Chek  Softclix Lancet Dev) KIT   No No   Sig: Use in the morning   Menthol, Topical Analgesic, (Icy Hot Advanced Relief) 7.5 % PTCH   No No   Sig: Apply 1 patch topically daily as needed (shoulder pain)   OneTouch Delica Lancets 33G MISC   No No   Sig: Check blood sugars three times daily. Please substitute with appropriate alternative as covered by patient's insurance. Dx: E11.65   apixaban (ELIQUIS) 5 mg   No No   Sig: Take 1 tablet (5 mg total) by mouth 2 (two) times a day Please fill this script. Thank you   aspirin (Aspirin 81) 81 mg EC tablet   No No   Sig: Take 1 tablet (81 mg total) by mouth daily   atorvastatin (LIPITOR) 20 mg tablet   No No   Sig: Take 1 tablet (20 mg total) by mouth daily   ferrous sulfate 324 (65 Fe) mg   No No   Sig: Take 1 tablet (324 mg total) by mouth every other day   gabapentin (NEURONTIN) 300 mg capsule   No No   Sig: Take 3 capsules (900 mg total) by mouth daily at bedtime   gabapentin (NEURONTIN) 300 mg capsule   No No   Sig: Take 2 capsules (600 mg total) by mouth 2 (two) times a day   glucose blood (Accu-Chek Guide) test strip   No No   Sig: Use 1 each in the morning Use as instructed   glucose blood test strip   No No   Sig: One Touch Verio brand. Use 1 each in the morning.   lisinopril-hydrochlorothiazide (PRINZIDE,ZESTORETIC) 20-12.5 MG per tablet   No No   Sig: Take 1 tablet by mouth once daily   metFORMIN (GLUCOPHAGE) 1000 MG tablet   No No   Sig: Take 1 tablet (1,000 mg total) by mouth 2 (two) times a day with meals   tiZANidine (ZANAFLEX) 4 mg tablet   No No   Sig: Take 1 tablet (4 mg total) by mouth daily at bedtime as needed for muscle spasms   vitamin B-12 (CYANOCOBALAMIN) 500 MCG TABS   No No   Sig: Take 1 tablet (500 mcg total) by mouth daily      Facility-Administered Medications: None     Patient's Medications   Discharge Prescriptions    No medications on file     No discharge procedures on file.  ED SEPSIS DOCUMENTATION   Time reflects when diagnosis was documented  in both MDM as applicable and the Disposition within this note       Time User Action Codes Description Comment    2/26/2025 10:11 PM Sanya George [D64.9] Symptomatic anemia     2/26/2025 10:22 PM Sanya George [R07.89] Atypical chest pain                  Sanya George MD  02/26/25 4404       Sanya George MD  02/27/25 2688

## 2025-02-27 NOTE — ED NOTES
Patient requesting to start bowel prep when she gets upstairs to her room with her own bathroom. Patient offered bedside commode and wishes to wait until she is in her room.      Jesus Garcia RN  02/27/25 [id number]

## 2025-02-28 ENCOUNTER — ANESTHESIA (INPATIENT)
Dept: GASTROENTEROLOGY | Facility: HOSPITAL | Age: 70
DRG: 378 | End: 2025-02-28
Payer: COMMERCIAL

## 2025-02-28 ENCOUNTER — APPOINTMENT (INPATIENT)
Dept: GASTROENTEROLOGY | Facility: HOSPITAL | Age: 70
DRG: 378 | End: 2025-02-28
Payer: COMMERCIAL

## 2025-02-28 ENCOUNTER — APPOINTMENT (INPATIENT)
Dept: NON INVASIVE DIAGNOSTICS | Facility: HOSPITAL | Age: 70
DRG: 378 | End: 2025-02-28
Payer: COMMERCIAL

## 2025-02-28 ENCOUNTER — ANESTHESIA EVENT (INPATIENT)
Dept: GASTROENTEROLOGY | Facility: HOSPITAL | Age: 70
DRG: 378 | End: 2025-02-28
Payer: COMMERCIAL

## 2025-02-28 PROBLEM — R07.9 CHEST PAIN ON EXERTION: Status: RESOLVED | Noted: 2021-07-14 | Resolved: 2025-02-28

## 2025-02-28 LAB
ANION GAP SERPL CALCULATED.3IONS-SCNC: 4 MMOL/L (ref 4–13)
BASOPHILS # BLD AUTO: 0.04 THOUSANDS/ÂΜL (ref 0–0.1)
BASOPHILS NFR BLD AUTO: 1 % (ref 0–1)
BUN SERPL-MCNC: 10 MG/DL (ref 5–25)
CALCIUM SERPL-MCNC: 9.1 MG/DL (ref 8.4–10.2)
CHLORIDE SERPL-SCNC: 109 MMOL/L (ref 96–108)
CO2 SERPL-SCNC: 29 MMOL/L (ref 21–32)
CREAT SERPL-MCNC: 0.68 MG/DL (ref 0.6–1.3)
EOSINOPHIL # BLD AUTO: 0.2 THOUSAND/ÂΜL (ref 0–0.61)
EOSINOPHIL NFR BLD AUTO: 3 % (ref 0–6)
ERYTHROCYTE [DISTWIDTH] IN BLOOD BY AUTOMATED COUNT: 18.4 % (ref 11.6–15.1)
GFR SERPL CREATININE-BSD FRML MDRD: 90 ML/MIN/1.73SQ M
GLUCOSE SERPL-MCNC: 80 MG/DL (ref 65–140)
GLUCOSE SERPL-MCNC: 86 MG/DL (ref 65–140)
GLUCOSE SERPL-MCNC: 87 MG/DL (ref 65–140)
GLUCOSE SERPL-MCNC: 91 MG/DL (ref 65–140)
HCT VFR BLD AUTO: 24.6 % (ref 34.8–46.1)
HGB BLD-MCNC: 7.5 G/DL (ref 11.5–15.4)
IMM GRANULOCYTES # BLD AUTO: 0.02 THOUSAND/UL (ref 0–0.2)
IMM GRANULOCYTES NFR BLD AUTO: 0 % (ref 0–2)
LYMPHOCYTES # BLD AUTO: 1.95 THOUSANDS/ÂΜL (ref 0.6–4.47)
LYMPHOCYTES NFR BLD AUTO: 29 % (ref 14–44)
MCH RBC QN AUTO: 24.3 PG (ref 26.8–34.3)
MCHC RBC AUTO-ENTMCNC: 30.5 G/DL (ref 31.4–37.4)
MCV RBC AUTO: 80 FL (ref 82–98)
MONOCYTES # BLD AUTO: 0.98 THOUSAND/ÂΜL (ref 0.17–1.22)
MONOCYTES NFR BLD AUTO: 14 % (ref 4–12)
NEUTROPHILS # BLD AUTO: 3.61 THOUSANDS/ÂΜL (ref 1.85–7.62)
NEUTS SEG NFR BLD AUTO: 53 % (ref 43–75)
NRBC BLD AUTO-RTO: 0 /100 WBCS
PLATELET # BLD AUTO: 344 THOUSANDS/UL (ref 149–390)
PMV BLD AUTO: 9.1 FL (ref 8.9–12.7)
POTASSIUM SERPL-SCNC: 4.2 MMOL/L (ref 3.5–5.3)
RBC # BLD AUTO: 3.09 MILLION/UL (ref 3.81–5.12)
SODIUM SERPL-SCNC: 142 MMOL/L (ref 135–147)
WBC # BLD AUTO: 6.8 THOUSAND/UL (ref 4.31–10.16)

## 2025-02-28 PROCEDURE — 45378 DIAGNOSTIC COLONOSCOPY: CPT | Performed by: INTERNAL MEDICINE

## 2025-02-28 PROCEDURE — 88305 TISSUE EXAM BY PATHOLOGIST: CPT | Performed by: PATHOLOGY

## 2025-02-28 PROCEDURE — 99232 SBSQ HOSP IP/OBS MODERATE 35: CPT | Performed by: INTERNAL MEDICINE

## 2025-02-28 PROCEDURE — 97163 PT EVAL HIGH COMPLEX 45 MIN: CPT

## 2025-02-28 PROCEDURE — 80048 BASIC METABOLIC PNL TOTAL CA: CPT

## 2025-02-28 PROCEDURE — 82948 REAGENT STRIP/BLOOD GLUCOSE: CPT

## 2025-02-28 PROCEDURE — 43255 EGD CONTROL BLEEDING ANY: CPT | Performed by: INTERNAL MEDICINE

## 2025-02-28 PROCEDURE — 43239 EGD BIOPSY SINGLE/MULTIPLE: CPT | Performed by: INTERNAL MEDICINE

## 2025-02-28 PROCEDURE — 88342 IMHCHEM/IMCYTCHM 1ST ANTB: CPT | Performed by: PATHOLOGY

## 2025-02-28 PROCEDURE — 85025 COMPLETE CBC W/AUTO DIFF WBC: CPT

## 2025-02-28 PROCEDURE — 93923 UPR/LXTR ART STDY 3+ LVLS: CPT

## 2025-02-28 PROCEDURE — 97166 OT EVAL MOD COMPLEX 45 MIN: CPT

## 2025-02-28 PROCEDURE — 0W3P8ZZ CONTROL BLEEDING IN GASTROINTESTINAL TRACT, VIA NATURAL OR ARTIFICIAL OPENING ENDOSCOPIC: ICD-10-PCS | Performed by: INTERNAL MEDICINE

## 2025-02-28 PROCEDURE — 0DJD8ZZ INSPECTION OF LOWER INTESTINAL TRACT, VIA NATURAL OR ARTIFICIAL OPENING ENDOSCOPIC: ICD-10-PCS | Performed by: INTERNAL MEDICINE

## 2025-02-28 PROCEDURE — 0DB68ZX EXCISION OF STOMACH, VIA NATURAL OR ARTIFICIAL OPENING ENDOSCOPIC, DIAGNOSTIC: ICD-10-PCS | Performed by: INTERNAL MEDICINE

## 2025-02-28 PROCEDURE — 93922 UPR/L XTREMITY ART 2 LEVELS: CPT | Performed by: SURGERY

## 2025-02-28 PROCEDURE — 93925 LOWER EXTREMITY STUDY: CPT | Performed by: SURGERY

## 2025-02-28 PROCEDURE — 93925 LOWER EXTREMITY STUDY: CPT

## 2025-02-28 RX ORDER — PROPOFOL 10 MG/ML
INJECTION, EMULSION INTRAVENOUS AS NEEDED
Status: DISCONTINUED | OUTPATIENT
Start: 2025-02-28 | End: 2025-02-28

## 2025-02-28 RX ORDER — SODIUM CHLORIDE 9 MG/ML
INJECTION, SOLUTION INTRAVENOUS CONTINUOUS PRN
Status: DISCONTINUED | OUTPATIENT
Start: 2025-02-28 | End: 2025-02-28

## 2025-02-28 RX ORDER — EPHEDRINE SULFATE 50 MG/ML
INJECTION INTRAVENOUS AS NEEDED
Status: DISCONTINUED | OUTPATIENT
Start: 2025-02-28 | End: 2025-02-28

## 2025-02-28 RX ORDER — LIDOCAINE HYDROCHLORIDE 10 MG/ML
INJECTION, SOLUTION EPIDURAL; INFILTRATION; INTRACAUDAL; PERINEURAL AS NEEDED
Status: DISCONTINUED | OUTPATIENT
Start: 2025-02-28 | End: 2025-02-28

## 2025-02-28 RX ORDER — PANTOPRAZOLE SODIUM 40 MG/1
40 TABLET, DELAYED RELEASE ORAL
Status: DISCONTINUED | OUTPATIENT
Start: 2025-03-01 | End: 2025-03-01 | Stop reason: HOSPADM

## 2025-02-28 RX ADMIN — GABAPENTIN 900 MG: 300 CAPSULE ORAL at 21:45

## 2025-02-28 RX ADMIN — ATORVASTATIN CALCIUM 40 MG: 40 TABLET, FILM COATED ORAL at 09:18

## 2025-02-28 RX ADMIN — EPHEDRINE SULFATE 5 MG: 50 INJECTION, SOLUTION INTRAVENOUS at 14:09

## 2025-02-28 RX ADMIN — PROPOFOL 150 MCG/KG/MIN: 10 INJECTION, EMULSION INTRAVENOUS at 13:08

## 2025-02-28 RX ADMIN — PROPOFOL 50 MG: 10 INJECTION, EMULSION INTRAVENOUS at 13:07

## 2025-02-28 RX ADMIN — SODIUM CHLORIDE: 0.9 INJECTION, SOLUTION INTRAVENOUS at 13:01

## 2025-02-28 RX ADMIN — PROPOFOL 130 MCG/KG/MIN: 10 INJECTION, EMULSION INTRAVENOUS at 13:06

## 2025-02-28 RX ADMIN — LIDOCAINE HYDROCHLORIDE 90 MG: 10 INJECTION, SOLUTION EPIDURAL; INFILTRATION; INTRACAUDAL; PERINEURAL at 13:05

## 2025-02-28 RX ADMIN — PROPOFOL 100 MG: 10 INJECTION, EMULSION INTRAVENOUS at 13:05

## 2025-02-28 RX ADMIN — GABAPENTIN 600 MG: 300 CAPSULE ORAL at 09:20

## 2025-02-28 RX ADMIN — LISINOPRIL 20 MG: 20 TABLET ORAL at 09:18

## 2025-02-28 RX ADMIN — PANTOPRAZOLE SODIUM 40 MG: 40 INJECTION, POWDER, FOR SOLUTION INTRAVENOUS at 09:17

## 2025-02-28 RX ADMIN — CYANOCOBALAMIN TAB 500 MCG 500 MCG: 500 TAB at 09:18

## 2025-02-28 RX ADMIN — TIZANIDINE 4 MG: 4 TABLET ORAL at 22:06

## 2025-02-28 RX ADMIN — HYDROCHLOROTHIAZIDE 12.5 MG: 12.5 TABLET ORAL at 09:18

## 2025-02-28 NOTE — ASSESSMENT & PLAN NOTE
In December 2024 she was noted to have had ~three-four week history of right lower extremity leg pain and ~two week history of swelling.  B/l lower extremity duplex on 12/11 notable for subacute versus chronic DVT on one of the paired right posterior tibial veins.  Did not appear provoked though noted to have sat in the car for ~six hours (based on clinic note from 2/11/2025).  Given seven day course of Eliquis, 10 mg BID followed by 5 mg BID afterwards (for at least three months).  Was compliant with the medication and only stopped it day of admission 2/2 low hemoglobin/symptomatic anemia.    Lower extremity duplex showing no evidence of DVT at this time, with interval resolution of right lower extremity DVT since last duplex.  Given concern for acute blood loss anemia and resolution of prior DVT, may consider that patient can discontinue Eliquis use following discharge    Plan  -Hold PTA Eliquis 2/2 symptomatic anemia  -B/l LE duplex ordered- negative for DVT

## 2025-02-28 NOTE — ANESTHESIA POSTPROCEDURE EVALUATION
Post-Op Assessment Note    CV Status:  Stable    Pain management: adequate       Mental Status:  Awake and sleepy   Hydration Status:  Euvolemic   PONV Controlled:  Controlled   Airway Patency:  Patent     Post Op Vitals Reviewed: Yes    No anethesia notable event occurred.    Staff: CRNA   Comments: glory FREIRE; RA      Last Filed PACU Vitals:  Vitals Value Taken Time   Temp 96.5 °F (35.8 °C) 02/28/25 1432   Pulse 112 02/28/25 1432   /78 02/28/25 1432   Resp 20 02/28/25 1432   SpO2 94 % 02/28/25 1432       Modified Jordan:     Vitals Value Taken Time   Activity 2 02/28/25 1432   Respiration 2 02/28/25 1432   Circulation 2 02/28/25 1432   Consciousness 2 02/28/25 1432   Oxygen Saturation 2 02/28/25 1432     Modified Jordan Score: 10

## 2025-02-28 NOTE — ASSESSMENT & PLAN NOTE
Patient complaining of leg pain that is worse w/ walking. Had screening test outpatient and was found to have severe PAD. Ordered repeat study outpatient and was scheduled for VAS US 3/13.    Plan  -Follow-up with VAS arterial Doppler scheduled for today

## 2025-02-28 NOTE — OCCUPATIONAL THERAPY NOTE
"    Occupational Therapy Evaluation     Patient Name: Jenifer Smith  Today's Date: 2/28/2025  Problem List  Principal Problem:    Symptomatic anemia  Active Problems:    Benign essential hypertension    Hyperlipidemia    Type 2 diabetes mellitus with diabetic polyneuropathy, without long-term current use of insulin (HCC)    Smoking    Lumbar radiculopathy    Deep vein thrombosis (DVT) of right lower extremity (HCC)    PAD (peripheral artery disease) (HCC)    Past Medical History  Past Medical History:   Diagnosis Date    Arthritis     Bump     bumped head yesterday at work \"saw stars\" no LOC, cat scan done was normal    Circulation problem     Diabetes mellitus (HCC)     blood sugar 125 on admission    Encounter for screening colonoscopy     1 st one    Headache     Hyperlipidemia     Hypertension     Positive fecal occult blood test 08/30/2018    Added automatically from request for surgery [id number]    Post concussion syndrome 09/14/2018    Syncope      Past Surgical History  Past Surgical History:   Procedure Laterality Date    BREAST BIOPSY Right 02/18/2022    COLONOSCOPY N/A 9/26/2018    Procedure: COLONOSCOPY;  Surgeon: Joie Morejon MD;  Location: [facility];  Service: Gastroenterology    HYSTERECTOMY  2017    INCISIONAL BREAST BIOPSY      last assessed [id number]    AK COLONOSCOPY FLX DX W/COLLJ SPEC WHEN PFRMD N/A 7/5/2018    Procedure: COLONOSCOPY;  Surgeon: Jioe Morejon MD;  Location:  GI LAB;  Service: Gastroenterology    US GUIDED BREAST BIOPSY RIGHT COMPLETE Right 2/18/2022 02/28/25 1139   OT Last Visit   OT Visit Date 02/28/25   Note Type   Note type Evaluation   Pain Assessment   Pain Assessment Tool 0-10   Pain Score No Pain   Restrictions/Precautions   Weight Bearing Precautions Per Order No   Other Precautions Multiple lines   Home Living   Type of Home Apartment   Home Layout One level;Performs ADLs on one level;Elevator  (5th floor apartment)   Bathroom Shower/Tub Tub/shower unit " "  Bathroom Toilet Standard   Bathroom Equipment   (denies)   Home Equipment Cane   Additional Comments Pt reports living alone in [facility] apt w/ elevator access. SPC for functional mobility PTA.   Prior Function   Level of [place] Independent with ADLs;Independent with functional mobility;Independent with IADLS   Lives With Alone   Receives Help From Family  (daughter)   IADLs Independent with meal prep;Independent with medication management;Family/Friend/Other provides transportation   Falls in the last 6 months 1 to 4  (reports 1 fall)   Comments Pt reports being completely independent PTA. (-) , takes public transportation. Daughter lives locally and is able to assist at home if needed.   Lifestyle   Autonomy PTA, Pt reports I w/ ADLs, IADLs, functional mobility using SPC. (-)    Reciprocal Relationships Daughter   General   Family/Caregiver Present No   Subjective   Subjective \"I had no blood but now I feel good\"   ADL   Where Assessed Edge of bed   Eating Assistance 7  Independent   Grooming Assistance 6  Modified Independent   UB Bathing Assistance 6  Modified Independent   LB Bathing Assistance 6  Modified Independent   UB Dressing Assistance 6  Modified independent   LB Dressing Assistance 6  Modified independent   Toileting Assistance  6  Modified independent   Functional Assistance 6  Modified independent   Bed Mobility   Supine to Sit 5  Supervision   Sit to Supine 5  Supervision   Additional Comments Pt greeted and left supine in bed w/ all needs within reach   Transfers   Sit to Stand 5  Supervision   Stand to Sit 5  Supervision   Additional Comments no AD   Functional Mobility   Functional Mobility   (CGA)   Additional Comments Pt completes long household distance mobility w/ CGA, no AD. Occasional swaying, Pt reaches for handrails as needed.   Balance   Static Sitting Good   Dynamic Sitting Good   Static Standing Fair +   Dynamic Standing Fair -   Ambulatory Fair -   Activity Tolerance "   Activity Tolerance Patient tolerated treatment well   Medical Staff Made Aware PT adelaida Barrow w/ PT due to medical complexity and multiple comorbidities   Nurse Made Aware RN cleared   RUE Assessment   RUE Assessment WFL   LUE Assessment   LUE Assessment WFL   Hand Function   Gross Motor Coordination Functional   Fine Motor Coordination Functional   Cognition   Overall Cognitive Status WFL   Arousal/Participation Alert;Cooperative   Attention Within functional limits   Orientation Level Oriented X4   Memory Within functional limits   Following Commands Follows one step commands without difficulty   Comments Pt is pleasant and cooperative. Overall fair safety awareness and insight into deficits   Assessment   Limitation Decreased endurance   Prognosis Good   Assessment Pt is a 69 y.o. female seen for OT evaluation s/p admission to [facility] on 2/26/2025 due to fatigue and dizziness. Pt diagnosed with Symptomatic anemia. Pt  has a past medical history of Arthritis, Bump, Circulation problem, Diabetes mellitus (HCC), Encounter for screening colonoscopy, Headache, Hyperlipidemia, Hypertension, Positive fecal occult blood test, Post concussion syndrome, and Syncope.  Pt with active OT evaluation/treatment and activity orders. Pt reports living alone in 5th floor apt w/ elevator. PTA, Pt was I w/ ADL/IADL and functional mobility, was not driving and was using SPC at baseline. Pt agreeable and willing to participate in OT evaluation. Pt was greeted and left supine in bed w/ all needs within reach. During evaluation, pt is Mod I ADLs, Supervision/CGA for transfers and mobility. Pt is likely at/close to functional baseline. Pt states no acute OT concerns and states she feels comfortable to return home. No further acute OT needs identified at this time. Recommend continued active ADL participation and mobilization with hospital staff while in the hospital to increase pt’s endurance and strength upon D/C.  From OT standpoint, recommend D/C to home with family support when medically cleared. D/C pt from OT caseload at this time.   Goals   Patient Goals to go home   Plan   OT Frequency Eval only  (d/c OT)   Discharge Recommendation   Rehab Resource Intensity Level, OT III (Minimum Resource Intensity)  (continue current OP PT)   Additional Comments  The patient's raw score on the AM-PAC Daily Activity Inpatient Short Form is 24. A raw score of greater than or equal to 19 suggests the patient may benefit from discharge to home. Please refer to the recommendation of the Occupational Therapist for safe discharge planning.   AM-PAC Daily Activity Inpatient   Lower Body Dressing 4   Bathing 4   Toileting 4   Upper Body Dressing 4   Grooming 4   Eating 4   Daily Activity Raw Score 24   Daily Activity Standardized Score (Calc for Raw Score >=11) 57.54   AM-PAC Applied Cognition Inpatient   Following a Speech/Presentation 4   Understanding Ordinary Conversation 4   Taking Medications 4   Remembering Where Things Are Placed or Put Away 4   Remembering List of 4-5 Errands 4   Taking Care of Complicated Tasks 4   Applied Cognition Raw Score 24   Applied Cognition Standardized Score 62.21   End of Consult   Education Provided Yes   Patient Position at End of Consult Supine;All needs within reach   Nurse Communication Nurse aware of consult     GERALD Hernandez, OTR/L

## 2025-02-28 NOTE — ASSESSMENT & PLAN NOTE
Lab Results   Component Value Date    HGBA1C 6.2 01/29/2025       Recent Labs     02/27/25  0557 02/27/25  1219 02/27/25  1649 02/27/25 2037   POCGLU 111 177* 162* 150*       Blood Sugar Average: Last 72 hrs:  (P) 150  History of T2DM, last A1c on 1/29/2025: 6.2.  On metformin, 1000 mg BID PTA.    Plan  -SSI  -Currently n.p.o. for potential invasive GI evaluation  -Accu-Cheks Q6H while NPO  -Carb controlled diet with Accu-Cheks QID (with meals and before bed) once cleared to restart diet  -Goal glucose: 140-180  -Hypoglycemia protocol  -Hold PTA metformin

## 2025-02-28 NOTE — ANESTHESIA POSTPROCEDURE EVALUATION
Post-Op Assessment Note    Last Filed PACU Vitals:  Vitals Value Taken Time   Temp 96.5 °F (35.8 °C) 02/28/25 1432   Pulse 88 02/28/25 1449   /72 02/28/25 1449   Resp 20 02/28/25 1449   SpO2 99 % 02/28/25 1449       Modified Jordan:     Vitals Value Taken Time   Activity 2 02/28/25 1449   Respiration 2 02/28/25 1449   Circulation 2 02/28/25 1449   Consciousness 2 02/28/25 1449   Oxygen Saturation 2 02/28/25 1449     Modified Jordan Score: 10

## 2025-02-28 NOTE — ASSESSMENT & PLAN NOTE
>>ASSESSMENT AND PLAN FOR TYPE 2 DIABETES MELLITUS WITH DIABETIC POLYNEUROPATHY, WITHOUT LONG-TERM CURRENT USE OF INSULIN (HCC) WRITTEN ON 2/28/2025  6:27 AM BY JOSE G ACKERMAN MD    Lab Results   Component Value Date    HGBA1C 6.2 01/29/2025       Recent Labs     02/27/25  0557 02/27/25  1219 02/27/25  1649 02/27/25 2037   POCGLU 111 177* 162* 150*       Blood Sugar Average: Last 72 hrs:  (P) 150  History of T2DM, last A1c on 1/29/2025: 6.2.  On metformin, 1000 mg BID PTA.    Plan  -SSI  -Currently n.p.o. for potential invasive GI evaluation  -Accu-Cheks Q6H while NPO  -Carb controlled diet with Accu-Cheks QID (with meals and before bed) once cleared to restart diet  -Goal glucose: 140-180  -Hypoglycemia protocol  -Hold PTA metformin

## 2025-02-28 NOTE — PROGRESS NOTES
Progress Note - Internal Medicine   Name: Jenifer Smith 69 y.o. female I MRN: [id number]  Unit/Bed#: -01 I Date of Admission: 2/26/2025   Date of Service: 2/28/2025 I Hospital Day: 2    Assessment & Plan  Symptomatic anemia  Prior to presentation in ED she was at outpatient clinic where she was noted to be fatigued, dizzy, positive orthostatics, positive FOBT.  Hemoglobin couple days prior to presentation in clinic 6.9, MCV: 80, low iron count consistent with VALENTIN.  No obvious source of bleeding though positive FOBT makes GI source possibility.  She denied any epistaxis, hematochezia, hemoptysis, BRBPR, significantly dark stools (though she mentioned that she takes iron supplementation PTA).  Notably tachycardic when she first presented to ED, repeat Hb: 6.5.  S/p 1 unit pRBC.  No obvious bleeding on CTA PE, abdomen not scanned.  Was noted to have lost around 10 pounds unintentionally since November 2024.  Of note, low hemoglobin and unintentional weight loss has not not been a new issue for her.  It appears that in 2023 she was having low hemoglobin levels and  ~20 lb weight loss for which she underwent EGD on 10/6 which did not show any obvious source of bleeding (moderate edematous and erythematous mucosa in gastric antrum, biopsies collected positive for H. pylori, confirmed eradication following quadruple therapy on stool antigen test on 11/27/2023) and CT abdomen and pelvis with contrast on 10/9 which was unremarkable.  Additionally, VALENTIN is not new diagnosis for her (it is why she takes iron supplementation).  Last colonoscopy on 9/15/2022 unremarkable.  She does not have many CBCs in her chart which makes establishing a baseline for her very difficult.  No hemoglobin level prior to starting Eliquis, 10.7 on 6/28/2024.  Nevertheless she warrants further workup.    Received 1 unit pRBCs- repeat Hgb 7.3  CBC this morning stay patient does have dark stool, but described as dark brown and would attribute  this to previous iron supplementation as opposed to melanotic stool.      Plan  -C/w telemetry  -Monitor daily CBC  -Goal Hb >7, transfuse as indicated  -GI consulted - plan for double scope today  -Can resume diet following intervention today  -I's and O's, monitor BMs and capture image in media tab  -Hold PTA Eliquis  -Continue iron supplementation, vitamin B12  -Consider venofer 400mg x3 days due to iron deficit  Benign essential hypertension  History of hypertension, on lisinopril-hydrochlorothiazide (Prinzide) 20-12.5 mg PTA.  Despite her anemia, pressures were normal to elevated (122/74 in clinic, 117-151/56-77 in ED).  Noted positive orthostatics in clinic yesterday.    Plan  -Lisinopril 20 mg and hydrochlorothiazide 12.5 mg QD (Prinzide not on formulary)  Hyperlipidemia  History of hyperlipidemia, last lipid panel on 6/8/2024: Cholesterol: 136, triglycerides: 56, HDL: 52, LDL: 73 (goal LDL <70, ASCVD risk: 40.5%) on atorvastatin 20 mg PTA.     Plan  -Increase atorvastatin to 40mg daily due to high risk of ASCVD  Type 2 diabetes mellitus with diabetic polyneuropathy, without long-term current use of insulin (HCC)  Lab Results   Component Value Date    HGBA1C 6.2 01/29/2025       Recent Labs     02/27/25  0557 02/27/25  1219 02/27/25  1649 02/27/25 2037   POCGLU 111 177* 162* 150*       Blood Sugar Average: Last 72 hrs:  (P) 150  History of T2DM, last A1c on 1/29/2025: 6.2.  On metformin, 1000 mg BID PTA.    Plan  -SSI  -Currently n.p.o. for potential invasive GI evaluation  -Accu-Cheks Q6H while NPO  -Carb controlled diet with Accu-Cheks QID (with meals and before bed) once cleared to restart diet  -Goal glucose: 140-180  -Hypoglycemia protocol  -Hold PTA metformin  Smoking  History of TUD, has been smoking since the 90s.  Says she smokes around eight cigarettes a day.  Mild apical emphysematous changes and stable 2 mm anterolateral RUL nodule noted on imaging.    Plan  -Nicotine patch and/or gum if  requested  -Tobacco cessation counseling  Lumbar radiculopathy  Noted longstanding history of back pain and radiculopathy.  Has tried physical therapy in the past.  Had intralaminar epidural steroid injection on 1/22/24 with notable improvement.  Follows with orthopedics, last seen on 6/26/2024.  Still having some back and leg (R>L) pain today with ambulatory dysfunction.  On gabapentin, tizanidine PTA.  Side note, she is also having shoulder pain for which she was taking IcyHot PTA    Plan  -Continue gabapentin 600 mg TID and 900 mg QHS  -Continue tizanidine, 4 mg QHS  -Voltaren gel PRN  -Bengay for shoulder pain PRN  -Consider PT/OT consult  Deep vein thrombosis (DVT) of right lower extremity ([facility])  In December 2024 she was noted to have had ~three-four week history of right lower extremity leg pain and ~two week history of swelling.  B/l lower extremity duplex on 12/11 notable for subacute versus chronic DVT on one of the paired right posterior tibial veins.  Did not appear provoked though noted to have sat in the car for ~six hours (based on clinic note from 2/11/2025).  Given seven day course of Eliquis, 10 mg BID followed by 5 mg BID afterwards (for at least three months).  Was compliant with the medication and only stopped it day of admission 2/2 low hemoglobin/symptomatic anemia.    Lower extremity duplex showing no evidence of DVT at this time, with interval resolution of right lower extremity DVT since last duplex.  Given concern for acute blood loss anemia and resolution of prior DVT, may consider that patient can discontinue Eliquis use following discharge    Plan  -Hold PTA Eliquis 2/2 symptomatic anemia  -B/l LE duplex ordered- negative for DVT  PAD (peripheral artery disease) ([facility])  Patient complaining of leg pain that is worse w/ walking. Had screening test outpatient and was found to have severe PAD. Ordered repeat study outpatient and was scheduled for VAS US 3/13.    Plan  -Follow-up with VAS  arterial Doppler scheduled for today    Disposition: Inpatient admission pending EGD and colonoscopy today    Team: SOD TEAM A    Subjective   Patient seen and examined. No acute events overnight.  She reports improvement in her fatigue and dizziness on standing since yesterday. Patient has drank approximately 3 L of GoLytely bowel prep, and is amenable to drinking more at this time.  She reports bowel movements at this time all still dark and watery following the bowel prep.  She denies any fevers, chills, shortness of breath, chest pain, abdominal pain, nausea/vomiting, or urinary changes.     Objective :  Temp:  [98.2 °F (36.8 °C)] 98.2 °F (36.8 °C)  HR:  [66-86] 85  BP: (131-158)/(67-83) 154/74  Resp:  [18] 18  SpO2:  [98 %-100 %] 98 %  O2 Device: None (Room air)    I/O         02/26 0701  02/27 0700 02/27 0701  02/28 0700    P.O.  480    Total Intake(mL/kg)  480 (6.2)    Net  +480                Weights:   IBW (Ideal Body Weight): 59.3 kg    Body mass index is 27.76 kg/m².  Weight (last 2 days)       Date/Time Weight    02/27/25 19:59:23 78 (172)            Physical Exam  Vitals and nursing note reviewed.   Constitutional:       General: She is not in acute distress.     Appearance: She is well-developed.   HENT:      Head: Normocephalic and atraumatic.      Mouth/Throat:      Mouth: Mucous membranes are dry.      Pharynx: Oropharynx is clear.   Eyes:      Comments: Pale conjunctiva bilaterally   Cardiovascular:      Rate and Rhythm: Normal rate and regular rhythm.      Heart sounds: No murmur heard.  Pulmonary:      Effort: Pulmonary effort is normal. No respiratory distress.      Breath sounds: Normal breath sounds. No wheezing or rales.   Abdominal:      General: Abdomen is flat.      Palpations: Abdomen is soft.      Tenderness: There is no abdominal tenderness.   Musculoskeletal:         General: No swelling.      Cervical back: Neck supple. No tenderness.      Right lower leg: No edema.      Left lower  leg: No edema.   Skin:     General: Skin is warm and dry.      Capillary Refill: Capillary refill takes less than 2 seconds.   Neurological:      General: No focal deficit present.      Mental Status: She is alert and oriented to person, place, and time.   Psychiatric:         Mood and Affect: Mood normal.           Lab Results: I have reviewed the following results:  Recent Labs     02/26/25 2043 02/26/25  2253 02/27/25  0555   WBC 9.43  --  7.89   HGB 6.5*  --  7.3*   HCT 22.2*  --  24.2*     --  339   SODIUM 139  --  139   K 4.0  --  5.2     --  109*   CO2 26  --  28   BUN 22  --  17   CREATININE 0.82  --  0.67   GLUC 165*  --  111   AST 11*  --  35   ALT 9  --  14   ALB 4.1  --  3.8   TBILI 0.24  --  0.41   ALKPHOS 61  --  55   PTT 26  --   --    INR 1.08  --   --    HSTNI0 3  --   --    HSTNI2  --  3  --    BNP 21  --   --              Currently Ordered Meds:   Current Facility-Administered Medications:     [Held by provider] apixaban (ELIQUIS) tablet 5 mg, BID    atorvastatin (LIPITOR) tablet 40 mg, Daily    cyanocobalamin (VITAMIN B-12) tablet 500 mcg, Daily    Diclofenac Sodium (VOLTAREN) 1 % topical gel 2 g, Q6H PRN    ferrous sulfate tablet 325 mg, Daily With Breakfast    gabapentin (NEURONTIN) capsule 600 mg, BID    gabapentin (NEURONTIN) capsule 900 mg, HS    lisinopril (ZESTRIL) tablet 20 mg, Daily **AND** hydroCHLOROthiazide tablet 12.5 mg, Daily    insulin lispro (HumALOG/ADMELOG) 100 units/mL subcutaneous injection 1-6 Units, TID AC **AND** Fingerstick Glucose (POCT), Q6H    menthol-methyl salicylate (BENGAY) 10-15 % cream, Daily PRN    pantoprazole (PROTONIX) injection 40 mg, BID    polyethylene glycol (GOLYTELY) bowel prep 4,000 mL, See Admin Instructions    tiZANidine (ZANAFLEX) tablet 4 mg, HS PRN  VTE Pharmacologic Prophylaxis: Reason for no pharmacologic prophylaxis held for concern of acute blood loss anemia  VTE Mechanical Prophylaxis: sequential compression  device    Administrative Statements     Portions of the record may have been created with voice recognition software.

## 2025-02-28 NOTE — PLAN OF CARE
Problem: PHYSICAL THERAPY ADULT  Goal: Performs mobility at highest level of function for planned discharge setting.  See evaluation for individualized goals.  Description: Treatment/Interventions: ADL retraining, Functional transfer training, LE strengthening/ROM, Therapeutic exercise, Endurance training, Patient/family training, Bed mobility, Gait training, Spoke to nursing, Spoke to case management, OT  Equipment Recommended: Cane       See flowsheet documentation for full assessment, interventions and recommendations.  Note: Prognosis: Good  Problem List: Decreased strength, Decreased endurance, Impaired balance, Decreased mobility  Assessment: PT completed evaluation of 69 y.o. female admitted to [facility] on 2/26/2025 with Symptomatic anemia. Patient's current status instabilities include multiple lines, ongoing pain, continuous O2/HR monitoring, regression in function from baseline. Patient  has a past medical history of Arthritis, Bump, Circulation problem, Diabetes mellitus (HCC), Encounter for screening colonoscopy, Headache, Hyperlipidemia, Hypertension, Positive fecal occult blood test (08/30/2018), Post concussion syndrome (09/14/2018), and Syncope. At baseline, pt resides alone in 5th floor apartment with elevator access and was independent ADLs prior to hospital admission. Patient presents at time of PT evaluation functioning below baseline and currently w/ overall mobility deficits due to: impaired balance, decreased endurance, gait dysfunction, decreased activity tolerance and fall risk. During PT evaluation, patient currently is requiring supervision for bed mobility skills;  supervision for functional transfers and  contact guard assist for ambulation with no AD. Patient performed supine to sit to edge of bed requiring HOB elevated, verbal cues for set up, increased time, use of bed rails. Patient ambulated 80'x2 with no AD, requiring occasional verbal cues for hallway navigation, and  improving stride/step length. Patient with intermittent LOB and sway requiring use of hand rails in the hallway for added balance and support; patient declining need for use of an AD at this time stating she occasionally uses a SPC at home when dizzy. Patient reported no symptoms of dizziness/lightheadedness throughout evaluation. Pt left supine in bed with alarm and all needs met. This patient is functioning below their baseline and is recommended for level III. Patient will benefit from continued skilled PT this admission to achieve maximal function and safety. The patients AM-PAC Basic Mobility Inpatient Short From Raw Score is 17 . Based on AM-PAC scoring and patient presentation, PT currently recommending Level III (Minimum Resource Intensity). Please also refer to the recommendation of the Physical Therapist for safe discharge planning.  Barriers to Discharge: Decreased caregiver support     Rehab Resource Intensity Level, PT: III (Minimum Resource Intensity)    See flowsheet documentation for full assessment.

## 2025-02-28 NOTE — ANESTHESIA PREPROCEDURE EVALUATION
Procedure:  COLONOSCOPY  EGD    Relevant Problems   ANESTHESIA   (-) History of anesthesia complications      CARDIO   (+) Benign essential hypertension   (+) Deep vein thrombosis (DVT) of right lower extremity (HCC)   (+) Hyperlipidemia   (+) PAD (peripheral artery disease) (HCC)      ENDO   (+) DM type 2 without retinopathy (HCC)   (+) Type 2 diabetes mellitus with diabetic polyneuropathy, without long-term current use of insulin (HCC)      GYN   (+) History of hysterectomy      HEMATOLOGY   (+) Symptomatic anemia      MUSCULOSKELETAL   (+) Chronic bilateral low back pain with left-sided sciatica   (+) Lumbar spondylosis      NEURO/PSYCH   (+) Chronic bilateral low back pain with left-sided sciatica   (+) Chronic pain syndrome   (+) Depression, recurrent (HCC)   (+) Type 2 diabetes mellitus with diabetic polyneuropathy, without long-term current use of insulin (HCC)      PULMONARY   (+) JOANNA (obstructive sleep apnea)   (+) Smoking   (-) URI (upper respiratory infection)        Physical Exam    Airway    Mallampati score: II  TM Distance: >3 FB  Neck ROM: full     Dental       Cardiovascular      Pulmonary      Other Findings  post-pubertal.      Anesthesia Plan  ASA Score- 3     Anesthesia Type- IV sedation with anesthesia with ASA Monitors.         Additional Monitors:     Airway Plan:            Plan Factors-Exercise tolerance (METS): >4 METS.    Chart reviewed. EKG reviewed.  Existing labs reviewed. Patient summary reviewed.                  Induction- intravenous.    Postoperative Plan-     Perioperative Resuscitation Plan - Level 1 - Full Code.       Informed Consent- Anesthetic plan and risks discussed with patient.  I personally reviewed this patient with the CRNA. Discussed and agreed on the Anesthesia Plan with the CRNA..      NPO Status:  No vitals data found for the desired time range.

## 2025-02-28 NOTE — PHYSICAL THERAPY NOTE
"   Physical Therapy Evaluation     Patient's Name: Jenifer Smith    Admitting Diagnosis  Anemia [D64.9]  Atypical chest pain [R07.89]  Symptomatic anemia [D64.9]    Problem List  Patient Active Problem List   Diagnosis    Benign essential hypertension    DM type 2 without retinopathy (HCC)    Hyperlipidemia    Chronic bilateral low back pain with left-sided sciatica    History of right breast biopsy    Breast pain, right    Visit for screening mammogram    Breast cyst, right    Breast mass, right    Carpal tunnel syndrome, bilateral    Depression, recurrent (HCC)    Type 2 diabetes mellitus with diabetic polyneuropathy, without long-term current use of insulin (HCC)    Overweight (BMI 25.0-29.9)    History of hysterectomy    Smoking    JOANNA (obstructive sleep apnea)    Microscopic hematuria    Spinal stenosis of lumbar region    Lumbar spondylosis    Lumbar radiculopathy    Myofascial pain    Chronic pain syndrome    Bilateral foot pain    Valgus deformity of both great toes    Exposure to COVID-19 virus    Urinary symptom or sign    Stress at home    Caregiver stress syndrome    Deep vein thrombosis (DVT) of right lower extremity (HCC)    Symptomatic anemia    PAD (peripheral artery disease) ([facility])       Past Medical History  Past Medical History:   Diagnosis Date    Arthritis     Bump     bumped head yesterday at work \"saw stars\" no LOC, cat scan done was normal    Circulation problem     Diabetes mellitus (HCC)     blood sugar 125 on admission    Encounter for screening colonoscopy     1 st one    Headache     Hyperlipidemia     Hypertension     Positive fecal occult blood test 08/30/2018    Added automatically from request for surgery [id number]    Post concussion syndrome 09/14/2018    Syncope        Past Surgical History  Past Surgical History:   Procedure Laterality Date    BREAST BIOPSY Right 02/18/2022    COLONOSCOPY N/A 9/26/2018    Procedure: COLONOSCOPY;  Surgeon: Joie Morejon MD;  Location: [facility]" LAB;  Service: Gastroenterology    HYSTERECTOMY  2017    INCISIONAL BREAST BIOPSY      last assessed 17Aug2017    DE COLONOSCOPY FLX DX W/COLLJ SPEC WHEN PFRMD N/A 7/5/2018    Procedure: COLONOSCOPY;  Surgeon: Joie Morejon MD;  Location[facility];  Service: Gastroenterology    US GUIDED BREAST BIOPSY RIGHT COMPLETE Right 2/18/2022 02/28/25 1140   PT Last Visit   PT Visit Date 02/28/25   Note Type   Note type Evaluation   Additional Comments 69 y.o. female admitted to [facility] on 2/26/2025 with Symptomatic anemia.   Pain Assessment   Pain Assessment Tool 0-10   Pain Score No Pain   Restrictions/Precautions   Weight Bearing Precautions Per Order No   Other Precautions Chair Alarm;Bed Alarm;Multiple lines;Fall Risk;Pain   Home Living   Type of Home Apartment   Home Layout One level;Performs ADLs on one level;Able to live on main level with bedroom/bathroom;Elevator  (5th floor apt)   Bathroom Shower/Tub Tub/shower unit   Bathroom Toilet Standard   Bathroom Equipment Other (Comment)  (no DME (wishes to have commode at home prior to d/c))   Bathroom Accessibility Accessible   Home Equipment Cane   Additional Comments At baseline, pt resides alone in 5th floor apartment with elevator access and was independent ADLs prior to hospital admission.   Prior Function   Level of [place] Independent with ADLs;Independent with functional mobility;Independent with IADLS   Lives With (S)  Alone   Receives Help From Family  (daughter lives close by)   IADLs Independent with meal prep;Independent with medication management;Family/Friend/Other provides transportation  (uses public transportation)   Falls in the last 6 months 1 to 4  (1 fall)   Vocational Retired   General   Additional Pertinent History Patient  has a past medical history of Arthritis, Bump, Circulation problem, Diabetes mellitus (HCC), Encounter for screening colonoscopy, Headache, Hyperlipidemia, Hypertension, Positive fecal occult blood  "test (08/30/2018), Post concussion syndrome (09/14/2018), and Syncope.   Family/Caregiver Present No   Cognition   Overall Cognitive Status WFL   Arousal/Participation Alert   Attention Within functional limits   Orientation Level Oriented X4   Memory Within functional limits   Following Commands Follows one step commands without difficulty   Comments patient pleasant and cooperative, fair insight of functional deficits and safety awareness   Subjective   Subjective \"I feel better, it was because I needed more blood\"   RLE Assessment   RLE Assessment   (4/5 grossly)   LLE Assessment   LLE Assessment   (4/5 grossly)   Bed Mobility   Supine to Sit 5  Supervision   Additional items HOB elevated;Verbal cues   Sit to Supine 5  Supervision   Additional items HOB elevated;Verbal cues   Additional Comments Patient found and left supine in bed with alarm and all needs met   Transfers   Sit to Stand 5  Supervision   Additional items Increased time required;Verbal cues   Stand to Sit 5  Supervision   Additional items Increased time required;Verbal cues   Additional Comments no AD   Ambulation/Elevation   Gait pattern Improper Weight shift;Forward Flexion;Decreased foot clearance;Shuffling;Inconsistent keyur;Excessively slow   Gait Assistance 4  Minimal assist  (CGA)   Additional items Verbal cues;Tactile cues   Assistive Device None  (use of hand rail in the sanchez)   Distance 80'x2   Stair Management Assistance Not tested   Ambulation/Elevation Additional Comments Patient ambulated 80'x2 with no AD, requiring occasional verbal cues for hallway navigation, and improving stride/step length. Patient with intermittent LOB and sway requiring use of hand rails in the hallway for added balance and support; patient declining need for use of an AD at this time stating she occasionally uses a SPC at home when dizzy. Patient reported no symptoms of dizziness/lightheadedness throughout evaluation.   Balance   Static Sitting Good "   Dynamic Sitting Fair +   Static Standing Fair   Dynamic Standing Fair -   Ambulatory Poor +   Endurance Deficit   Endurance Deficit Yes   Endurance Deficit Description generalized weakness, fatigue   Activity Tolerance   Activity Tolerance Patient tolerated treatment well   Medical Staff Made Aware CINTHYA Pittman; This high complexity evaluation was performed with an occupational therapist due to the patient's co-morbidities, clinically unstable presentation, and present impairments which are a regression from the patient's baseline.   Nurse Made Aware RN cleared and updated   Assessment   Prognosis Good   Problem List Decreased strength;Decreased endurance;Impaired balance;Decreased mobility   Assessment PT completed evaluation of 69 y.o. female admitted to [facility] on 2/26/2025 with Symptomatic anemia. Patient's current status instabilities include multiple lines, ongoing pain, continuous O2/HR monitoring, regression in function from baseline. Patient  has a past medical history of Arthritis, Bump, Circulation problem, Diabetes mellitus (HCC), Encounter for screening colonoscopy, Headache, Hyperlipidemia, Hypertension, Positive fecal occult blood test (08/30/2018), Post concussion syndrome (09/14/2018), and Syncope. At baseline, pt resides alone in 5th floor apartment with elevator access and was independent ADLs prior to hospital admission.       Patient presents at time of PT evaluation functioning below baseline and currently w/ overall mobility deficits due to: impaired balance, decreased endurance, gait dysfunction, decreased activity tolerance and fall risk. During PT evaluation, patient currently is requiring supervision for bed mobility skills;  supervision for functional transfers and  contact guard assist for ambulation with no AD. Patient performed supine to sit to edge of bed requiring HOB elevated, verbal cues for set up, increased time, use of bed rails. Patient ambulated 80'x2 with no AD,  requiring occasional verbal cues for hallway navigation, and improving stride/step length. Patient with intermittent LOB and sway requiring use of hand rails in the hallway for added balance and support; patient declining need for use of an AD at this time stating she occasionally uses a SPC at home when dizzy. Patient reported no symptoms of dizziness/lightheadedness throughout evaluation. Pt left supine in bed with alarm and all needs met.       This patient is functioning below their baseline and is recommended for level III. Patient will benefit from continued skilled PT this admission to achieve maximal function and safety. The patients -[facility] Basic Mobility Inpatient Short From Raw Score is 17 . Based on AM-PAC scoring and patient presentation, PT currently recommending Level III (Minimum Resource Intensity). Please also refer to the recommendation of the Physical Therapist for safe discharge planning.   Barriers to Discharge Decreased caregiver support   Goals   Patient Goals to return home   STG Expiration Date 03/14/25   Short Term Goal #1 1) Perform bed mobility mod-I to participate in frequent repositioning and improve skin integrity; 2) Perform functional transfers mod-I to promote I with toileting and OOB mobility; 3) Ambulate 200 feet mod-I with least restrictive device to participate in household and community level mobility; 4) Improve b/l LE strength by 1/2 grade in order to improve efficiency of tranfers; 5) Improve balance by 1 grade to reduce risk for falls   PT Treatment Day 0   Plan   Treatment/Interventions ADL retraining;Functional transfer training;LE strengthening/ROM;Therapeutic exercise;Endurance training;Patient/family training;Bed mobility;Gait training;Spoke to nursing;Spoke to case management;OT   PT Frequency 2-3x/wk   Discharge Recommendation   Rehab Resource Intensity Level, PT III (Minimum Resource Intensity)   Equipment Recommended Cane   AM-[facility] Basic Mobility Inpatient   Turning  in Flat Bed Without Bedrails 3   Lying on Back to Sitting on Edge of Flat Bed Without Bedrails 3   Moving Bed to Chair 3   Standing Up From Chair Using Arms 3   Walk in Room 3   Climb 3-5 Stairs With Railing 2   Basic Mobility Inpatient Raw Score 17   Basic Mobility Standardized Score 39.67   [facility] Highest Level Of Mobility   -[facility] Goal 5: Stand one or more mins   -HLM Achieved 7: Walk 25 feet or more   End of Consult   Patient Position at End of Consult Supine;Bed/Chair alarm activated;All needs within reach         Pushpa Underwood, PT, DPT

## 2025-02-28 NOTE — PLAN OF CARE
Patient is alert/oriented, afebrile, NPO since midnight, and denies pain/SOB on room air.  Patient was only able to drink 3L out of 4L of bowel prep.  Bowel movements are watery but not clear.            Problem: PAIN - ADULT  Goal: Verbalizes/displays adequate comfort level or baseline comfort level  Description: Interventions:  - Encourage patient to monitor pain and request assistance  - Assess pain using appropriate pain scale  - Administer analgesics based on type and severity of pain and evaluate response  - Implement non-pharmacological measures as appropriate and evaluate response  - Consider cultural and social influences on pain and pain management  - Notify physician/advanced practitioner if interventions unsuccessful or patient reports new pain  Outcome: Progressing     Problem: DISCHARGE PLANNING  Goal: Discharge to home or other facility with appropriate resources  Description: INTERVENTIONS:  - Identify barriers to discharge w/patient and caregiver  - Arrange for needed discharge resources and transportation as appropriate  - Identify discharge learning needs (meds, wound care, etc.)  - Arrange for interpretive services to assist at discharge as needed  - Refer to Case Management Department for coordinating discharge planning if the patient needs post-hospital services based on physician/advanced practitioner order or complex needs related to functional status, cognitive ability, or social support system  Outcome: Progressing     Problem: Knowledge Deficit  Goal: Patient/family/caregiver demonstrates understanding of disease process, treatment plan, medications, and discharge instructions  Description: Complete learning assessment and assess knowledge base.  Interventions:  - Provide teaching at level of understanding  - Provide teaching via preferred learning methods  Outcome: Progressing

## 2025-02-28 NOTE — ASSESSMENT & PLAN NOTE
Prior to presentation in ED she was at outpatient clinic where she was noted to be fatigued, dizzy, positive orthostatics, positive FOBT.  Hemoglobin couple days prior to presentation in clinic 6.9, MCV: 80, low iron count consistent with VALENTIN.  No obvious source of bleeding though positive FOBT makes GI source possibility.  She denied any epistaxis, hematochezia, hemoptysis, BRBPR, significantly dark stools (though she mentioned that she takes iron supplementation PTA).  Notably tachycardic when she first presented to ED, repeat Hb: 6.5.  S/p 1 unit pRBC.  No obvious bleeding on CTA PE, abdomen not scanned.  Was noted to have lost around 10 pounds unintentionally since November 2024.  Of note, low hemoglobin and unintentional weight loss has not not been a new issue for her.  It appears that in 2023 she was having low hemoglobin levels and  ~20 lb weight loss for which she underwent EGD on 10/6 which did not show any obvious source of bleeding (moderate edematous and erythematous mucosa in gastric antrum, biopsies collected positive for H. pylori, confirmed eradication following quadruple therapy on stool antigen test on 11/27/2023) and CT abdomen and pelvis with contrast on 10/9 which was unremarkable.  Additionally, VALENTIN is not new diagnosis for her (it is why she takes iron supplementation).  Last colonoscopy on 9/15/2022 unremarkable.  She does not have many CBCs in her chart which makes establishing a baseline for her very difficult.  No hemoglobin level prior to starting Eliquis, 10.7 on 6/28/2024.  Nevertheless she warrants further workup.    Received 1 unit pRBCs- repeat Hgb 7.3  CBC this morning stay patient does have dark stool, but described as dark brown and would attribute this to previous iron supplementation as opposed to melanotic stool.      Plan  -C/w telemetry  -Monitor daily CBC  -Goal Hb >7, transfuse as indicated  -GI consulted - plan for double scope today  -Can resume diet following  intervention today  -I's and O's, monitor BMs and capture image in media tab  -Hold PTA Eliquis  -Continue iron supplementation, vitamin B12  -Consider venofer 400mg x3 days due to iron deficit

## 2025-02-28 NOTE — TELEPHONE ENCOUNTER
Patient is currently admitted.  She has not been seen since 3/28/24.   Please schedule an OVS for follow up. Thanks

## 2025-03-01 VITALS
TEMPERATURE: 98 F | HEART RATE: 72 BPM | SYSTOLIC BLOOD PRESSURE: 107 MMHG | WEIGHT: 172 LBS | RESPIRATION RATE: 18 BRPM | BODY MASS INDEX: 27.64 KG/M2 | HEIGHT: 66 IN | DIASTOLIC BLOOD PRESSURE: 55 MMHG | OXYGEN SATURATION: 96 %

## 2025-03-01 LAB
ANION GAP SERPL CALCULATED.3IONS-SCNC: 5 MMOL/L (ref 4–13)
BASOPHILS # BLD AUTO: 0.02 THOUSANDS/ÂΜL (ref 0–0.1)
BASOPHILS NFR BLD AUTO: 0 % (ref 0–1)
BUN SERPL-MCNC: 13 MG/DL (ref 5–25)
CALCIUM SERPL-MCNC: 8.9 MG/DL (ref 8.4–10.2)
CHLORIDE SERPL-SCNC: 108 MMOL/L (ref 96–108)
CO2 SERPL-SCNC: 27 MMOL/L (ref 21–32)
CREAT SERPL-MCNC: 0.74 MG/DL (ref 0.6–1.3)
EOSINOPHIL # BLD AUTO: 0.17 THOUSAND/ÂΜL (ref 0–0.61)
EOSINOPHIL NFR BLD AUTO: 2 % (ref 0–6)
ERYTHROCYTE [DISTWIDTH] IN BLOOD BY AUTOMATED COUNT: 18.7 % (ref 11.6–15.1)
GFR SERPL CREATININE-BSD FRML MDRD: 83 ML/MIN/1.73SQ M
GLUCOSE SERPL-MCNC: 149 MG/DL (ref 65–140)
GLUCOSE SERPL-MCNC: 171 MG/DL (ref 65–140)
GLUCOSE SERPL-MCNC: 172 MG/DL (ref 65–140)
GLUCOSE SERPL-MCNC: 173 MG/DL (ref 65–140)
HCT VFR BLD AUTO: 26 % (ref 34.8–46.1)
HGB BLD-MCNC: 7.8 G/DL (ref 11.5–15.4)
IMM GRANULOCYTES # BLD AUTO: 0.04 THOUSAND/UL (ref 0–0.2)
IMM GRANULOCYTES NFR BLD AUTO: 0 % (ref 0–2)
LYMPHOCYTES # BLD AUTO: 2.16 THOUSANDS/ÂΜL (ref 0.6–4.47)
LYMPHOCYTES NFR BLD AUTO: 23 % (ref 14–44)
MCH RBC QN AUTO: 24.2 PG (ref 26.8–34.3)
MCHC RBC AUTO-ENTMCNC: 30 G/DL (ref 31.4–37.4)
MCV RBC AUTO: 81 FL (ref 82–98)
MONOCYTES # BLD AUTO: 1 THOUSAND/ÂΜL (ref 0.17–1.22)
MONOCYTES NFR BLD AUTO: 11 % (ref 4–12)
NEUTROPHILS # BLD AUTO: 6 THOUSANDS/ÂΜL (ref 1.85–7.62)
NEUTS SEG NFR BLD AUTO: 64 % (ref 43–75)
NRBC BLD AUTO-RTO: 0 /100 WBCS
PLATELET # BLD AUTO: 353 THOUSANDS/UL (ref 149–390)
PMV BLD AUTO: 9.4 FL (ref 8.9–12.7)
POTASSIUM SERPL-SCNC: 4.2 MMOL/L (ref 3.5–5.3)
RBC # BLD AUTO: 3.22 MILLION/UL (ref 3.81–5.12)
SODIUM SERPL-SCNC: 140 MMOL/L (ref 135–147)
WBC # BLD AUTO: 9.39 THOUSAND/UL (ref 4.31–10.16)

## 2025-03-01 PROCEDURE — 82948 REAGENT STRIP/BLOOD GLUCOSE: CPT

## 2025-03-01 PROCEDURE — 80048 BASIC METABOLIC PNL TOTAL CA: CPT

## 2025-03-01 PROCEDURE — 99238 HOSP IP/OBS DSCHRG MGMT 30/<: CPT | Performed by: INTERNAL MEDICINE

## 2025-03-01 PROCEDURE — 85025 COMPLETE CBC W/AUTO DIFF WBC: CPT

## 2025-03-01 RX ORDER — PANTOPRAZOLE SODIUM 40 MG/1
40 TABLET, DELAYED RELEASE ORAL
Qty: 90 TABLET | Refills: 1 | Status: SHIPPED | OUTPATIENT
Start: 2025-03-02

## 2025-03-01 RX ADMIN — LISINOPRIL 20 MG: 20 TABLET ORAL at 09:12

## 2025-03-01 RX ADMIN — CYANOCOBALAMIN TAB 500 MCG 500 MCG: 500 TAB at 09:12

## 2025-03-01 RX ADMIN — PANTOPRAZOLE SODIUM 40 MG: 40 TABLET, DELAYED RELEASE ORAL at 06:17

## 2025-03-01 RX ADMIN — FERROUS SULFATE TAB 325 MG (65 MG ELEMENTAL FE) 325 MG: 325 (65 FE) TAB at 09:12

## 2025-03-01 RX ADMIN — IRON SUCROSE 300 MG: 20 INJECTION, SOLUTION INTRAVENOUS at 10:15

## 2025-03-01 RX ADMIN — ATORVASTATIN CALCIUM 40 MG: 40 TABLET, FILM COATED ORAL at 09:12

## 2025-03-01 RX ADMIN — GABAPENTIN 600 MG: 300 CAPSULE ORAL at 09:12

## 2025-03-01 NOTE — CASE MANAGEMENT
Case Management Discharge Planning Note    Patient name Jenifer Rasmussen /-01 MRN [id number]  : 1955 Date 3/1/2025       Current Admission Date: 2025  Current Admission Diagnosis:Symptomatic anemia   Patient Active Problem List    Diagnosis Date Noted Date Diagnosed    Symptomatic anemia 2025     PAD (peripheral artery disease) ([facility]) 2025     Deep vein thrombosis (DVT) of right lower extremity ([facility]) 2025     Urinary symptom or sign 2024     Stress at home 2024     Caregiver stress syndrome 2024     Exposure to COVID-19 virus 2024     Bilateral foot pain 2024     Valgus deformity of both great toes 2024     Chronic pain syndrome 2024     Spinal stenosis of lumbar region 2024     Lumbar spondylosis 2024     Lumbar radiculopathy 2024     Myofascial pain 2024     Microscopic hematuria 2024     JOANNA (obstructive sleep apnea)      Smoking 09/15/2022     Overweight (BMI 25.0-29.9) 02/10/2021     History of hysterectomy 02/10/2021     Type 2 diabetes mellitus with diabetic polyneuropathy, without long-term current use of insulin ([facility]) 2021     Depression, recurrent ([facility]) 2018     Carpal tunnel syndrome, bilateral      Breast mass, right 2018     Breast cyst, right 2018     History of right breast biopsy 2018     Breast pain, right 2018     Visit for screening mammogram 2018     Chronic bilateral low back pain with left-sided sciatica 2018     DM type 2 without retinopathy ([facility]) 2017     Hyperlipidemia 2016     Benign essential hypertension 2013       LOS (days): 3  Geometric Mean LOS (GMLOS) (days): 2.8  Days to GMLOS:0.2     OBJECTIVE:  Risk of Unplanned Readmission Score: 11.33         Current admission status: Inpatient   Preferred Pharmacy:   [facility]  [place], [place] - [address] [place] [address]  [place] [address]  Phone: [phone] Fax: [phone]    [facility] - [place], [place] - [address]  [address]  [address]  [facility] [address]  Phone: [phone] Fax: [phone]    [facility] [address] [place] [address] ROAD  [address] [place] [id number]  Phone: [phone] Fax: [phone]    Primary Care Provider: Syed Landrum MD    Primary Insurance: [facility]  Secondary Insurance: [facility]    DISCHARGE DETAILS:       Additional Comments: CM made aware that pt is cleared for d/c today and in need of assistance with transportation. CM scheduled lyft transport as requested. Pt will d/c home and continue current OP therapy.

## 2025-03-01 NOTE — DISCHARGE INSTR - AVS FIRST PAGE
We are sending you home with the following discharge medications/instructions:    Protonix: Please take 1 tablet daily  Please STOP taking Eliquis    Please schedule a follow-up appointment with your PCP in 1-2 weeks.  We will schedule you for Venofer infusions during that visit  We have placed a referral to gastroenterology.  Please schedule follow-up appointment with them at your earliest convenience  We have placed a referral to vascular surgery.  Please schedule a follow-up appointment with them at your earliest convenience

## 2025-03-01 NOTE — QUICK NOTE
GASTROENTEROLOGY QUICK NOTE:   GI was consulted due to patient having concerns of dark stools.  Patient underwent an EGD and a colonoscopy on 2/28.  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 Colonoscopy was incomplete due to poor prep however no signs of bleeding were seen. Hemoglobin today remained stable. Continue with omeprazole 40 mg daily and can resume Eliquis today.  Patient will need to follow-up with gastroenterology outpatient, a message has been sent to schedulers. As hemoglobin remained stable patient is okay to discharge from GI standpoint. GI will sign off, please reach out with any questions.    Wendy Landrum MD  Gastroenterology Fellow  [facility]  Division of Gastroenterology and Hepatology

## 2025-03-01 NOTE — PLAN OF CARE

## 2025-03-01 NOTE — DISCHARGE SUMMARY
Discharge Summary - Internal Medicine   Name: Jenifer Smith 69 y.o. female I MRN: [id number]  Unit/Bed#: -01 I Date of Admission: 2/26/2025   Date of Service: 3/1/2025 I Hospital Day: 3    BRIEF OVERVIEW  Admitting Provider: Shey Roberson DO  Discharge Provider: Shey Roberson DO  Primary Care Physician at Discharge: Syed Landrum MD    Discharge To:  Home  Facility / Family Member Name: [facility]  Phone Number: [phone]     Admission Date: 2/26/2025     Discharge Date: 3/1/2025    Primary Discharge Diagnosis  Symptomatic anemia    Other Problems Addressed: PAD, DVT    Consulting Providers   GI       Therapeutic Operative Procedures Performed  APC of AVM x2    Diagnostic Procedures Performed  endoscopy: colonoscopy: poor study due to brown stool and gastroscopy: 2 AVMs APC ; venous and arterial US     Discharge Disposition: Home/Self Care  Discharged With Lines: no    Test Results Pending at Discharge: Endoscopy biopsy for H. pylori    Outpatient Follow-Up  yes      Follow up: PCP  Follow up within next: 1 week  Follow up with consulting providers  yes        Specialty: GI  Follow up within next: 2 weeks   Active Issues Requiring Follow-up   yes     Issue: VALENTIN    What is Needed: Venofer infusion  Follow-up Appointments Arranged: No     Code Status: Level 1 - Full Code  Advance Directive and Living Will: <no information>  Power of :    POLST:      Medications   [unfilled]    Allergies  No Known Allergies  Discharge Diet: diabetic diet  Activity restrictions: none    DETAILS OF HOSPITAL STAY  Hospital Course:   69-year-old woman with PMHx of VALENTIN on supplementation, Prior H. Pylori gastritis s/p quadruple therapy with eradication on stool test 11/2023, RLE DVT on Eliquis, HTN, HLD, DM non-insulin dependent, lumbar radiculopathy presenting from PCP office for CBC showing significant anemia (Hgb=6.5 with microcytic volume and iron deficient) associated with fatigue, lightheadedness when standing and  dark stools, though patient reports stool changes following iron supplementation. Denies abdominal pain, diarrhea, constipation, nausea, vomiting, hemoptysis/hematemesis, easy bruising, bright red blood in stool. Vitals significant for sinus tachycardia on admission. Presented to the ED and consented for 1 unit pRBC for transfusion. Admitted for symptomatic anemia. 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 Biopsies taken for H. Pylori. Started on Protonix 40mg oral daily. Patient given 1 dose of venofer in hospital before discharge, however patient's line became infiltrated and she only received half of the dose. Patient received venous US and no longer had DVT. Eliquis being discontinued. Patient received arterial US of lower extremities and was found to have severe occlusion of SFA w/ RBI of 0.7. Plan to hold off of anti-platelet medications iso bleed. Already on high dose statin. Would recommend f/u with vascular surgery. Plan to follow-up with PCP and GI outpatient.     Physical Exam  HENT:      Head: Normocephalic and atraumatic.      Mouth/Throat:      Mouth: Mucous membranes are moist.   Eyes:      Extraocular Movements: Extraocular movements intact.      Pupils: Pupils are equal, round, and reactive to light.   Cardiovascular:      Rate and Rhythm: Normal rate and regular rhythm.      Pulses: Normal pulses.      Heart sounds: Normal heart sounds. No murmur heard.     No friction rub. No gallop.   Pulmonary:      Effort: Pulmonary effort is normal.      Breath sounds: No wheezing, rhonchi or rales.   Abdominal:      General: Abdomen is flat. Bowel sounds are normal. There is no distension.      Palpations: Abdomen is soft.      Tenderness: There is abdominal tenderness (epigastric). There is no guarding.      Hernia: No hernia is present.   Skin:     General: Skin is warm.      Capillary Refill: Capillary refill takes less than 2 seconds.   Neurological:      General: No  focal deficit present.      Mental Status: She is alert and oriented to person, place, and time. Mental status is at baseline.          Presenting Problem/History of Present Illness  Principal Problem:    Symptomatic anemia  Active Problems:    Benign essential hypertension    Hyperlipidemia    Type 2 diabetes mellitus with diabetic polyneuropathy, without long-term current use of insulin ([facility])    Smoking    Lumbar radiculopathy    Deep vein thrombosis (DVT) of right lower extremity (HCC)    PAD (peripheral artery disease) ([facility])  Resolved Problems:    * No resolved hospital problems. *      Other Pertinent Test Results  N/A     Discharge Condition: good      Discharge Statement:  I have spent a total time of 30 minutes in caring for this patient on the day of the visit/encounter. .    Tanner Beaulieu PGY-1  Internal Medicine

## 2025-03-03 ENCOUNTER — PATIENT OUTREACH (OUTPATIENT)
Dept: INTERNAL MEDICINE CLINIC | Facility: CLINIC | Age: 70
End: 2025-03-03

## 2025-03-03 ENCOUNTER — TELEPHONE (OUTPATIENT)
Dept: GASTROENTEROLOGY | Facility: CLINIC | Age: 70
End: 2025-03-03

## 2025-03-03 NOTE — TELEPHONE ENCOUNTER
----- Message from Wendy Landrum MD sent at 3/1/2025  8:01 AM EST -----  Regarding: outpatient follow up  Hello,    Please schedule this patient for an outpatient office follow-up.  She was recently hospitalized for an upper GI bleed.    Thank you,  Wendy Landrum MD  Gastroenterology Fellow  [facility]  Division of Gastroenterology and Hepatology

## 2025-03-04 ENCOUNTER — TRANSITIONAL CARE MANAGEMENT (OUTPATIENT)
Dept: INTERNAL MEDICINE CLINIC | Facility: CLINIC | Age: 70
End: 2025-03-04

## 2025-03-04 ENCOUNTER — PATIENT OUTREACH (OUTPATIENT)
Dept: INTERNAL MEDICINE CLINIC | Facility: CLINIC | Age: 70
End: 2025-03-04

## 2025-03-04 NOTE — PROGRESS NOTES
Initial outreach. Briefly spoke with patient. Pt was out at time of call. RN CM unable to hear patient clearly. Pt agreed to second outreach.

## 2025-03-05 ENCOUNTER — RESULTS FOLLOW-UP (OUTPATIENT)
Dept: GASTROENTEROLOGY | Facility: CLINIC | Age: 70
End: 2025-03-05

## 2025-03-05 PROCEDURE — 88342 IMHCHEM/IMCYTCHM 1ST ANTB: CPT | Performed by: PATHOLOGY

## 2025-03-05 PROCEDURE — 88305 TISSUE EXAM BY PATHOLOGIST: CPT | Performed by: PATHOLOGY

## 2025-03-06 ENCOUNTER — PATIENT OUTREACH (OUTPATIENT)
Dept: INTERNAL MEDICINE CLINIC | Facility: CLINIC | Age: 70
End: 2025-03-06

## 2025-03-06 NOTE — PROGRESS NOTES
Second outreach attempt. No answer.    Received return call from patient. Reviewed all upcoming appointments, dates and times.     Pt denies need for additional outreach. Pt will keep RN CM phone number if any additional needs arise.

## 2025-03-06 NOTE — LETTER
Date: 03/06/25    Dear Jenifer Smith,   My name is Carlitos. I am an RN CM from [facility].   I was trying to reach you to follow up on your most recent hospitalization.   If you would like any assistance with navigating your healthcare please call me at [phone].     Regards,   Carlitos DUBON RN Case Manager

## 2025-03-10 DIAGNOSIS — I10 BENIGN ESSENTIAL HYPERTENSION: ICD-10-CM

## 2025-03-10 RX ORDER — LISINOPRIL AND HYDROCHLOROTHIAZIDE 12.5; 2 MG/1; MG/1
1 TABLET ORAL DAILY
Qty: 90 TABLET | Refills: 1 | Status: SHIPPED | OUTPATIENT
Start: 2025-03-10

## 2025-03-10 NOTE — TELEPHONE ENCOUNTER
Patient is all out of medication. Patient is hoping refill can be sent asap. She is already going to [facility] today at noon and she's hoping refill will be sent by then.

## 2025-03-12 ENCOUNTER — APPOINTMENT (OUTPATIENT)
Dept: LAB | Facility: CLINIC | Age: 70
End: 2025-03-12
Payer: COMMERCIAL

## 2025-03-12 ENCOUNTER — TELEPHONE (OUTPATIENT)
Dept: INTERNAL MEDICINE CLINIC | Facility: CLINIC | Age: 70
End: 2025-03-12

## 2025-03-12 ENCOUNTER — OFFICE VISIT (OUTPATIENT)
Dept: INTERNAL MEDICINE CLINIC | Facility: CLINIC | Age: 70
End: 2025-03-12

## 2025-03-12 VITALS
WEIGHT: 166.6 LBS | SYSTOLIC BLOOD PRESSURE: 138 MMHG | HEART RATE: 94 BPM | BODY MASS INDEX: 26.89 KG/M2 | TEMPERATURE: 97.3 F | DIASTOLIC BLOOD PRESSURE: 72 MMHG

## 2025-03-12 DIAGNOSIS — E78.2 MIXED HYPERLIPIDEMIA: ICD-10-CM

## 2025-03-12 DIAGNOSIS — I73.9 PERIPHERAL ARTERY DISEASE (HCC): ICD-10-CM

## 2025-03-12 DIAGNOSIS — E11.65 TYPE 2 DIABETES MELLITUS WITH HYPERGLYCEMIA, WITHOUT LONG-TERM CURRENT USE OF INSULIN (HCC): ICD-10-CM

## 2025-03-12 DIAGNOSIS — M54.16 LUMBAR RADICULOPATHY: ICD-10-CM

## 2025-03-12 DIAGNOSIS — Z78.9 TRANSITION OF CARE: Primary | ICD-10-CM

## 2025-03-12 DIAGNOSIS — I51.89 DIASTOLIC DYSFUNCTION: ICD-10-CM

## 2025-03-12 DIAGNOSIS — F17.200 SMOKING: ICD-10-CM

## 2025-03-12 DIAGNOSIS — D50.9 IRON DEFICIENCY ANEMIA, UNSPECIFIED IRON DEFICIENCY ANEMIA TYPE: ICD-10-CM

## 2025-03-12 DIAGNOSIS — K31.819 ANGIECTASIA OF GASTROINTESTINAL TRACT: ICD-10-CM

## 2025-03-12 DIAGNOSIS — Q27.30 AVM (ARTERIOVENOUS MALFORMATION): ICD-10-CM

## 2025-03-12 DIAGNOSIS — E53.8 LOW SERUM VITAMIN B12: ICD-10-CM

## 2025-03-12 DIAGNOSIS — I10 BENIGN ESSENTIAL HYPERTENSION: ICD-10-CM

## 2025-03-12 DIAGNOSIS — E11.42 CONTROLLED TYPE 2 DIABETES MELLITUS WITH DIABETIC POLYNEUROPATHY, WITHOUT LONG-TERM CURRENT USE OF INSULIN (HCC): ICD-10-CM

## 2025-03-12 LAB
BASOPHILS # BLD AUTO: 0.04 THOUSANDS/ÂΜL (ref 0–0.1)
BASOPHILS NFR BLD AUTO: 1 % (ref 0–1)
EOSINOPHIL # BLD AUTO: 0.16 THOUSAND/ÂΜL (ref 0–0.61)
EOSINOPHIL NFR BLD AUTO: 3 % (ref 0–6)
ERYTHROCYTE [DISTWIDTH] IN BLOOD BY AUTOMATED COUNT: 21.6 % (ref 11.6–15.1)
HCT VFR BLD AUTO: 33 % (ref 34.8–46.1)
HGB BLD-MCNC: 9.6 G/DL (ref 11.5–15.4)
IMM GRANULOCYTES # BLD AUTO: 0.02 THOUSAND/UL (ref 0–0.2)
IMM GRANULOCYTES NFR BLD AUTO: 0 % (ref 0–2)
LYMPHOCYTES # BLD AUTO: 1.36 THOUSANDS/ÂΜL (ref 0.6–4.47)
LYMPHOCYTES NFR BLD AUTO: 24 % (ref 14–44)
MCH RBC QN AUTO: 23.9 PG (ref 26.8–34.3)
MCHC RBC AUTO-ENTMCNC: 29.1 G/DL (ref 31.4–37.4)
MCV RBC AUTO: 82 FL (ref 82–98)
MONOCYTES # BLD AUTO: 0.8 THOUSAND/ÂΜL (ref 0.17–1.22)
MONOCYTES NFR BLD AUTO: 14 % (ref 4–12)
NEUTROPHILS # BLD AUTO: 3.33 THOUSANDS/ÂΜL (ref 1.85–7.62)
NEUTS SEG NFR BLD AUTO: 58 % (ref 43–75)
NRBC BLD AUTO-RTO: 0 /100 WBCS
PLATELET # BLD AUTO: 369 THOUSANDS/UL (ref 149–390)
PMV BLD AUTO: 9.7 FL (ref 8.9–12.7)
RBC # BLD AUTO: 4.01 MILLION/UL (ref 3.81–5.12)
WBC # BLD AUTO: 5.71 THOUSAND/UL (ref 4.31–10.16)

## 2025-03-12 PROCEDURE — 99495 TRANSJ CARE MGMT MOD F2F 14D: CPT | Performed by: INTERNAL MEDICINE

## 2025-03-12 PROCEDURE — 36415 COLL VENOUS BLD VENIPUNCTURE: CPT

## 2025-03-12 PROCEDURE — 85025 COMPLETE CBC W/AUTO DIFF WBC: CPT

## 2025-03-12 RX ORDER — BLOOD-GLUCOSE METER
KIT MISCELLANEOUS
Qty: 1 KIT | Refills: 0 | Status: SHIPPED | OUTPATIENT
Start: 2025-03-12

## 2025-03-12 RX ORDER — ASPIRIN 81 MG/1
81 TABLET, CHEWABLE ORAL DAILY
Qty: 90 TABLET | Refills: 3 | Status: SHIPPED | OUTPATIENT
Start: 2025-03-12

## 2025-03-12 RX ORDER — GABAPENTIN 300 MG/1
600 CAPSULE ORAL
Qty: 180 CAPSULE | Refills: 1 | Status: SHIPPED | OUTPATIENT
Start: 2025-03-12 | End: 2025-03-12

## 2025-03-12 RX ORDER — GABAPENTIN 300 MG/1
300 CAPSULE ORAL 2 TIMES DAILY
Qty: 180 CAPSULE | Refills: 2 | Status: SHIPPED | OUTPATIENT
Start: 2025-03-12 | End: 2025-03-12

## 2025-03-12 RX ORDER — GABAPENTIN 300 MG/1
600 CAPSULE ORAL
Qty: 180 CAPSULE | Refills: 1 | Status: SHIPPED | OUTPATIENT
Start: 2025-03-12

## 2025-03-12 RX ORDER — CYANOCOBALAMIN (VITAMIN B-12) 500 MCG
500 TABLET ORAL DAILY
Qty: 30 TABLET | Refills: 0 | Status: SHIPPED | OUTPATIENT
Start: 2025-03-12 | End: 2025-04-11

## 2025-03-12 RX ORDER — GABAPENTIN 300 MG/1
300 CAPSULE ORAL 2 TIMES DAILY
Qty: 180 CAPSULE | Refills: 2 | Status: SHIPPED | OUTPATIENT
Start: 2025-03-12 | End: 2025-12-07

## 2025-03-12 NOTE — TELEPHONE ENCOUNTER
Patient was seen in office for 3/13 and needed a abdominal ultrasound scheduled. Scheduled patient for Monday 3/17/25 at 8am at [address] [place] [address], 1st floor radiology.     Will need to call patient to make her aware of this appointment

## 2025-03-12 NOTE — ASSESSMENT & PLAN NOTE
Longstanding history of lumbar radiculopathy with MRI on 12/18/23 showing extensive lumbar degenerative changes including moderate left foraminal narrowing at L3-L4 (with contact of exiting L3 nerve root) and moderate right foraminal narrowing (with contact of exiting L5 nerve root at L5-S1).. Was previously seen by pain management and underwent L5-S1 ILESI on 1/22/24 (with noted improvement for three weeks) and 4/1/24. Also saw orthopedics on 3/26/24 and 6/26/24 where she was noted to have elected for conservative management (rather than operative). Was eventually referred to physical therapy. Had back pain when she presented to hospital on 2/26. Today, says while she still has some back pain (as well as occasional leg pain) the physical therapy sessions have been helpful. She denied any urinary/bowel incontinence, saddle anaesthesia , or other red flag symptoms today. While she had an appointment with pain management on 3/28, in light of what happened yesterday, she would not be able to make it. Called to see if anything was available sooner, though nothing was (placed on cancellation list). Additionally, as mentioned in HPI, she requested her gabapentin be de-escalated.     Plan  -Continue PT  -Taper gabapentin from 600 mg BID to 300 BID as well as from 900 QHS to 600 QHS  -Continue tizanidine, 4 mg QHS PRN  -Rescheduled pain management appointment to 5/12/25 (this was personally communicated to Jenifer over the phone, she was ok with the new time/date)      Orders:    gabapentin (NEURONTIN) 300 mg capsule; Take 2 capsules (600 mg total) by mouth daily at bedtime    gabapentin (NEURONTIN) 300 mg capsule; Take 1 capsule (300 mg total) by mouth 2 (two) times a day

## 2025-03-12 NOTE — ASSESSMENT & PLAN NOTE
History of hyperlipidemia, last lipid panel on 6/28/24: Cholesterol: 136, triglycerides: 56, HDL: 52, LDL: 73. On atorvastatin, 40 mg and appears to be tolerating statin well. Noted coronary artery and aortic atherosclerosis on recent CAT scans. ASCVD-risk: 36.1%. No documented history of CAD and did not attest to any anginal symptoms but she does have significant risk factors (age, HTN, HLD, T2DM, PAD, smoking).    Plan  -Continue atorvastatin, 40 mg QD  -Repeat lipid panel ~May-June, 2025

## 2025-03-12 NOTE — ASSESSMENT & PLAN NOTE
History of tobacco use, currently down to four cigarettes a day. Is trying to quit but hasn't been able to stop completely. Discussed with her how smoking makes her PAD worse. CAT scan from 2/26/25 showed stable (not explicitly documented but seen on CAT scan on 10/9/2023) 2 mm RUL nodule and emphysematous changes in the right apex.     Plan  -Continued tobacco cessation guidance and counseling  -Consider pharmacologic therapy at next

## 2025-03-12 NOTE — PROGRESS NOTES
Transition of Care Visit  Name: Jenifer Smith      : 1955      MRN: [id number]  Encounter Provider: Joseph Shepard MD  Encounter Date: 3/12/2025   Encounter department: [facility]    Assessment & Plan  Transition of care  Presents today for TCM visit after being hospitalized from -3/3 with UGIB. Overall, she is doing well, especially when compared to the day she presented to hospital. No significant complaints today. Completed med-rec nad made some adjustments to medications, as will be discussed below. As mentioned in HPI, she will traveling abroad and won't be back until early-mid May. Plan is to see her again in clinic on , which she was agreeable to. Ideally will need to schedule Medicare annual wellness exam PCP as well (one was scheduled for  but had to be canceled today for reasons mentioned above).       Iron deficiency anemia, unspecified iron deficiency anemia type  Longstanding history of VALENTIN. Presented to hospital on  following a low Hb a couple of days prior, fatigue, dizziness, + FOBT. Was on Eliquis for DVT PTA. Received 1U pRBC. Underwent EGD and colonoscopy which showed two AVM's and multiple small angioectasias in the small duodenum s/p APC. Hb improved to 7.8 on day of discharge. Did not continue on Eliquis as it had been ~three months since DVT found. Today, she is no longer having the symptoms that brought her to hospital. Continues to take iron supplementation every other day. Looking at the notes, it appears that she was due to start Venofer infusions outpatient (received x1 while hospitalized on 3/1), but this has not been set up yet. She does not have many CBC/H&H in her chart which makes establishing a baseline Hb for her not straightforward, though a reasonable baseline for her based on what is available in her chart is ~9-11. Recheck CBC today showed improvement from day of discharge to 9.6 (I personally called her to discuss  the results with her). As will be mentioned below, will be starting ASA and was told to be on the look-out for bleeding episodes, fatigue, dizziness, etc, and to get evaluated as soon as possible if that were to happen.    Plan  -Continue ferrous sulfate, 324 mg QoD  -Will need to follow-up on setting up Venofer infusions when she comes back  -Repeat CBC around May, prior to follow-up visit on 5/14.  Orders:    CBC and differential; Future    Peripheral artery disease (HCC)  Recurrent leg pain, was previously found to have a DVT on 12/11/24 in the right leg for which she completed a ~three month course of Eliquis. No clear inciting/provoking factor. Repeat b/l LE venous duplex during hospital stay did not show evidence of DVT but b/l LE arterial duplex showed evidence of PAD with high grade stenosis vs. occulison of SFA (from proximal segment to below mid-thigh segment) and 50-75% stenosis of the proximal PFA artery with MAKAYLA of 0.70 on the right and 50-75% stenosis of the proximal PFA, diffuse femoral-popliteal artery disease (without significant focal stenosis), with MAKAYLA: 1.01 on the left. Was not discharged on aspirin due to recent GI bleed but was on statin. Outpatient vascular surgery referral placed. Today, she says she still has occasional leg and foot pain, particularly bothersome at night. No obvious ambulatory dysfunction today. While she continues to smoke, she is trying to cut back. As mentioned above and in HPI, she will be unable to attend some of her scheduled appointments due to a family emergency, with vascular surgery (previously scheduled for 4/3) being one of them. Called to see if an appointment is available earlier, but there were no open slots (placed on cancellation list). In light of her stable hemoglobin, no bleeding episodes, no longer being on Eliquis, and PAD diagnosis, feel it is appropriate to restart aspirin. This was discussed with Jenifer and she was agreeable to this.      Plan  -Restart aspirin, 81 mg QD  -Continue atorvastatin, 40 mg QD  -Repeat arterial and vascular duplex s  -Encourage exercise, will need structured exercise plan; will need to take place for at least 12 weeks, will try to initiate when she comes back  -Encourage tobacco cessation  -Rescheduled vascular surgery appointment to 5/12/25 (this was personally communicated to Jenifer over the phone, was ok with new time/date)  -May need to consider cilostazol in the future if symptoms worsen  -Will order one-time AAA US for screening purposes  -Repeat lipid panel in ~May/[place]    Orders:     Abdominal Aorta Medicare Screening AAA; Future    aspirin 81 mg chewable tablet; Chew 1 tablet (81 mg total) daily    Lumbar radiculopathy  Longstanding history of lumbar radiculopathy with MRI on 12/18/23 showing extensive lumbar degenerative changes including moderate left foraminal narrowing at L3-L4 (with contact of exiting L3 nerve root) and moderate right foraminal narrowing (with contact of exiting L5 nerve root at L5-S1).. Was previously seen by pain management and underwent L5-S1 ILESI on 1/22/24 (with noted improvement for three weeks) and 4/1/24. Also saw orthopedics on 3/26/24 and 6/26/24 where she was noted to have elected for conservative management (rather than operative). Was eventually referred to physical therapy. Had back pain when she presented to hospital on 2/26. Today, says while she still has some back pain (as well as occasional leg pain) the physical therapy sessions have been helpful. She denied any urinary/bowel incontinence, saddle anaesthesia , or other red flag symptoms today. While she had an appointment with pain management on 3/28, in light of what happened yesterday, she would not be able to make it. Called to see if anything was available sooner, though nothing was (placed on cancellation list). Additionally, as mentioned in HPI, she requested her gabapentin be de-escalated.      Plan  -Continue PT  -Taper gabapentin from 600 mg BID to 300 BID as well as from 900 QHS to 600 QHS  -Continue tizanidine, 4 mg QHS PRN  -Rescheduled pain management appointment to 5/12/25 (this was personally communicated to Jenifer over the phone, she was ok with the new time/date)      Orders:    gabapentin (NEURONTIN) 300 mg capsule; Take 2 capsules (600 mg total) by mouth daily at bedtime    gabapentin (NEURONTIN) 300 mg capsule; Take 1 capsule (300 mg total) by mouth 2 (two) times a day    Controlled type 2 diabetes mellitus with diabetic polyneuropathy, without long-term current use of insulin ([facility])    Lab Results   Component Value Date    HGBA1C 6.2 01/29/2025       History of T2DM, on metformin. Last A1c on 1/29/25: 6.2, 6.6 on 10/8/24 and 6.9 on 3/14/24. Continues to monitor her glucose levels at home and says her sugars range from the high 80's to high 130's, making sure to avoid levels below 80 and above 140. She asked today if her metformin dose could be reduced as she wanted to avoid side effects (particularly as it relates to her kidneys). Unfortunately, cannot go with SGL2i in light of her PAD (and increased risk of amputation). Seeing how she has good glucose control at home and an A1c of 6.2, will trial reduced dose of metformin.    Plan  -Reduce metformin from 1000 mg to 500 mg BID  -Reordered OneTouch device kit   -Repeat BMP prior to recheck appointment on May 14  -Recheck A1c around May (when she comes back)    Orders:    metFORMIN (GLUCOPHAGE) 1000 MG tablet; Take 0.5 tablets (500 mg total) by mouth 2 (two) times a day with meals    Benign essential hypertension  History of hypertension, on lisinopril-hydrochlorothiazide, 20-12.5 mg. Initial BP today: 161/77, recheck: 138/72. Says she is takes her antihypertensive daily but not this morning, in case she needed labs. Typically her BP have been <130/80, but there have been elevated readings in the past.    Plan  -Continue  "lisinopril-hydrochlorothiazide, 20-12.5 mg QD       Mixed hyperlipidemia  History of hyperlipidemia, last lipid panel on 6/28/24: Cholesterol: 136, triglycerides: 56, HDL: 52, LDL: 73. On atorvastatin, 40 mg and appears to be tolerating statin well. Noted coronary artery and aortic atherosclerosis on recent CAT scans. ASCVD-risk: 36.1%. No documented history of CAD and did not attest to any anginal symptoms but she does have significant risk factors (age, HTN, HLD, T2DM, PAD, smoking).    Plan  -Continue atorvastatin, 40 mg QD  -Repeat lipid panel ~May-June, 2025       Diastolic dysfunction  Echo on 7/30/21 showed LVEF: 60% with G1DD (with no regional wall motion abnormalities and normal wall thickness) as well as normal RV systolic function, mild MV calcification and regurgitation, no AV stenosis (with trace regurgitation), and mild-moderate TV regurgitation. Was noted to have chest pain on admission (which was present for weeks PTA, CTPE negative for PE). Follow-up echo scheduled by medicine team and scheduled for tomorrow.     Plan  -Repeat echo scheduled for tomorrow       Smoking  History of tobacco use, currently down to four cigarettes a day. Is trying to quit but hasn't been able to stop completely. Discussed with her how smoking makes her PAD worse. CAT scan from 2/26/25 showed stable (not explicitly documented but seen on CAT scan on 10/9/2023) 2 mm RUL nodule and emphysematous changes in the right apex.     Plan  -Continued tobacco cessation guidance and counseling  -Consider pharmacologic therapy at next        Low serum vitamin B12  Requested refill for Vit-B12, which was sent to pharmacy.    Orders:    vitamin B-12 (CYANOCOBALAMIN) 500 MCG TABS; Take 1 tablet (500 mcg total) by mouth daily    CBC and differential; Future    Type 2 diabetes mellitus with hyperglycemia, without long-term current use of insulin ([facility])    Lab Results   Component Value Date    HGBA1C 6.2 01/29/2025     See \"Controlled type 2 " "diabetes mellitus with diabetic polyneuropathy, without long-term current use of insulin\"    Orders:    Blood Glucose Monitoring Suppl (OneTouch Verio Reflect) w/Device KIT; Check blood sugars three times daily. Please substitute with appropriate alternative as covered by patient's insurance. Dx: E11.65    Basic metabolic panel; Future    AVM (arteriovenous malformation)  See \"Iron deficiency anemia, unspecified iron deficiency anemia type\"       Angiectasia of gastrointestinal tract  See \"Iron deficiency anemia, unspecified iron deficiency anemia type\"         Tobacco Cessation Counseling: Tobacco cessation counseling was provided. The patient is sincerely urged to quit consumption of tobacco. She is not ready to quit tobacco.         History of Present Illness     Transitional Care Management Review:   Jenifer Smith is a 69 y.o. female here for TCM follow up.     During the TCM phone call patient stated:  TCM Call (since 2/26/2025)       Date and time call was made  3/3/2025  8:53 AM    Hospital care reviewed  Records reviewed    Patient was hospitialized at  [facility]    Date of Admission  02/26/25    Date of discharge  03/01/25    Diagnosis  Symptomatic anemia Principal problem D64.9    Disposition  Home    Were the patients medications reviewed and updated  Yes    Current Symptoms  None          TCM Call (since 2/26/2025)       Should patient be enrolled in anticoag monitoring?  No    Scheduled for follow up?  Yes    Did you obtain your prescribed medications  Yes    Do you need help managing your prescriptions or medications  No    Is transportation to your appointment needed  No    I have advised the patient to call PCP with any new or worsening symptoms  SARA HARDY MA    Counseling  Patient    Counseling topics  instructions for management; Importance of RX compliance          Jenifer Smith is a 68 YO female with a PMH of T2DM (not on insulin), PAD, hx DVT (not on AC), diastolic " dysfunction (LVEF: 60%, G1DD on 7/30/21), VALENTIN, hx AVM s/p APC (on 2/28/25), HTN, HLD, TUD, lumbar radiculopathy who present to clinic today for TCM visit. She was hospitalized from 2/26/25-3/3/25 after presenting to clinic earlier on the day of admission with fatigue, dizziness, positive FOBT, and a hemoglobin of 6.9 on 2/24. Prior to admission, she was on Eliquis for a DVT that was discovered in December 2024 as well as iron supplementation. H/H in ED was 6.5/22.2 and she was transfused one unit pRBC. Started on IV PPI, evaluated by GI and underwent EGD + colonoscopy on 2/28/25 which showed two AVM's in the duodenum as well as multiple small angioectasias in the second part of the duodenum (treated with APC), grade A esophagitis, erythematous stomach mucosa (with biopsies collected for H. pylori coming back negative), and no obvious source of bleeding on colonoscopy (though that procedure was complicated by inadequate prep and equipment malfunction). Hemoglobin stabilized, was asked to continue PPI daily, follow-up outpatient, and was subsequently cleared by GI for discharge on 3/1. Additionally, when she presented to ED she was having right-sided leg pain behind the calf, around the area she had a DVT (of one of the paired posterior tibial veins). She underwent b/l vascular venous duplex on 2/27 which did not show DVT and b/l vascular arterial duplex on 2/28 which showed right-sided high grade stenosis vs occlusion of the SFA (from proximal segment to below mid-thigh segment) and 50-75% stenosis of proximal PFA artery with MAKAYLA: 0.70, and 50-75% stenosis of proximal PFA, diffuse femoral-popliteal artery disease (without significant focal stenosis) with MAKAYLA: 1.01 on the left. Aspirin was deferred 2/2 recent GI bleed, she was already on statin (atorvastatin), and she was discharged with vascular surgery referral. As it had been ~three-months since her DVT was found, she was discharged off the Eliquis.     Today, she  says she is doing well and had no significant complaints. She tells me that her fatigue has pretty much resolved and she has not had any bleeding episodes (epistaxis, hemoptysis, hematochezia, melena, BRBPR). Still having dark (not black) stools but this is within her normal as she takes iron supplementation QoD. Says her right leg pain is currently bothering her around the night and is having no significant gait issues. Still having back pain for which she is going to physical therapy and she feels that she is benefiting from it. She requested that her gabapentin be reduced. While she is not having any somnolence, dizziness, she feels like the amount of pills she has to take are too much. Regarding her T2DM, says she continues to check her sugars daily and they range from , making sure they do not go below 80 or above 140. Asked if her metformin could be reduced as well, as she was concerned about its side-effects. Regrettably she still smokes, but says she is down to four cigarettes a day. Says it is difficult to quit as she tends to smoke when meeting with friends, who also smoke. Requested refill for Vit B12.    Unfortunately, her brother  yesterday and she needs to go to the [place] to organize  arrangements. She has multiple imaging appointments and specialist follow-ups in the coming weeks. She says she plans to leave to [place] around  and will not be back until ~May 7th. Therefore, she should be able to make her arterial/venus duplex + echo tomorrow, GI appointment on 3/17, and her PT sessions but will miss her pain management and vascular surgery appointments (scheduled on 3/28 and 4/3 respectively).       Review of Systems   Constitutional:  Negative for chills, fatigue and fever.   HENT:  Negative for nosebleeds and rhinorrhea.    Eyes:  Positive for pain (Randomally).   Respiratory:  Positive for cough. Negative for choking, shortness of breath and wheezing.     Cardiovascular:  Negative for chest pain and palpitations.   Gastrointestinal:  Negative for abdominal pain, anal bleeding, blood in stool, constipation, diarrhea, nausea and vomiting.   Endocrine: Negative for polyphagia and polyuria.   Genitourinary:  Negative for difficulty urinating, frequency, hematuria and urgency.   Musculoskeletal:  Positive for back pain.   Skin:  Negative for rash.   Allergic/Immunologic: Negative for environmental allergies and food allergies.   Neurological:  Negative for dizziness, weakness, light-headedness and headaches.   Hematological:  Does not bruise/bleed easily.   Psychiatric/Behavioral:  Negative for dysphoric mood. The patient is not nervous/anxious.      Objective   /72 (BP Location: Left arm, Patient Position: Sitting, Cuff Size: Extra-Large)   Pulse 94   Temp (!) 97.3 °F (36.3 °C) (Temporal)   Wt 75.6 kg (166 lb 9.6 oz)   BMI 26.89 kg/m²     Physical Exam  Vitals reviewed.   Constitutional:       General: She is not in acute distress.     Appearance: She is not ill-appearing.   HENT:      Head: Normocephalic and atraumatic.      Nose: No rhinorrhea.      Mouth/Throat:      Mouth: Mucous membranes are moist.      Pharynx: Oropharynx is clear.   Eyes:      Extraocular Movements: Extraocular movements intact.   Cardiovascular:      Rate and Rhythm: Normal rate and regular rhythm.      Pulses:           Radial pulses are 2+ on the right side and 2+ on the left side.        Dorsalis pedis pulses are 1+ on the right side and 1+ on the left side.      Heart sounds:      No friction rub. No gallop.   Pulmonary:      Effort: Pulmonary effort is normal. No respiratory distress.      Breath sounds: No wheezing, rhonchi or rales.   Abdominal:      General: Abdomen is protuberant. Bowel sounds are normal.      Palpations: Abdomen is soft.      Tenderness: There is no abdominal tenderness.   Musculoskeletal:      Cervical back: No tenderness.      Thoracic back: No  tenderness.      Lumbar back: Tenderness present.      Right lower leg: No edema.      Left lower leg: No edema.      Comments: Straight leg test negative b/l   Feet:      Right foot:      Skin integrity: Warmth present. No ulcer, skin breakdown or erythema.      Left foot:      Skin integrity: Warmth present. No ulcer, skin breakdown or erythema.      Comments: Intact foot proprioception   Skin:     General: Skin is warm and dry.      Capillary Refill: Capillary refill takes less than 2 seconds.   Neurological:      General: No focal deficit present.      Mental Status: She is alert.   Psychiatric:         Mood and Affect: Mood normal.         Behavior: Behavior normal.         Thought Content: Thought content normal.       Medications have been reviewed by provider in current encounter

## 2025-03-12 NOTE — ASSESSMENT & PLAN NOTE
History of hypertension, on lisinopril-hydrochlorothiazide, 20-12.5 mg. Initial BP today: 161/77, recheck: 138/72. Says she is takes her antihypertensive daily but not this morning, in case she needed labs. Typically her BP have been <130/80, but there have been elevated readings in the past.    Plan  -Continue lisinopril-hydrochlorothiazide, 20-12.5 mg QD

## 2025-03-13 ENCOUNTER — HOSPITAL ENCOUNTER (OUTPATIENT)
Dept: NON INVASIVE DIAGNOSTICS | Facility: HOSPITAL | Age: 70
Discharge: HOME | End: 2025-03-13
Payer: COMMERCIAL

## 2025-03-13 VITALS
BODY MASS INDEX: 26.68 KG/M2 | HEIGHT: 66 IN | HEART RATE: 94 BPM | SYSTOLIC BLOOD PRESSURE: 138 MMHG | DIASTOLIC BLOOD PRESSURE: 72 MMHG | WEIGHT: 166 LBS

## 2025-03-13 DIAGNOSIS — I82.401 ACUTE DEEP VEIN THROMBOSIS (DVT) OF RIGHT LOWER EXTREMITY, UNSPECIFIED VEIN (HCC): ICD-10-CM

## 2025-03-13 DIAGNOSIS — R07.9 CHEST PAIN, UNSPECIFIED TYPE: ICD-10-CM

## 2025-03-13 DIAGNOSIS — I73.9 PERIPHERAL ARTERY DISEASE (HCC): ICD-10-CM

## 2025-03-13 LAB
AORTIC ROOT: 2.8 CM
ASCENDING AORTA: 3 CM
AV LVOT MEAN GRADIENT: 2 MMHG
AV LVOT PEAK GRADIENT: 4 MMHG
BSA FOR ECHO PROCEDURE: 1.85 M2
DOP CALC LVOT PEAK VEL VTI: 20.41 CM
DOP CALC LVOT PEAK VEL: 1.01 M/S
E WAVE DECELERATION TIME: 144 MS
E/A RATIO: 0.85
FRACTIONAL SHORTENING: 33 (ref 28–44)
GLOBAL LONGITUIDAL STRAIN: -18 %
INTERVENTRICULAR SEPTUM IN DIASTOLE (PARASTERNAL SHORT AXIS VIEW): 1.3 CM
INTERVENTRICULAR SEPTUM: 1.3 CM (ref 0.6–1.1)
LAAS-AP2: 17.6 CM2
LAAS-AP4: 12.8 CM2
LEFT ATRIUM SIZE: 3 CM
LEFT ATRIUM VOLUME (MOD BIPLANE): 42 ML
LEFT ATRIUM VOLUME INDEX (MOD BIPLANE): 22.7 ML/M2
LEFT INTERNAL DIMENSION IN SYSTOLE: 2.2 CM (ref 2.1–4)
LEFT VENTRICULAR INTERNAL DIMENSION IN DIASTOLE: 3.3 CM (ref 3.5–6)
LEFT VENTRICULAR POSTERIOR WALL IN END DIASTOLE: 1.2 CM
LEFT VENTRICULAR STROKE VOLUME: 29 ML
LV EF US.2D.A4C+ESTIMATED: 80 %
LVSV (TEICH): 29 ML
MV E'TISSUE VEL-LAT: 10 CM/S
MV E'TISSUE VEL-SEP: 11 CM/S
MV PEAK A VEL: 1.09 M/S
MV PEAK E VEL: 93 CM/S
MV STENOSIS PRESSURE HALF TIME: 42 MS
MV VALVE AREA P 1/2 METHOD: 5.24
RA PRESSURE ESTIMATED: 3 MMHG
RIGHT ATRIUM AREA SYSTOLE A4C: 15 CM2
RIGHT VENTRICLE ID DIMENSION: 3.8 CM
RV PSP: 35 MMHG
SL CV ECHO LV DYNAMIC OBSTRUCTION PEAK GRADIENT (REST): 18 MMHG
SL CV ECHO LV DYNAMIC OBSTRUCTION PEAK GRADIENT (VALSAL: 41 MMHG
SL CV LEFT ATRIUM LENGTH A2C: 6.1 CM
SL CV LV EF: 75
SL CV PED ECHO LEFT VENTRICLE DIASTOLIC VOLUME (MOD BIPLANE) 2D: 45 ML
SL CV PED ECHO LEFT VENTRICLE SYSTOLIC VOLUME (MOD BIPLANE) 2D: 16 ML
TR MAX PG: 32 MMHG
TR PEAK VELOCITY: 2.8 M/S
TRICUSPID ANNULAR PLANE SYSTOLIC EXCURSION: 1.8 CM
TRICUSPID VALVE PEAK REGURGITATION VELOCITY: 2.83 M/S

## 2025-03-13 PROCEDURE — 93970 EXTREMITY STUDY: CPT | Performed by: SURGERY

## 2025-03-13 PROCEDURE — 93306 TTE W/DOPPLER COMPLETE: CPT | Performed by: INTERNAL MEDICINE

## 2025-03-13 PROCEDURE — 93970 EXTREMITY STUDY: CPT

## 2025-03-13 PROCEDURE — 93306 TTE W/DOPPLER COMPLETE: CPT

## 2025-03-13 PROCEDURE — 93356 MYOCRD STRAIN IMG SPCKL TRCK: CPT

## 2025-03-13 PROCEDURE — 93356 MYOCRD STRAIN IMG SPCKL TRCK: CPT | Performed by: INTERNAL MEDICINE

## 2025-03-17 ENCOUNTER — OFFICE VISIT (OUTPATIENT)
Dept: GASTROENTEROLOGY | Facility: CLINIC | Age: 70
End: 2025-03-17
Payer: COMMERCIAL

## 2025-03-17 ENCOUNTER — HOSPITAL ENCOUNTER (OUTPATIENT)
Dept: RADIOLOGY | Facility: HOSPITAL | Age: 70
Discharge: HOME | End: 2025-03-17
Payer: COMMERCIAL

## 2025-03-17 VITALS
HEIGHT: 64 IN | SYSTOLIC BLOOD PRESSURE: 141 MMHG | DIASTOLIC BLOOD PRESSURE: 71 MMHG | TEMPERATURE: 98.8 F | BODY MASS INDEX: 28.68 KG/M2 | WEIGHT: 168 LBS

## 2025-03-17 DIAGNOSIS — I73.9 PERIPHERAL ARTERY DISEASE (HCC): ICD-10-CM

## 2025-03-17 DIAGNOSIS — D62 ACUTE BLOOD LOSS ANEMIA: Primary | ICD-10-CM

## 2025-03-17 DIAGNOSIS — K55.20 ANGIODYSPLASIA OF INTESTINAL TRACT: ICD-10-CM

## 2025-03-17 PROCEDURE — 76706 US ABDL AORTA SCREEN AAA: CPT

## 2025-03-17 PROCEDURE — 99213 OFFICE O/P EST LOW 20 MIN: CPT

## 2025-03-17 NOTE — PROGRESS NOTES
Name: Jenifer Smith      : 1955      MRN: [id number]  Encounter Provider: Meli Dang PA-C  Encounter Date: 3/17/2025   Encounter department: [facility] [place]  :  Assessment & Plan  Acute blood loss anemia  Patient hospitalized  to 3/1/2025 due to anemia, fatigue, lightheadedness, dark stools.  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  Her Eliquis was discontinued during hospitalization.  Most recent labs 3 show hemoglobin 9.6 (from 7.8).  Continue with iron supplement.   Planned for repeat labs in May.       Angiodysplasia of intestinal tract  Likely cause of anemia, dark stools.  No further overt GI bleeding.  Orders:    Ambulatory referral to Gastroenterology        History of Present Illness   Jenifer Smith is a 69 y.o. female with PMH of HTN, PAD, JOANNA, type 2 diabetes, history of DVT, HLD who presents for hospital follow-up.  Patient hospitalized  to 3/1/2025 for abnormal labs.  Patient was sent in by PCP due to labs showing hemoglobin 6.5.  Patient also with fatigue, lightheadedness, dark stools.  Patient was given unit 1 unit PRBCs and GI was consulted.  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  Her Eliquis was discontinued during hospitalization.  Patient's hemoglobin remained stable was discharged home.  Labs 3/ showed hemoglobin 9.6 (from 7.8), MCV 82, platelets 369.    Patient states she has been feeling great since hospitalization.  She is no longer having fatigue, lightheadedness.  She is taking iron supplement every other day.  No overt GI bleeding.  Denies weight loss, nausea, vomiting, heartburn, dysphagia, abdominal pain.    Prior imaging/procedures:  EGD 2025: Grade a esophagitis, erythematous mucosa in stomach, multiple small angiectasia's in the second part of duodenum s/p APC (stomach biopsies negative for H. pylori)  Colonoscopy 2025:  Unable to reach cecum due to poor prep, significant stool throughout colon.  Brown stool seen, no active bleed seen  Colonoscopy 9/2022: Normal    Review of Systems   Constitutional:  Negative for appetite change, chills and fever.   Respiratory:  Negative for shortness of breath.    Gastrointestinal:  Negative for abdominal pain, blood in stool, constipation, diarrhea, nausea and vomiting.     Medical History Reviewed by provider this encounter:  Tobacco  Allergies  Meds  Problems  Med Hx  Surg Hx  Fam Hx     .  Current Outpatient Medications on File Prior to Visit   Medication Sig Dispense Refill    aspirin 81 mg chewable tablet Chew 1 tablet (81 mg total) daily 90 tablet 3    atorvastatin (LIPITOR) 40 mg tablet Take 40 mg by mouth daily      Blood Glucose Monitoring Suppl (OneTouch Verio Reflect) w/Device KIT Check blood sugars three times daily. Please substitute with appropriate alternative as covered by patient's insurance. Dx: E11.65 1 kit 0    Elastic Bandages & Supports (GNP Diabetic Socks Unisex XL) MISC Use 1 each if needed (1 pair of socks for diabetic neuropathy) 2 each 0    ferrous sulfate 324 (65 Fe) mg Take 1 tablet (324 mg total) by mouth every other day 90 tablet 1    gabapentin (NEURONTIN) 300 mg capsule Take 2 capsules (600 mg total) by mouth daily at bedtime 180 capsule 1    gabapentin (NEURONTIN) 300 mg capsule Take 1 capsule (300 mg total) by mouth 2 (two) times a day 180 capsule 2    glucose blood (Accu-Chek Guide) test strip Use 1 each in the morning Use as instructed 200 each 2    glucose blood test strip One Touch Verio brand. Use 1 each in the morning. 100 strip 3    Lancets Misc. (Accu-Chek Softclix Lancet Dev) KIT Use in the morning 1 kit 0    lisinopril-hydrochlorothiazide (PRINZIDE,ZESTORETIC) 20-12.5 MG per tablet Take 1 tablet by mouth daily 90 tablet 1    Menthol, Topical Analgesic, (Icy Hot Advanced Relief) 7.5 % PTCH Apply 1 patch topically daily as needed (shoulder pain)  "30 patch 0    metFORMIN (GLUCOPHAGE) 1000 MG tablet Take 0.5 tablets (500 mg total) by mouth 2 (two) times a day with meals 100 tablet 3    OneTouch Delica Lancets 33G MISC Check blood sugars three times daily. Please substitute with appropriate alternative as covered by patient's insurance. Dx: E11.65 300 each 3    pantoprazole (PROTONIX) 40 mg tablet Take 1 tablet (40 mg total) by mouth daily in the early morning 90 tablet 1    tiZANidine (ZANAFLEX) 4 mg tablet Take 1 tablet (4 mg total) by mouth daily at bedtime as needed for muscle spasms 90 tablet 1    vitamin B-12 (CYANOCOBALAMIN) 500 MCG TABS Take 1 tablet (500 mcg total) by mouth daily 30 tablet 0    Accu-Chek Softclix Lancets lancets Use 1 (one) time for 1 dose Use as instructed 200 each 2     No current facility-administered medications on file prior to visit.      Social History     Tobacco Use    Smoking status: Every Day     Current packs/day: 0.25     Average packs/day: 0.3 packs/day for 20.0 years (5.0 ttl pk-yrs)     Types: Cigarettes    Smokeless tobacco: Never    Tobacco comments:     smokes less than 1pk/day   Vaping Use    Vaping status: Never Used   Substance and Sexual Activity    Alcohol use: Never    Drug use: Never    Sexual activity: Not Currently        Objective   /71 (BP Location: Left arm, Patient Position: Sitting, Cuff Size: Large)   Temp 98.8 °F (37.1 °C) (Tympanic)   Ht 5' 3.5\" (1.613 m)   Wt 76.2 kg (168 lb)   BMI 29.29 kg/m²      Physical Exam  Vitals reviewed.   Constitutional:       General: She is not in acute distress.     Appearance: She is not ill-appearing.   HENT:      Head: Normocephalic and atraumatic.   Cardiovascular:      Rate and Rhythm: Normal rate and regular rhythm.   Pulmonary:      Effort: Pulmonary effort is normal.      Breath sounds: Normal breath sounds.   Abdominal:      General: Abdomen is flat. Bowel sounds are normal. There is no distension.      Palpations: Abdomen is soft.      Tenderness: " There is no abdominal tenderness.   Skin:     General: Skin is warm and dry.   Neurological:      Mental Status: She is alert. Mental status is at baseline.

## 2025-03-19 LAB
LEFT EYE DIABETIC RETINOPATHY: POSITIVE
RIGHT EYE DIABETIC RETINOPATHY: POSITIVE

## 2025-03-24 ENCOUNTER — RESULTS FOLLOW-UP (OUTPATIENT)
Dept: GASTROENTEROLOGY | Facility: CLINIC | Age: 70
End: 2025-03-24

## 2025-03-24 NOTE — TELEPHONE ENCOUNTER
Attempted to call Jenifer to discuss AAA screening US, but no . LVM stating no evidence of AAA but it did show moderate atherosclerosis. Asked her to contact clinic and to ask to speak with me if she had any questions or wished to discuss the results further.

## 2025-03-24 NOTE — TELEPHONE ENCOUNTER
Pt called for results to [facility] US. Pt advised she needs to call PCPs office to get results as they are the ones that ordered it

## 2025-05-08 NOTE — PROGRESS NOTES
Assessment  1. Lumbar radiculopathy    2. Spinal stenosis of lumbar region, unspecified whether neurogenic claudication present    3. Lumbar spondylosis    4. Myofascial pain    5. DM type 2 without retinopathy (HCC)        Plan  69-year-old female with history of lumbar spondylosis, stenosis, and lumbar radiculopathy returning for interval follow-up.  The patient was last seen April 1, 2024 when she had an L4-5 LESI that gave her about 70% of relief of her low back and right lower extremity symptoms.  Unfortunately, the patient's low back and right lower extremity symptoms have returned.  She denies any interval trauma.  The patient is currently taking gabapentin 600 mg at bedtime and tizanidine 4 mg nightly as needed with moderate relief.  Of note, the patient is no longer on anticoagulation as she was recently admitted with anemia secondary to AVM x 2 and duodenum status post APC by GI.  Patient did require blood transfusion at that time.    1.  I will schedule the patient for repeat L4-5 LESI and we will obtain updated hemoglobin A1c prior to CSI  2.  Patient will continue with tizanidine 4 mg nightly as needed and refill was provided  3.  Patient will continue with gabapentin 600 mg at bedtime as prescribed  4.  Patient will continue with HEP  5.  I will follow-up with the patient 2 weeks after injection      Complete risks and benefits including bleeding, infection, tissue reaction, nerve injury and allergic reaction were discussed. The approach was demonstrated using models and literature was provided. Verbal and written consent was obtained.    My impressions and treatment recommendations were discussed in detail with the patient who verbalized understanding and had no further questions.  Discharge instructions were provided. I personally saw and examined the patient and I agree with the above discussed plan of care.    No orders of the defined types were placed in this encounter.    No orders of the defined  types were placed in this encounter.      History of Present Illness    Jenifer Smiht is a 69 y.o. female with history of lumbar spondylosis, stenosis, and lumbar radiculopathy returning for interval follow-up.  The patient was last seen April 1, 2024 when she had an L4-5 LESI that gave her about 70% of relief of her low back and right lower extremity symptoms.  Unfortunately, the patient's low back and right lower extremity pain have returned.  She denies any weakness in her legs, bladder or bowel incontinence, or saddle anesthesia.  She denies any left lower extremity symptoms.  She denies any interval trauma.  The patient is currently taking gabapentin 600 mg at bedtime and tizanidine 4 mg nightly as needed with moderate relief.  Of note, the patient is no longer on anticoagulation as she was recently admitted with anemia secondary to AVM x 2 and duodenum status post APC by GI.  Patient did require blood transfusion at that time.  The patient rates her pain an 8 out of 10 and the pain is constant.  The pain is described as aching and throbbing.  The pain is increased with standing, walking, and exercise.  Pain is alleviated with injections, medication, and relaxation.      Other than as stated above, the patient denies any interval changes in medications, medical condition, mental condition, symptoms, or allergies since the last office visit.    I have personally reviewed and/or updated the patient's past medical history, past surgical history, family history, social history, current medications, allergies, and vital signs today.     Review of Systems    Patient Active Problem List   Diagnosis    Benign essential hypertension    DM type 2 without retinopathy (HCC)    Hyperlipidemia    Chronic bilateral low back pain with left-sided sciatica    History of right breast biopsy    Breast pain, right    Visit for screening mammogram    Breast cyst, right    Breast mass, right    Carpal tunnel syndrome, bilateral     "Depression, recurrent (HCC)    Overweight (BMI 25.0-29.9)    History of hysterectomy    Smoking    JOANNA (obstructive sleep apnea)    Microscopic hematuria    Spinal stenosis of lumbar region    Lumbar spondylosis    Lumbar radiculopathy    Myofascial pain    Chronic pain syndrome    Bilateral foot pain    Valgus deformity of both great toes    Exposure to COVID-19 virus    Urinary symptom or sign    Stress at home    Caregiver stress syndrome    Deep vein thrombosis (DVT) of right lower extremity (HCC)    Symptomatic anemia    PAD (peripheral artery disease) (HCC)       Past Medical History:   Diagnosis Date    Arthritis     Bump     bumped head yesterday at work \"saw stars\" no LOC, cat scan done was normal    Circulation problem     Diabetes mellitus (HCC)     blood sugar 125 on admission    Encounter for screening colonoscopy     1 st one    Headache     Hyperlipidemia     Hypertension     Positive fecal occult blood test 08/30/2018    Added automatically from request for surgery [id number]    Post concussion syndrome 09/14/2018    Syncope        Past Surgical History:   Procedure Laterality Date    BREAST BIOPSY Right 02/18/2022    COLONOSCOPY N/A 9/26/2018    Procedure: COLONOSCOPY;  Surgeon: Joie Morejon MD;  Location: [facility] GI LAB;  Service: Gastroenterology    HYSTERECTOMY  2017    INCISIONAL BREAST BIOPSY      last assessed 17Aug2017    ND COLONOSCOPY FLX DX W/COLLJ SPEC WHEN PFRMD N/A 7/5/2018    Procedure: COLONOSCOPY;  Surgeon: Joie Morejon MD;  Location:  GI LAB;  Service: Gastroenterology    US GUIDED BREAST BIOPSY RIGHT COMPLETE Right 2/18/2022       Family History   Problem Relation Age of Onset    Breast cancer Cousin 50    Alcohol abuse Mother     Alcohol abuse Father     Breast cancer Daughter 38    No Known Problems Sister     No Known Problems Maternal Grandmother     No Known Problems Maternal Grandfather     No Known Problems Paternal Grandmother     No Known Problems Paternal Grandfather  "    No Known Problems Son     No Known Problems Son     No Known Problems Daughter     No Known Problems Daughter     No Known Problems Daughter     Breast cancer Sister 66    No Known Problems Maternal Aunt     No Known Problems Maternal Aunt     No Known Problems Maternal Aunt     No Known Problems Maternal Aunt     No Known Problems Maternal Aunt     No Known Problems Maternal Aunt     No Known Problems Maternal Aunt     No Known Problems Maternal Aunt     No Known Problems Maternal Aunt     No Known Problems Maternal Aunt     No Known Problems Maternal Aunt     No Known Problems Maternal Aunt     No Known Problems Paternal Aunt     No Known Problems Paternal Aunt     Dementia Paternal Aunt     No Known Problems Paternal Aunt     No Known Problems Paternal Aunt     No Known Problems Paternal Aunt     No Known Problems Paternal Aunt     No Known Problems Paternal Aunt     Colon cancer Neg Hx     Endometrial cancer Neg Hx     Ovarian cancer Neg Hx        Social History     Occupational History    Occupation: [employer]   Tobacco Use    Smoking status: Every Day     Current packs/day: 0.25     Average packs/day: 0.3 packs/day for 20.0 years (5.0 ttl pk-yrs)     Types: Cigarettes    Smokeless tobacco: Never    Tobacco comments:     smokes less than 1pk/day   Vaping Use    Vaping status: Never Used   Substance and Sexual Activity    Alcohol use: Never    Drug use: Never    Sexual activity: Not Currently       Current Outpatient Medications on File Prior to Visit   Medication Sig    Accu-Chek Softclix Lancets lancets Use 1 (one) time for 1 dose Use as instructed    aspirin 81 mg chewable tablet Chew 1 tablet (81 mg total) daily    atorvastatin (LIPITOR) 40 mg tablet Take 40 mg by mouth daily    Blood Glucose Monitoring Suppl (OneTouch Verio Reflect) w/Device KIT Check blood sugars three times daily. Please substitute with appropriate alternative as covered by patient's insurance. Dx: E11.65    Elastic Bandages &  "Supports (GNP Diabetic Socks Unisex XL) MISC Use 1 each if needed (1 pair of socks for diabetic neuropathy)    ferrous sulfate 324 (65 Fe) mg Take 1 tablet (324 mg total) by mouth every other day    gabapentin (NEURONTIN) 300 mg capsule Take 2 capsules (600 mg total) by mouth daily at bedtime    gabapentin (NEURONTIN) 300 mg capsule Take 1 capsule (300 mg total) by mouth 2 (two) times a day    glucose blood (Accu-Chek Guide) test strip Use 1 each in the morning Use as instructed    glucose blood test strip One Touch Verio brand. Use 1 each in the morning.    Lancets Mis. (Accu-Chek Softclix Lancet Dev) KIT Use in the morning    lisinopril-hydrochlorothiazide (PRINZIDE,ZESTORETIC) 20-12.5 MG per tablet Take 1 tablet by mouth daily    Menthol, Topical Analgesic, (Icy Hot Advanced Relief) 7.5 % PTCH Apply 1 patch topically daily as needed (shoulder pain)    metFORMIN (GLUCOPHAGE) 1000 MG tablet Take 0.5 tablets (500 mg total) by mouth 2 (two) times a day with meals    OneTouch Delica Lancets 33G MISC Check blood sugars three times daily. Please substitute with appropriate alternative as covered by patient's insurance. Dx: E11.65    pantoprazole (PROTONIX) 40 mg tablet Take 1 tablet (40 mg total) by mouth daily in the early morning    tiZANidine (ZANAFLEX) 4 mg tablet Take 1 tablet (4 mg total) by mouth daily at bedtime as needed for muscle spasms    vitamin B-12 (CYANOCOBALAMIN) 500 MCG TABS Take 1 tablet (500 mcg total) by mouth daily     No current facility-administered medications on file prior to visit.       No Known Allergies    Physical Exam    Ht 5' 3.5\" (1.613 m)   Wt 76.2 kg (168 lb)   BMI 29.29 kg/m²     Constitutional: normal, well developed, well nourished, alert, in no distress and non-toxic and no overt pain behavior.  Eyes: anicteric  HEENT: grossly intact  Neck: supple, symmetric, trachea midline and no masses   Pulmonary:even and unlabored  Cardiovascular:No edema or pitting edema " present  Skin:Normal without rashes or lesions and well hydrated  Psychiatric:Mood and affect appropriate  Neurologic:Cranial Nerves II-XII grossly intact  Musculoskeletal:normal gait.  Right lumbar paraspinals tender to palpation from L4-S1.  Bilateral lower extremity strength 5 out of 5 in all muscle groups.  Sensation intact to light touch in L3-S1 dermatomes bilaterally.  Positive seated straight leg raise on the right and negative on the left.    Imaging  MRI LUMBAR SPINE WITHOUT CONTRAST     INDICATION: M54.16: Radiculopathy, lumbar region  M51.36: Other intervertebral disc degeneration, lumbar region.     COMPARISON:  None.     TECHNIQUE:  Multiplanar, multisequence imaging of the lumbar spine was performed. .        IMAGE QUALITY:  Diagnostic     FINDINGS:     VERTEBRAL BODIES:  There are 5 lumbar type vertebral bodies. There is trace anterolisthesis of L2-L3 and L4-L5. There is scoliosis. Normal marrow signal is identified within the visualized bony structures.  No discrete marrow lesion.     SACRUM:  Normal signal within the sacrum. No evidence of insufficiency or stress fracture.     DISTAL CORD AND CONUS:  Normal size and signal within the distal cord and conus.     PARASPINAL SOFT TISSUES:  Paraspinal soft tissues are unremarkable.     LOWER THORACIC DISC SPACES:  Normal disc height and signal.  No disc herniation, canal stenosis or foraminal narrowing.     LUMBAR DISC SPACES:     L1-L2:  Normal.     L2-L3: There is a bulging annulus. There is facet arthrosis. There is mild mass effect on the thecal sac. There is mild foraminal narrowing.     L3-L4: There is a bulging annulus, asymmetric to the left with annular fissure. There is facet arthrosis. There is mild to moderate mass effect on the thecal sac. There is moderate left foraminal narrowing with contact of the exiting nerve root.     L4-L5: There is a bulging annulus. There is facet arthrosis. There is mild mass effect on the thecal sac. There is  mild to moderate left and mild right foraminal narrowing.     L5-S1: There is a bulging annulus, asymmetric to the right. There is facet arthrosis. There is mild mass effect on thecal sac. There is moderate right and mild left foraminal narrowing.     OTHER FINDINGS:  None.     IMPRESSION:     Degenerative changes of the lumbar spine, as described above.     Multifactorial disease results in moderate left foraminal narrowing at L3-L4 with contact of the exiting L3 nerve root.     Moderate right foraminal narrowing with contact of the exiting right L5 nerve root is also present at L5-S1.

## 2025-05-09 ENCOUNTER — TELEPHONE (OUTPATIENT)
Dept: VASCULAR SURGERY | Facility: CLINIC | Age: 70
End: 2025-05-09

## 2025-05-09 NOTE — TELEPHONE ENCOUNTER
Called pt and lmom to inform them their appt for 5/12 is canceled due to provider being out of office. Offered 5/15 with [facility] at 9:30am

## 2025-05-12 ENCOUNTER — OFFICE VISIT (OUTPATIENT)
Dept: PAIN MEDICINE | Facility: CLINIC | Age: 70
End: 2025-05-12
Payer: COMMERCIAL

## 2025-05-12 VITALS — WEIGHT: 168 LBS | HEIGHT: 64 IN | BODY MASS INDEX: 28.68 KG/M2

## 2025-05-12 DIAGNOSIS — E11.9 DM TYPE 2 WITHOUT RETINOPATHY (HCC): ICD-10-CM

## 2025-05-12 DIAGNOSIS — M79.18 MYOFASCIAL PAIN: ICD-10-CM

## 2025-05-12 DIAGNOSIS — M54.16 LUMBAR RADICULOPATHY: Primary | ICD-10-CM

## 2025-05-12 DIAGNOSIS — M48.061 SPINAL STENOSIS OF LUMBAR REGION, UNSPECIFIED WHETHER NEUROGENIC CLAUDICATION PRESENT: ICD-10-CM

## 2025-05-12 DIAGNOSIS — M47.816 LUMBAR SPONDYLOSIS: ICD-10-CM

## 2025-05-12 PROCEDURE — 99214 OFFICE O/P EST MOD 30 MIN: CPT | Performed by: ANESTHESIOLOGY

## 2025-05-12 PROCEDURE — G2211 COMPLEX E/M VISIT ADD ON: HCPCS | Performed by: ANESTHESIOLOGY

## 2025-05-13 ENCOUNTER — APPOINTMENT (OUTPATIENT)
Dept: LAB | Facility: CLINIC | Age: 70
End: 2025-05-13
Payer: COMMERCIAL

## 2025-05-13 DIAGNOSIS — E11.9 DM TYPE 2 WITHOUT RETINOPATHY (HCC): ICD-10-CM

## 2025-05-13 LAB
EST. AVERAGE GLUCOSE BLD GHB EST-MCNC: 151 MG/DL
HBA1C MFR BLD: 6.9 %

## 2025-05-13 PROCEDURE — 83036 HEMOGLOBIN GLYCOSYLATED A1C: CPT

## 2025-05-13 PROCEDURE — 36415 COLL VENOUS BLD VENIPUNCTURE: CPT

## 2025-05-13 NOTE — TELEPHONE ENCOUNTER
Caller: Luz Marina Smith    Doctor: Izabella Hampton    Reason for call: Returning call confirming appointment for 5/12/25 canceled and rescheduled to 5/15/25 at 9:30 am    Call back#: [phone]

## 2025-05-14 ENCOUNTER — APPOINTMENT (OUTPATIENT)
Dept: LAB | Facility: CLINIC | Age: 70
End: 2025-05-14
Payer: COMMERCIAL

## 2025-05-14 ENCOUNTER — OFFICE VISIT (OUTPATIENT)
Dept: INTERNAL MEDICINE CLINIC | Facility: CLINIC | Age: 70
End: 2025-05-14

## 2025-05-14 VITALS
TEMPERATURE: 97.8 F | HEART RATE: 97 BPM | DIASTOLIC BLOOD PRESSURE: 62 MMHG | OXYGEN SATURATION: 94 % | WEIGHT: 169.4 LBS | SYSTOLIC BLOOD PRESSURE: 122 MMHG | BODY MASS INDEX: 29.54 KG/M2

## 2025-05-14 DIAGNOSIS — D64.9 ANEMIA, UNSPECIFIED TYPE: ICD-10-CM

## 2025-05-14 DIAGNOSIS — I73.9 PAD (PERIPHERAL ARTERY DISEASE) (HCC): ICD-10-CM

## 2025-05-14 DIAGNOSIS — I10 BENIGN ESSENTIAL HYPERTENSION: ICD-10-CM

## 2025-05-14 DIAGNOSIS — R53.83 FATIGUE, UNSPECIFIED TYPE: ICD-10-CM

## 2025-05-14 DIAGNOSIS — M25.511 RIGHT SHOULDER PAIN, UNSPECIFIED CHRONICITY: ICD-10-CM

## 2025-05-14 DIAGNOSIS — I73.9 PERIPHERAL ARTERY DISEASE (HCC): ICD-10-CM

## 2025-05-14 DIAGNOSIS — M25.561 RIGHT KNEE PAIN, UNSPECIFIED CHRONICITY: ICD-10-CM

## 2025-05-14 DIAGNOSIS — Z09 FOLLOW-UP EXAM: Primary | ICD-10-CM

## 2025-05-14 DIAGNOSIS — K55.20 ANGIODYSPLASIA OF INTESTINAL TRACT: ICD-10-CM

## 2025-05-14 LAB
BASOPHILS # BLD AUTO: 0.03 THOUSANDS/ÂΜL (ref 0–0.1)
BASOPHILS NFR BLD AUTO: 1 % (ref 0–1)
EOSINOPHIL # BLD AUTO: 0.09 THOUSAND/ÂΜL (ref 0–0.61)
EOSINOPHIL NFR BLD AUTO: 1 % (ref 0–6)
ERYTHROCYTE [DISTWIDTH] IN BLOOD BY AUTOMATED COUNT: 21.2 % (ref 11.6–15.1)
HCT VFR BLD AUTO: 33.3 % (ref 34.8–46.1)
HGB BLD-MCNC: 9.6 G/DL (ref 11.5–15.4)
IMM GRANULOCYTES # BLD AUTO: 0.02 THOUSAND/UL (ref 0–0.2)
IMM GRANULOCYTES NFR BLD AUTO: 0 % (ref 0–2)
LYMPHOCYTES # BLD AUTO: 1.35 THOUSANDS/ÂΜL (ref 0.6–4.47)
LYMPHOCYTES NFR BLD AUTO: 21 % (ref 14–44)
MCH RBC QN AUTO: 24.1 PG (ref 26.8–34.3)
MCHC RBC AUTO-ENTMCNC: 28.8 G/DL (ref 31.4–37.4)
MCV RBC AUTO: 84 FL (ref 82–98)
MONOCYTES # BLD AUTO: 0.72 THOUSAND/ÂΜL (ref 0.17–1.22)
MONOCYTES NFR BLD AUTO: 11 % (ref 4–12)
NEUTROPHILS # BLD AUTO: 4.24 THOUSANDS/ÂΜL (ref 1.85–7.62)
NEUTS SEG NFR BLD AUTO: 66 % (ref 43–75)
NRBC BLD AUTO-RTO: 0 /100 WBCS
PLATELET # BLD AUTO: 375 THOUSANDS/UL (ref 149–390)
PMV BLD AUTO: 9.6 FL (ref 8.9–12.7)
RBC # BLD AUTO: 3.99 MILLION/UL (ref 3.81–5.12)
VIT B12 SERPL-MCNC: 484 PG/ML (ref 180–914)
WBC # BLD AUTO: 6.45 THOUSAND/UL (ref 4.31–10.16)

## 2025-05-14 PROCEDURE — 85025 COMPLETE CBC W/AUTO DIFF WBC: CPT

## 2025-05-14 PROCEDURE — 36415 COLL VENOUS BLD VENIPUNCTURE: CPT

## 2025-05-14 PROCEDURE — G2211 COMPLEX E/M VISIT ADD ON: HCPCS | Performed by: INTERNAL MEDICINE

## 2025-05-14 PROCEDURE — 99214 OFFICE O/P EST MOD 30 MIN: CPT | Performed by: INTERNAL MEDICINE

## 2025-05-14 PROCEDURE — 82607 VITAMIN B-12: CPT

## 2025-05-14 RX ORDER — ASPIRIN 81 MG/1
81 TABLET, CHEWABLE ORAL DAILY
Qty: 90 TABLET | Refills: 3 | Status: SHIPPED | OUTPATIENT
Start: 2025-05-14

## 2025-05-14 RX ORDER — ATORVASTATIN CALCIUM 40 MG/1
40 TABLET, FILM COATED ORAL DAILY
Qty: 100 TABLET | Refills: 3 | Status: SHIPPED | OUTPATIENT
Start: 2025-05-14

## 2025-05-14 RX ORDER — BLOOD PRESSURE TEST KIT
KIT MISCELLANEOUS 2 TIMES DAILY
Qty: 1 KIT | Refills: 0 | Status: CANCELLED | OUTPATIENT
Start: 2025-05-14

## 2025-05-14 RX ORDER — BLOOD PRESSURE TEST KIT
KIT MISCELLANEOUS 2 TIMES DAILY
Qty: 1 KIT | Refills: 0 | Status: SHIPPED | OUTPATIENT
Start: 2025-05-14

## 2025-05-14 RX ORDER — PANTOPRAZOLE SODIUM 40 MG/1
40 TABLET, DELAYED RELEASE ORAL
Qty: 90 TABLET | Refills: 1 | Status: SHIPPED | OUTPATIENT
Start: 2025-05-14

## 2025-05-14 NOTE — ASSESSMENT & PLAN NOTE
Hx HTN, on lisinopril-hydrochlorothiazide 20-12.5 mg QD. BP on recheck today: 122/62 (initially 146/72). Requested BP cuff be sent to pharmacy to see if they could pay for it. Placed order after she left. When I call about her lab results, will mention that script was sent to pharmacy, if she isn't already aware.   Orders:    Blood Pressure KIT; Use in the morning and before bedtime.

## 2025-05-14 NOTE — PATIENT INSTRUCTIONS
Please bring all your medications with you to your next visit.    As discussed, you can go to [facility] in [place] to get your xrays

## 2025-05-14 NOTE — PROGRESS NOTES
Name: Jenifer Smith      : 1955      MRN: [id number]  Encounter Provider: Joseph Shepard MD  Encounter Date: 2025   Encounter department: [facility]    Assessment & Plan  Follow-up exam  Presents today for follow-up exam [place] from [place]. Main complaint today was fatigue, as well as right shoulder and knee pain, as discussed below.        Fatigue, unspecified type  Has had fatigue since coming back to the [place] on . Does have prior hx of anemia and fatigue has been a presenting symptoms, most notably prior to her hospitalization a few months ago. Not on any AC, continues to take ASA, 81 mg. Denies any bleeding events. Last CBC done on 3/12/25: H/H: 9.6/33, 7.8/ day of discharge from hospital on 3/1. Will initiate workup with CBC and B12 level. *May need to discuss initiating Venofer infusions at next visit.    Orders:    CBC and differential; Future    Vitamin B12; Future    Right knee pain, unspecified chronicity  As mentioned in HPI. Physical exam notable for tenderness around her right knee, crepitus (on both knees), no obvious effusion, warmth, erythema. No prior imaging. Etiology most likely multifactorial including her increased use of knee when abroad/overuse, age, weight and associated degenerative changes. Discussed symptomatic control at this time, including rest, acetaminophen as needed (not to exceed 3g a day), avoiding NSAIDS (especially in light of her hx). Will get xray and refer to orthopedics if indicated.  Orders:    XR knee 3 vw right non injury; Future    Right shoulder pain, unspecified chronicity  Similar to above. Will manage symptomatically at this time and get imaging to assess for any degenerative changes.  Orders:    XR shoulder 2+ vw right; Future    Angiodysplasia of intestinal tract  Was discharged on pantoprazole but when asked about whether she was taking it said she wasn't sure. Reviewed med list and it  appears that she picked it up. Sent refill to pharmacy, asked to take it, and to bring all her medications to her next clinic visit to make sure she is taking her appropriate medication.  Orders:    pantoprazole (PROTONIX) 40 mg tablet; Take 1 tablet (40 mg total) by mouth daily in the early morning    Peripheral artery disease (HCC)  Requested refill  Orders:    aspirin 81 mg chewable tablet; Chew 1 tablet (81 mg total) daily    PAD (peripheral artery disease) (HCC)  Requested refill   Orders:    atorvastatin (LIPITOR) 40 mg tablet; Take 1 tablet (40 mg total) by mouth daily    Benign essential hypertension  Hx HTN, on lisinopril-hydrochlorothiazide 20-12.5 mg QD. BP on recheck today: 122/62 (initially 146/72). Requested BP cuff be sent to pharmacy to see if they could pay for it. Placed order after she left. When I call about her lab results, will mention that script was sent to pharmacy, if she isn't already aware.   Orders:    Blood Pressure KIT; Use in the morning and before bedtime.         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 Just came back after being abroad for ~one month. Main complaint is that she is tired. [place] the [place] on Sunday. Said fatigue started since she came back. Says there is no time delay. Says she sleeps good. Says coffee (one cup a day) isn't helping. Says she drinks 2, 3 bottles of water a day. No lightheadedness, dizziness, syncope.     Other complaints include shoulder and knee. Shoulder pain started after she was discharged from hospital in March. No inciting events that she was aware off. Had shoulder pain in the past, then had period of no shoulder pain. Says it is a dull pain, worse at rest. Limited ROM, pain limits movement. Pain does not radiate. Tried heating oil, helps a little.  "Tylenol, also helps a little. No weakness, neck pain. Regarding her knee pain, says it started a month ago. Hurts throughout her knee, denies prior hx of knee pain. Says she has hard time bending knee. Has to use cane to walk. Pain does not radiate past knee. No trauma or inciting events though was walking a lot when she was abroad.      Review of Systems   Constitutional:  Positive for fatigue. Negative for chills, fever and unexpected weight change.   Respiratory:  Negative for shortness of breath.    Cardiovascular:  Negative for chest pain.   Gastrointestinal:  Negative for abdominal pain, blood in stool, constipation, diarrhea, nausea and vomiting.   Endocrine: Negative for polydipsia, polyphagia and polyuria.   Genitourinary:         +Nocturia   Musculoskeletal:  Positive for arthralgias, back pain and gait problem. Negative for neck pain.   Neurological:  Positive for numbness (Diabetic neuropathy, chronic) and headaches. Negative for dizziness, weakness and light-headedness.     Past Medical History:   Diagnosis Date    Arthritis     Bump     bumped head yesterday at work \"saw stars\" no LOC, cat scan done was normal    Circulation problem     Diabetes mellitus (HCC)     blood sugar 125 on admission    Encounter for screening colonoscopy     1 st one    Headache     Hyperlipidemia     Hypertension     Positive fecal occult blood test 08/30/2018    Added automatically from request for surgery [id number]    Post concussion syndrome 09/14/2018    Syncope      Past Surgical History:   Procedure Laterality Date    BREAST BIOPSY Right 02/18/2022    COLONOSCOPY N/A 9/26/2018    Procedure: COLONOSCOPY;  Surgeon: Joie Morejon MD;  Location: [facility];  Service: Gastroenterology    HYSTERECTOMY  2017    INCISIONAL BREAST BIOPSY      last assessed [id number]    DE COLONOSCOPY FLX DX W/COLLJ SPEC WHEN PFRMD N/A 7/5/2018    Procedure: COLONOSCOPY;  Surgeon: Joie Morejon MD;  Location:  GI LAB;  Service: " Gastroenterology    US GUIDED BREAST BIOPSY RIGHT COMPLETE Right 2/18/2022     Family History   Problem Relation Age of Onset    Breast cancer Cousin 50    Alcohol abuse Mother     Alcohol abuse Father     Breast cancer Daughter 38    No Known Problems Sister     No Known Problems Maternal Grandmother     No Known Problems Maternal Grandfather     No Known Problems Paternal Grandmother     No Known Problems Paternal Grandfather     No Known Problems Son     No Known Problems Son     No Known Problems Daughter     No Known Problems Daughter     No Known Problems Daughter     Breast cancer Sister 66    No Known Problems Maternal Aunt     No Known Problems Maternal Aunt     No Known Problems Maternal Aunt     No Known Problems Maternal Aunt     No Known Problems Maternal Aunt     No Known Problems Maternal Aunt     No Known Problems Maternal Aunt     No Known Problems Maternal Aunt     No Known Problems Maternal Aunt     No Known Problems Maternal Aunt     No Known Problems Maternal Aunt     No Known Problems Maternal Aunt     No Known Problems Paternal Aunt     No Known Problems Paternal Aunt     Dementia Paternal Aunt     No Known Problems Paternal Aunt     No Known Problems Paternal Aunt     No Known Problems Paternal Aunt     No Known Problems Paternal Aunt     No Known Problems Paternal Aunt     Colon cancer Neg Hx     Endometrial cancer Neg Hx     Ovarian cancer Neg Hx      Social History     Tobacco Use    Smoking status: Every Day     Current packs/day: 0.25     Average packs/day: 0.3 packs/day for 20.0 years (5.0 ttl pk-yrs)     Types: Cigarettes    Smokeless tobacco: Never    Tobacco comments:     smokes less than 1pk/day   Vaping Use    Vaping status: Never Used   Substance and Sexual Activity    Alcohol use: Never    Drug use: Never    Sexual activity: Not Currently     Medications[1]  No Known Allergies  Immunization History   Administered Date(s) Administered    COVID-19 PFIZER VACCINE 0.3 ML IM  04/18/2021, 05/16/2021     Objective   /62 (BP Location: Left arm, Patient Position: Sitting, Cuff Size: Standard)   Pulse 97   Temp 97.8 °F (36.6 °C) (Temporal)   Wt 76.8 kg (169 lb 6.4 oz)   SpO2 94%   BMI 29.54 kg/m²     Physical Exam  Vitals reviewed.   Constitutional:       General: She is not in acute distress.  HENT:      Head: Normocephalic and atraumatic.     Eyes:      Extraocular Movements: Extraocular movements intact.       Cardiovascular:      Rate and Rhythm: Normal rate.      Heart sounds:      No friction rub. No gallop.   Pulmonary:      Effort: Pulmonary effort is normal.      Breath sounds: No wheezing, rhonchi or rales.   Abdominal:      Palpations: Abdomen is soft.      Tenderness: There is no abdominal tenderness.     Musculoskeletal:         General: Tenderness present. No swelling.      Right shoulder: Tenderness present. No deformity. Normal range of motion (pain with movement).      Left shoulder: No deformity or tenderness. Normal range of motion.      Right knee: Crepitus present. No swelling, deformity, effusion, erythema or ecchymosis. Normal range of motion. Tenderness present.      Left knee: Crepitus present. No swelling, deformity, effusion, erythema or ecchymosis. Normal range of motion. No tenderness.      Right lower leg: No edema.      Left lower leg: No edema.     Skin:     General: Skin is warm and dry.      Capillary Refill: Unable to assess, had nail polish    Neurological:      General: No focal deficit present.      Mental Status: She is alert.     Psychiatric:         Mood and Affect: Mood normal.         Behavior: Behavior normal.         Thought Content: Thought content normal.       *Addend note to mention Venofer         [1]   Current Outpatient Medications on File Prior to Visit   Medication Sig    Accu-Chek Softclix Lancets lancets Use 1 (one) time for 1 dose Use as instructed    Blood Glucose Monitoring Suppl (OneTouch Verio Reflect) w/Device KIT Check  blood sugars three times daily. Please substitute with appropriate alternative as covered by patient's insurance. Dx: E11.65    Elastic Bandages & Supports (GNP Diabetic Socks Unisex XL) MISC Use 1 each if needed (1 pair of socks for diabetic neuropathy)    ferrous sulfate 324 (65 Fe) mg Take 1 tablet (324 mg total) by mouth every other day    gabapentin (NEURONTIN) 300 mg capsule Take 2 capsules (600 mg total) by mouth daily at bedtime    gabapentin (NEURONTIN) 300 mg capsule Take 1 capsule (300 mg total) by mouth 2 (two) times a day (Patient not taking: Reported on 5/12/2025)    glucose blood (Accu-Chek Guide) test strip Use 1 each in the morning Use as instructed    glucose blood test strip One Touch Verio brand. Use 1 each in the morning.    Lancets Misc. (Accu-Chek Softclix Lancet Dev) KIT Use in the morning    lisinopril-hydrochlorothiazide (PRINZIDE,ZESTORETIC) 20-12.5 MG per tablet Take 1 tablet by mouth daily    Menthol, Topical Analgesic, (Icy Hot Advanced Relief) 7.5 % PTCH Apply 1 patch topically daily as needed (shoulder pain)    metFORMIN (GLUCOPHAGE) 1000 MG tablet Take 0.5 tablets (500 mg total) by mouth 2 (two) times a day with meals    OneTouch Delica Lancets 33G MISC Check blood sugars three times daily. Please substitute with appropriate alternative as covered by patient's insurance. Dx: E11.65    tiZANidine (ZANAFLEX) 4 mg tablet Take 1 tablet (4 mg total) by mouth daily at bedtime as needed for muscle spasms    vitamin B-12 (CYANOCOBALAMIN) 500 MCG TABS Take 1 tablet (500 mcg total) by mouth daily    [DISCONTINUED] aspirin 81 mg chewable tablet Chew 1 tablet (81 mg total) daily    [DISCONTINUED] atorvastatin (LIPITOR) 40 mg tablet Take 40 mg by mouth daily    [DISCONTINUED] pantoprazole (PROTONIX) 40 mg tablet Take 1 tablet (40 mg total) by mouth daily in the early morning

## 2025-05-14 NOTE — ASSESSMENT & PLAN NOTE
Requested refill   Orders:    atorvastatin (LIPITOR) 40 mg tablet; Take 1 tablet (40 mg total) by mouth daily

## 2025-05-15 ENCOUNTER — CONSULT (OUTPATIENT)
Dept: VASCULAR SURGERY | Facility: CLINIC | Age: 70
End: 2025-05-15
Payer: COMMERCIAL

## 2025-05-15 ENCOUNTER — RESULTS FOLLOW-UP (OUTPATIENT)
Dept: INTERNAL MEDICINE CLINIC | Facility: CLINIC | Age: 70
End: 2025-05-15

## 2025-05-15 VITALS — WEIGHT: 172.3 LBS | HEART RATE: 76 BPM | OXYGEN SATURATION: 100 % | BODY MASS INDEX: 30.04 KG/M2

## 2025-05-15 DIAGNOSIS — E78.2 MIXED HYPERLIPIDEMIA: ICD-10-CM

## 2025-05-15 DIAGNOSIS — Z72.0 TOBACCO ABUSE: ICD-10-CM

## 2025-05-15 DIAGNOSIS — I73.9 PERIPHERAL ARTERY DISEASE (HCC): Primary | ICD-10-CM

## 2025-05-15 DIAGNOSIS — E11.9 DM TYPE 2 WITHOUT RETINOPATHY (HCC): ICD-10-CM

## 2025-05-15 DIAGNOSIS — I10 BENIGN ESSENTIAL HYPERTENSION: ICD-10-CM

## 2025-05-15 DIAGNOSIS — I83.813 VARICOSE VEINS OF BOTH LOWER EXTREMITIES WITH PAIN: ICD-10-CM

## 2025-05-15 PROCEDURE — 99204 OFFICE O/P NEW MOD 45 MIN: CPT

## 2025-05-15 NOTE — PROGRESS NOTES
Name: Jenifer Smith      : 1955      MRN: [id number]  Encounter Provider: SU Vazquez  Encounter Date: 5/15/2025   Encounter department: [facility] [place]  :  Assessment & Plan  Peripheral artery disease (HCC)  Patient reports sharp, stabbing to her right lower extremity that has been progressively worsening for the last year. Pain occurs after ambulating approximately 1/2 block. She reports numbness/tingling to bilateral lower extremities, worse on the right. Patient notes heaviness in her right leg that occurs by the end of the day. She reports occasional swelling to her legs.  She is currently denying any color/temperature change to the lower extremities, however notes burning pain to the bottom of both of her feet at night.  Patient notes bulging veins to her right lower extremity that occur by the end of the day.  She denies any previous issues with tissue loss.  Of note, patient does attend physical therapy related to low back pain.  She also follows with pain management and has received epidural steroid injections which did provide some relief in her right lower extremity symptoms.    Imaging:  LEAD 2025:  Evaluation shows a high grade stenosis vs occlusion of the right SFA from the proximal segment to below the mid thigh segment. Significant reconstitution at the distal superficial femoral artery. There is a 50 to 75% stenosis of the  bilateral proximal PFA  R MAKAYLA: 0.70/77/82, L MAKAYLA: 1.01/170/133    Plan:  -Right lower extremity pain appears to be multifactorial in nature.  Patient likely experiencing a component of radiculopathy, claudication, and venous insufficiency.  -We discussed the pathophysiology of peripheral arterial disease as well as contributing lifestyle factors.  -We discussed the results of LEAD at length.  There is high-grade stenosis versus occlusion of the right SFA with collateral circulation noted.  Decreased right MAKAYLA  -Continue medical  optimization with atorvastatin and aspirin  -Will patient is agreeable to PAD physical therapy.  Referral placed at this time  -Instructed patient to notify office of any worsening claudication, rest pain, or tissue loss  -Instructed patient to report to the ED for any symptoms of acute limb ischemia (color/temperature change, motor/sensory loss, tissue loss).  -Follow up in the office in 3 months to reevaluate symptoms following physical therapy    Orders:    Ambulatory Referral to Peripheral Artery Disease Supervised Exercise Therapy; Future    Varicose veins of both lower extremities with pain  Patient does note intermittent bilateral lower extremity swelling, as well as sensation of heaviness and fatigue that occurs by the end of the day.    Plan:  -We discussed the pathophysiology of venous insufficiency as well as contributing lifestyle factors.  -We discussed initiating conservative measures including compression stockings, elevating legs, and increasing physical activity  - We discussed utilizing light compression stockings for management of venous insufficiency.  Measurements taken in the office today and patient provided with information on how to obtain over-the-counter  - Patient will follow-up in the vascular surgery office for reevaluation of symptoms in 3 months           Benign essential hypertension  Unable to obtain blood pressure in the office today, however blood pressure yesterday at her internal medicine office visit appears well-controlled.    - Continue medical management per PCP.  -Low-salt diet         DM type 2 without retinopathy (HCC)    Lab Results   Component Value Date    HGBA1C 6.9 (H) 05/13/2025   Well-controlled, on metformin.  Continue management per PCP.         Mixed hyperlipidemia  Continue atorvastatin.         Tobacco abuse  Tobacco use is a significant patient-modifiable risk factor for this patient’s vascular disease with multiple vascular comorbidities, and a significant  risk factor for failure of and complications from any endovascular or surgical interventions.    I explained to the patient the effects of smoking including peripheral artery disease, coronary artery disease, cerebrovascular disease as well as cancer and chronic obstructive pulmonary disease. I asked the patient to stop smoking immediately. It is never too late to quit, and many studies show significant health benefits as well as economical savings after smoking cessation. I offered to the patient nicotine replacement therapy as well as referral to the smoking cessation program and access to the quit line 5-760-PWEEMRV or ambulatory referral to our network smoking cessation program.    Based on our conversation, this patient expresses a passive interest in quitting but does not appear very motivated to quit  and plans to quit without nicotine replacement or medications    The patient did not set a quit date. I will continue to follow up on this issue at our next scheduled visit.     I spent approximately 3 minutes on tobacco cessation counseling with this patient.               History of Present Illness   HPI  Jenifer Smith is a 69 y.o. female, current smoker, with PMH HTN, HLD, type II DM, JOANNA, lumbar spinal stenosis, RLE DVT, and PAD. Patient is presenting to the vascular surgery office upon hospital referral to review results of LEAD 2/28/2025.    Patient reports sharp, stabbing to her right lower extremity that has been progressively worsening for the last year. Pain occurs after ambulating approximately 1/2 block. She reports numbness/tingling to bilateral lower extremities, worse on the right. Patient notes heaviness in her right leg that occurs by the end of the day. She reports occasional swelling to her legs.  She is currently denying any color/temperature change to the lower extremities, however notes burning pain to the bottom of both of her feet at night.  Patient notes bulging veins to her right  "lower extremity that occur by the end of the day.  She denies any previous issues with tissue loss.  Of note, patient does attend physical therapy related to low back pain.  She also follows with pain management and has received epidural steroid injections which did provide some relief in her right lower extremity symptoms.    Patient does note a history of right calf DVT approximately 6 months ago which was noted to be unprovoked but resolved with 3 months of anticoagulation.  She denies any known history of varicose veins in her family.  Patient has 6 children.  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  She spent the last 2 years caring for her chronically ill brother.  Patient is currently smoking 1/3 PPD.  She reports that she began smoking in December after her brother's death.    History obtained from: patient    Review of Systems   Constitutional:  Positive for activity change.   Respiratory:  Negative for shortness of breath.    Cardiovascular:  Positive for leg swelling. Negative for chest pain.   Musculoskeletal:  Positive for back pain. Negative for gait problem.   Skin:  Negative for color change, pallor and wound.   Neurological:  Positive for numbness.     Pertinent Medical History   Past Medical History:   Diagnosis Date    Arthritis     Bump     bumped head yesterday at work \"saw stars\" no LOC, cat scan done was normal    Circulation problem     Diabetes mellitus (HCC)     blood sugar 125 on admission    Encounter for screening colonoscopy     1 st one    Headache     Hyperlipidemia     Hypertension     Positive fecal occult blood test 08/30/2018    Added automatically from request for surgery [id number]    Post concussion syndrome 09/14/2018    Syncope           Medical History Reviewed by provider this encounter:     .  Medications Ordered Prior to Encounter[1]   Social History     Tobacco Use    Smoking status: Every Day     Current packs/day: 0.25    "  Average packs/day: 0.3 packs/day for 20.0 years (5.0 ttl pk-yrs)     Types: Cigarettes    Smokeless tobacco: Never    Tobacco comments:     smokes less than 1pk/day   Vaping Use    Vaping status: Never Used   Substance and Sexual Activity    Alcohol use: Never    Drug use: Never    Sexual activity: Not Currently        Objective   Pulse 76   Wt 78.2 kg (172 lb 4.8 oz)   SpO2 100%   BMI 30.04 kg/m²      Physical Exam  Vitals and nursing note reviewed.   Constitutional:       General: She is not in acute distress.     Appearance: She is well-developed.   HENT:      Head: Normocephalic and atraumatic.     Eyes:      Conjunctiva/sclera: Conjunctivae normal.       Cardiovascular:      Rate and Rhythm: Normal rate and regular rhythm.      Pulses:           Radial pulses are 2+ on the right side and 2+ on the left side.        Dorsalis pedis pulses are detected w/ Doppler on the right side and detected w/ Doppler on the left side.        Posterior tibial pulses are detected w/ Doppler on the right side and 2+ on the left side.      Heart sounds: Normal heart sounds. No murmur heard.  Pulmonary:      Effort: Pulmonary effort is normal. No respiratory distress.      Breath sounds: Normal breath sounds.   Abdominal:      Palpations: Abdomen is soft.      Tenderness: There is no abdominal tenderness.     Musculoskeletal:         General: No swelling.      Cervical back: Neck supple.      Right lower le+ Edema present.      Left lower le+ Edema present.     Skin:     General: Skin is warm and dry.      Capillary Refill: Capillary refill takes less than 2 seconds.     Neurological:      Mental Status: She is alert.     Psychiatric:         Mood and Affect: Mood normal.         Administrative Statements   I have spent a total time of 45 minutes in caring for this patient on the day of the visit/encounter including Diagnostic results, Prognosis, Risks and benefits of tx options, Instructions for management, Patient and  family education, Importance of tx compliance, Risk factor reductions, Impressions, Counseling / Coordination of care, Documenting in the medical record, Reviewing/placing orders in the medical record (including tests, medications, and/or procedures), and Obtaining or reviewing history  .         [1]   Current Outpatient Medications on File Prior to Visit   Medication Sig Dispense Refill    Accu-Chek Softclix Lancets lancets Use 1 (one) time for 1 dose Use as instructed 200 each 2    aspirin 81 mg chewable tablet Chew 1 tablet (81 mg total) daily 90 tablet 3    atorvastatin (LIPITOR) 40 mg tablet Take 1 tablet (40 mg total) by mouth daily 100 tablet 3    Blood Glucose Monitoring Suppl (OneTouch Verio Reflect) w/Device KIT Check blood sugars three times daily. Please substitute with appropriate alternative as covered by patient's insurance. Dx: E11.65 1 kit 0    Blood Pressure KIT Use in the morning and before bedtime. 1 kit 0    Elastic Bandages & Supports (GNP Diabetic Socks Unisex XL) MISC Use 1 each if needed (1 pair of socks for diabetic neuropathy) 2 each 0    ferrous sulfate 324 (65 Fe) mg Take 1 tablet (324 mg total) by mouth every other day 90 tablet 1    gabapentin (NEURONTIN) 300 mg capsule Take 2 capsules (600 mg total) by mouth daily at bedtime 180 capsule 1    glucose blood (Accu-Chek Guide) test strip Use 1 each in the morning Use as instructed 200 each 2    glucose blood test strip One Touch Verio brand. Use 1 each in the morning. 100 strip 3    Lancets Misc. (Accu-Chek Softclix Lancet Dev) KIT Use in the morning 1 kit 0    lisinopril-hydrochlorothiazide (PRINZIDE,ZESTORETIC) 20-12.5 MG per tablet Take 1 tablet by mouth daily 90 tablet 1    Menthol, Topical Analgesic, (Icy Hot Advanced Relief) 7.5 % PTCH Apply 1 patch topically daily as needed (shoulder pain) 30 patch 0    metFORMIN (GLUCOPHAGE) 1000 MG tablet Take 0.5 tablets (500 mg total) by mouth 2 (two) times a day with meals 100 tablet 3     OneTouch Delica Lancets 33G MISC Check blood sugars three times daily. Please substitute with appropriate alternative as covered by patient's insurance. Dx: E11.65 300 each 3    pantoprazole (PROTONIX) 40 mg tablet Take 1 tablet (40 mg total) by mouth daily in the early morning 90 tablet 1    tiZANidine (ZANAFLEX) 4 mg tablet Take 1 tablet (4 mg total) by mouth daily at bedtime as needed for muscle spasms 90 tablet 1    vitamin B-12 (CYANOCOBALAMIN) 500 MCG TABS Take 1 tablet (500 mcg total) by mouth daily 30 tablet 0    gabapentin (NEURONTIN) 300 mg capsule Take 1 capsule (300 mg total) by mouth 2 (two) times a day (Patient not taking: Reported on 5/12/2025) 180 capsule 2     No current facility-administered medications on file prior to visit.

## 2025-05-15 NOTE — ASSESSMENT & PLAN NOTE
Lab Results   Component Value Date    HGBA1C 6.9 (H) 05/13/2025   Well-controlled, on metformin.  Continue management per PCP.

## 2025-05-15 NOTE — ASSESSMENT & PLAN NOTE
Unable to obtain blood pressure in the office today, however blood pressure yesterday at her internal medicine office visit appears well-controlled.    - Continue medical management per PCP.  -Low-salt diet

## 2025-05-15 NOTE — ASSESSMENT & PLAN NOTE
Patient reports sharp, stabbing to her right lower extremity that has been progressively worsening for the last year. Pain occurs after ambulating approximately 1/2 block. She reports numbness/tingling to bilateral lower extremities, worse on the right. Patient notes heaviness in her right leg that occurs by the end of the day. She reports occasional swelling to her legs.  She is currently denying any color/temperature change to the lower extremities, however notes burning pain to the bottom of both of her feet at night.  Patient notes bulging veins to her right lower extremity that occur by the end of the day.  She denies any previous issues with tissue loss.  Of note, patient does attend physical therapy related to low back pain.  She also follows with pain management and has received epidural steroid injections which did provide some relief in her right lower extremity symptoms.    Imaging:  LEAD 2/28/2025:  Evaluation shows a high grade stenosis vs occlusion of the right SFA from the proximal segment to below the mid thigh segment. Significant reconstitution at the distal superficial femoral artery. There is a 50 to 75% stenosis of the  bilateral proximal PFA  R MAKAYLA: 0.70/77/82, L MAKAYLA: 1.01/170/133    Plan:  -Right lower extremity pain appears to be multifactorial in nature.  Patient likely experiencing a component of radiculopathy, claudication, and venous insufficiency.  -We discussed the pathophysiology of peripheral arterial disease as well as contributing lifestyle factors.  -We discussed the results of LEAD at length.  There is high-grade stenosis versus occlusion of the right SFA with collateral circulation noted.  Decreased right MAKAYLA  -Continue medical optimization with atorvastatin and aspirin  -Will patient is agreeable to PAD physical therapy.  Referral placed at this time  -Instructed patient to notify office of any worsening claudication, rest pain, or tissue loss  -Instructed patient to report to  the ED for any symptoms of acute limb ischemia (color/temperature change, motor/sensory loss, tissue loss).  -Follow up in the office in 3 months to reevaluate symptoms following physical therapy    Orders:    Ambulatory Referral to Peripheral Artery Disease Supervised Exercise Therapy; Future

## 2025-05-15 NOTE — ASSESSMENT & PLAN NOTE
Patient does note intermittent bilateral lower extremity swelling, as well as sensation of heaviness and fatigue that occurs by the end of the day.    Plan:  -We discussed the pathophysiology of venous insufficiency as well as contributing lifestyle factors.  -We discussed initiating conservative measures including compression stockings, elevating legs, and increasing physical activity  - We discussed utilizing light compression stockings for management of venous insufficiency.  Measurements taken in the office today and patient provided with information on how to obtain over-the-counter  - Patient will follow-up in the vascular surgery office for reevaluation of symptoms in 3 months

## 2025-05-15 NOTE — ASSESSMENT & PLAN NOTE
Tobacco use is a significant patient-modifiable risk factor for this patient’s vascular disease with multiple vascular comorbidities, and a significant risk factor for failure of and complications from any endovascular or surgical interventions.    I explained to the patient the effects of smoking including peripheral artery disease, coronary artery disease, cerebrovascular disease as well as cancer and chronic obstructive pulmonary disease. I asked the patient to stop smoking immediately. It is never too late to quit, and many studies show significant health benefits as well as economical savings after smoking cessation. I offered to the patient nicotine replacement therapy as well as referral to the smoking cessation program and access to the quit line 3-974-OMWFSZO or ambulatory referral to our network smoking cessation program.    Based on our conversation, this patient expresses a passive interest in quitting but does not appear very motivated to quit  and plans to quit without nicotine replacement or medications    The patient did not set a quit date. I will continue to follow up on this issue at our next scheduled visit.     I spent approximately 3 minutes on tobacco cessation counseling with this patient.

## 2025-05-15 NOTE — PATIENT INSTRUCTIONS
Knee high compression stockings, 15-20mm Hg    Size medium    Continue aspirin and atorvastatin  Recommend 3 months of physical therapy with our PAD program  Return to the office in 3 months to reevaluate symptoms.     For smoking cessation: [phone]

## 2025-05-21 ENCOUNTER — HOSPITAL ENCOUNTER (OUTPATIENT)
Dept: RADIOLOGY | Facility: CLINIC | Age: 70
Discharge: HOME | End: 2025-05-21
Attending: ANESTHESIOLOGY
Payer: COMMERCIAL

## 2025-05-21 VITALS
HEART RATE: 88 BPM | TEMPERATURE: 98.3 F | DIASTOLIC BLOOD PRESSURE: 82 MMHG | RESPIRATION RATE: 16 BRPM | SYSTOLIC BLOOD PRESSURE: 143 MMHG | OXYGEN SATURATION: 93 %

## 2025-05-21 DIAGNOSIS — M54.16 LUMBAR RADICULOPATHY: ICD-10-CM

## 2025-05-21 RX ORDER — METHYLPREDNISOLONE ACETATE 80 MG/ML
80 INJECTION, SUSPENSION INTRA-ARTICULAR; INTRALESIONAL; INTRAMUSCULAR; PARENTERAL; SOFT TISSUE ONCE
Status: COMPLETED | OUTPATIENT
Start: 2025-05-21 | End: 2025-05-21

## 2025-05-21 RX ADMIN — METHYLPREDNISOLONE ACETATE 80 MG: 80 INJECTION, SUSPENSION INTRA-ARTICULAR; INTRALESIONAL; INTRAMUSCULAR; SOFT TISSUE at 08:44

## 2025-05-21 RX ADMIN — IOHEXOL 1 ML: 300 INJECTION, SOLUTION INTRAVENOUS at 08:44

## 2025-05-21 NOTE — H&P
"History of Present Illness: The patient is a 69 y.o. female who presents with complaints of low back and leg pain.    Past Medical History:   Diagnosis Date    Arthritis     Bump     bumped head yesterday at work \"saw stars\" no LOC, cat scan done was normal    Circulation problem     Diabetes mellitus (HCC)     blood sugar 125 on admission    Encounter for screening colonoscopy     1 st one    Headache     Hyperlipidemia     Hypertension     Positive fecal occult blood test 08/30/2018    Added automatically from request for surgery [id number]    Post concussion syndrome 09/14/2018    Syncope        Past Surgical History:   Procedure Laterality Date    BREAST BIOPSY Right 02/18/2022    COLONOSCOPY N/A 9/26/2018    Procedure: COLONOSCOPY;  Surgeon: Joie Morejon MD;  Location: [facility] GI [facility];  Service: Gastroenterology    HYSTERECTOMY  2017    INCISIONAL BREAST BIOPSY      last assessed 17Aug2017    UT COLONOSCOPY FLX DX W/COLLJ SPEC WHEN PFRMD N/A 7/5/2018    Procedure: COLONOSCOPY;  Surgeon: Joie Morejon MD;  Location:  GI LAB;  Service: Gastroenterology    US GUIDED BREAST BIOPSY RIGHT COMPLETE Right 2/18/2022       Current Medications[1]    Allergies[2]    Physical Exam:   Vitals:    05/21/25 0819   BP: 143/83   Pulse: 92   Resp: 20   Temp: 98.3 °F (36.8 °C)   SpO2: 98%     General: Awake, Alert, Oriented x 3, Mood and affect appropriate  Respiratory: Respirations even and unlabored  Cardiovascular: Peripheral pulses intact; no edema  Musculoskeletal Exam: normal gait    ASA Score: 3    Patient/Chart Verification  Patient ID Verified: Verbal  ID Band Applied: No  Consents Confirmed: To be obtained in the Procedural area  H&P( within 30 days) Verified: To be obtained in the Procedural area  Interval H&P(within 24 hr) Complete (required for Outpatients and Surgery Admit only): To be obtained in the Procedural area  Allergies Reviewed: Yes  Anticoag/NSAID held?: No (takes ASA)  Currently on antibiotics?: " No  Pregnancy Lab Collected: N/A comment    Assessment:   1. Lumbar radiculopathy        Plan: L4-5 LESI         [1]   Current Outpatient Medications:     Accu-Chek Softclix Lancets lancets, Use 1 (one) time for 1 dose Use as instructed, Disp: 200 each, Rfl: 2    aspirin 81 mg chewable tablet, Chew 1 tablet (81 mg total) daily, Disp: 90 tablet, Rfl: 3    atorvastatin (LIPITOR) 40 mg tablet, Take 1 tablet (40 mg total) by mouth daily, Disp: 100 tablet, Rfl: 3    Blood Glucose Monitoring Suppl (OneTouch Verio Reflect) w/Device KIT, Check blood sugars three times daily. Please substitute with appropriate alternative as covered by patient's insurance. Dx: E11.65, Disp: 1 kit, Rfl: 0    Blood Pressure KIT, Use in the morning and before bedtime., Disp: 1 kit, Rfl: 0    Elastic Bandages & Supports (GNP Diabetic Socks Unisex XL) MISC, Use 1 each if needed (1 pair of socks for diabetic neuropathy), Disp: 2 each, Rfl: 0    ferrous sulfate 324 (65 Fe) mg, Take 1 tablet (324 mg total) by mouth every other day, Disp: 90 tablet, Rfl: 1    gabapentin (NEURONTIN) 300 mg capsule, Take 2 capsules (600 mg total) by mouth daily at bedtime, Disp: 180 capsule, Rfl: 1    gabapentin (NEURONTIN) 300 mg capsule, Take 1 capsule (300 mg total) by mouth 2 (two) times a day (Patient not taking: Reported on 5/12/2025), Disp: 180 capsule, Rfl: 2    glucose blood (Accu-Chek Guide) test strip, Use 1 each in the morning Use as instructed, Disp: 200 each, Rfl: 2    glucose blood test strip, One Touch Verio brand. Use 1 each in the morning., Disp: 100 strip, Rfl: 3    Lancets Misc. (Accu-Chek Softclix Lancet Dev) KIT, Use in the morning, Disp: 1 kit, Rfl: 0    lisinopril-hydrochlorothiazide (PRINZIDE,ZESTORETIC) 20-12.5 MG per tablet, Take 1 tablet by mouth daily, Disp: 90 tablet, Rfl: 1    Menthol, Topical Analgesic, (Icy Hot Advanced Relief) 7.5 % PTCH, Apply 1 patch topically daily as needed (shoulder pain), Disp: 30 patch, Rfl: 0    metFORMIN  (GLUCOPHAGE) 1000 MG tablet, Take 0.5 tablets (500 mg total) by mouth 2 (two) times a day with meals, Disp: 100 tablet, Rfl: 3    OneTouch Delica Lancets 33G MISC, Check blood sugars three times daily. Please substitute with appropriate alternative as covered by patient's insurance. Dx: E11.65, Disp: 300 each, Rfl: 3    pantoprazole (PROTONIX) 40 mg tablet, Take 1 tablet (40 mg total) by mouth daily in the early morning, Disp: 90 tablet, Rfl: 1    tiZANidine (ZANAFLEX) 4 mg tablet, Take 1 tablet (4 mg total) by mouth daily at bedtime as needed for muscle spasms, Disp: 90 tablet, Rfl: 1    vitamin B-12 (CYANOCOBALAMIN) 500 MCG TABS, Take 1 tablet (500 mcg total) by mouth daily, Disp: 30 tablet, Rfl: 0    Current Facility-Administered Medications:     iohexol (OMNIPAQUE) 300 mg/mL injection 1 mL, 1 mL, Epidural, Once, Gilbert Bansal DO    methylPREDNISolone acetate (DEPO-MEDROL) injection 80 mg, 80 mg, Epidural, Once, Gilbert Bansal DO  [2] No Known Allergies

## 2025-05-21 NOTE — DISCHARGE INSTR - LAB
Epidural Steroid Injection   WHAT YOU NEED TO KNOW:   An epidural steroid injection (RORO) is a procedure to inject steroid medicine into the epidural space. The epidural space is between your spinal cord and vertebrae. Steroids reduce inflammation and fluid buildup in your spine that may be causing pain. You may be given pain medicine along with the steroids.          ACTIVITY  Do not drive or operate machinery today.  No strenuous activity today - bending, lifting, etc.  You may resume normal activites starting tomorrow - start slowly and as tolerated.  You may shower today, but no tub baths or hot tubs.  You may have numbness for several hours from the local anesthetic. Please use caution and common sense, especially with weight-bearing activities.    CARE OF THE INJECTION SITE  If you have soreness or pain, apply ice to the area today (20 minutes on/20 minutes off).  Starting tomorrow, you may use warm, moist heat or ice if needed.  You may have an increase or change in your discomfort for 36-48 hours after your treatment.  Apply ice and continue with any pain medication you have been prescribed.  Notify the [facility] if you have any of the following: redness, drainage, swelling, headache, stiff neck or fever above 100°F.    SPECIAL INSTRUCTIONS  Our office will contact you in approximately 14 days for a progress report.    MEDICATIONS  Continue to take all routine medications.  Our office may have instructed you to hold some medications.    As no general anesthesia was used in today's procedure, you should not experience any side effects related to anesthesia.     If you are diabetic, the steroids used in today's injection may temporarily increase your blood sugar levels after the first few days after your injection. Please keep a close eye on your sugars and alert the doctor who manages your diabetes if your sugars are significantly high from your baseline or you are symptomatic.     If you have a  problem specifically related to your procedure, please call our office at [phone].  Problems not related to your procedure should be directed to your primary care physician.

## 2025-05-28 ENCOUNTER — OFFICE VISIT (OUTPATIENT)
Dept: INTERNAL MEDICINE CLINIC | Facility: CLINIC | Age: 70
End: 2025-05-28

## 2025-05-28 VITALS
HEART RATE: 103 BPM | TEMPERATURE: 97.9 F | WEIGHT: 170 LBS | DIASTOLIC BLOOD PRESSURE: 69 MMHG | BODY MASS INDEX: 29.64 KG/M2 | SYSTOLIC BLOOD PRESSURE: 125 MMHG

## 2025-05-28 DIAGNOSIS — E11.42 CONTROLLED TYPE 2 DIABETES MELLITUS WITH DIABETIC POLYNEUROPATHY, WITHOUT LONG-TERM CURRENT USE OF INSULIN (HCC): ICD-10-CM

## 2025-05-28 DIAGNOSIS — K55.20 ANGIODYSPLASIA OF INTESTINAL TRACT: ICD-10-CM

## 2025-05-28 DIAGNOSIS — I10 BENIGN ESSENTIAL HYPERTENSION: ICD-10-CM

## 2025-05-28 DIAGNOSIS — D64.9 ANEMIA, UNSPECIFIED TYPE: Primary | ICD-10-CM

## 2025-05-28 DIAGNOSIS — E78.2 MIXED HYPERLIPIDEMIA: ICD-10-CM

## 2025-05-28 DIAGNOSIS — M54.16 LUMBAR RADICULOPATHY: ICD-10-CM

## 2025-05-28 DIAGNOSIS — D64.9 SYMPTOMATIC ANEMIA: ICD-10-CM

## 2025-05-28 DIAGNOSIS — E53.8 LOW SERUM VITAMIN B12: ICD-10-CM

## 2025-05-28 PROCEDURE — 99214 OFFICE O/P EST MOD 30 MIN: CPT | Performed by: INTERNAL MEDICINE

## 2025-05-28 PROCEDURE — G2211 COMPLEX E/M VISIT ADD ON: HCPCS | Performed by: INTERNAL MEDICINE

## 2025-05-28 RX ORDER — LISINOPRIL AND HYDROCHLOROTHIAZIDE 12.5; 2 MG/1; MG/1
1 TABLET ORAL DAILY
Qty: 90 TABLET | Refills: 1 | Status: SHIPPED | OUTPATIENT
Start: 2025-05-28

## 2025-05-28 RX ORDER — PANTOPRAZOLE SODIUM 40 MG/1
40 TABLET, DELAYED RELEASE ORAL
Qty: 90 TABLET | Refills: 1 | Status: SHIPPED | OUTPATIENT
Start: 2025-05-28

## 2025-05-28 RX ORDER — LISINOPRIL AND HYDROCHLOROTHIAZIDE 12.5; 2 MG/1; MG/1
1 TABLET ORAL DAILY
Qty: 90 TABLET | Refills: 1 | Status: SHIPPED | OUTPATIENT
Start: 2025-05-28 | End: 2025-05-28 | Stop reason: SDUPTHER

## 2025-05-28 RX ORDER — SODIUM CHLORIDE 9 MG/ML
20 INJECTION, SOLUTION INTRAVENOUS ONCE
OUTPATIENT
Start: 2025-05-28

## 2025-05-28 RX ORDER — GABAPENTIN 300 MG/1
600 CAPSULE ORAL 2 TIMES DAILY
Qty: 180 CAPSULE | Refills: 1 | Status: SHIPPED | OUTPATIENT
Start: 2025-05-28

## 2025-05-28 RX ORDER — CYANOCOBALAMIN (VITAMIN B-12) 500 MCG
500 TABLET ORAL DAILY
Qty: 30 TABLET | Refills: 0 | Status: SHIPPED | OUTPATIENT
Start: 2025-05-28 | End: 2025-06-27

## 2025-05-28 RX ORDER — FERROUS SULFATE 324(65)MG
324 TABLET, DELAYED RELEASE (ENTERIC COATED) ORAL EVERY OTHER DAY
Qty: 90 TABLET | Refills: 1 | Status: SHIPPED | OUTPATIENT
Start: 2025-05-28

## 2025-05-28 NOTE — PROGRESS NOTES
Name: Jenifer Smith      : 1955      MRN: [id number]  Encounter Provider: Mikki Johnson MD  Encounter Date: 2025   Encounter department: [facility] [place]  :  Assessment & Plan  Lumbar radiculopathy  Continue follow-up with pain management  Taking gabapentin 600 mg BID  Orders:    gabapentin (NEURONTIN) 300 mg capsule; Take 2 capsules (600 mg total) by mouth 2 (two) times a day    Controlled type 2 diabetes mellitus with diabetic polyneuropathy, without long-term current use of insulin ([facility])    Lab Results   Component Value Date    HGBA1C 6.9 (H) 2025     Within goal, continue  Orders:    metFORMIN (GLUCOPHAGE) 1000 MG tablet; Take 1 tablet (1,000 mg total) by mouth 2 (two) times a day with meals    Basic metabolic panel; Future    Albumin / creatinine urine ratio; Future    Benign essential hypertension  Well controlled  Recheck hyperdynamic Echo later this year    Orders:    Basic metabolic panel; Future    lisinopril-hydrochlorothiazide (PRINZIDE,ZESTORETIC) 20-12.5 MG per tablet; Take 1 tablet by mouth daily    Echo complete w/ contrast if indicated; Future    Anemia, unspecified type    Despite oral iron, levels still low and symptomatic    PLAN:  -Will place IV iron transfusions  Orders:    ferrous sulfate 324 (65 Fe) mg; Take 1 tablet (324 mg total) by mouth every other day    Echo complete w/ contrast if indicated; Future    CBC and differential; Future    TIBC Panel (incl. Iron, TIBC, % Iron Saturation); Future    Low serum vitamin B12    Orders:    vitamin B-12 (CYANOCOBALAMIN) 500 MCG TABS; Take 1 tablet (500 mcg total) by mouth daily    Angiodysplasia of intestinal tract    Orders:    pantoprazole (PROTONIX) 40 mg tablet; Take 1 tablet (40 mg total) by mouth daily in the early morning    Mixed hyperlipidemia  LDL was above 70, not at goal  Was recommended to increase atorvastatin to 40 mg daily       Symptomatic anemia      Secondary to AVM/angiectasia  with Eliquis use    PLAN:  - Continue to hold Eliquis, continue GI follow-up  -Will recheck CBC, iron panel  - Will place iron infusions           BMI Counseling: Body mass index is 29.64 kg/m². The BMI is above normal. Nutrition recommendations include decreasing portion sizes. Exercise recommendations include moderate physical activity 150 minutes/week. Rationale for BMI follow-up plan is due to patient being overweight or obese.       History of Present Illness   HPI  Review of Systems  Jenifer 68 F with PMH T2DM, lumbar radiculopathy, Grade A H pylori gastritis and angioectasias, chronic pain , CKD, FE def anemia, smoker,intraductal papilloma, RLE DVT no longer on Eliquis, HTN.  Patient is compliant with medication but was not taking her PPI and was only taking atorvastatin 20 mg.  Takes gabapentin 600 mg in the morning and 600 mg at bedtime.   Patient underwent recent L4-5 lumbar epidural steroid injection. Follows vascular for high-grade stenosis versus occlusion of the right SFA with collateral circulation.  Recommended for physical therapy and atorvastatin and aspirin.   Recent EGD with Grade A esophagitis with mucosal breaks measuring less than 5 mm not continuous between folds, covering less than 75% of the circumference in the GE junction.  Multiple small angioectasias in the 2nd part of the duodenum; the lesion was ablated with argon plasma coagulation using a circumferential probe. 2 AVM's seen in duodenum, both treated with APC.  Biopsy was negative for H. pylori or malignancy.      Objective   /69 (BP Location: Left arm, Patient Position: Sitting, Cuff Size: Large)   Pulse 103   Temp 97.9 °F (36.6 °C) (Temporal)   Wt 77.1 kg (170 lb)   BMI 29.64 kg/m²      Physical Exam  Vitals reviewed.   Constitutional:       General: She is not in acute distress.  HENT:      Head: Normocephalic and atraumatic.     Cardiovascular:      Rate and Rhythm: Normal rate and regular rhythm.      Pulses: Normal  pulses.      Heart sounds: Normal heart sounds.   Pulmonary:      Effort: Pulmonary effort is normal.      Breath sounds: Normal breath sounds.   Abdominal:      Palpations: Abdomen is soft.      Tenderness: There is no abdominal tenderness.     Neurological:      Mental Status: She is alert and oriented to person, place, and time.

## 2025-05-28 NOTE — ASSESSMENT & PLAN NOTE
Continue follow-up with pain management  Taking gabapentin 600 mg BID  Orders:    gabapentin (NEURONTIN) 300 mg capsule; Take 2 capsules (600 mg total) by mouth 2 (two) times a day

## 2025-05-28 NOTE — ASSESSMENT & PLAN NOTE
Well controlled  Recheck hyperdynamic Echo later this year    Orders:    Basic metabolic panel; Future    lisinopril-hydrochlorothiazide (PRINZIDE,ZESTORETIC) 20-12.5 MG per tablet; Take 1 tablet by mouth daily    Echo complete w/ contrast if indicated; Future

## 2025-05-28 NOTE — ASSESSMENT & PLAN NOTE
Secondary to AVM/angiectasia with Eliquis use    PLAN:  - Continue to hold Eliquis, continue GI follow-up  -Will recheck CBC, iron panel  - Will place iron infusions

## 2025-05-28 NOTE — ASSESSMENT & PLAN NOTE
Despite oral iron, levels still low and symptomatic    PLAN:  -Will place IV iron transfusions  Orders:    ferrous sulfate 324 (65 Fe) mg; Take 1 tablet (324 mg total) by mouth every other day    Echo complete w/ contrast if indicated; Future    CBC and differential; Future    TIBC Panel (incl. Iron, TIBC, % Iron Saturation); Future

## 2025-06-04 ENCOUNTER — TELEPHONE (OUTPATIENT)
Dept: PAIN MEDICINE | Facility: CLINIC | Age: 70
End: 2025-06-04

## 2025-07-01 ENCOUNTER — TELEPHONE (OUTPATIENT)
Dept: INFUSION CENTER | Facility: HOSPITAL | Age: 70
End: 2025-07-01

## 2025-07-01 ENCOUNTER — TELEPHONE (OUTPATIENT)
Age: 70
End: 2025-07-01

## 2025-07-01 NOTE — TELEPHONE ENCOUNTER
Caller: pt    Doctor: Dr. anglin    Reason for call: pt needs a refill of tizanidine.    Call back#: [phone]

## 2025-07-01 NOTE — TELEPHONE ENCOUNTER
RECEIVED REPORT FROM NIGHT SHIFT RN. PT RESTING IN BED. DENIES ABDOMINAL PAIN 
BUT HAS  ABDOMINAL DISCOMFORT AS PER PT. NAUSEATED, NO VOMITING. ABDOMEN SOFT 
ROUND AND NON-TENDER. ACTIVE BOWEL SOUND. S/w pt regarding previous. Pt advised her Tizanidine was ordered on 5/12 for a 90 day supply and she has 1 refill available. Per pt she is going away in the middle of July. Pt plans to contact the pharmacy to see if she can refill her Tizanidine before she goes. Pt advised if there are any issues to contact our office. Pt verbalized understanding and appreciation.

## 2025-07-01 NOTE — TELEPHONE ENCOUNTER
PT called to confirm her appointment for [facility] adv her appointment is not till 7/8, reviewed future appointment and time with us as well, provided address to location.

## 2025-07-03 ENCOUNTER — HOSPITAL ENCOUNTER (OUTPATIENT)
Dept: NON INVASIVE DIAGNOSTICS | Facility: HOSPITAL | Age: 70
Discharge: HOME | End: 2025-07-03
Attending: STUDENT IN AN ORGANIZED HEALTH CARE EDUCATION/TRAINING PROGRAM
Payer: COMMERCIAL

## 2025-07-03 VITALS
HEART RATE: 88 BPM | HEIGHT: 64 IN | DIASTOLIC BLOOD PRESSURE: 69 MMHG | BODY MASS INDEX: 29.02 KG/M2 | SYSTOLIC BLOOD PRESSURE: 125 MMHG | WEIGHT: 170 LBS

## 2025-07-03 DIAGNOSIS — I10 BENIGN ESSENTIAL HYPERTENSION: ICD-10-CM

## 2025-07-03 DIAGNOSIS — D64.9 ANEMIA, UNSPECIFIED TYPE: ICD-10-CM

## 2025-07-03 LAB
AORTIC ROOT: 3.1 CM
AV PEAK GRADIENT: 10 MMHG
BSA FOR ECHO PROCEDURE: 1.82 M2
FRACTIONAL SHORTENING: 34 (ref 28–44)
INTERVENTRICULAR SEPTUM IN DIASTOLE (PARASTERNAL SHORT AXIS VIEW): 1.4 CM
INTERVENTRICULAR SEPTUM: 1.4 CM (ref 0.6–1.1)
LEFT ATRIUM SIZE: 3 CM
LEFT INTERNAL DIMENSION IN SYSTOLE: 2.3 CM (ref 2.1–4)
LEFT VENTRICULAR INTERNAL DIMENSION IN DIASTOLE: 3.5 CM (ref 3.5–6)
LEFT VENTRICULAR POSTERIOR WALL IN END DIASTOLE: 1 CM
LEFT VENTRICULAR STROKE VOLUME: 33 ML
LV EF US.2D.A4C+ESTIMATED: 57 %
LVSV (TEICH): 33 ML
RA PRESSURE ESTIMATED: 3 MMHG
RV PSP: 31 MMHG
SL CV LV EF: 66
SL CV PED ECHO LEFT VENTRICLE DIASTOLIC VOLUME (MOD BIPLANE) 2D: 50 ML
SL CV PED ECHO LEFT VENTRICLE SYSTOLIC VOLUME (MOD BIPLANE) 2D: 18 ML
TR MAX PG: 28 MMHG
TR PEAK VELOCITY: 2.7 M/S
TRICUSPID ANNULAR PLANE SYSTOLIC EXCURSION: 1.9 CM
TRICUSPID VALVE PEAK REGURGITATION VELOCITY: 2.67 M/S

## 2025-07-03 PROCEDURE — 93321 DOPPLER ECHO F-UP/LMTD STD: CPT | Performed by: INTERNAL MEDICINE

## 2025-07-03 PROCEDURE — 93308 TTE F-UP OR LMTD: CPT

## 2025-07-03 PROCEDURE — 93308 TTE F-UP OR LMTD: CPT | Performed by: INTERNAL MEDICINE

## 2025-07-03 PROCEDURE — 93325 DOPPLER ECHO COLOR FLOW MAPG: CPT

## 2025-07-03 PROCEDURE — 93321 DOPPLER ECHO F-UP/LMTD STD: CPT

## 2025-07-03 PROCEDURE — 93325 DOPPLER ECHO COLOR FLOW MAPG: CPT | Performed by: INTERNAL MEDICINE

## 2025-07-05 ENCOUNTER — TELEPHONE (OUTPATIENT)
Dept: INTERNAL MEDICINE CLINIC | Facility: CLINIC | Age: 70
End: 2025-07-05

## 2025-07-05 NOTE — TELEPHONE ENCOUNTER
This patient needs follow-up appt- please schedule her for 9/3/25 so I can see her with any of the residents- thank you   Dr. Hamm

## 2025-07-07 ENCOUNTER — TELEPHONE (OUTPATIENT)
Dept: INFUSION CENTER | Facility: HOSPITAL | Age: 70
End: 2025-07-07

## 2025-07-07 DIAGNOSIS — M54.16 LUMBAR RADICULOPATHY: ICD-10-CM

## 2025-07-07 DIAGNOSIS — E53.8 LOW SERUM VITAMIN B12: ICD-10-CM

## 2025-07-07 RX ORDER — GABAPENTIN 300 MG/1
600 CAPSULE ORAL 2 TIMES DAILY
Qty: 180 CAPSULE | Refills: 3 | Status: SHIPPED | OUTPATIENT
Start: 2025-07-07

## 2025-07-07 RX ORDER — CYANOCOBALAMIN (VITAMIN B-12) 500 MCG
500 TABLET ORAL DAILY
Qty: 30 TABLET | Refills: 3 | Status: SHIPPED | OUTPATIENT
Start: 2025-07-07 | End: 2025-11-04

## 2025-07-07 NOTE — TELEPHONE ENCOUNTER
Needs it before going on vacation. Will call back if there are others. Patient was not home to check

## 2025-07-07 NOTE — TELEPHONE ENCOUNTER
It appears her appointment is for 9/2, please call patient to reschedule for 9/3 when paulo is back in office.

## 2025-07-08 ENCOUNTER — HOSPITAL ENCOUNTER (OUTPATIENT)
Dept: INFUSION CENTER | Facility: HOSPITAL | Age: 70
Discharge: HOME | End: 2025-07-08
Attending: STUDENT IN AN ORGANIZED HEALTH CARE EDUCATION/TRAINING PROGRAM
Payer: COMMERCIAL

## 2025-07-08 VITALS
SYSTOLIC BLOOD PRESSURE: 134 MMHG | RESPIRATION RATE: 18 BRPM | HEART RATE: 109 BPM | DIASTOLIC BLOOD PRESSURE: 74 MMHG | TEMPERATURE: 97.4 F

## 2025-07-08 DIAGNOSIS — D64.9 ANEMIA, UNSPECIFIED TYPE: Primary | ICD-10-CM

## 2025-07-08 PROCEDURE — 96365 THER/PROPH/DIAG IV INF INIT: CPT

## 2025-07-08 RX ORDER — SODIUM CHLORIDE 9 MG/ML
20 INJECTION, SOLUTION INTRAVENOUS ONCE
Status: CANCELLED | OUTPATIENT
Start: 2025-07-14

## 2025-07-08 RX ORDER — SODIUM CHLORIDE 9 MG/ML
20 INJECTION, SOLUTION INTRAVENOUS ONCE
Status: COMPLETED | OUTPATIENT
Start: 2025-07-08 | End: 2025-07-08

## 2025-07-08 RX ADMIN — IRON SUCROSE 200 MG: 20 INJECTION, SOLUTION INTRAVENOUS at 08:32

## 2025-07-08 RX ADMIN — SODIUM CHLORIDE 20 ML/HR: 0.9 INJECTION, SOLUTION INTRAVENOUS at 08:32

## 2025-07-08 NOTE — PLAN OF CARE
Problem: Potential for Falls  Goal: Patient will remain free of falls  Description: INTERVENTIONS:  - Educate patient/family on patient safety including physical limitations  - Instruct patient to call for assistance with activity   - Consider consulting OT/PT to assist with strengthening/mobility based on AM PAC & JH-HLM score  - Consult OT/PT to assist with strengthening/mobility   - Keep Call bell within reach  - Keep bed low and locked with side rails adjusted as appropriate  - Keep care items and personal belongings within reach  - Apply yellow socks and bracelet for high fall risk patients  - Consider moving patient to room near nurses station  Outcome: Progressing

## 2025-07-08 NOTE — PROGRESS NOTES
Jenifer Smith  tolerated treatment well with no complications.      Jenifer Smith is aware of future appt on 7/14/25 at 1030.     AVS printed and given to Jenifer Smith.

## 2025-07-14 ENCOUNTER — HOSPITAL ENCOUNTER (OUTPATIENT)
Dept: INFUSION CENTER | Facility: HOSPITAL | Age: 70
Discharge: HOME | End: 2025-07-14
Attending: STUDENT IN AN ORGANIZED HEALTH CARE EDUCATION/TRAINING PROGRAM
Payer: COMMERCIAL

## 2025-07-14 ENCOUNTER — TELEPHONE (OUTPATIENT)
Dept: PAIN MEDICINE | Facility: CLINIC | Age: 70
End: 2025-07-14

## 2025-07-14 VITALS
HEART RATE: 97 BPM | SYSTOLIC BLOOD PRESSURE: 126 MMHG | TEMPERATURE: 96.6 F | DIASTOLIC BLOOD PRESSURE: 75 MMHG | RESPIRATION RATE: 18 BRPM

## 2025-07-14 DIAGNOSIS — D64.9 ANEMIA, UNSPECIFIED TYPE: Primary | ICD-10-CM

## 2025-07-14 PROCEDURE — 96365 THER/PROPH/DIAG IV INF INIT: CPT

## 2025-07-14 RX ORDER — SODIUM CHLORIDE 9 MG/ML
20 INJECTION, SOLUTION INTRAVENOUS ONCE
OUTPATIENT
Start: 2025-07-21

## 2025-07-14 RX ORDER — SODIUM CHLORIDE 9 MG/ML
20 INJECTION, SOLUTION INTRAVENOUS ONCE
Status: COMPLETED | OUTPATIENT
Start: 2025-07-14 | End: 2025-07-14

## 2025-07-14 RX ADMIN — SODIUM CHLORIDE 20 ML/HR: 0.9 INJECTION, SOLUTION INTRAVENOUS at 10:28

## 2025-07-14 RX ADMIN — IRON SUCROSE 200 MG: 20 INJECTION, SOLUTION INTRAVENOUS at 10:28

## 2025-07-14 NOTE — TELEPHONE ENCOUNTER
Is she just talking about the tizanidine? Looks like that's all we give her. She should have enough - Dr. Bansal prescribed 6 months in May.

## 2025-07-14 NOTE — PROGRESS NOTES
Jenifer Smith  tolerated treatment well with no complications.      Jenifer Smith is aware of future appt on 8/25 at 11am after her vacation    AVS printed and given to Jenifer Smith:    No (Declined by Jenifer Smith)

## 2025-07-14 NOTE — TELEPHONE ENCOUNTER
Pt came into office to r/s appt from 7/18 to 8/22. [place] She would like to know if meds can be refilled until her appt. She wont have enough to last her until then.

## 2025-07-14 NOTE — TELEPHONE ENCOUNTER
S/w the patient to inquire. She stated she would like a refill for the Tizanidine. Last ordered for 5/12/25.  She stated it helps with the muscle spasms and pain. She had to reschedule her next ovs. She uses [facility] on [address]. Please advise. Thank you

## 2025-08-22 ENCOUNTER — OFFICE VISIT (OUTPATIENT)
Dept: PAIN MEDICINE | Facility: CLINIC | Age: 70
End: 2025-08-22
Payer: COMMERCIAL

## 2025-08-22 VITALS — BODY MASS INDEX: 30.12 KG/M2 | HEIGHT: 63 IN | WEIGHT: 170 LBS

## 2025-08-22 DIAGNOSIS — M79.18 MYOFASCIAL PAIN: ICD-10-CM

## 2025-08-22 DIAGNOSIS — M51.16 LUMBAR DISC DISEASE WITH RADICULOPATHY: Primary | ICD-10-CM

## 2025-08-22 DIAGNOSIS — M47.816 LUMBAR SPONDYLOSIS: ICD-10-CM

## 2025-08-22 PROCEDURE — G2211 COMPLEX E/M VISIT ADD ON: HCPCS | Performed by: PHYSICIAN ASSISTANT

## 2025-08-22 PROCEDURE — 99214 OFFICE O/P EST MOD 30 MIN: CPT | Performed by: PHYSICIAN ASSISTANT

## (undated) DEVICE — ENDOCUFF VISION LRG GREEN ID 11.2